# Patient Record
Sex: FEMALE | Race: ASIAN | Employment: FULL TIME | URBAN - METROPOLITAN AREA
[De-identification: names, ages, dates, MRNs, and addresses within clinical notes are randomized per-mention and may not be internally consistent; named-entity substitution may affect disease eponyms.]

---

## 2017-07-24 PROBLEM — Z01.419 WOMEN'S ANNUAL ROUTINE GYNECOLOGICAL EXAMINATION: Status: ACTIVE | Noted: 2017-07-24

## 2017-07-24 PROBLEM — N92.6 IRREGULAR MENSES: Status: ACTIVE | Noted: 2017-07-24

## 2017-10-10 ENCOUNTER — HOSPITAL ENCOUNTER (OUTPATIENT)
Dept: MAMMOGRAPHY | Age: 52
Discharge: HOME | End: 2017-10-10
Attending: FAMILY MEDICINE
Payer: COMMERCIAL

## 2017-10-10 DIAGNOSIS — Z12.31 SCREENING MAMMOGRAM, ENCOUNTER FOR: ICD-10-CM

## 2017-10-10 PROCEDURE — 77067 SCR MAMMO BI INCL CAD: CPT

## 2018-10-15 ENCOUNTER — HOSPITAL ENCOUNTER (OUTPATIENT)
Dept: MAMMOGRAPHY | Age: 53
Discharge: HOME | End: 2018-10-15
Attending: FAMILY MEDICINE
Payer: COMMERCIAL

## 2018-10-15 DIAGNOSIS — Z12.31 VISIT FOR SCREENING MAMMOGRAM: ICD-10-CM

## 2018-10-15 PROCEDURE — 77067 SCR MAMMO BI INCL CAD: CPT

## 2019-03-18 PROBLEM — Z12.31 SCREENING MAMMOGRAM, ENCOUNTER FOR: Status: ACTIVE | Noted: 2019-03-18

## 2019-04-08 PROCEDURE — 88305 TISSUE EXAM BY PATHOLOGIST: CPT

## 2019-04-09 ENCOUNTER — HOSPITAL ENCOUNTER (OUTPATIENT)
Dept: LAB | Age: 54
Discharge: HOME | End: 2019-04-09

## 2019-10-21 ENCOUNTER — HOSPITAL ENCOUNTER (OUTPATIENT)
Dept: MAMMOGRAPHY | Age: 54
Discharge: HOME | End: 2019-10-21
Attending: OBSTETRICS & GYNECOLOGY
Payer: COMMERCIAL

## 2019-10-21 DIAGNOSIS — Z12.31 SCREENING MAMMOGRAM, ENCOUNTER FOR: ICD-10-CM

## 2019-10-21 PROCEDURE — 77067 SCR MAMMO BI INCL CAD: CPT

## 2020-10-19 ENCOUNTER — TRANSCRIBE ORDER (OUTPATIENT)
Dept: SCHEDULING | Age: 55
End: 2020-10-19

## 2020-10-19 DIAGNOSIS — Z12.31 SCREENING MAMMOGRAM FOR HIGH-RISK PATIENT: Primary | ICD-10-CM

## 2020-11-09 ENCOUNTER — HOSPITAL ENCOUNTER (OUTPATIENT)
Dept: MAMMOGRAPHY | Age: 55
Discharge: HOME | End: 2020-11-09
Attending: OBSTETRICS & GYNECOLOGY
Payer: COMMERCIAL

## 2020-11-09 DIAGNOSIS — Z12.31 SCREENING MAMMOGRAM FOR HIGH-RISK PATIENT: ICD-10-CM

## 2020-11-09 PROCEDURE — 77067 SCR MAMMO BI INCL CAD: CPT

## 2021-10-22 ENCOUNTER — TRANSCRIBE ORDER (OUTPATIENT)
Dept: SCHEDULING | Age: 56
End: 2021-10-22

## 2021-10-22 DIAGNOSIS — Z12.31 ENCOUNTER FOR SCREENING MAMMOGRAM FOR MALIGNANT NEOPLASM OF BREAST: Primary | ICD-10-CM

## 2021-11-22 ENCOUNTER — HOSPITAL ENCOUNTER (OUTPATIENT)
Dept: MAMMOGRAPHY | Age: 56
Discharge: HOME | End: 2021-11-22
Attending: OBSTETRICS & GYNECOLOGY
Payer: COMMERCIAL

## 2021-11-22 DIAGNOSIS — Z12.31 ENCOUNTER FOR SCREENING MAMMOGRAM FOR MALIGNANT NEOPLASM OF BREAST: ICD-10-CM

## 2021-11-22 PROCEDURE — 77067 SCR MAMMO BI INCL CAD: CPT

## 2021-12-06 ENCOUNTER — HOSPITAL ENCOUNTER (OUTPATIENT)
Dept: MAMMOGRAPHY | Age: 56
Discharge: HOME | End: 2021-12-06
Attending: OBSTETRICS & GYNECOLOGY
Payer: COMMERCIAL

## 2021-12-06 DIAGNOSIS — R92.8 ABNORMAL SCREENING MAMMOGRAM: ICD-10-CM

## 2021-12-06 PROCEDURE — 77065 DX MAMMO INCL CAD UNI: CPT

## 2021-12-06 PROCEDURE — 76642 ULTRASOUND BREAST LIMITED: CPT

## 2021-12-20 ENCOUNTER — HOSPITAL ENCOUNTER (OUTPATIENT)
Dept: MAMMOGRAPHY | Age: 56
Discharge: HOME | End: 2021-12-20
Attending: OBSTETRICS & GYNECOLOGY

## 2021-12-20 DIAGNOSIS — R92.8 ABNORMAL ULTRASOUND OF BREAST: ICD-10-CM

## 2021-12-20 DIAGNOSIS — N63.20 BREAST MASS, LEFT: ICD-10-CM

## 2021-12-23 ENCOUNTER — HOSPITAL ENCOUNTER (OUTPATIENT)
Dept: MAMMOGRAPHY | Age: 56
Discharge: HOME | End: 2021-12-23
Attending: OBSTETRICS & GYNECOLOGY
Payer: COMMERCIAL

## 2021-12-23 VITALS — HEART RATE: 88 BPM | SYSTOLIC BLOOD PRESSURE: 222 MMHG | DIASTOLIC BLOOD PRESSURE: 104 MMHG

## 2021-12-23 DIAGNOSIS — N63.20 BREAST MASS, LEFT: ICD-10-CM

## 2021-12-23 DIAGNOSIS — R92.8 ABNORMAL ULTRASOUND OF BREAST: ICD-10-CM

## 2021-12-23 PROCEDURE — 88342 IMHCHEM/IMCYTCHM 1ST ANTB: CPT

## 2021-12-23 PROCEDURE — 88341 IMHCHEM/IMCYTCHM EA ADD ANTB: CPT

## 2021-12-23 PROCEDURE — 74011000250 HC RX REV CODE- 250: Performed by: OBSTETRICS & GYNECOLOGY

## 2021-12-23 PROCEDURE — A4648 IMPLANTABLE TISSUE MARKER: HCPCS

## 2021-12-23 PROCEDURE — 88361 TUMOR IMMUNOHISTOCHEM/COMPUT: CPT

## 2021-12-23 PROCEDURE — 77065 DX MAMMO INCL CAD UNI: CPT

## 2021-12-23 PROCEDURE — 88305 TISSUE EXAM BY PATHOLOGIST: CPT

## 2021-12-23 RX ORDER — LIDOCAINE HYDROCHLORIDE 10 MG/ML
10 INJECTION INFILTRATION; PERINEURAL
Status: COMPLETED | OUTPATIENT
Start: 2021-12-23 | End: 2021-12-23

## 2021-12-23 RX ADMIN — LIDOCAINE HYDROCHLORIDE 10 ML: 10 INJECTION, SOLUTION INFILTRATION; PERINEURAL at 09:30

## 2021-12-28 ENCOUNTER — HOSPITAL ENCOUNTER (OUTPATIENT)
Dept: MRI IMAGING | Age: 56
Discharge: HOME | End: 2021-12-28
Attending: OBSTETRICS & GYNECOLOGY
Payer: COMMERCIAL

## 2021-12-28 DIAGNOSIS — D05.10 DUCTAL CARCINOMA IN SITU (DCIS) OF BREAST: ICD-10-CM

## 2021-12-28 DIAGNOSIS — C50.919 INFILTRATING DUCT CARCINOMA (HCC): ICD-10-CM

## 2021-12-28 PROCEDURE — 74011250636 HC RX REV CODE- 250/636: Performed by: OBSTETRICS & GYNECOLOGY

## 2021-12-28 PROCEDURE — 77049 MRI BREAST C-+ W/CAD BI: CPT

## 2021-12-28 PROCEDURE — A9576 INJ PROHANCE MULTIPACK: HCPCS | Performed by: OBSTETRICS & GYNECOLOGY

## 2021-12-28 RX ORDER — SODIUM CHLORIDE 0.9 % (FLUSH) 0.9 %
10 SYRINGE (ML) INJECTION
Status: COMPLETED | OUTPATIENT
Start: 2021-12-28 | End: 2021-12-28

## 2021-12-28 RX ADMIN — GADOTERIDOL 15 ML: 279.3 INJECTION, SOLUTION INTRAVENOUS at 11:10

## 2021-12-28 RX ADMIN — Medication 10 ML: at 11:10

## 2021-12-28 NOTE — PROGRESS NOTES
Dr Ailyn Fontenot, [facility]  and I spoke with Ms Jovany Gomez, her daughter and  regarding the results of her Lt breast U/S bx. Pathology: Microinvasive IDC & DCIS. The patient had no post bx issues or concerns. She has the following apptiontments;  MRI   12-28-21 @ 10:45  Dr Dariana Garcia  1-10-22 @ 8:30    Results went well with  and I believe all information was given and all questions answered. They did ask questions regarding treatment and surgical options and I gave them very general terms but told them these questions will be answered by Dr Dariana Garcia.

## 2022-01-10 PROBLEM — C50.212 CARCINOMA OF UPPER-INNER QUADRANT OF LEFT BREAST IN FEMALE, ESTROGEN RECEPTOR NEGATIVE (HCC): Status: ACTIVE | Noted: 2022-01-10

## 2022-01-10 PROBLEM — C50.919 TRIPLE NEGATIVE MALIGNANT NEOPLASM OF BREAST (HCC): Status: ACTIVE | Noted: 2022-01-10

## 2022-01-10 PROBLEM — Z17.1 CARCINOMA OF UPPER-INNER QUADRANT OF LEFT BREAST IN FEMALE, ESTROGEN RECEPTOR NEGATIVE (HCC): Status: ACTIVE | Noted: 2022-01-10

## 2022-01-19 ENCOUNTER — PATIENT OUTREACH (OUTPATIENT)
Dept: CASE MANAGEMENT | Age: 57
End: 2022-01-19

## 2022-01-19 ENCOUNTER — HOSPITAL ENCOUNTER (OUTPATIENT)
Dept: LAB | Age: 57
Discharge: HOME | End: 2022-01-19
Payer: COMMERCIAL

## 2022-01-19 DIAGNOSIS — C50.919 TRIPLE NEGATIVE MALIGNANT NEOPLASM OF BREAST (HCC): ICD-10-CM

## 2022-01-19 LAB
ALBUMIN SERPL-MCNC: 3.6 G/DL (ref 3.5–5)
ALBUMIN/GLOB SERPL: 0.9 {RATIO} (ref 1.2–3.5)
ALP SERPL-CCNC: 117 U/L (ref 50–136)
ALT SERPL-CCNC: 35 U/L (ref 12–65)
ANION GAP SERPL CALC-SCNC: 5 MMOL/L (ref 7–16)
AST SERPL-CCNC: 19 U/L (ref 15–37)
BASOPHILS # BLD: 0 K/UL (ref 0–0.2)
BASOPHILS NFR BLD: 1 % (ref 0–2)
BILIRUB SERPL-MCNC: 0.4 MG/DL (ref 0.2–1.1)
BUN SERPL-MCNC: 13 MG/DL (ref 6–23)
CALCIUM SERPL-MCNC: 8.9 MG/DL (ref 8.3–10.4)
CANCER AG15-3 SERPL-ACNC: 3 U/ML (ref 1–35)
CHLORIDE SERPL-SCNC: 105 MMOL/L (ref 98–107)
CO2 SERPL-SCNC: 29 MMOL/L (ref 21–32)
CREAT SERPL-MCNC: 0.7 MG/DL (ref 0.6–1)
DIFFERENTIAL METHOD BLD: ABNORMAL
EOSINOPHIL # BLD: 0 K/UL (ref 0–0.8)
EOSINOPHIL NFR BLD: 0 % (ref 0.5–7.8)
ERYTHROCYTE [DISTWIDTH] IN BLOOD BY AUTOMATED COUNT: 12.5 % (ref 11.9–14.6)
GLOBULIN SER CALC-MCNC: 4.2 G/DL (ref 2.3–3.5)
GLUCOSE SERPL-MCNC: 137 MG/DL (ref 65–100)
HCT VFR BLD AUTO: 41.8 % (ref 35.8–46.3)
HGB BLD-MCNC: 13.6 G/DL (ref 11.7–15.4)
IMM GRANULOCYTES # BLD AUTO: 0 K/UL (ref 0–0.5)
IMM GRANULOCYTES NFR BLD AUTO: 0 % (ref 0–5)
LYMPHOCYTES # BLD: 1.5 K/UL (ref 0.5–4.6)
LYMPHOCYTES NFR BLD: 21 % (ref 13–44)
Lab: NORMAL
Lab: NORMAL
MCH RBC QN AUTO: 29.3 PG (ref 26.1–32.9)
MCHC RBC AUTO-ENTMCNC: 32.5 G/DL (ref 31.4–35)
MCV RBC AUTO: 90.1 FL (ref 79.6–97.8)
MONOCYTES # BLD: 0.3 K/UL (ref 0.1–1.3)
MONOCYTES NFR BLD: 5 % (ref 4–12)
NEUTS SEG # BLD: 5.4 K/UL (ref 1.7–8.2)
NEUTS SEG NFR BLD: 74 % (ref 43–78)
NRBC # BLD: 0 K/UL (ref 0–0.2)
PLATELET # BLD AUTO: 236 K/UL (ref 150–450)
PMV BLD AUTO: 9.3 FL (ref 9.4–12.3)
POTASSIUM SERPL-SCNC: 3.6 MMOL/L (ref 3.5–5.1)
PROT SERPL-MCNC: 7.8 G/DL (ref 6.3–8.2)
RBC # BLD AUTO: 4.64 M/UL (ref 4.05–5.2)
REFERENCE LAB,REFLB: NORMAL
REFERENCE LAB,REFLB: NORMAL
SODIUM SERPL-SCNC: 139 MMOL/L (ref 136–145)
TEST DESCRIPTION:,ATST: NORMAL
TEST DESCRIPTION:,ATST: NORMAL
WBC # BLD AUTO: 7.3 K/UL (ref 4.3–11.1)

## 2022-01-19 PROCEDURE — 85025 COMPLETE CBC W/AUTO DIFF WBC: CPT

## 2022-01-19 PROCEDURE — 86300 IMMUNOASSAY TUMOR CA 15-3: CPT

## 2022-01-19 PROCEDURE — 80053 COMPREHEN METABOLIC PANEL: CPT

## 2022-01-19 PROCEDURE — 36415 COLL VENOUS BLD VENIPUNCTURE: CPT

## 2022-01-19 NOTE — PROGRESS NOTES
1/19/22 saw pt today with Dr. Vicky Blackwell for initial medical oncology consult for triple negative breast cancer. Family is here with her today.  services Adonis Mac #[id number]. Recommendation is carbo/taxol/keytruda/adriamycin/cytoxan. Will arrange port placement, chemo education, and echo. 4 prescriptions sent to pharmacy, pt aware. Will arrange follow up with Dr. Pino Basilio for elevated blood pressure. Provided opportunity to ask questions and all were discussed. Port education book reviewed with pt. Chemo approval team emailed. Plan a start date of 2/7, not decoupled per Dr. Vicky Blackwell. Manuela, caitlinator, contact information provided. Navigation will continue to follow.

## 2022-01-20 LAB — CANCER AG27-29 SERPL-ACNC: 13.4 U/ML (ref 0–38.6)

## 2022-01-25 ENCOUNTER — DOCUMENTATION ONLY (OUTPATIENT)
Dept: HEMATOLOGY | Age: 57
End: 2022-01-25

## 2022-01-25 NOTE — PROGRESS NOTES
I spoke with Mrs. Stanley Astudillo, via [employer]  [place], regarding her insurance coverage, potential oral medication authorizations, enrollment in the [address] [place], [place] [address] ([address]), and assistance organization resource sheet. Mrs. Stanley Astudillo has [facility]. She has no deductible. 90/10% co-insurance. Of her $1,600 max out of pocket, she has $1,585 remaining. I went over the [employer] co-pay program for [place] and the hospital financial assistance program with Mrs. Stanley Astudillo. I went over the [place] application page by page with Mrs. Stanley Astudillo. Mrs. Stanley Astudillo agreed to submit the [place] co-pay application and signed where appropriate. Hospital financial assistance application in [place] and [facility] was given to Mrs. Stanley Astudillo. Next, I spoke with patient regarding potential oral medication authorizations. I told her that if she ever had any problems getting her oral medications filled to give the dedicated [facility] , Rafa Pena, a call. Most of the time, it is simply an authorization that needs to be done with the insurance company. Next, I spoke with patient regarding enrolling with [facility]. I went over some of the services that [facility] offers and the enrollment process. Lastly, I gave patient a form with various resource organizations that could assist with specific needs (example:  transportation, lodging, preparing meals, home cleaning)     Faxed Physician's Statement to the [facility] 2[facility] at [phone]. Phone [phone]. Form scanned into chart. Patient expressed understanding of the information above and all questions were answered to her satisfaction.

## 2022-02-01 ENCOUNTER — ANESTHESIA EVENT (OUTPATIENT)
Dept: INTERVENTIONAL RADIOLOGY/VASCULAR | Age: 57
End: 2022-02-01
Payer: COMMERCIAL

## 2022-02-01 RX ORDER — SODIUM CHLORIDE, SODIUM LACTATE, POTASSIUM CHLORIDE, CALCIUM CHLORIDE 600; 310; 30; 20 MG/100ML; MG/100ML; MG/100ML; MG/100ML
75 INJECTION, SOLUTION INTRAVENOUS CONTINUOUS
Status: CANCELLED | OUTPATIENT
Start: 2022-02-01

## 2022-02-01 RX ORDER — LIDOCAINE HYDROCHLORIDE 10 MG/ML
0.1 INJECTION INFILTRATION; PERINEURAL AS NEEDED
Status: CANCELLED | OUTPATIENT
Start: 2022-02-01

## 2022-02-01 RX ORDER — HALOPERIDOL 5 MG/ML
1 INJECTION INTRAMUSCULAR
Status: CANCELLED | OUTPATIENT
Start: 2022-02-01 | End: 2022-02-02

## 2022-02-01 RX ORDER — ACETAMINOPHEN 500 MG
1000 TABLET ORAL ONCE
Status: CANCELLED | OUTPATIENT
Start: 2022-02-01 | End: 2022-02-01

## 2022-02-01 RX ORDER — FLUMAZENIL 0.1 MG/ML
0.2 INJECTION INTRAVENOUS
Status: CANCELLED | OUTPATIENT
Start: 2022-02-01

## 2022-02-01 RX ORDER — HYDROMORPHONE HYDROCHLORIDE 2 MG/ML
0.5 INJECTION, SOLUTION INTRAMUSCULAR; INTRAVENOUS; SUBCUTANEOUS
Status: CANCELLED | OUTPATIENT
Start: 2022-02-01

## 2022-02-01 RX ORDER — DIPHENHYDRAMINE HYDROCHLORIDE 50 MG/ML
12.5 INJECTION, SOLUTION INTRAMUSCULAR; INTRAVENOUS
Status: CANCELLED | OUTPATIENT
Start: 2022-02-01

## 2022-02-01 RX ORDER — MIDAZOLAM HYDROCHLORIDE 1 MG/ML
2 INJECTION, SOLUTION INTRAMUSCULAR; INTRAVENOUS
Status: CANCELLED | OUTPATIENT
Start: 2022-02-01 | End: 2022-02-02

## 2022-02-01 RX ORDER — SODIUM CHLORIDE, SODIUM LACTATE, POTASSIUM CHLORIDE, CALCIUM CHLORIDE 600; 310; 30; 20 MG/100ML; MG/100ML; MG/100ML; MG/100ML
100 INJECTION, SOLUTION INTRAVENOUS CONTINUOUS
Status: CANCELLED | OUTPATIENT
Start: 2022-02-01 | End: 2022-02-02

## 2022-02-01 RX ORDER — OXYCODONE HYDROCHLORIDE 5 MG/1
5 TABLET ORAL
Status: CANCELLED | OUTPATIENT
Start: 2022-02-01 | End: 2022-02-02

## 2022-02-01 RX ORDER — NALOXONE HYDROCHLORIDE 0.4 MG/ML
0.1 INJECTION, SOLUTION INTRAMUSCULAR; INTRAVENOUS; SUBCUTANEOUS
Status: CANCELLED | OUTPATIENT
Start: 2022-02-01

## 2022-02-02 ENCOUNTER — ANESTHESIA (OUTPATIENT)
Dept: INTERVENTIONAL RADIOLOGY/VASCULAR | Age: 57
End: 2022-02-02
Payer: COMMERCIAL

## 2022-02-02 ENCOUNTER — HOSPITAL ENCOUNTER (OUTPATIENT)
Dept: INTERVENTIONAL RADIOLOGY/VASCULAR | Age: 57
Discharge: HOME | End: 2022-02-02
Attending: INTERNAL MEDICINE
Payer: COMMERCIAL

## 2022-02-02 VITALS
DIASTOLIC BLOOD PRESSURE: 68 MMHG | TEMPERATURE: 97.8 F | HEIGHT: 60 IN | RESPIRATION RATE: 14 BRPM | HEART RATE: 65 BPM | OXYGEN SATURATION: 99 % | WEIGHT: 161 LBS | SYSTOLIC BLOOD PRESSURE: 125 MMHG | BODY MASS INDEX: 31.61 KG/M2

## 2022-02-02 DIAGNOSIS — C50.919 TRIPLE NEGATIVE MALIGNANT NEOPLASM OF BREAST (HCC): ICD-10-CM

## 2022-02-02 PROCEDURE — 77030010507 HC ADH SKN DERMBND J&J -B

## 2022-02-02 PROCEDURE — 74011000250 HC RX REV CODE- 250: Performed by: PHYSICIAN ASSISTANT

## 2022-02-02 PROCEDURE — 36561 INSERT TUNNELED CV CATH: CPT

## 2022-02-02 PROCEDURE — 76060000032 HC ANESTHESIA 0.5 TO 1 HR

## 2022-02-02 PROCEDURE — 74011250636 HC RX REV CODE- 250/636: Performed by: PHYSICIAN ASSISTANT

## 2022-02-02 PROCEDURE — C1894 INTRO/SHEATH, NON-LASER: HCPCS

## 2022-02-02 PROCEDURE — 74011250636 HC RX REV CODE- 250/636: Performed by: NURSE ANESTHETIST, CERTIFIED REGISTERED

## 2022-02-02 PROCEDURE — C1788 PORT, INDWELLING, IMP: HCPCS

## 2022-02-02 PROCEDURE — 77030031139 HC SUT VCRL2 J&J -A

## 2022-02-02 PROCEDURE — 76937 US GUIDE VASCULAR ACCESS: CPT

## 2022-02-02 PROCEDURE — 77030031131 HC SUT MXN P COVD -B

## 2022-02-02 RX ORDER — SODIUM CHLORIDE, SODIUM LACTATE, POTASSIUM CHLORIDE, CALCIUM CHLORIDE 600; 310; 30; 20 MG/100ML; MG/100ML; MG/100ML; MG/100ML
INJECTION, SOLUTION INTRAVENOUS
Status: DISCONTINUED | OUTPATIENT
Start: 2022-02-02 | End: 2022-02-02 | Stop reason: HOSPADM

## 2022-02-02 RX ORDER — HEPARIN SODIUM (PORCINE) LOCK FLUSH IV SOLN 100 UNIT/ML 100 UNIT/ML
500 SOLUTION INTRAVENOUS ONCE
Status: COMPLETED | OUTPATIENT
Start: 2022-02-02 | End: 2022-02-02

## 2022-02-02 RX ORDER — CEFAZOLIN SODIUM/WATER 2 G/20 ML
2 SYRINGE (ML) INTRAVENOUS ONCE
Status: COMPLETED | OUTPATIENT
Start: 2022-02-02 | End: 2022-02-02

## 2022-02-02 RX ORDER — LIDOCAINE HYDROCHLORIDE AND EPINEPHRINE 10; 10 MG/ML; UG/ML
1-20 INJECTION, SOLUTION INFILTRATION; PERINEURAL
Status: DISCONTINUED | OUTPATIENT
Start: 2022-02-02 | End: 2022-02-06 | Stop reason: HOSPADM

## 2022-02-02 RX ORDER — PROPOFOL 10 MG/ML
INJECTION, EMULSION INTRAVENOUS
Status: DISCONTINUED | OUTPATIENT
Start: 2022-02-02 | End: 2022-02-02 | Stop reason: HOSPADM

## 2022-02-02 RX ORDER — PROPOFOL 10 MG/ML
INJECTION, EMULSION INTRAVENOUS AS NEEDED
Status: DISCONTINUED | OUTPATIENT
Start: 2022-02-02 | End: 2022-02-02 | Stop reason: HOSPADM

## 2022-02-02 RX ADMIN — Medication 2 G: at 09:48

## 2022-02-02 RX ADMIN — PROPOFOL 160 MCG/KG/MIN: 10 INJECTION, EMULSION INTRAVENOUS at 09:48

## 2022-02-02 RX ADMIN — PROPOFOL 50 MG: 10 INJECTION, EMULSION INTRAVENOUS at 09:48

## 2022-02-02 RX ADMIN — SODIUM CHLORIDE, SODIUM LACTATE, POTASSIUM CHLORIDE, AND CALCIUM CHLORIDE: 600; 310; 30; 20 INJECTION, SOLUTION INTRAVENOUS at 09:42

## 2022-02-02 RX ADMIN — LIDOCAINE HYDROCHLORIDE,EPINEPHRINE BITARTRATE 200 MG: 10; .01 INJECTION, SOLUTION INFILTRATION; PERINEURAL at 10:02

## 2022-02-02 RX ADMIN — HEPARIN SODIUM (PORCINE) LOCK FLUSH IV SOLN 100 UNIT/ML 500 UNITS: 100 SOLUTION at 10:09

## 2022-02-02 NOTE — DISCHARGE INSTRUCTIONS
[address]  Department of Interventional Radiology  [facility]  [phone] Office  [phone] Fax  Implanted Port Discharge Instructions      General Instructions:   A port is like an implanted IV. They are usually ordered for patients who will be getting chemotherapy, but can also be used as an IV for long term antibiotics, large amounts of fluids, and/or blood products. Your blood can be drawn from your port for labs also. Those patients who do not have good veins find the ports convenient as they can get the IV they need with one stick. The port can be used long term, and the care is easy. The device is under the skin, and once the skin heals, care is minimal. All that is required is the nurse who accesses the port will need to flush it with heparinized saline after each use. Ports are usually placed in the chest wall, usually on the right side. But they can be place in the arms and in the abdomen. Home Care Instructions: If your port is in your arm, do not allow blood pressure or other IVs to be place in that arm. Do not allow bra straps or any clothing to rub the skin over the port. Do not bathe or swim until the skin has healed and if the port is accessed. Once it is healed, and when the port is not accessed, it is okay to bathe and swim. Restrict yourself to light activity for the first 5 days after getting the port put in, after that, resume normal activity slowly. You may resume your normal diet and medications. Follow-Up Instructions: Please see your oncologist, or whatever physician ordered the port as he/she has requested of you. Call If: You should call your Physician and/or the Radiology Nurse if you notice redness, pus, swelling, or pain from the area of your incision. Call if you should develop a fever. The nurses who access your port will know to call your doctor if the port does not seem to be working properly. You need to tell the nurses who use the port if you should have any pain or swelling at the site during an infusion. To Reach Us: If you have any questions about your procedure, please call the Interventional Radiology department at [phone]. After business hours (5pm) and weekends, call the answering service at [phone] and ask for the Radiologist on call to be paged. Si tiene Preguntas acerca del procedimiento, por favor llame al departamento de Radiología Intervencional al [phone]. Después de horas de oficina (5 pm) y los fines de [place], llamar al [place] Ask Alexa al [phone] y pregunte por el [facility]. Interventional Radiology General Nurse Discharge    After general anesthesia or intravenous sedation, for 24 hours or while taking prescription Narcotics:  · Limit your activities  · Do not drive and operate hazardous machinery  · Do not make important personal or business decisions  · Do  not drink alcoholic beverages  · If you have not urinated within 8 hours after discharge, please contact your surgeon on call. * Please give a list of your current medications to your Primary Care Provider. * Please update this list whenever your medications are discontinued, doses are     changed, or new medications (including over-the-counter products) are added. * Please carry medication information at all times in case of emergency situations. These are general instructions for a healthy lifestyle:    No smoking/ No tobacco products/ Avoid exposure to second hand smoke  Surgeon General's Warning:  Quitting smoking now greatly reduces serious risk to your health.     Obesity, smoking, and sedentary lifestyle greatly increases your risk for illness  A healthy diet, regular physical exercise & weight monitoring are important for maintaining a healthy lifestyle    You may be retaining fluid if you have a history of heart failure or if you experience any of the following symptoms:  Weight gain of 3 pounds or more overnight or 5 pounds in a week, increased swelling in our hands or feet or shortness of breath while lying flat in bed. Please call your doctor as soon as you notice any of these symptoms; do not wait until your next office visit. Recognize signs and symptoms of STROKE:  F-face looks uneven    A-arms unable to move or move unevenly    S-speech slurred or non-existent    T-time-call 911 as soon as signs and symptoms begin-DO NOT go       Back to bed or wait to see if you get better-TIME IS BRAIN.       Patient Signature:  Date: 2/2/2022  Discharging Nurse: Kayla Schwartz RN

## 2022-02-02 NOTE — PROGRESS NOTES
Prep complete. Patient ready  For procedure.  utilized for triage and port teaching. Opportunity for questions provided. Both patient and family verbalized understanding.

## 2022-02-02 NOTE — PROCEDURES
Department of Interventional Radiology  [phone]        Interventional Radiology Brief Procedure Note    Patient: Angela Gonzales MRN: [id number]  SSN: [id number]    YOB: 1965  Age: 49 y.o.   Sex: female      Date of Procedure: 2/2/2022    Pre-Procedure Diagnosis: breast cancer    Post-Procedure Diagnosis: SAME    Procedure(s): Venous Chest Port Placement    Brief Description of Procedure: as above    Performed By: Lucio Fowler PA-C     Assistants: None    Anesthesia:TIVS/MAC    Estimated Blood Loss: Less than 10ml    Specimens:  None    Implants:  Chest Port Placement    Findings: catheter tip in right atrium     Complications: None    Recommendations: ok to use port     Follow Up: prn    Signed By: Lucio Fowler PA-C     February 2, 2022

## 2022-02-02 NOTE — ANESTHESIA POSTPROCEDURE EVALUATION
Tunneled CV catheter. total IV anesthesia    Anesthesia Post Evaluation      Multimodal analgesia: multimodal analgesia used between 6 hours prior to anesthesia start to PACU discharge  Patient location during evaluation: bedside  Patient participation: complete - patient participated  Level of consciousness: awake  Pain management: adequate  Airway patency: patent  Anesthetic complications: no  Cardiovascular status: acceptable  Respiratory status: spontaneous ventilation and acceptable  Hydration status: acceptable  Post anesthesia nausea and vomiting:  none      INITIAL Post-op Vital signs:   Vitals Value Taken Time   /68 02/02/22 1050   Temp     Pulse 78 02/02/22 1054   Resp 14 02/02/22 1050   SpO2 100 % 02/02/22 1052   Vitals shown include unvalidated device data.

## 2022-02-02 NOTE — H&P
Department of Interventional Radiology  [phone]    History and Physical    Patient:  Nayely Madison MRN:  [id number]  SSN:  [id number]    YOB: 1965  Age:  49 y.o. Sex:  female      Primary Care Provider:  Jeovanny Edmonds MD  Referring Physician:  Tiffany Salmon MD    Subjective:     Chief Complaint: port    History of the Present Illness: The patient is a 49 y.o. Mandarin speaking female with breast cancer who presents for venous chest port placement. Npo. No acute complaints. Past Medical History:   Diagnosis Date    Breast cancer ([facility] [address]     Hypertension     Murmur, heart     echo 2016 EF 60-65% ; 1/31/22 states never has had symptoms    Poor historian     difficulty with verifying medication list     Past Surgical History:   Procedure Laterality Date    [place] BREAST SURGERY PROCEDURE UNLISTED      bx of left breast         Review of Systems:    Pertinent items are noted in the History of Present Illness. Prior to Admission medications    Medication Sig Start Date End Date Taking? Authorizing Provider   LORazepam (Ativan) 1 mg tablet Take 0.5-1 Tablets by mouth every eight (8) hours as needed for Anxiety. Max Daily Amount: 3 mg. Indications: anxious, nausea and vomiting caused by cancer drugs 1/29/22  Yes Jacqueline Herbert NP   senna-docusate (PERICOLACE) 8.6-50 mg per tablet Take 1 Tablet by mouth daily as needed for Constipation. 1/29/22  Yes Jacqueline Herbert NP   spironolactone (ALDACTONE) 25 mg tablet Take 25 mg by mouth daily. 1/20/22 1/20/23 Yes Provider, Historical   montelukast (Singulair) 10 mg tablet Take 10 mg by mouth daily. Yes Provider, Historical   dilTIAZem CD (CARDIZEM CD) 240 mg ER capsule 1 po qd 11/5/18  Yes Jeovanny Edmonds MD   losartan-hydroCHLOROthiazide [facility]) 100-12.5 mg per tablet 1 PO QD 11/5/18  Yes Jeovanny Edmonds MD   prochlorperazine (Compazine) 10 mg tablet Take 1 Tablet by mouth every six (6) hours as needed for Nausea.  Indications: prevent nausea and vomiting from cancer chemotherapy  Patient not taking: Reported on 1/31/2022 1/29/22   Piedad, Idelia Pill, NP   ondansetron hcl (Zofran) 8 mg tablet Take 1 Tablet by mouth every eight (8) hours as needed for Nausea.  Indications: prevent nausea and vomiting from cancer chemotherapy  Patient not taking: Reported on 1/31/2022 1/29/22   Piedad, Idelia Pill, NP   lidocaine-prilocaine (EMLA) topical cream Apply to port about 45 minutes prior to access  Patient not taking: Reported on 2/2/2022 1/29/22   Piedad, Idelia Pill, NP        Allergies   Allergen Reactions    Lisinopril Cough     Other reaction(s): Cough       Family History   Problem Relation Age of Onset    No Known Problems Mother     Hypertension Father     No Known Problems Sister     No Known Problems Brother     Other Other         states no family history    No Known Problems Sister     Breast Cancer Neg Hx     Colon Cancer Neg Hx     Ovarian Cancer Neg Hx     Uterine Cancer Neg Hx      Social History     Tobacco Use    Smoking status: Never Smoker    Smokeless tobacco: Never Used   Substance Use Topics    Alcohol use: No     Alcohol/week: 0.0 standard drinks        Objective:       Physical Examination:    Vitals:    02/02/22 0837   BP: (!) 191/88   Pulse: 84   Resp: 16   Temp: 97.5 °F (36.4 °C)   SpO2: 99%   Weight: 73 kg (161 lb)   Height: 5' (1.524 m)       Pain Assessment  Pain Intensity 1: 0 (02/02/22 0858)               HEART: regular rate and rhythm  LUNG: clear to auscultation bilaterally  ABDOMEN: normal findings: soft, non-tender  EXTREMITIES: warm, no edema    Laboratory:     Lab Results   Component Value Date/Time    Sodium 139 01/19/2022 11:24 AM    Sodium 142 10/25/2018 12:56 PM    Potassium 3.6 01/19/2022 11:24 AM    Potassium 4.1 10/25/2018 12:56 PM    Chloride 105 01/19/2022 11:24 AM    Chloride 102 10/25/2018 12:56 PM    CO2 29 01/19/2022 11:24 AM    CO2 23 10/25/2018 12:56 PM    Anion gap 5 (L) 01/19/2022 11:24 AM    Glucose 137 (H) 01/19/2022 11:24 AM    Glucose 107 (H) 10/25/2018 12:56 PM    BUN 13 01/19/2022 11:24 AM    BUN 10 10/25/2018 12:56 PM    Creatinine 0.70 01/19/2022 11:24 AM    Creatinine 0.59 10/25/2018 12:56 PM    GFR est AA >60 01/19/2022 11:24 AM    GFR est  10/25/2018 12:56 PM    GFR est non-AA >60 01/19/2022 11:24 AM    GFR est non- 10/25/2018 12:56 PM    Calcium 8.9 01/19/2022 11:24 AM    Calcium 9.4 10/25/2018 12:56 PM    Albumin 3.6 01/19/2022 11:24 AM    Albumin 4.4 10/25/2018 12:56 PM    Protein, total 7.8 01/19/2022 11:24 AM    Protein, total 7.7 10/25/2018 12:56 PM    Globulin 4.2 (H) 01/19/2022 11:24 AM    A-G Ratio 0.9 (L) 01/19/2022 11:24 AM    A-G Ratio 1.3 10/25/2018 12:56 PM    ALT (SGPT) 35 01/19/2022 11:24 AM    ALT (SGPT) 16 10/25/2018 12:56 PM     Lab Results   Component Value Date/Time    WBC 7.3 01/19/2022 11:24 AM    WBC 7.3 10/25/2018 09:35 AM    HGB 13.6 01/19/2022 11:24 AM    HGB 13.4 10/25/2018 09:35 AM    HCT 41.8 01/19/2022 11:24 AM    HCT 40.6 10/25/2018 09:35 AM    PLATELET 479 32/78/7525 11:24 AM    PLATELET 655 42/47/7058 09:35 AM     No results found for: APTT, PTP, INR, INREXT    Assessment:     Breast cancer    Hospital Problems  Date Reviewed: 1/25/2022    None          Plan:     Planned Procedure:  Port placement    Risks, benefits, and alternatives reviewed with patient and she agrees to proceed with the procedure.       Signed By: Nhung Grace PA-C     February 2, 2022

## 2022-02-02 NOTE — PROGRESS NOTES
Patient back to IR Recovery 5 for MAC recovery period. Patient resting comfortably with daughter at bedside.

## 2022-02-02 NOTE — ANESTHESIA PREPROCEDURE EVALUATION
Anesthetic History   No history of anesthetic complications            Review of Systems / Medical History  Patient summary reviewed and pertinent labs reviewed    Pulmonary  Within defined limits                 Neuro/Psych         Headaches     Cardiovascular    Hypertension: well controlled              Exercise tolerance: >4 METS     GI/Hepatic/Renal  Within defined limits              Endo/Other        Obesity and cancer (Breast)     Other Findings              Physical Exam    Airway  Mallampati: II  TM Distance: 4 - 6 cm  Neck ROM: normal range of motion   Mouth opening: Normal     Cardiovascular  Regular rate and rhythm,  S1 and S2 normal,  no murmur, click, rub, or gallop          Pertinent negatives: No murmur   Dental  No notable dental hx       Pulmonary  Breath sounds clear to auscultation               Abdominal  GI exam deferred       Other Findings            Anesthetic Plan    ASA: 2  Anesthesia type: total IV anesthesia          Induction: Intravenous  Anesthetic plan and risks discussed with: Patient and Son / Daughter       used via video feed for H&P and IC.

## 2022-02-07 ENCOUNTER — HOSPITAL ENCOUNTER (OUTPATIENT)
Dept: INFUSION THERAPY | Age: 57
Discharge: HOME | End: 2022-02-07
Payer: COMMERCIAL

## 2022-02-07 ENCOUNTER — PATIENT OUTREACH (OUTPATIENT)
Dept: CASE MANAGEMENT | Age: 57
End: 2022-02-07

## 2022-02-07 VITALS
RESPIRATION RATE: 18 BRPM | DIASTOLIC BLOOD PRESSURE: 75 MMHG | SYSTOLIC BLOOD PRESSURE: 123 MMHG | TEMPERATURE: 98 F | OXYGEN SATURATION: 100 % | HEART RATE: 70 BPM

## 2022-02-07 DIAGNOSIS — Z79.899 HIGH RISK MEDICATION USE: ICD-10-CM

## 2022-02-07 DIAGNOSIS — C50.919 TRIPLE NEGATIVE MALIGNANT NEOPLASM OF BREAST (HCC): ICD-10-CM

## 2022-02-07 DIAGNOSIS — C50.919 TRIPLE NEGATIVE MALIGNANT NEOPLASM OF BREAST (HCC): Primary | ICD-10-CM

## 2022-02-07 LAB
ALBUMIN SERPL-MCNC: 3.9 G/DL (ref 3.5–5)
ALBUMIN/GLOB SERPL: 0.8 {RATIO} (ref 1.2–3.5)
ALP SERPL-CCNC: 111 U/L (ref 50–136)
ALT SERPL-CCNC: 31 U/L (ref 12–65)
ANION GAP SERPL CALC-SCNC: 4 MMOL/L (ref 7–16)
AST SERPL-CCNC: 18 U/L (ref 15–37)
BASOPHILS # BLD: 0.1 K/UL (ref 0–0.2)
BASOPHILS NFR BLD: 1 % (ref 0–2)
BILIRUB SERPL-MCNC: 0.6 MG/DL (ref 0.2–1.1)
BUN SERPL-MCNC: 19 MG/DL (ref 6–23)
CALCIUM SERPL-MCNC: 9.1 MG/DL (ref 8.3–10.4)
CHLORIDE SERPL-SCNC: 104 MMOL/L (ref 98–107)
CO2 SERPL-SCNC: 28 MMOL/L (ref 21–32)
CREAT SERPL-MCNC: 0.6 MG/DL (ref 0.6–1)
DIFFERENTIAL METHOD BLD: ABNORMAL
EOSINOPHIL # BLD: 0.1 K/UL (ref 0–0.8)
EOSINOPHIL NFR BLD: 1 % (ref 0.5–7.8)
ERYTHROCYTE [DISTWIDTH] IN BLOOD BY AUTOMATED COUNT: 12.6 % (ref 11.9–14.6)
GLOBULIN SER CALC-MCNC: 4.6 G/DL (ref 2.3–3.5)
GLUCOSE SERPL-MCNC: 112 MG/DL (ref 65–100)
HBV SURFACE AB SERPL IA-ACNC: 206.71 MIU/ML
HCT VFR BLD AUTO: 42.8 % (ref 35.8–46.3)
HGB BLD-MCNC: 13.9 G/DL (ref 11.7–15.4)
IMM GRANULOCYTES # BLD AUTO: 0 K/UL (ref 0–0.5)
IMM GRANULOCYTES NFR BLD AUTO: 0 % (ref 0–5)
LYMPHOCYTES # BLD: 1.9 K/UL (ref 0.5–4.6)
LYMPHOCYTES NFR BLD: 18 % (ref 13–44)
MCH RBC QN AUTO: 28.9 PG (ref 26.1–32.9)
MCHC RBC AUTO-ENTMCNC: 32.5 G/DL (ref 31.4–35)
MCV RBC AUTO: 89 FL (ref 79.6–97.8)
MONOCYTES # BLD: 0.5 K/UL (ref 0.1–1.3)
MONOCYTES NFR BLD: 5 % (ref 4–12)
NEUTS SEG # BLD: 7.9 K/UL (ref 1.7–8.2)
NEUTS SEG NFR BLD: 75 % (ref 43–78)
NRBC # BLD: 0 K/UL (ref 0–0.2)
PLATELET # BLD AUTO: 286 K/UL (ref 150–450)
PMV BLD AUTO: 9.3 FL (ref 9.4–12.3)
POTASSIUM SERPL-SCNC: 3.8 MMOL/L (ref 3.5–5.1)
PROT SERPL-MCNC: 8.5 G/DL (ref 6.3–8.2)
RBC # BLD AUTO: 4.81 M/UL (ref 4.05–5.2)
SODIUM SERPL-SCNC: 136 MMOL/L (ref 136–145)
T3 SERPL-MCNC: 1.35 NG/ML (ref 0.6–1.81)
T4 FREE SERPL-MCNC: 1.1 NG/DL (ref 0.78–1.46)
TSH SERPL DL<=0.005 MIU/L-ACNC: 0.76 UIU/ML (ref 0.36–3.74)
WBC # BLD AUTO: 10.5 K/UL (ref 4.3–11.1)

## 2022-02-07 PROCEDURE — 84480 ASSAY TRIIODOTHYRONINE (T3): CPT

## 2022-02-07 PROCEDURE — 83735 ASSAY OF MAGNESIUM: CPT

## 2022-02-07 PROCEDURE — 87340 HEPATITIS B SURFACE AG IA: CPT

## 2022-02-07 PROCEDURE — 96417 CHEMO IV INFUS EACH ADDL SEQ: CPT

## 2022-02-07 PROCEDURE — 86704 HEP B CORE ANTIBODY TOTAL: CPT

## 2022-02-07 PROCEDURE — 80053 COMPREHEN METABOLIC PANEL: CPT

## 2022-02-07 PROCEDURE — 36591 DRAW BLOOD OFF VENOUS DEVICE: CPT

## 2022-02-07 PROCEDURE — 74011250636 HC RX REV CODE- 250/636: Performed by: INTERNAL MEDICINE

## 2022-02-07 PROCEDURE — 96375 TX/PRO/DX INJ NEW DRUG ADDON: CPT

## 2022-02-07 PROCEDURE — 96367 TX/PROPH/DG ADDL SEQ IV INF: CPT

## 2022-02-07 PROCEDURE — 74011000250 HC RX REV CODE- 250: Performed by: INTERNAL MEDICINE

## 2022-02-07 PROCEDURE — 85025 COMPLETE CBC W/AUTO DIFF WBC: CPT

## 2022-02-07 PROCEDURE — 84443 ASSAY THYROID STIM HORMONE: CPT

## 2022-02-07 PROCEDURE — 96413 CHEMO IV INFUSION 1 HR: CPT

## 2022-02-07 PROCEDURE — 86706 HEP B SURFACE ANTIBODY: CPT

## 2022-02-07 PROCEDURE — 84439 ASSAY OF FREE THYROXINE: CPT

## 2022-02-07 PROCEDURE — 74011000258 HC RX REV CODE- 258: Performed by: INTERNAL MEDICINE

## 2022-02-07 RX ORDER — DIPHENHYDRAMINE HYDROCHLORIDE 50 MG/ML
50 INJECTION, SOLUTION INTRAMUSCULAR; INTRAVENOUS ONCE
Status: COMPLETED | OUTPATIENT
Start: 2022-02-07 | End: 2022-02-07

## 2022-02-07 RX ORDER — SODIUM CHLORIDE 0.9 % (FLUSH) 0.9 %
10 SYRINGE (ML) INJECTION AS NEEDED
Status: ACTIVE | OUTPATIENT
Start: 2022-02-07 | End: 2022-02-07

## 2022-02-07 RX ORDER — SODIUM CHLORIDE 0.9 % (FLUSH) 0.9 %
10 SYRINGE (ML) INJECTION AS NEEDED
Status: DISCONTINUED | OUTPATIENT
Start: 2022-02-07 | End: 2022-02-09 | Stop reason: HOSPADM

## 2022-02-07 RX ORDER — ONDANSETRON 2 MG/ML
8 INJECTION INTRAMUSCULAR; INTRAVENOUS ONCE
Status: COMPLETED | OUTPATIENT
Start: 2022-02-07 | End: 2022-02-07

## 2022-02-07 RX ORDER — SODIUM CHLORIDE 9 MG/ML
25 INJECTION, SOLUTION INTRAVENOUS CONTINUOUS
Status: ACTIVE | OUTPATIENT
Start: 2022-02-07 | End: 2022-02-07

## 2022-02-07 RX ADMIN — SODIUM CHLORIDE 25 ML/HR: 9 INJECTION, SOLUTION INTRAVENOUS at 11:20

## 2022-02-07 RX ADMIN — PACLITAXEL 142 MG: 6 INJECTION, SOLUTION, CONCENTRATE INTRAVENOUS at 13:50

## 2022-02-07 RX ADMIN — ONDANSETRON 8 MG: 2 INJECTION INTRAMUSCULAR; INTRAVENOUS at 12:28

## 2022-02-07 RX ADMIN — SODIUM CHLORIDE 744 MG: 900 INJECTION, SOLUTION INTRAVENOUS at 15:10

## 2022-02-07 RX ADMIN — FAMOTIDINE 20 MG: 10 INJECTION, SOLUTION INTRAVENOUS at 12:25

## 2022-02-07 RX ADMIN — Medication 10 ML: at 09:35

## 2022-02-07 RX ADMIN — DIPHENHYDRAMINE HYDROCHLORIDE 50 MG: 50 INJECTION, SOLUTION INTRAMUSCULAR; INTRAVENOUS at 12:31

## 2022-02-07 RX ADMIN — SODIUM CHLORIDE 200 MG: 9 INJECTION, SOLUTION INTRAVENOUS at 11:45

## 2022-02-07 RX ADMIN — FOSAPREPITANT 150 MG: 150 INJECTION, POWDER, LYOPHILIZED, FOR SOLUTION INTRAVENOUS at 13:00

## 2022-02-07 RX ADMIN — SODIUM CHLORIDE, PRESERVATIVE FREE 10 ML: 5 INJECTION INTRAVENOUS at 11:20

## 2022-02-07 RX ADMIN — SODIUM CHLORIDE, PRESERVATIVE FREE 10 ML: 5 INJECTION INTRAVENOUS at 15:50

## 2022-02-07 RX ADMIN — DEXAMETHASONE SODIUM PHOSPHATE 12 MG: 4 INJECTION, SOLUTION INTRAMUSCULAR; INTRAVENOUS at 12:40

## 2022-02-07 NOTE — PROGRESS NOTES
2/7/2022 saw pt today with Dr. Vicky Blackwell and interpeter # [id number] for pre chemo visit #1 Carbo/Taxol/Ketruda. Patient discussed plan with Dr. Vicky Blackwell.  and daughter present. Given number for  service line for calls to office [phone]. .  Follow up in 1 week. Encouraged to call with any concerns, uncontrolled side effects from treatment, or fevers. Navigation will continue to follow.

## 2022-02-07 NOTE — PROGRESS NOTES
Arrived to the [facility] from [facility] office visit. Pembrolizumab, Taxol and Carboplatin completed. Patient tolerated well. Pre and post infusion instructions  given via  services for Mandarin chinese. Jessica Shell [phone] and Irena Tsang [id number] interpreters. Any issues or concerns during appointment: NO.  Patient aware of next infusion appointment on 02/14/22 (date) at 80 (time). Patient aware of next lab and [facility] office visit on 02/14/22 (date) at 0930 (time). Patient instructed to call provider with temperature of 100.4 or greater or nausea/vomiting/ diarrhea or pain not controlled by medications. Pt and family have  services number to call with any questions post treatment. Discharged ambulatory with family.

## 2022-02-07 NOTE — PROGRESS NOTES
Patient arrived to port lab for port access and lab draw   Florin Erp accessed and labs drawn per protocol   *Port remains accessed   Patient discharged from port lab ambulatory*

## 2022-02-08 LAB
HBV CORE AB SERPL QL IA: NEGATIVE
HBV SURFACE AG SERPL QL IA: NEGATIVE
MAGNESIUM SERPL-MCNC: 2 MG/DL (ref 1.6–2.3)

## 2022-02-09 ENCOUNTER — DOCUMENTATION ONLY (OUTPATIENT)
Dept: HEMATOLOGY | Age: 57
End: 2022-02-09

## 2022-02-09 NOTE — PROGRESS NOTES
Ms. Saralyn Riedel has been enrolled into the [employer] program for [place]. E/D 1/1/22 - 12/31/22  ID# [id number]. Enrollment fax will be scanned into chart upon receipt.

## 2022-02-14 ENCOUNTER — PATIENT OUTREACH (OUTPATIENT)
Dept: CASE MANAGEMENT | Age: 57
End: 2022-02-14

## 2022-02-14 ENCOUNTER — HOSPITAL ENCOUNTER (OUTPATIENT)
Dept: INFUSION THERAPY | Age: 57
Discharge: HOME | End: 2022-02-14
Payer: COMMERCIAL

## 2022-02-14 DIAGNOSIS — C50.919 TRIPLE NEGATIVE MALIGNANT NEOPLASM OF BREAST (HCC): Primary | ICD-10-CM

## 2022-02-14 DIAGNOSIS — C50.919 TRIPLE NEGATIVE MALIGNANT NEOPLASM OF BREAST (HCC): ICD-10-CM

## 2022-02-14 LAB
ALBUMIN SERPL-MCNC: 3.5 G/DL (ref 3.5–5)
ALBUMIN/GLOB SERPL: 0.9 {RATIO} (ref 1.2–3.5)
ALP SERPL-CCNC: 106 U/L (ref 50–136)
ALT SERPL-CCNC: 54 U/L (ref 12–65)
ANION GAP SERPL CALC-SCNC: 6 MMOL/L (ref 7–16)
AST SERPL-CCNC: 26 U/L (ref 15–37)
BASOPHILS # BLD: 0 K/UL (ref 0–0.2)
BASOPHILS NFR BLD: 1 % (ref 0–2)
BILIRUB SERPL-MCNC: 0.6 MG/DL (ref 0.2–1.1)
BUN SERPL-MCNC: 21 MG/DL (ref 6–23)
CALCIUM SERPL-MCNC: 9 MG/DL (ref 8.3–10.4)
CHLORIDE SERPL-SCNC: 106 MMOL/L (ref 98–107)
CO2 SERPL-SCNC: 25 MMOL/L (ref 21–32)
CREAT SERPL-MCNC: 0.8 MG/DL (ref 0.6–1)
DIFFERENTIAL METHOD BLD: ABNORMAL
EOSINOPHIL # BLD: 0.1 K/UL (ref 0–0.8)
EOSINOPHIL NFR BLD: 2 % (ref 0.5–7.8)
ERYTHROCYTE [DISTWIDTH] IN BLOOD BY AUTOMATED COUNT: 12.3 % (ref 11.9–14.6)
GLOBULIN SER CALC-MCNC: 4.1 G/DL (ref 2.3–3.5)
GLUCOSE SERPL-MCNC: 168 MG/DL (ref 65–100)
HCT VFR BLD AUTO: 40 % (ref 35.8–46.3)
HGB BLD-MCNC: 12.9 G/DL (ref 11.7–15.4)
IMM GRANULOCYTES # BLD AUTO: 0 K/UL (ref 0–0.5)
IMM GRANULOCYTES NFR BLD AUTO: 1 % (ref 0–5)
LYMPHOCYTES # BLD: 0.5 K/UL (ref 0.5–4.6)
LYMPHOCYTES NFR BLD: 9 % (ref 13–44)
MCH RBC QN AUTO: 28.5 PG (ref 26.1–32.9)
MCHC RBC AUTO-ENTMCNC: 32.3 G/DL (ref 31.4–35)
MCV RBC AUTO: 88.3 FL (ref 79.6–97.8)
MONOCYTES # BLD: 0.3 K/UL (ref 0.1–1.3)
MONOCYTES NFR BLD: 6 % (ref 4–12)
NEUTS SEG # BLD: 4.8 K/UL (ref 1.7–8.2)
NEUTS SEG NFR BLD: 82 % (ref 43–78)
NRBC # BLD: 0 K/UL (ref 0–0.2)
PLATELET # BLD AUTO: 181 K/UL (ref 150–450)
PMV BLD AUTO: 9.5 FL (ref 9.4–12.3)
POTASSIUM SERPL-SCNC: 4.2 MMOL/L (ref 3.5–5.1)
PROT SERPL-MCNC: 7.6 G/DL (ref 6.3–8.2)
RBC # BLD AUTO: 4.53 M/UL (ref 4.05–5.2)
SODIUM SERPL-SCNC: 137 MMOL/L (ref 136–145)
WBC # BLD AUTO: 5.8 K/UL (ref 4.3–11.1)

## 2022-02-14 PROCEDURE — 96375 TX/PRO/DX INJ NEW DRUG ADDON: CPT

## 2022-02-14 PROCEDURE — 80053 COMPREHEN METABOLIC PANEL: CPT

## 2022-02-14 PROCEDURE — 74011000250 HC RX REV CODE- 250: Performed by: INTERNAL MEDICINE

## 2022-02-14 PROCEDURE — 74011250636 HC RX REV CODE- 250/636: Performed by: INTERNAL MEDICINE

## 2022-02-14 PROCEDURE — 85025 COMPLETE CBC W/AUTO DIFF WBC: CPT

## 2022-02-14 PROCEDURE — 96413 CHEMO IV INFUSION 1 HR: CPT

## 2022-02-14 PROCEDURE — 83735 ASSAY OF MAGNESIUM: CPT

## 2022-02-14 PROCEDURE — 74011000258 HC RX REV CODE- 258: Performed by: INTERNAL MEDICINE

## 2022-02-14 PROCEDURE — 36591 DRAW BLOOD OFF VENOUS DEVICE: CPT

## 2022-02-14 RX ORDER — SODIUM CHLORIDE 0.9 % (FLUSH) 0.9 %
10 SYRINGE (ML) INJECTION AS NEEDED
Status: DISCONTINUED | OUTPATIENT
Start: 2022-02-14 | End: 2022-02-16 | Stop reason: HOSPADM

## 2022-02-14 RX ORDER — DIPHENHYDRAMINE HYDROCHLORIDE 50 MG/ML
50 INJECTION, SOLUTION INTRAMUSCULAR; INTRAVENOUS ONCE
Status: COMPLETED | OUTPATIENT
Start: 2022-02-14 | End: 2022-02-14

## 2022-02-14 RX ORDER — SODIUM CHLORIDE 9 MG/ML
25 INJECTION, SOLUTION INTRAVENOUS CONTINUOUS
Status: ACTIVE | OUTPATIENT
Start: 2022-02-14 | End: 2022-02-14

## 2022-02-14 RX ORDER — SODIUM CHLORIDE 0.9 % (FLUSH) 0.9 %
10 SYRINGE (ML) INJECTION AS NEEDED
Status: ACTIVE | OUTPATIENT
Start: 2022-02-14 | End: 2022-02-14

## 2022-02-14 RX ORDER — DEXAMETHASONE SODIUM PHOSPHATE 100 MG/10ML
10 INJECTION INTRAMUSCULAR; INTRAVENOUS ONCE
Status: COMPLETED | OUTPATIENT
Start: 2022-02-14 | End: 2022-02-14

## 2022-02-14 RX ADMIN — SODIUM CHLORIDE 25 ML/HR: 9 INJECTION, SOLUTION INTRAVENOUS at 11:05

## 2022-02-14 RX ADMIN — DIPHENHYDRAMINE HYDROCHLORIDE 50 MG: 50 INJECTION, SOLUTION INTRAMUSCULAR; INTRAVENOUS at 11:15

## 2022-02-14 RX ADMIN — Medication 10 ML: at 09:50

## 2022-02-14 RX ADMIN — FAMOTIDINE 20 MG: 10 INJECTION, SOLUTION INTRAVENOUS at 11:10

## 2022-02-14 RX ADMIN — SODIUM CHLORIDE, PRESERVATIVE FREE 10 ML: 5 INJECTION INTRAVENOUS at 11:05

## 2022-02-14 RX ADMIN — PACLITAXEL 142 MG: 300 INJECTION, SOLUTION INTRAVENOUS at 11:48

## 2022-02-14 RX ADMIN — DEXAMETHASONE SODIUM PHOSPHATE 10 MG: 10 INJECTION INTRAMUSCULAR; INTRAVENOUS at 11:12

## 2022-02-14 RX ADMIN — SODIUM CHLORIDE, PRESERVATIVE FREE 10 ML: 5 INJECTION INTRAVENOUS at 12:53

## 2022-02-14 NOTE — PROGRESS NOTES
Arrived to the [facility]. Taxol completed. Patient tolerated well. Any issues or concerns during appointment: none. Patient aware of next infusion appointment on 2/21 (date) at 2:30PM (time). Patient instructed to call provider with temperature of 100.4 or greater or nausea/vomiting/ diarrhea or pain not controlled by medications  Discharged ambulatory.

## 2022-02-14 NOTE — PROGRESS NOTES
2/14/2022 saw pt today with Dr Hollie Olsen for [address] visit . Mandarin [facility]  # [phone] present via remote.  and daughter present for visit. Dr Hollie Olsen stated some leg pain last visit with infusion, will call if problems. Patient had 3 lb weight loss, will hold off on appetite stimulant at this time. Follow up in 1 week. Encouraged to call with any concerns, uncontrolled side effects from treatment, or fevers. Navigation will continue to follow.

## 2022-02-14 NOTE — PROGRESS NOTES
Patient arrived to port lab for port access and lab draw   Catia Liang 45 accessed and labs drawn per protocol   *Port remains accessed   Patient discharged from port lab ambulatory*

## 2022-02-15 LAB — MAGNESIUM SERPL-MCNC: 1.8 MG/DL (ref 1.6–2.3)

## 2022-02-17 ENCOUNTER — PATIENT OUTREACH (OUTPATIENT)
Dept: CASE MANAGEMENT | Age: 57
End: 2022-02-17

## 2022-02-17 ENCOUNTER — HOSPITAL ENCOUNTER (INPATIENT)
Age: 57
LOS: 7 days | Discharge: HOME | DRG: 871 | End: 2022-02-25
Attending: EMERGENCY MEDICINE | Admitting: FAMILY MEDICINE
Payer: COMMERCIAL

## 2022-02-17 DIAGNOSIS — R50.81 NEUTROPENIC FEVER (HCC): Primary | ICD-10-CM

## 2022-02-17 DIAGNOSIS — C50.919 MALIGNANT NEOPLASM OF FEMALE BREAST, UNSPECIFIED ESTROGEN RECEPTOR STATUS, UNSPECIFIED LATERALITY, UNSPECIFIED SITE OF BREAST (HCC): ICD-10-CM

## 2022-02-17 DIAGNOSIS — I95.9 HYPOTENSION, UNSPECIFIED HYPOTENSION TYPE: ICD-10-CM

## 2022-02-17 DIAGNOSIS — R19.7 DIARRHEA, UNSPECIFIED TYPE: ICD-10-CM

## 2022-02-17 DIAGNOSIS — A41.9 SEVERE SEPSIS (HCC): ICD-10-CM

## 2022-02-17 DIAGNOSIS — D70.9 NEUTROPENIC FEVER (HCC): Primary | ICD-10-CM

## 2022-02-17 DIAGNOSIS — R00.0 TACHYCARDIA: ICD-10-CM

## 2022-02-17 DIAGNOSIS — C50.919 TRIPLE NEGATIVE MALIGNANT NEOPLASM OF BREAST (HCC): ICD-10-CM

## 2022-02-17 DIAGNOSIS — R60.9 FLUID RETENTION: ICD-10-CM

## 2022-02-17 DIAGNOSIS — R65.20 SEVERE SEPSIS (HCC): ICD-10-CM

## 2022-02-17 DIAGNOSIS — R21 RASH: ICD-10-CM

## 2022-02-17 PROCEDURE — 94762 N-INVAS EAR/PLS OXIMTRY CONT: CPT

## 2022-02-17 PROCEDURE — 99284 EMERGENCY DEPT VISIT MOD MDM: CPT

## 2022-02-17 RX ORDER — SODIUM CHLORIDE 0.9 % (FLUSH) 0.9 %
5-10 SYRINGE (ML) INJECTION EVERY 8 HOURS
Status: DISCONTINUED | OUTPATIENT
Start: 2022-02-17 | End: 2022-02-25 | Stop reason: HOSPADM

## 2022-02-17 RX ORDER — SODIUM CHLORIDE 0.9 % (FLUSH) 0.9 %
5-10 SYRINGE (ML) INJECTION AS NEEDED
Status: DISCONTINUED | OUTPATIENT
Start: 2022-02-17 | End: 2022-02-25 | Stop reason: HOSPADM

## 2022-02-17 RX ORDER — ACETAMINOPHEN 325 MG/1
650 TABLET ORAL
Status: DISCONTINUED | OUTPATIENT
Start: 2022-02-17 | End: 2022-02-18 | Stop reason: SDUPTHER

## 2022-02-17 NOTE — PROGRESS NOTES
2/17/2022 The patient called today with the . She has a rash over her face, hands, and neck that is itching. I discussed this with Dr Vickey Sanchez and he would like to prescribe Kenalog and have her take Benadryl oral as well as topically.

## 2022-02-18 ENCOUNTER — APPOINTMENT (OUTPATIENT)
Dept: GENERAL RADIOLOGY | Age: 57
DRG: 871 | End: 2022-02-18
Attending: EMERGENCY MEDICINE
Payer: COMMERCIAL

## 2022-02-18 PROBLEM — R65.20 SEVERE SEPSIS (HCC): Status: ACTIVE | Noted: 2022-02-18

## 2022-02-18 PROBLEM — A41.9 SEVERE SEPSIS (HCC): Status: ACTIVE | Noted: 2022-02-18

## 2022-02-18 LAB
ALBUMIN SERPL-MCNC: 2.7 G/DL (ref 3.5–5)
ALBUMIN/GLOB SERPL: 0.6 {RATIO} (ref 1.2–3.5)
ALP SERPL-CCNC: 88 U/L (ref 50–136)
ALT SERPL-CCNC: 81 U/L (ref 12–65)
ANION GAP SERPL CALC-SCNC: 7 MMOL/L (ref 7–16)
APPEARANCE UR: ABNORMAL
AST SERPL-CCNC: 40 U/L (ref 15–37)
ATRIAL RATE: 114 BPM
BACTERIA URNS QL MICRO: 0 /HPF
BASOPHILS # BLD: 0 K/UL (ref 0–0.2)
BASOPHILS NFR BLD: 1 % (ref 0–2)
BILIRUB SERPL-MCNC: 0.3 MG/DL (ref 0.2–1.1)
BILIRUB UR QL: NEGATIVE
BUN SERPL-MCNC: 11 MG/DL (ref 6–23)
CALCIUM SERPL-MCNC: 8.2 MG/DL (ref 8.3–10.4)
CALCULATED P AXIS, ECG09: 61 DEGREES
CALCULATED R AXIS, ECG10: 77 DEGREES
CALCULATED T AXIS, ECG11: 52 DEGREES
CHLORIDE SERPL-SCNC: 96 MMOL/L (ref 98–107)
CO2 SERPL-SCNC: 24 MMOL/L (ref 21–32)
COLOR UR: YELLOW
COVID-19 RAPID TEST, COVR: NOT DETECTED
CREAT SERPL-MCNC: 0.81 MG/DL (ref 0.6–1)
DIAGNOSIS, 93000: NORMAL
DIFFERENTIAL METHOD BLD: ABNORMAL
EOSINOPHIL # BLD: 0 K/UL (ref 0–0.8)
EOSINOPHIL NFR BLD: 3 % (ref 0.5–7.8)
EPI CELLS #/AREA URNS HPF: ABNORMAL /HPF
ERYTHROCYTE [DISTWIDTH] IN BLOOD BY AUTOMATED COUNT: 12.3 % (ref 11.9–14.6)
GLOBULIN SER CALC-MCNC: 4.4 G/DL (ref 2.3–3.5)
GLUCOSE SERPL-MCNC: 204 MG/DL (ref 65–100)
GLUCOSE UR STRIP.AUTO-MCNC: 250 MG/DL
HCT VFR BLD AUTO: 36.3 % (ref 35.8–46.3)
HGB BLD-MCNC: 12.2 G/DL (ref 11.7–15.4)
HGB UR QL STRIP: NEGATIVE
IMM GRANULOCYTES # BLD AUTO: 0 K/UL (ref 0–0.5)
IMM GRANULOCYTES NFR BLD AUTO: 2 % (ref 0–5)
KETONES UR QL STRIP.AUTO: NEGATIVE MG/DL
LACTATE SERPL-SCNC: 1.4 MMOL/L (ref 0.4–2)
LACTATE SERPL-SCNC: 2.1 MMOL/L (ref 0.4–2)
LEUKOCYTE ESTERASE UR QL STRIP.AUTO: NEGATIVE
LYMPHOCYTES # BLD: 0.2 K/UL (ref 0.5–4.6)
LYMPHOCYTES NFR BLD: 15 % (ref 13–44)
MAGNESIUM SERPL-MCNC: 1.5 MG/DL (ref 1.8–2.4)
MCH RBC QN AUTO: 28.9 PG (ref 26.1–32.9)
MCHC RBC AUTO-ENTMCNC: 33.6 G/DL (ref 31.4–35)
MCV RBC AUTO: 86 FL (ref 79.6–97.8)
MONOCYTES # BLD: 0 K/UL (ref 0.1–1.3)
MONOCYTES NFR BLD: 2 % (ref 4–12)
MUCOUS THREADS URNS QL MICRO: ABNORMAL /LPF
NEUTS SEG # BLD: 1.4 K/UL (ref 1.7–8.2)
NEUTS SEG NFR BLD: 77 % (ref 43–78)
NITRITE UR QL STRIP.AUTO: NEGATIVE
NRBC # BLD: 0 K/UL (ref 0–0.2)
OTHER OBSERVATIONS,UCOM: ABNORMAL
P-R INTERVAL, ECG05: 128 MS
PH UR STRIP: 5.5 [PH] (ref 5–9)
PLATELET # BLD AUTO: 103 K/UL (ref 150–450)
PLATELET COMMENTS,PCOM: ADEQUATE
PMV BLD AUTO: 10.6 FL (ref 9.4–12.3)
POTASSIUM SERPL-SCNC: 3.7 MMOL/L (ref 3.5–5.1)
PROCALCITONIN SERPL-MCNC: 0.47 NG/ML (ref 0–0.49)
PROT SERPL-MCNC: 7.1 G/DL (ref 6.3–8.2)
PROT UR STRIP-MCNC: ABNORMAL MG/DL
Q-T INTERVAL, ECG07: 298 MS
QRS DURATION, ECG06: 88 MS
QTC CALCULATION (BEZET), ECG08: 410 MS
RBC # BLD AUTO: 4.22 M/UL (ref 4.05–5.2)
RBC #/AREA URNS HPF: ABNORMAL /HPF
RBC MORPH BLD: ABNORMAL
SODIUM SERPL-SCNC: 127 MMOL/L (ref 136–145)
SOURCE, COVRS: NORMAL
SP GR UR REFRACTOMETRY: 1.01 (ref 1–1.02)
UROBILINOGEN UR QL STRIP.AUTO: 0.2 EU/DL (ref 0.2–1)
VENTRICULAR RATE, ECG03: 114 BPM
WBC # BLD AUTO: 1.6 K/UL (ref 4.3–11.1)
WBC MORPH BLD: ABNORMAL
WBC URNS QL MICRO: ABNORMAL /HPF

## 2022-02-18 PROCEDURE — 74011000250 HC RX REV CODE- 250: Performed by: EMERGENCY MEDICINE

## 2022-02-18 PROCEDURE — 74011000258 HC RX REV CODE- 258: Performed by: FAMILY MEDICINE

## 2022-02-18 PROCEDURE — 71045 X-RAY EXAM CHEST 1 VIEW: CPT

## 2022-02-18 PROCEDURE — 87040 BLOOD CULTURE FOR BACTERIA: CPT

## 2022-02-18 PROCEDURE — 74011250636 HC RX REV CODE- 250/636: Performed by: STUDENT IN AN ORGANIZED HEALTH CARE EDUCATION/TRAINING PROGRAM

## 2022-02-18 PROCEDURE — 96367 TX/PROPH/DG ADDL SEQ IV INF: CPT

## 2022-02-18 PROCEDURE — APPNB30 APP NON BILLABLE TIME 0-30 MINS: Performed by: NURSE PRACTITIONER

## 2022-02-18 PROCEDURE — 74011250636 HC RX REV CODE- 250/636: Performed by: INTERNAL MEDICINE

## 2022-02-18 PROCEDURE — APPSS180 APP SPLIT SHARED TIME > 60 MINUTES: Performed by: NURSE PRACTITIONER

## 2022-02-18 PROCEDURE — 74011250637 HC RX REV CODE- 250/637: Performed by: FAMILY MEDICINE

## 2022-02-18 PROCEDURE — 74011250636 HC RX REV CODE- 250/636: Performed by: EMERGENCY MEDICINE

## 2022-02-18 PROCEDURE — 83735 ASSAY OF MAGNESIUM: CPT

## 2022-02-18 PROCEDURE — 99223 1ST HOSP IP/OBS HIGH 75: CPT | Performed by: INTERNAL MEDICINE

## 2022-02-18 PROCEDURE — 84145 PROCALCITONIN (PCT): CPT

## 2022-02-18 PROCEDURE — 81003 URINALYSIS AUTO W/O SCOPE: CPT

## 2022-02-18 PROCEDURE — 83605 ASSAY OF LACTIC ACID: CPT

## 2022-02-18 PROCEDURE — 74011000258 HC RX REV CODE- 258: Performed by: EMERGENCY MEDICINE

## 2022-02-18 PROCEDURE — 85025 COMPLETE CBC W/AUTO DIFF WBC: CPT

## 2022-02-18 PROCEDURE — 74011250637 HC RX REV CODE- 250/637: Performed by: EMERGENCY MEDICINE

## 2022-02-18 PROCEDURE — 96365 THER/PROPH/DIAG IV INF INIT: CPT

## 2022-02-18 PROCEDURE — 74011250636 HC RX REV CODE- 250/636: Performed by: FAMILY MEDICINE

## 2022-02-18 PROCEDURE — 93005 ELECTROCARDIOGRAM TRACING: CPT | Performed by: EMERGENCY MEDICINE

## 2022-02-18 PROCEDURE — 65270000029 HC RM PRIVATE

## 2022-02-18 PROCEDURE — 74011000258 HC RX REV CODE- 258: Performed by: INTERNAL MEDICINE

## 2022-02-18 PROCEDURE — 74011000250 HC RX REV CODE- 250: Performed by: FAMILY MEDICINE

## 2022-02-18 PROCEDURE — 87635 SARS-COV-2 COVID-19 AMP PRB: CPT

## 2022-02-18 PROCEDURE — 80053 COMPREHEN METABOLIC PANEL: CPT

## 2022-02-18 PROCEDURE — 74011250637 HC RX REV CODE- 250/637: Performed by: STUDENT IN AN ORGANIZED HEALTH CARE EDUCATION/TRAINING PROGRAM

## 2022-02-18 RX ORDER — SODIUM CHLORIDE 9 MG/ML
150 INJECTION, SOLUTION INTRAVENOUS CONTINUOUS
Status: DISPENSED | OUTPATIENT
Start: 2022-02-18 | End: 2022-02-18

## 2022-02-18 RX ORDER — LIDOCAINE AND PRILOCAINE 25; 25 MG/G; MG/G
CREAM TOPICAL AS NEEDED
Status: DISCONTINUED | OUTPATIENT
Start: 2022-02-18 | End: 2022-02-25 | Stop reason: HOSPADM

## 2022-02-18 RX ORDER — FAMOTIDINE 20 MG/1
20 TABLET, FILM COATED ORAL EVERY 12 HOURS
Status: DISCONTINUED | OUTPATIENT
Start: 2022-02-18 | End: 2022-02-18

## 2022-02-18 RX ORDER — MONTELUKAST SODIUM 10 MG/1
10 TABLET ORAL DAILY
Status: DISCONTINUED | OUTPATIENT
Start: 2022-02-18 | End: 2022-02-18

## 2022-02-18 RX ORDER — SODIUM CHLORIDE 0.9 % (FLUSH) 0.9 %
5-40 SYRINGE (ML) INJECTION AS NEEDED
Status: DISCONTINUED | OUTPATIENT
Start: 2022-02-18 | End: 2022-02-21

## 2022-02-18 RX ORDER — SODIUM CHLORIDE 0.9 % (FLUSH) 0.9 %
10 SYRINGE (ML) INJECTION ONCE
Status: CANCELLED | OUTPATIENT
Start: 2022-02-18 | End: 2022-02-18

## 2022-02-18 RX ORDER — ENOXAPARIN SODIUM 100 MG/ML
40 INJECTION SUBCUTANEOUS EVERY 24 HOURS
Status: DISCONTINUED | OUTPATIENT
Start: 2022-02-18 | End: 2022-02-25 | Stop reason: HOSPADM

## 2022-02-18 RX ORDER — LORAZEPAM 0.5 MG/1
.5-1 TABLET ORAL
Status: DISCONTINUED | OUTPATIENT
Start: 2022-02-18 | End: 2022-02-25 | Stop reason: HOSPADM

## 2022-02-18 RX ORDER — DILTIAZEM HYDROCHLORIDE 240 MG/1
240 CAPSULE, COATED, EXTENDED RELEASE ORAL DAILY
Status: DISCONTINUED | OUTPATIENT
Start: 2022-02-18 | End: 2022-02-18

## 2022-02-18 RX ORDER — ACETAMINOPHEN 500 MG
1000 TABLET ORAL
Status: COMPLETED | OUTPATIENT
Start: 2022-02-18 | End: 2022-02-18

## 2022-02-18 RX ORDER — DIPHENHYDRAMINE HCL 25 MG
25 CAPSULE ORAL
Status: DISCONTINUED | OUTPATIENT
Start: 2022-02-18 | End: 2022-02-25 | Stop reason: HOSPADM

## 2022-02-18 RX ORDER — VANCOMYCIN 1.75 GRAM/500 ML IN 0.9 % SODIUM CHLORIDE INTRAVENOUS
1750 ONCE
Status: COMPLETED | OUTPATIENT
Start: 2022-02-18 | End: 2022-02-18

## 2022-02-18 RX ORDER — SPIRONOLACTONE 25 MG/1
25 TABLET ORAL DAILY
Status: DISCONTINUED | OUTPATIENT
Start: 2022-02-18 | End: 2022-02-18

## 2022-02-18 RX ORDER — ONDANSETRON 2 MG/ML
4 INJECTION INTRAMUSCULAR; INTRAVENOUS
Status: DISCONTINUED | OUTPATIENT
Start: 2022-02-18 | End: 2022-02-25 | Stop reason: HOSPADM

## 2022-02-18 RX ORDER — SODIUM CHLORIDE 0.9 % (FLUSH) 0.9 %
5-40 SYRINGE (ML) INJECTION EVERY 8 HOURS
Status: DISCONTINUED | OUTPATIENT
Start: 2022-02-18 | End: 2022-02-21

## 2022-02-18 RX ORDER — MAGNESIUM SULFATE HEPTAHYDRATE 40 MG/ML
2 INJECTION, SOLUTION INTRAVENOUS ONCE
Status: COMPLETED | OUTPATIENT
Start: 2022-02-18 | End: 2022-02-18

## 2022-02-18 RX ORDER — ACETAMINOPHEN 325 MG/1
650 TABLET ORAL
Status: DISCONTINUED | OUTPATIENT
Start: 2022-02-18 | End: 2022-02-22

## 2022-02-18 RX ORDER — DIPHENHYDRAMINE HCL 25 MG
25 CAPSULE ORAL
Status: COMPLETED | OUTPATIENT
Start: 2022-02-18 | End: 2022-02-18

## 2022-02-18 RX ADMIN — ACETAMINOPHEN 650 MG: 325 TABLET, FILM COATED ORAL at 22:56

## 2022-02-18 RX ADMIN — PIPERACILLIN AND TAZOBACTAM 3.38 G: 3; .375 INJECTION, POWDER, LYOPHILIZED, FOR SOLUTION INTRAVENOUS at 08:29

## 2022-02-18 RX ADMIN — CEFEPIME HYDROCHLORIDE 2 G: 2 INJECTION, POWDER, FOR SOLUTION INTRAVENOUS at 10:12

## 2022-02-18 RX ADMIN — FILGRASTIM-AAFI 300 MCG: 300 INJECTION, SOLUTION SUBCUTANEOUS at 13:20

## 2022-02-18 RX ADMIN — ENOXAPARIN SODIUM 40 MG: 100 INJECTION SUBCUTANEOUS at 10:12

## 2022-02-18 RX ADMIN — DIPHENHYDRAMINE HYDROCHLORIDE 25 MG: 25 CAPSULE ORAL at 04:48

## 2022-02-18 RX ADMIN — PIPERACILLIN SODIUM AND TAZOBACTAM SODIUM 4.5 G: 4; .5 INJECTION, POWDER, LYOPHILIZED, FOR SOLUTION INTRAVENOUS at 00:42

## 2022-02-18 RX ADMIN — SODIUM CHLORIDE 1000 ML: 900 INJECTION, SOLUTION INTRAVENOUS at 01:49

## 2022-02-18 RX ADMIN — SODIUM CHLORIDE, PRESERVATIVE FREE 10 ML: 5 INJECTION INTRAVENOUS at 21:09

## 2022-02-18 RX ADMIN — ACETAMINOPHEN 1000 MG: 500 TABLET, FILM COATED ORAL at 00:30

## 2022-02-18 RX ADMIN — VANCOMYCIN HYDROCHLORIDE 1750 MG: 10 INJECTION, POWDER, LYOPHILIZED, FOR SOLUTION INTRAVENOUS at 01:24

## 2022-02-18 RX ADMIN — ONDANSETRON 4 MG: 2 INJECTION INTRAMUSCULAR; INTRAVENOUS at 15:22

## 2022-02-18 RX ADMIN — MONTELUKAST 10 MG: 10 TABLET, FILM COATED ORAL at 13:20

## 2022-02-18 RX ADMIN — MAGNESIUM SULFATE HEPTAHYDRATE 2 G: 40 INJECTION, SOLUTION INTRAVENOUS at 10:50

## 2022-02-18 RX ADMIN — VANCOMYCIN HYDROCHLORIDE 1000 MG: 1 INJECTION, POWDER, LYOPHILIZED, FOR SOLUTION INTRAVENOUS at 13:20

## 2022-02-18 RX ADMIN — SODIUM CHLORIDE 75 ML/HR: 900 INJECTION, SOLUTION INTRAVENOUS at 04:48

## 2022-02-18 RX ADMIN — CEFEPIME HYDROCHLORIDE 2 G: 2 INJECTION, POWDER, FOR SOLUTION INTRAVENOUS at 21:07

## 2022-02-18 RX ADMIN — SODIUM CHLORIDE, SODIUM LACTATE, POTASSIUM CHLORIDE, AND CALCIUM CHLORIDE 1000 ML: 600; 310; 30; 20 INJECTION, SOLUTION INTRAVENOUS at 00:30

## 2022-02-18 RX ADMIN — DIPHENHYDRAMINE HYDROCHLORIDE 25 MG: 25 CAPSULE ORAL at 15:22

## 2022-02-18 NOTE — CONSULTS
Discussed with oncology. Recommend initial vanc/cefepime pending further cultures. Keytruda rash is also possible. ID consult not needed at this time. We are happy to see later in the course of illness if additional input needed.

## 2022-02-18 NOTE — ED TRIAGE NOTES
Pt speaks Mandarin. Daughter states that the pt received her chemo on Monday and has had a fever and generalized rash that developed today.   Masked on arrival

## 2022-02-18 NOTE — PROGRESS NOTES
[facility] Pharmacokinetic Monitoring Service - Vancomycin     Nina Plaza is a 49 y.o. female starting on vancomycin therapy for sepsis. Pharmacy consulted by Dr Raheem Blanco for monitoring and adjustment. Target Concentration: Goal AUC/TONI 400-600 mg*hr/L    Additional Antimicrobials: Zosyn    Pertinent Laboratory Values: Wt Readings from Last 1 Encounters:   02/17/22 71.7 kg (158 lb 1.1 oz)     Temp Readings from Last 1 Encounters:   02/18/22 99.2 °F (37.3 °C)     No components found for: PROCAL  Recent Labs     02/18/22  0157 02/18/22  0004   BUN  --  11   CREA  --  0.81   WBC  --  1.6*   PCT  --  0.47   LAC 1.4 2.1*     Estimated Creatinine Clearance: 70.2 mL/min (based on SCr of 0.81 mg/dL). No results found for: Juan A Garza    MRSA Nasal Swab: N/A. Non-respiratory infection. .    Plan:  1. Dosing recommendations based on Bayesian software  2. Start vancomycin 1000mg q12h.  a. Anticipated AUC of 558 and trough concentration of 17.6 at steady state  3. Renal labs as indicated   4. Vancomycin concentration not yet ordered.    5. Pharmacy will continue to monitor patient and adjust therapy as indicated    Thank you for the consult,  DAYNA Edouard

## 2022-02-18 NOTE — ED NOTES
TRANSFER - OUT REPORT:    Verbal report given to Nick(name) on Simba  being transferred to [facility](unit) for routine progression of care       Report consisted of patients Situation, Background, Assessment and   Recommendations(SBAR). Information from the following report(s) SBAR, ED Summary and MAR was reviewed with the receiving nurse. Lines:   Venous Access Device Bard PowerPort 02/02/22 Upper chest (subclavicular area, right (Active)   Central Line Being Utilized Yes 02/18/22 0019   Site Assessment Clean, dry, & intact 02/18/22 0019   Date of Last Dressing Change 02/18/22 02/18/22 0019   Dressing Status Clean, dry, & intact 02/18/22 0019   Dressing Type Transparent 02/18/22 0019   Date Accessed (Medial Site) 02/18/22 02/18/22 0019   Access Time (Medial Site) 0005 02/18/22 0019   Access Needle Size (Site #1) 20 G 02/18/22 0019   Access Needle Length (Medial Site) 1 inch 02/18/22 0019   Positive Blood Return (Medial Site) Yes 02/18/22 0019   Action Taken (Medial Site) Alcohol;Benzoin;Blood drawn 02/18/22 0019       Peripheral IV 02/18/22 Left Antecubital (Active)   Site Assessment Clean, dry, & intact 02/18/22 0020   Phlebitis Assessment 0 02/18/22 0020   Infiltration Assessment 0 02/18/22 0020   Dressing Status Clean, dry, & intact 02/18/22 0020   Action Taken Blood drawn 02/18/22 0020        Opportunity for questions and clarification was provided.       Patient transported with:

## 2022-02-18 NOTE — PROGRESS NOTES
VANCO DAILY FOLLOW UP NOTE  [facility] Pharmacokinetic Monitoring Service - Vancomycin    Consulting Provider: Gurinder Feliciano   Indication: sepsis  Target Concentration: Goal AUC/TONI 400-600 mg*hr/L  Day of Therapy: 1  Additional Antimicrobials: cefepime    Pertinent Laboratory Values: Wt Readings from Last 1 Encounters:   02/17/22 71.7 kg (158 lb 1.1 oz)     Temp Readings from Last 1 Encounters:   02/18/22 99.2 °F (37.3 °C)     No components found for: PROCAL  Recent Labs     02/18/22  0157 02/18/22  0004   BUN  --  11   CREA  --  0.81   WBC  --  1.6*   PCT  --  0.47   LAC 1.4 2.1*     Estimated Creatinine Clearance: 70.2 mL/min (based on SCr of 0.81 mg/dL). No results found for: Juan A Garza      Assessment:  Date/Time Dose Concentration AUC   2/19 1000mg q 12 hours     Note: Serum concentrations collected for AUC dosing may appear elevated if collected in close proximity to the dose administered, this is not necessarily an indication of toxicity    Plan:  Continue current dose  Vancomycin concentration ordered for 2/19 @ 0500   Pharmacy will continue to monitor patient and adjust therapy as indicated    Thank you for the consult,  Chino Guillen.  Jalen Wilson, PharmD, BCPS  Clinical Pharmacist

## 2022-02-18 NOTE — ED NOTES
Pt denies any pain at the present time. Report received from [facility].   No other changes at the present time

## 2022-02-18 NOTE — ED PROVIDER NOTES
Patient is a 53-year-old female fully vaccinated for Covid including booster who presents with her family. The patient does not speak English, the patient and family interpreted. They state that she has a history of breast cancer and is currently on chemotherapy, last treatment was Monday. She was doing fine until Wednesday when she started running a fever and broke out in a rash. They tried to get her to come to the emergency department but the patient refused. She vomited 1 time, has had no diarrhea or sore throat. She denies any cough or shortness of breath or urinary symptoms. They went to the oncology office today where she was afebrile. She was given some type of medication for her rash. This evening her fever returned and thus they brought her here for evaluation. Past Medical History:   Diagnosis Date    Breast cancer ([facility] [address]     Hypertension     Murmur, heart     echo 2016 EF 60-65% ; 1/31/22 states never has had symptoms    Poor historian     difficulty with verifying medication list       Past Surgical History:   Procedure Laterality Date    IR INSERT TUNL CVC W PORT OVER 5 YEARS  2/2/2022    MO BREAST SURGERY PROCEDURE UNLISTED      bx of left breast          Family History:   Problem Relation Age of Onset    No Known Problems Mother     Hypertension Father     No Known Problems Sister     No Known Problems Brother     Other Other         states no family history    No Known Problems Sister     Breast Cancer Neg Hx     Colon Cancer Neg Hx     Ovarian Cancer Neg Hx     Uterine Cancer Neg Hx        Social History     Socioeconomic History    Marital status:      Spouse name: Not on file    Number of children: 3    Years of education: Not on file    Highest education level: Not on file   Occupational History    Not on file   Tobacco Use    Smoking status: Never Smoker    Smokeless tobacco: Never Used   Substance and Sexual Activity    Alcohol use:  No Alcohol/week: 0.0 standard drinks    Drug use: No    Sexual activity: Yes     Birth control/protection: Condom   Other Topics Concern     Service Not Asked    Blood Transfusions Not Asked    Caffeine Concern No    Occupational Exposure Not Asked    Hobby Hazards Not Asked    Sleep Concern Not Asked    Stress Concern Not Asked    Weight Concern Not Asked    Special Diet No    Back Care Not Asked    Exercise No    Bike Helmet Not Asked    Seat Belt Yes    Self-Exams Yes   Social History Narrative    Abuse: Feels safe at home, no history of physical abuse, no history of sexual abuse     Social Determinants of Health     Financial Resource Strain:     Difficulty of Paying Living Expenses: Not on file   Food Insecurity:     Worried About Running Out of Food in the Last Year: Not on file    Kathy of Food in the Last Year: Not on file   Transportation Needs:     Lack of Transportation (Medical): Not on file    Lack of Transportation (Non-Medical):  Not on file   Physical Activity:     Days of Exercise per Week: Not on file    Minutes of Exercise per Session: Not on file   Stress:     Feeling of Stress : Not on file   Social Connections:     Frequency of Communication with Friends and Family: Not on file    Frequency of Social Gatherings with Friends and Family: Not on file    Attends [employer] Services: Not on file    Active Member of [address] [place] [address] or Organizations: Not on file    Attends Club or Organization Meetings: Not on file    Marital Status: Not on file   Intimate Partner Violence:     Fear of Current or Ex-Partner: Not on file    Emotionally Abused: Not on file    Physically Abused: Not on file    Sexually Abused: Not on file   Housing Stability:     Unable to Pay for Housing in the Last Year: Not on file    Number of [place] in the Last Year: Not on file    Unstable Housing in the Last Year: Not on file         ALLERGIES: Lisinopril    Review of Systems   Constitutional: Positive for fever. Negative for chills. Gastrointestinal: Negative for nausea and vomiting. Skin: Positive for rash. All other systems reviewed and are negative. Vitals:    02/17/22 2346   BP: 114/70   Pulse: (!) 123   Resp: 20   Temp: (!) 103.1 °F (39.5 °C)   SpO2: 97%   Weight: 71.7 kg (158 lb 1.1 oz)   Height: 5' (1.524 m)            Physical Exam  Vitals and nursing note reviewed. Constitutional:       Appearance: Normal appearance. She is well-developed. HENT:      Head: Normocephalic and atraumatic. Eyes:      Conjunctiva/sclera: Conjunctivae normal.      Pupils: Pupils are equal, round, and reactive to light. Cardiovascular:      Rate and Rhythm: Regular rhythm. Tachycardia present. Pulmonary:      Effort: Pulmonary effort is normal. No respiratory distress. Breath sounds: No wheezing or rales. Abdominal:      General: There is no distension. Tenderness: There is no abdominal tenderness. Musculoskeletal:         General: No tenderness. Cervical back: Normal range of motion and neck supple. Skin:     General: Skin is warm and dry. Findings: Rash present. Comments: Diffuse maculopapular lesions approximately 1 cm diameter on her trunk and extremities. Scattered lesions on her soft palate orally. Neurological:      Mental Status: She is alert. Psychiatric:         Behavior: Behavior normal.          MDM  Number of Diagnoses or Management Options  Malignant neoplasm of female breast, unspecified estrogen receptor status, unspecified laterality, unspecified site of breast ([address]  Neutropenic fever ([address]): new and requires workup  Rash: new and requires workup  Diagnosis management comments: 2:19 AM discussed work-up thus far with patient and family, need for admission. They are agreeable. Hospitalist has been contacted for admission. Vanco and Zosyn are in. Her lactic acid is minimally elevated, IV fluids are infusing.     Sepsis Reassessment note: Nata Amanda meets criteria for Sepsis -  Patient is receiving IV Fluids and Antibiotics per sepsis protocol. I did the Sepsis Reassessment at 2:19 AM 02/18/22.   Nilson Recinos MD        Amount and/or Complexity of Data Reviewed  Clinical lab tests: ordered and reviewed  Tests in the radiology section of CPT®: ordered and reviewed  Obtain history from someone other than the patient: yes  Discuss the patient with other providers: yes    Risk of Complications, Morbidity, and/or Mortality  Presenting problems: moderate  Diagnostic procedures: moderate  Management options: moderate    Patient Progress  Patient progress: improved         EKG    Date/Time: 2/18/2022 12:29 AM  Performed by: Emily Ruffin MD  Authorized by: Emily Ruffin MD     ECG reviewed by ED Physician in the absence of a cardiologist: yes    Previous ECG:     Previous ECG:  Unavailable  Interpretation:     Interpretation: non-specific    Rate:     ECG rate:  114    ECG rate assessment: tachycardic    Rhythm:     Rhythm: sinus tachycardia    Ectopy:     Ectopy: none    QRS:     QRS axis:  Normal    QRS intervals:  Normal  Conduction:     Conduction: normal    ST segments:     ST segments:  Normal  T waves:     T waves: normal

## 2022-02-18 NOTE — PROGRESS NOTES
Hospitalist Progress Note   Admit Date:  2022 11:51 PM   Name:  Clinton Mcknight   Age:  49 y.o. Sex:  female  :  1965   MRN:  [id number]   Room:      Presenting Complaint: Fever and Rash    Reason(s) for Admission: Severe sepsis ([facility] [A41.9, R65.20]     Hospital Course & Interval History:   62-year-old Mandarin speaking female with newly diagnosed triple negative left breast cancer who just started neoadjuvant chemo per oncology on 2022 presents with fevers hypotension and new maculopapular rash all over her abdomen and septic shock. Her last chemo treatment was on  and since then she was doing okay but then on Wednesday she started running fever she broke out in a rash. Patient refused to go to the emergency room. He returned on  and so she came to the ER. Prior to this the oncology office did prescribe her steroids with her the rash, though not clear if they knew about the fever. Arrived in our ER on  she had a temperature of 103 white blood cell count 1.6 with an ANC of 1400, lactate 2.1 and chest x-ray and urinalysis did not show any obvious source of infection. She was start empirically on Vanco and Zosyn and bolused lactate came down the patient still mildly hypotensive. The admitting physician consulted both oncology and infectious diseases. Subjective (22): She is complaining of her nearly full body drug rash that spares only her feet and hands, very itchy, raised. Assessment & Plan:     1. Sepsis with septic shock, mild neutropenic fever: Though her [address] is greater than 500 she is still immunocompromise, so if patient acutely decompensates or has evidence of pulmonary distress, recommend a stat dry CT to rule out opportunistic infection. Continue with Vanco and Zosyn. IV fluids can be timed out. Holding all antihypertensives. Blood cultures and urine cultures.   2. Hypertension: Hold her reportedly home diltiazem, olmesartan, hydrochlorothiazide, and spironolactone, though when I reviewed the patient's medication bottles with her that she does not appear to have any of these and all think she is taking these at home. 3. Chemo induced nausea and vomiting: As needed lorazepam and Zofran. 4. Hypomagnesemia: Probably from her nausea and vomiting, treat and treat above. 5. Leukopenia, thrombocytopenia: Chemo induced. Keep platelets above 10 or platelets above 20 if patient is showing signs or symptoms of fever and greater than 50 if she is bleeding actively. Okay for DVT prophylaxis along his platelets are greater than 50.  6. Maculopapular rash: Suspect this is a drug rash probably from [address]. As needed Benadryl, and expect patient to feel better within the next 1 to 2 days. Explained this to the family. DVT ppx: Enoxaparin  Dispo: Home  PT/OT: Not yet consulted    Full Code    Objective:     Patient Vitals for the past 24 hrs:   Temp Pulse Resp BP SpO2   02/18/22 0651 -- 95 20 (!) 95/47 99 %   02/18/22 0430 -- 98 16 (!) 92/57 98 %   02/18/22 0300 -- 98 16 (!) 89/59 98 %   02/18/22 0131 99.2 °F (37.3 °C) (!) 107 18 (!) 119/59 98 %   02/17/22 2346 (!) 103.1 °F (39.5 °C) (!) 123 20 114/70 97 %     Oxygen Therapy  O2 Sat (%): 99 % (02/18/22 0651)  O2 Device: None (Room air) (02/18/22 0300)    Estimated body mass index is 30.87 kg/m² as calculated from the following:    Height as of this encounter: 5' (1.524 m). Weight as of this encounter: 71.7 kg (158 lb 1.1 oz). No intake or output data in the 24 hours ending 02/18/22 0539      Physical Exam:   Physical Exam  Vitals reviewed. Constitutional:       Appearance: Normal appearance. She is ill-appearing. HENT:      Head: Normocephalic. Mouth/Throat:      Mouth: Mucous membranes are moist.   Eyes:      Extraocular Movements: Extraocular movements intact. Cardiovascular:      Rate and Rhythm: Normal rate. Pulses: Normal pulses.    Pulmonary:      Effort: Pulmonary effort is normal.      Breath sounds: Normal breath sounds. Abdominal:      Palpations: Abdomen is soft. Tenderness: There is no abdominal tenderness. Musculoskeletal:         General: Normal range of motion. Cervical back: Normal range of motion. Skin:     General: Skin is warm. Findings: Rash (Diffuse maculopapular rash on the lower and upper extremities sparing the hands and the feet including the entire back abdomen chest, itchy) present. Neurological:      General: No focal deficit present. Mental Status: She is alert and oriented to person, place, and time. Psychiatric:         Mood and Affect: Mood normal.           I have reviewed ordered lab tests and independently visualized imaging below:    Recent Labs:  Recent Results (from the past 48 hour(s))   LACTIC ACID    Collection Time: 02/18/22 12:04 AM   Result Value Ref Range    Lactic acid 2.1 (H) 0.4 - 2.0 MMOL/L   CBC WITH AUTOMATED DIFF    Collection Time: 02/18/22 12:04 AM   Result Value Ref Range    WBC 1.6 (LL) 4.3 - 11.1 K/uL    RBC 4.22 4.05 - 5.2 M/uL    HGB 12.2 11.7 - 15.4 g/dL    HCT 36.3 35.8 - 46.3 %    MCV 86.0 79.6 - 97.8 FL    MCH 28.9 26.1 - 32.9 PG    MCHC 33.6 31.4 - 35.0 g/dL    RDW 12.3 11.9 - 14.6 %    PLATELET 213 (L) 894 - 450 K/uL    MPV 10.6 9.4 - 12.3 FL    ABSOLUTE NRBC 0.00 0.0 - 0.2 K/uL    NEUTROPHILS 77 43 - 78 %    LYMPHOCYTES 15 13 - 44 %    MONOCYTES 2 (L) 4.0 - 12.0 %    EOSINOPHILS 3 0.5 - 7.8 %    BASOPHILS 1 0.0 - 2.0 %    IMMATURE GRANULOCYTES 2 0.0 - 5.0 %    ABS. NEUTROPHILS 1.4 (L) 1.7 - 8.2 K/UL    ABS. LYMPHOCYTES 0.2 (L) 0.5 - 4.6 K/UL    ABS. MONOCYTES 0.0 (L) 0.1 - 1.3 K/UL    ABS. EOSINOPHILS 0.0 0.0 - 0.8 K/UL    ABS. BASOPHILS 0.0 0.0 - 0.2 K/UL    ABS. IMM.  GRANS. 0.0 0.0 - 0.5 K/UL    RBC COMMENTS NORMOCYTIC/NORMOCHROMIC      WBC COMMENTS VACUOLATED POLYS      PLATELET COMMENTS ADEQUATE      DF AUTOMATED     METABOLIC PANEL, COMPREHENSIVE    Collection Time: 02/18/22 12:04 AM Result Value Ref Range    Sodium 127 (L) 136 - 145 mmol/L    Potassium 3.7 3.5 - 5.1 mmol/L    Chloride 96 (L) 98 - 107 mmol/L    CO2 24 21 - 32 mmol/L    Anion gap 7 7 - 16 mmol/L    Glucose 204 (H) 65 - 100 mg/dL    BUN 11 6 - 23 MG/DL    Creatinine 0.81 0.6 - 1.0 MG/DL    GFR est AA >60 >60 ml/min/1.73m2    GFR est non-AA >60 >60 ml/min/1.73m2    Calcium 8.2 (L) 8.3 - 10.4 MG/DL    Bilirubin, total 0.3 0.2 - 1.1 MG/DL    ALT (SGPT) 81 (H) 12 - 65 U/L    AST (SGOT) 40 (H) 15 - 37 U/L    Alk. phosphatase 88 50 - 136 U/L    Protein, total 7.1 6.3 - 8.2 g/dL    Albumin 2.7 (L) 3.5 - 5.0 g/dL    Globulin 4.4 (H) 2.3 - 3.5 g/dL    A-G Ratio 0.6 (L) 1.2 - 3.5     PROCALCITONIN    Collection Time: 02/18/22 12:04 AM   Result Value Ref Range    Procalcitonin 0.47 0.00 - 0.49 ng/mL   MAGNESIUM    Collection Time: 02/18/22 12:04 AM   Result Value Ref Range    Magnesium 1.5 (L) 1.8 - 2.4 mg/dL   EKG, 12 LEAD, INITIAL    Collection Time: 02/18/22 12:10 AM   Result Value Ref Range    Ventricular Rate 114 BPM    Atrial Rate 114 BPM    P-R Interval 128 ms    QRS Duration 88 ms    Q-T Interval 298 ms    QTC Calculation (Bezet) 410 ms    Calculated P Axis 61 degrees    Calculated R Axis 77 degrees    Calculated T Axis 52 degrees    Diagnosis       !! AGE AND GENDER SPECIFIC ECG ANALYSIS !!   Sinus tachycardia  Otherwise normal ECG  No previous ECGs available  Confirmed by Angella Laws ([id number]) on 2/18/2022 7:15:21 AM     URINALYSIS W/ RFLX MICROSCOPIC    Collection Time: 02/18/22 12:16 AM   Result Value Ref Range    Color YELLOW      Appearance CLOUDY      Specific gravity 1.011 1.001 - 1.023      pH (UA) 5.5 5.0 - 9.0      Protein TRACE (A) NEG mg/dL    Glucose 250 mg/dL    Ketone Negative NEG mg/dL    Bilirubin Negative NEG      Blood Negative NEG      Urobilinogen 0.2 0.2 - 1.0 EU/dL    Nitrites Negative NEG      Leukocyte Esterase Negative NEG      WBC 3-5 0 /hpf    RBC 0-3 0 /hpf    Epithelial cells 0-3 0 /hpf    Bacteria 0 0 /hpf    Mucus TRACE 0 /lpf    Other observations RESULTS VERIFIED MANUALLY     COVID-19 RAPID TEST    Collection Time: 02/18/22 12:44 AM   Result Value Ref Range    Specimen source NASAL SWAB      COVID-19 rapid test Not detected NOTD     LACTIC ACID    Collection Time: 02/18/22  1:57 AM   Result Value Ref Range    Lactic acid 1.4 0.4 - 2.0 MMOL/L       All Micro Results     Procedure Component Value Units Date/Time    COVID-19 RAPID TEST [[id number]] Collected: 02/18/22 0044    Order Status: Completed Specimen: Nasopharyngeal Updated: 02/18/22 0121     Specimen source NASAL SWAB        COVID-19 rapid test Not detected        Comment:      The specimen is NEGATIVE for SARS-CoV-2, the novel coronavirus associated with COVID-19. A negative result does not rule out COVID-19. This test has been authorized by the FDA under an Emergency Use Authorization (EUA) for use by authorized laboratories. Fact sheet for Healthcare Providers: [employer]  Fact sheet for Patients: [employer]       Methodology: Isothermal Nucleic Acid Amplification         BLOOD CULTURE [[id number]] Collected: 02/18/22 0008    Order Status: Completed Specimen: Blood Updated: 02/18/22 0030    BLOOD CULTURE [[id number]] Collected: 02/18/22 0004    Order Status: Completed Specimen: Blood Updated: 02/18/22 0030          Other Studies:  XR CHEST PORT    Result Date: 2/18/2022  EXAM: XR CHEST PORT HISTORY: meets SIRS criteria. TECHNIQUE: Frontal chest. COMPARISON: None available. FINDINGS: The cardiac silhouette, mediastinum, and pulmonary vasculature are within normal limits. There is no consolidation, pleural effusion, or pneumothorax. No significant osseous abnormalities are observed. There is a right-sided MediPort. No evidence of an acute intrathoracic process.        Current Meds:  Current Facility-Administered Medications   Medication Dose Route Frequency    lidocaine-prilocaine (EMLA) 2.5-2.5 % cream   Topical PRN    LORazepam (ATIVAN) tablet 0.5-1 mg  0.5-1 mg Oral Q8H PRN    montelukast (SINGULAIR) tablet 10 mg  10 mg Oral DAILY    sodium chloride (NS) flush 5-40 mL  5-40 mL IntraVENous Q8H    sodium chloride (NS) flush 5-40 mL  5-40 mL IntraVENous PRN    0.9% sodium chloride infusion  75 mL/hr IntraVENous CONTINUOUS    piperacillin-tazobactam (ZOSYN) 3.375 g in 0.9% sodium chloride (MBP/ADV) 100 mL MBP  3.375 g IntraVENous Q8H    acetaminophen (TYLENOL) tablet 650 mg  650 mg Oral Q4H PRN    famotidine (PEPCID) tablet 20 mg  20 mg Oral Q12H    enoxaparin (LOVENOX) injection 40 mg  40 mg SubCUTAneous Q24H    vancomycin (VANCOCIN) 1,000 mg in 0.9% sodium chloride 250 mL (VIAL-MATE)  1 g IntraVENous Q12H    magnesium sulfate 2 g/50 ml IVPB (premix or compounded)  2 g IntraVENous ONCE    sodium chloride (NS) flush 5-10 mL  5-10 mL IntraVENous Q8H    sodium chloride (NS) flush 5-10 mL  5-10 mL IntraVENous PRN     Current Outpatient Medications   Medication Sig    triamcinolone acetonide (KENALOG) 0.1 % topical cream Apply  to affected area three (3) times daily. use thin layer over rash    olmesartan-hydroCHLOROthiazide (BENICAR HCT) 40-25 mg per tablet Take 1 Tablet by mouth daily.  prochlorperazine (Compazine) 10 mg tablet Take 1 Tablet by mouth every six (6) hours as needed for Nausea. Indications: prevent nausea and vomiting from cancer chemotherapy    ondansetron hcl (Zofran) 8 mg tablet Take 1 Tablet by mouth every eight (8) hours as needed for Nausea. Indications: prevent nausea and vomiting from cancer chemotherapy    lidocaine-prilocaine (EMLA) topical cream Apply to port about 45 minutes prior to access    LORazepam (Ativan) 1 mg tablet Take 0.5-1 Tablets by mouth every eight (8) hours as needed for Anxiety. Max Daily Amount: 3 mg.  Indications: anxious, nausea and vomiting caused by cancer drugs    senna-docusate (PERICOLACE) 8.6-50 mg per tablet Take 1 Tablet by mouth daily as needed for Constipation.  spironolactone (ALDACTONE) 25 mg tablet Take 25 mg by mouth daily.  montelukast (Singulair) 10 mg tablet Take 10 mg by mouth daily.  dilTIAZem CD (CARDIZEM CD) 240 mg ER capsule 1 po qd    losartan-hydroCHLOROthiazide (HYZAAR) 100-12.5 mg per tablet 1 PO QD       Signed:  Edilma Ruiz MD    Part of this note may have been written by using a voice dictation software. The note has been proofread but may still contain some grammatical/other typographical errors.

## 2022-02-18 NOTE — PROGRESS NOTES
Care Management Interventions  PCP Verified by CM: Yes (Dr. Manjula Javier )  Mode of Transport at Discharge:  (family)  Transition of Care Consult (CM Consult): Discharge Planning  Confirm Follow Up Transport: Family  The Patient and/or Patient Representative was Provided with a Choice of Provider and Agrees with the Discharge Plan?: Yes  Freedom of Choice List was Provided with Basic Dialogue that Supports the Patient's Individualized Plan of Care/Goals, Treatment Preferences and Shares the Quality Data Associated with the Providers?: Yes  Discharge Location  Patient Expects to be Discharged to[de-identified] Home with family assistance  Visited with pt to establish prior to admission baseline and discuss their anticipated goals at time of d/c. Stratus interpreting used for communication. Pt came in c/o fever and rash since yesterday, had last chemo on Monday. Pt with Hx of Breast CA. Pt lives at home with spouse/ Dorys Roberson, she is inde with [employer], works in [employer]. Pt is current with PCP and Rx are filled at [facility] off [address]. CM to follow.

## 2022-02-18 NOTE — PROGRESS NOTES
02/18/22 1433   Dual Skin Pressure Injury Assessment   Dual Skin Pressure Injury Assessment WDL   Second Care Provider (Based on Facility Policy) ANKIT Grossman   Skin Integumentary   Skin Integumentary (WDL) X   Skin Color Other (comment); Appropriate for ethnicity  (rashes around body)   Skin Integrity Excoriation  (L middle finger)    Pressure  Injury Documentation No Pressure Injury Noted-Pressure Ulcer Prevention Initiated   Wound Prevention and Protection Methods   Orientation of Wound Prevention Posterior   Location of Wound Prevention Sacrum/Coccyx   Dressing Present  No   Wound Offloading (Prevention Methods) Bed, pressure reduction mattress

## 2022-02-18 NOTE — H&P
[facility] Hematology & Oncology        Inpatient Hematology / Oncology History and Physical    Reason for Admission:  Severe sepsis ([facility] [A41.9, R65.20]    History of Present Illness:  Ms. Panchito Rob is a 49 y.o. female admitted on 2/17/2022. The primary encounter diagnosis was Neutropenic fever ([facility] Diagnoses of Rash and Malignant neoplasm of female breast, unspecified estrogen receptor status, unspecified laterality, unspecified site of breast ([facility] were also pertinent to this visit. Bouchra Fartun Her PMH includes HTN and heart murmur. She is a patient of Dr. Sidney Martinez with triple negative breast cancer s/p C1D8 Carbo/Taxol/Keytruda on 2/14. D15 due 2/21. She presented to ED with c/o fever and rash. Temp 103 in ED with one episode of vomiting. Rash is occasionally pruritic. She called the clinic regarding this rash and was prescribed steroids and advised to take Benadryl. She met sepsis criteria with fever, tachycardia, leukopenia, and lactic acid 2.1. CXR neg.  UA neg. BCx pending. COVID neg. On Vanc/Zosyn. We were asked to assume care of our patient. Review of Systems:  Constitutional +fever. Denies weight loss, appetite changes, fatigue, night sweats. HEENT Denies trauma, blurry vision, hearing loss, ear pain, nosebleeds, sore throat, neck pain and ear discharge. Skin +rash   Lungs Denies dyspnea, cough, sputum production or hemoptysis. Cardiovascular Denies chest pain, palpitations, or lower extremity edema. Gastrointestinal +vomiting. Denies changes in bowel habits, bloody or black stools, abdominal pain.  Denies dysuria, frequency or hesitancy of urination. Neuro Denies headaches, visual changes or ataxia. Denies dizziness, tingling, tremors, sensory change, speech change, focal weakness or headaches. MSK Denies back pain, arthralgias, myalgias or frequent falls. Psychiatric/Behavioral  The patient is not nervous/anxious.          Allergies   Allergen Reactions    Lisinopril Cough Other reaction(s): Cough     Past Medical History:   Diagnosis Date    Breast cancer ([facility]     Hypertension     Murmur, heart     echo 2016 EF 60-65% ; 1/31/22 states never has had symptoms    Poor historian     difficulty with verifying medication list     Past Surgical History:   Procedure Laterality Date    IR INSERT TUNL CVC W PORT OVER 5 YEARS  2/2/2022    ID BREAST SURGERY PROCEDURE UNLISTED      bx of left breast      Family History   Problem Relation Age of Onset    No Known Problems Mother     Hypertension Father     No Known Problems Sister     No Known Problems Brother     Other Other         states no family history    No Known Problems Sister     Breast Cancer Neg Hx     Colon Cancer Neg Hx     Ovarian Cancer Neg Hx     Uterine Cancer Neg Hx      Social History     Socioeconomic History    Marital status:      Spouse name: Not on file    Number of children: 3    Years of education: Not on file    Highest education level: Not on file   Occupational History    Not on file   Tobacco Use    Smoking status: Never Smoker    Smokeless tobacco: Never Used   Substance and Sexual Activity    Alcohol use: No     Alcohol/week: 0.0 standard drinks    Drug use: No    Sexual activity: Yes     Birth control/protection: Condom   Other Topics Concern     Service Not Asked    Blood Transfusions Not Asked    Caffeine Concern No    Occupational Exposure Not Asked    Hobby Hazards Not Asked    Sleep Concern Not Asked    Stress Concern Not Asked    Weight Concern Not Asked    Special Diet No    Back Care Not Asked    Exercise No    Bike Helmet Not Asked    Seat Belt Yes    Self-Exams Yes   Social History Narrative    Abuse: Feels safe at home, no history of physical abuse, no history of sexual abuse     Social Determinants of Health     Financial Resource Strain:     Difficulty of Paying Living Expenses: Not on file   Food Insecurity:     Worried About Running Out of Food in the Last Year: Not on file    Ran Out of Food in the Last Year: Not on file   Transportation Needs:     Lack of Transportation (Medical): Not on file    Lack of Transportation (Non-Medical):  Not on file   Physical Activity:     Days of Exercise per Week: Not on file    Minutes of Exercise per Session: Not on file   Stress:     Feeling of Stress : Not on file   Social Connections:     Frequency of Communication with Friends and Family: Not on file    Frequency of Social Gatherings with Friends and Family: Not on file    Attends Anabaptist Services: Not on file    Active Member of Clubs or Organizations: Not on file    Attends Club or Organization Meetings: Not on file    Marital Status: Not on file   Intimate Partner Violence:     Fear of Current or Ex-Partner: Not on file    Emotionally Abused: Not on file    Physically Abused: Not on file    Sexually Abused: Not on file   Housing Stability:     Unable to Pay for Housing in the Last Year: Not on file    Number of [place] in the Last Year: Not on file    Unstable Housing in the Last Year: Not on file     Current Facility-Administered Medications   Medication Dose Route Frequency Provider Last Rate Last Admin    lidocaine-prilocaine (EMLA) 2.5-2.5 % cream   Topical PRN Tete Swartz MD        LORazepam (ATIVAN) tablet 0.5-1 mg  0.5-1 mg Oral Q8H PRN Tete Swartz MD        montelukast (SINGULAIR) tablet 10 mg  10 mg Oral DAILY Tete Swartz MD        sodium chloride (NS) flush 5-40 mL  5-40 mL IntraVENous Q8H Catherine Negrete MD        sodium chloride (NS) flush 5-40 mL  5-40 mL IntraVENous PRN Tete Swartz MD        0.9% sodium chloride infusion  150 mL/hr IntraVENous CONTINUOUS Jun Perez  mL/hr at 02/18/22 0957 150 mL/hr at 02/18/22 0957    acetaminophen (TYLENOL) tablet 650 mg  650 mg Oral Q4H PRN Tete Swartz MD        enoxaparin (LOVENOX) injection 40 mg  40 mg SubCUTAneous Q24H Bill Harman MD   40 mg at 02/18/22 1012    vancomycin (VANCOCIN) 1,000 mg in 0.9% sodium chloride 250 mL (VIAL-MATE)  1 g IntraVENous Q12H Olman Case MD        magnesium sulfate 2 g/50 ml IVPB (premix or compounded)  2 g IntraVENous Jose Ellis MD 25 mL/hr at 02/18/22 1050 2 g at 02/18/22 1050    cefepime (MAXIPIME) 2 g in 0.9% sodium chloride (MBP/ADV) 100 mL MBP  2 g IntraVENous Q8H Raquel Hager  mL/hr at 02/18/22 1012 2 g at 02/18/22 1012    filgrastim-aafi (NIVESTYM) injection syringe 300 mcg  300 mcg SubCUTAneous DAILY Gage Harrison MD        sodium chloride (NS) flush 5-10 mL  5-10 mL IntraVENous Q8H Arlyn Gray MD        sodium chloride (NS) flush 5-10 mL  5-10 mL IntraVENous PRN Arlyn Gray MD         Current Outpatient Medications   Medication Sig Dispense Refill    triamcinolone acetonide (KENALOG) 0.1 % topical cream Apply  to affected area three (3) times daily. use thin layer over rash 45 g 1    olmesartan-hydroCHLOROthiazide (BENICAR HCT) 40-25 mg per tablet Take 1 Tablet by mouth daily.  prochlorperazine (Compazine) 10 mg tablet Take 1 Tablet by mouth every six (6) hours as needed for Nausea. Indications: prevent nausea and vomiting from cancer chemotherapy 60 Tablet 2    ondansetron hcl (Zofran) 8 mg tablet Take 1 Tablet by mouth every eight (8) hours as needed for Nausea. Indications: prevent nausea and vomiting from cancer chemotherapy 90 Tablet 2    lidocaine-prilocaine (EMLA) topical cream Apply to port about 45 minutes prior to access 30 g 5    LORazepam (Ativan) 1 mg tablet Take 0.5-1 Tablets by mouth every eight (8) hours as needed for Anxiety. Max Daily Amount: 3 mg. Indications: anxious, nausea and vomiting caused by cancer drugs 60 Tablet 0    senna-docusate (PERICOLACE) 8.6-50 mg per tablet Take 1 Tablet by mouth daily as needed for Constipation.  30 Tablet 1    spironolactone (ALDACTONE) 25 mg tablet Take 25 mg by mouth daily.  montelukast (Singulair) 10 mg tablet Take 10 mg by mouth daily.  dilTIAZem CD (CARDIZEM CD) 240 mg ER capsule 1 po qd 90 Cap 3    losartan-hydroCHLOROthiazide (HYZAAR) 100-12.5 mg per tablet 1 PO QD 90 Tab 3       OBJECTIVE:  Patient Vitals for the past 8 hrs:   BP Pulse Resp SpO2   22 0651 (!) 95/47 95 20 99 %   22 0430 (!) 92/57 98 16 98 %     Temp (24hrs), Av.2 °F (38.4 °C), Min:99.2 °F (37.3 °C), Max:103.1 °F (39.5 °C)    No intake/output data recorded. Physical Exam:  Constitutional: Well developed, well nourished female in no acute distress, sitting comfortably in the hospital bed. HEENT: Normocephalic and atraumatic. Oropharynx is clear, mucous membranes are moist. Extraocular muscles are intact. Sclerae anicteric. Neck supple without JVD. No thyromegaly present. Skin Warm and dry. No bruising and no rash noted. No erythema. No pallor. Respiratory Lungs are clear to auscultation bilaterally without wheezes, rales or rhonchi, normal air exchange without accessory muscle use. CVS Normal rate, regular rhythm and normal S1 and S2. No murmurs, gallops, or rubs. Abdomen Soft, nontender and nondistended, normoactive bowel sounds. No palpable mass. No hepatosplenomegaly. Neuro Grossly nonfocal with no obvious sensory or motor deficits. MSK Normal range of motion in general.  No edema and no tenderness. Psych Appropriate mood and affect.         Labs:    Recent Results (from the past 24 hour(s))   LACTIC ACID    Collection Time: 22 12:04 AM   Result Value Ref Range    Lactic acid 2.1 (H) 0.4 - 2.0 MMOL/L   CBC WITH AUTOMATED DIFF    Collection Time: 22 12:04 AM   Result Value Ref Range    WBC 1.6 (LL) 4.3 - 11.1 K/uL    RBC 4.22 4.05 - 5.2 M/uL    HGB 12.2 11.7 - 15.4 g/dL    HCT 36.3 35.8 - 46.3 %    MCV 86.0 79.6 - 97.8 FL    MCH 28.9 26.1 - 32.9 PG    MCHC 33.6 31.4 - 35.0 g/dL    RDW 12.3 11.9 - 14.6 %    PLATELET 982 (L) 150 - 450 K/uL    MPV 10.6 9.4 - 12.3 FL    ABSOLUTE NRBC 0.00 0.0 - 0.2 K/uL    NEUTROPHILS 77 43 - 78 %    LYMPHOCYTES 15 13 - 44 %    MONOCYTES 2 (L) 4.0 - 12.0 %    EOSINOPHILS 3 0.5 - 7.8 %    BASOPHILS 1 0.0 - 2.0 %    IMMATURE GRANULOCYTES 2 0.0 - 5.0 %    ABS. NEUTROPHILS 1.4 (L) 1.7 - 8.2 K/UL    ABS. LYMPHOCYTES 0.2 (L) 0.5 - 4.6 K/UL    ABS. MONOCYTES 0.0 (L) 0.1 - 1.3 K/UL    ABS. EOSINOPHILS 0.0 0.0 - 0.8 K/UL    ABS. BASOPHILS 0.0 0.0 - 0.2 K/UL    ABS. IMM. GRANS. 0.0 0.0 - 0.5 K/UL    RBC COMMENTS NORMOCYTIC/NORMOCHROMIC      WBC COMMENTS VACUOLATED POLYS      PLATELET COMMENTS ADEQUATE      DF AUTOMATED     METABOLIC PANEL, COMPREHENSIVE    Collection Time: 02/18/22 12:04 AM   Result Value Ref Range    Sodium 127 (L) 136 - 145 mmol/L    Potassium 3.7 3.5 - 5.1 mmol/L    Chloride 96 (L) 98 - 107 mmol/L    CO2 24 21 - 32 mmol/L    Anion gap 7 7 - 16 mmol/L    Glucose 204 (H) 65 - 100 mg/dL    BUN 11 6 - 23 MG/DL    Creatinine 0.81 0.6 - 1.0 MG/DL    GFR est AA >60 >60 ml/min/1.73m2    GFR est non-AA >60 >60 ml/min/1.73m2    Calcium 8.2 (L) 8.3 - 10.4 MG/DL    Bilirubin, total 0.3 0.2 - 1.1 MG/DL    ALT (SGPT) 81 (H) 12 - 65 U/L    AST (SGOT) 40 (H) 15 - 37 U/L    Alk.  phosphatase 88 50 - 136 U/L    Protein, total 7.1 6.3 - 8.2 g/dL    Albumin 2.7 (L) 3.5 - 5.0 g/dL    Globulin 4.4 (H) 2.3 - 3.5 g/dL    A-G Ratio 0.6 (L) 1.2 - 3.5     PROCALCITONIN    Collection Time: 02/18/22 12:04 AM   Result Value Ref Range    Procalcitonin 0.47 0.00 - 0.49 ng/mL   MAGNESIUM    Collection Time: 02/18/22 12:04 AM   Result Value Ref Range    Magnesium 1.5 (L) 1.8 - 2.4 mg/dL   EKG, 12 LEAD, INITIAL    Collection Time: 02/18/22 12:10 AM   Result Value Ref Range    Ventricular Rate 114 BPM    Atrial Rate 114 BPM    P-R Interval 128 ms    QRS Duration 88 ms    Q-T Interval 298 ms    QTC Calculation (Bezet) 410 ms    Calculated P Axis 61 degrees    Calculated R Axis 77 degrees    Calculated T Axis 52 degrees Diagnosis       !! AGE AND GENDER SPECIFIC ECG ANALYSIS !! Sinus tachycardia  Otherwise normal ECG  No previous ECGs available  Confirmed by JOHN GARCIA ([id number]) on 2/18/2022 7:15:21 AM     URINALYSIS W/ RFLX MICROSCOPIC    Collection Time: 02/18/22 12:16 AM   Result Value Ref Range    Color YELLOW      Appearance CLOUDY      Specific gravity 1.011 1.001 - 1.023      pH (UA) 5.5 5.0 - 9.0      Protein TRACE (A) NEG mg/dL    Glucose 250 mg/dL    Ketone Negative NEG mg/dL    Bilirubin Negative NEG      Blood Negative NEG      Urobilinogen 0.2 0.2 - 1.0 EU/dL    Nitrites Negative NEG      Leukocyte Esterase Negative NEG      WBC 3-5 0 /hpf    RBC 0-3 0 /hpf    Epithelial cells 0-3 0 /hpf    Bacteria 0 0 /hpf    Mucus TRACE 0 /lpf    Other observations RESULTS VERIFIED MANUALLY     COVID-19 RAPID TEST    Collection Time: 02/18/22 12:44 AM   Result Value Ref Range    Specimen source NASAL SWAB      COVID-19 rapid test Not detected NOTD     LACTIC ACID    Collection Time: 02/18/22  1:57 AM   Result Value Ref Range    Lactic acid 1.4 0.4 - 2.0 MMOL/L       Imaging:  XR CHEST PORT [[id number]] Collected: 02/18/22 0053   Order Status: Completed Updated: 02/18/22 0056   Narrative:     EXAM: XR CHEST PORT     HISTORY: meets SIRS criteria.       TECHNIQUE: Frontal chest.     COMPARISON: None available. FINDINGS:   The cardiac silhouette, mediastinum, and pulmonary vasculature are within normal   limits. There is no consolidation, pleural effusion, or pneumothorax. No significant osseous abnormalities are observed. There is a right-sided MediPort. Impression:     No evidence of an acute intrathoracic process.           ASSESSMENT:  Problem List  Date Reviewed: 2/14/2022          Codes Class Noted    * (Principal) Severe sepsis [facility]) ICD-10-CM: A41.9, R65.20  ICD-9-CM: 038.9, 995.92  2/18/2022        Carcinoma at 9:00 in left breast in female, estrogen receptor negative ([facility] [address] ICD-10-CM: K28.194, Z17.1  ICD-9-CM: 174.2, V86.1  1/10/2022    Overview Addendum 1/27/2022  6:20 AM by Yudelka Barnes MD     cT2N0 left breast IDC with DCIS, triple negative     No previous personal malignancy, prior breast surgery, or other symptoms reported. No known family breast cancer. 11/22/21 Bilat screening mammo  Scattered fibroglandular densities. A few typically benign-appearing calcifications in the left breast are similar in appearance to the previous study.     There is a focal asymmetry in the medial inferior left breast located 6 cm from the nipple. Recommend spot compression magnification views on the direct ML view and ultrasound, if indicated for further evaluation.     There is otherwise no suspicious mass, calcification, or architectural distortion in either breast.       IMPRESSION  Focal asymmetry in the medial inferior left breast at 6 cm from the nipple.        12/6/21 Left breast dx mammo and US  The area of concern persists with additional imaging. This resides within the medial left breast at approximately 9:00 position. There  are few associated calcifications which appear somewhat coarse. This measures approximately 1.3 cm in size. An additional smaller asymmetry also at the 9:00  position resides 7 cm from the nipple. A left breast ultrasound is recommended for further evaluation.     12/6/21 left breast US  At the 9:00 position, 6 cm from the nipple is a lobulated hypoechoic but mildly heterogeneous appearing mass measuring  approximately 1.5 x 0.8 x 0.8 cm. The margins are occasionally ill-defined with some components demonstrating posterior acoustic shadowing. The long axis is  parallel to the plane of the chest wall. There is a 7 mm hypoechoic mass just adjacent to this This is hypoechoic with a lobulated contour. The margins are  occasionally ill-defined.      IMPRESSION  Adjacent left breast masses.  Recommend tissue sampling of the larger lesion.       12/23/21 US-guided left breast biopsy  A:  \" LEFT BREAST, 9:00 POSITION, 6 CM FROM NIPPLE, CORE BIOPSY\": FOCI OF AT LEAST MICROINVASIVE INFILTRATING DUCTAL CARCINOMA,   INTERMEDIATE GRADE (MODERATELY DIFFERENTIATED) IN ASSOCIATION WITH FOCALLY HIGH GRADE DUCTAL CARCINOMA IN SITU. DEFINITE LYMPHOVASCULAR INVASION IS NOT IDENTIFIED   Sign Out Date: 12/24/2021 Suzanne Parson M.D.   ER:  Negative (0.9%);  PA: Negative (0.4%); HER-2/COURTNEY: Negative (0)   Post biopsy mammogram shows the biopsy clip in good position. 12/28/21 Breast MRI  The breasts demonstrate moderate glandularity and mild background enhancement.     Left 9:00 biopsy clip within a lobulated heterogeneously enhancing mass with angulated and irregular margins overall measuring about 1.8 x 0.7 x 1.2 cm. Posterior to this (about 1 cm away) the smaller mass measuring 0.9 cm is unchanged from recent mammogram and ultrasound. Additionally there is clumped  and reticular nonmass enhancement extending superiorly by about 2.7 cm and anteriorly by about 1.8 cm, in keeping with the DCIS and calcifications. Overall the expected area of disease measures about 4.5 cm AP by 1.2 cm transverse and is located at 9:00-9:30, mid to posterior depth. There is no evidence of involvement of skin or chest wall structures.     No other evidence of suspicious enhancing mass, and no other dominant or unique nonmass enhancement,  to suggest additional malignancy in either breast.  No evidence of axillary or internal mammary lymphadenopathy.     Elsewhere, limited visualization of the partially included thorax and upper abdomen shows no acute abnormality, with note of a small benign cyst in the left liver.         IMPRESSION  1. Left medial breast cancer measures about 4.5 cm in greatest dimension. 2. No additional suspicious finding elsewhere in either breast. No obvious lymphadenopathy.       1/13/22 presented at [facility]; genetic testing and refer to med oncology pre-op    1/13/22 BRCAPlus panel of 8 genes associated with hereditary cancer (including BRCA1 and BRCA2)   No pathogenic variants identified that increase risk of developing breast cancer                Triple negative malignant neoplasm of breast [facility]) ICD-10-CM: C50.919  ICD-9-CM: 174.9  1/10/2022        Screening mammogram, encounter for ICD-10-CM: Z12.31  ICD-9-CM: V76.12  3/18/2019    Overview Signed 3/18/2019  4:30 PM by Gregg Dyer MD     Ordered for this Oct on 3/18/19             Women's annual routine gynecological examination ICD-10-CM: Z01.419  ICD-9-CM: V72.31  7/24/2017    Overview Signed 7/24/2017  9:42 AM by Gregg Dyer MD     Pap with HPV done 7/24/17             Irregular menses ICD-10-CM: N92.6  ICD-9-CM: 626.4  7/24/2017    Overview Signed 7/24/2017  9:42 AM by Gregg Dyer MD     noted             Benign essential HTN (Chronic) ICD-10-CM: I10  ICD-9-CM: 401.1  4/13/2016        Mixed hyperlipidemia ICD-10-CM: P18.7  ICD-9-CM: 272.2  4/13/2016        Encephalomalacia on imaging study ICD-10-CM: G93.89  ICD-9-CM: 348.89  4/13/2016        Seasonal allergic rhinitis ICD-10-CM: J30.2  ICD-9-CM: 477.9  4/13/2016        Frequent headaches ICD-10-CM: R51.9  ICD-9-CM: 784.0  2/25/2016                PLAN:  Triple negative breast cancer  - s/p C1D8 Carbo/Taxol/Keytruda on 2/14. C15 due 2/21, no plans to give while admitted  -mildly neutropenic-give filgrastim x 3 days    Sepsis / Neutropenic fever  - CXR neg  - UA neg  - BCx pending  - COVID neg  - On Vanc/Zosyn    Rash  - cause unknown    Continue home meds  Roya SOPs  Lovenox for DVT ppx    Lab studies and imaging studies were personally reviewed. Thank you for allowing us to participate in the care of Ms. Loel Goodpasture. We will assume care of our patient.               Will Yousif, NP    [facility] Hematology & Oncology  [address] [address]  Office : [phone]  Fax : [phone]         Attending Addendum:  I have personally performed a face to face diagnostic evaluation on this patient. I have reviewed and agree with the care plan as documented by Eddie Varela N.P. My findings are as follows: She has breast cancer and fever, appears weak, heart rate regular without murmurs, abdomen is non-tender, bowel sounds are positive, we will admit her to our service and administer IV ABX.               Vinnie Houser MD      [facility] Hematology/Oncology  [address] [place] [address]  Office : [phone]  Fax : [phone]

## 2022-02-18 NOTE — H&P
Hospitalist History and Physical   Admit Date:  2022 11:51 PM   Name:  Miguelito Clark   Age:  49 y.o. Sex:  female  :  1965   MRN:  [id number]   Room:      Presenting Complaint: Fever and Rash    Reason(s) for Admission: Severe sepsis ([facility] [A41.9, R65.20]     History of Present Illness: This patient was discussed with the ER provider prior to seeing the patient. Pertinent history, physical, diagnostics,   initial treatments, and further plans reviewed. Patient speaks Clydie [place], family is able to interpret. Miguelito Clark is a 49 y.o. female with medical history of breast cancer, who presented with fever and rash. Patient is getting chemotherapy for her breast cancer and had her last dose on Monday of this week. Yesterday, she developed fever and a rash. Her family wondered come to the emergency room then, but she refused. Her fever did resolve somewhat, but the rashes persisted. Until this evening she had another significant fever and the family brought her into the ER. She was noted to have a temperature of 103 on arrival.  She did have one episode of vomiting. She denies abdominal pain, chest pain, shortness of breath, cough, urinary symptoms, sore throat, headaches. She did call her oncology office and was prescribed steroids and advised to take Benadryl. She has not had any new medications or exposures, other than her chemotherapy. The rash is occasionally itchy, but not continuous. Review of Systems:  10 systems reviewed and negative except as noted in HPI. Assessment & Plan:     Principal Problem:    Severe sepsis ([facility] (2022)  She meets criteria with a temperature of 103.1, tachycardia, WBC of 1.6, initial lactic acid 2.1. Repeat lactic acid was 1.4. Procalcitonin 0.47. Chest x-ray and urine are clear. Unknown source of potential infection. Unknown cause of rash. We will continue with Erika.   Consult ID    Active Problems:    Benign essential HTN (4/13/2016)  Continue home medications      Carcinoma at 9:00 in left breast in female, estrogen receptor negative ([facility] (1/10/2022)  Consult oncology to assume care of this patient. Dispo/Discharge Planning:   Inpatient, anticipate 2 to 3 days    Diet: Regular  VTE ppx:  Lovenox  Code status: Full    Hospital Problems as of 2/18/2022 Date Reviewed: 2/14/2022          Codes Class Noted - Resolved POA    * (Principal) Severe sepsis ([facility] ICD-10-CM: A41.9, R65.20  ICD-9-CM: 038.9, 995.92  2/18/2022 - Present Yes        Carcinoma at 9:00 in left breast in female, estrogen receptor negative ([facility] ICD-10-CM: F19.360, Z17.1  ICD-9-CM: 174.2, V86.1  1/10/2022 - Present Yes    Overview Addendum 1/27/2022  6:20 AM by Vita Tsang MD     cT2N0 left breast IDC with DCIS, triple negative     No previous personal malignancy, prior breast surgery, or other symptoms reported. No known family breast cancer. 11/22/21 Bilat screening mammo  Scattered fibroglandular densities. A few typically benign-appearing calcifications in the left breast are similar in appearance to the previous study.     There is a focal asymmetry in the medial inferior left breast located 6 cm from the nipple. Recommend spot compression magnification views on the direct ML view and ultrasound, if indicated for further evaluation.     There is otherwise no suspicious mass, calcification, or architectural distortion in either breast.       IMPRESSION  Focal asymmetry in the medial inferior left breast at 6 cm from the nipple.        12/6/21 Left breast dx mammo and US  The area of concern persists with additional imaging. This resides within the medial left breast at approximately 9:00 position. There  are few associated calcifications which appear somewhat coarse. This measures approximately 1.3 cm in size. An additional smaller asymmetry also at the 9:00  position resides 7 cm from the nipple.  A left breast ultrasound is recommended for further evaluation.     12/6/21 left breast US  At the 9:00 position, 6 cm from the nipple is a lobulated hypoechoic but mildly heterogeneous appearing mass measuring  approximately 1.5 x 0.8 x 0.8 cm. The margins are occasionally ill-defined with some components demonstrating posterior acoustic shadowing. The long axis is  parallel to the plane of the chest wall. There is a 7 mm hypoechoic mass just adjacent to this This is hypoechoic with a lobulated contour. The margins are  occasionally ill-defined.      IMPRESSION  Adjacent left breast masses. Recommend tissue sampling of the larger lesion.       12/23/21 US-guided left breast biopsy  A:  \" LEFT BREAST, 9:00 POSITION, 6 CM FROM NIPPLE, CORE BIOPSY\": FOCI OF AT LEAST MICROINVASIVE INFILTRATING DUCTAL CARCINOMA,   INTERMEDIATE GRADE (MODERATELY DIFFERENTIATED) IN ASSOCIATION WITH FOCALLY HIGH GRADE DUCTAL CARCINOMA IN SITU. DEFINITE LYMPHOVASCULAR INVASION IS NOT IDENTIFIED   Sign Out Date: 12/24/2021 Eliana Gutierres M.D.   ER:  Negative (0.9%);  WA: Negative (0.4%); HER-2/COURTNEY: Negative (0)   Post biopsy mammogram shows the biopsy clip in good position. 12/28/21 Breast MRI  The breasts demonstrate moderate glandularity and mild background enhancement.     Left 9:00 biopsy clip within a lobulated heterogeneously enhancing mass with angulated and irregular margins overall measuring about 1.8 x 0.7 x 1.2 cm. Posterior to this (about 1 cm away) the smaller mass measuring 0.9 cm is unchanged from recent mammogram and ultrasound. Additionally there is clumped  and reticular nonmass enhancement extending superiorly by about 2.7 cm and anteriorly by about 1.8 cm, in keeping with the DCIS and calcifications. Overall the expected area of disease measures about 4.5 cm AP by 1.2 cm transverse and is located at 9:00-9:30, mid to posterior depth.    There is no evidence of involvement of skin or chest wall structures.     No other evidence of suspicious enhancing mass, and no other dominant or unique nonmass enhancement,  to suggest additional malignancy in either breast.  No evidence of axillary or internal mammary lymphadenopathy.     Elsewhere, limited visualization of the partially included thorax and upper abdomen shows no acute abnormality, with note of a small benign cyst in the left liver.         IMPRESSION  1. Left medial breast cancer measures about 4.5 cm in greatest dimension. 2. No additional suspicious finding elsewhere in either breast. No obvious lymphadenopathy.       1/13/22 presented at [facility]; genetic testing and refer to med oncology pre-op    1/13/22 BRCAPlus panel of 8 genes associated with hereditary cancer (including BRCA1 and BRCA2)   No pathogenic variants identified that increase risk of developing breast cancer                Benign essential HTN (Chronic) ICD-10-CM: I10  ICD-9-CM: 401.1  4/13/2016 - Present Yes              Past History:  Past Medical History:   Diagnosis Date    [facility]     Hypertension     Murmur, heart     echo 2016 EF 60-65% ; 1/31/22 states never has had symptoms    Poor historian     difficulty with verifying medication list     Past Surgical History:   Procedure Laterality Date    IR INSERT TUNL CVC W PORT OVER 5 YEARS  2/2/2022    MN BREAST SURGERY PROCEDURE UNLISTED      bx of left breast       Allergies   Allergen Reactions    Lisinopril Cough     Other reaction(s): Cough      Social History     Tobacco Use    Smoking status: Never Smoker    Smokeless tobacco: Never Used   Substance Use Topics    Alcohol use: No     Alcohol/week: 0.0 standard drinks        Family Hx:  Family History   Problem Relation Age of Onset    No Known Problems Mother     Hypertension Father     No Known Problems Sister     No Known Problems Brother     Other Other         states no family history    No Known Problems Sister     Breast Cancer Neg Hx     Colon Cancer Neg Hx     Ovarian Cancer Neg Hx     Uterine Cancer Neg Hx          Family history reviewed and negative except as otherwise noted. Immunization History   Administered Date(s) Administered    Influenza Vaccine (Quad) Mdck Pf (>2 Yrs Flucelvax QUAD [id number]) 11/01/2017    Influenza Vaccine (Quad) PF (>6 Mo Flulaval, Fluarix, and >3 Yrs Afluria, Fluzone [id number]) 11/05/2018    Influenza Vaccine PF 02/25/2016    Tdap 11/01/2017     Prior to Admit Medications:  Current Outpatient Medications   Medication Instructions    dilTIAZem CD (CARDIZEM CD) 240 mg ER capsule 1 po qd    lidocaine-prilocaine (EMLA) topical cream Apply to port about 45 minutes prior to access    LORazepam (ATIVAN) 0.5-1 mg, Oral, EVERY 8 HOURS AS NEEDED    losartan-hydroCHLOROthiazide (HYZAAR) 100-12.5 mg per tablet 1 PO QD    montelukast (SINGULAIR) 10 mg, Oral, DAILY    olmesartan-hydroCHLOROthiazide (BENICAR HCT) 40-25 mg per tablet 1 Tablet, Oral, DAILY    ondansetron hcl (ZOFRAN) 8 mg, Oral, EVERY 8 HOURS AS NEEDED    prochlorperazine (COMPAZINE) 10 mg, Oral, EVERY 6 HOURS AS NEEDED    senna-docusate (PERICOLACE) 8.6-50 mg per tablet 1 Tablet, Oral, DAILY AS NEEDED    spironolactone (ALDACTONE) 25 mg, Oral, DAILY    triamcinolone acetonide (KENALOG) 0.1 % topical cream Topical, 3 TIMES DAILY, use thin layer over rash       Objective:     Patient Vitals for the past 24 hrs:   Temp Pulse Resp BP SpO2   02/18/22 0131 99.2 °F (37.3 °C) (!) 107 18 (!) 119/59 98 %   02/17/22 2346 (!) 103.1 °F (39.5 °C) (!) 123 20 114/70 97 %     Oxygen Therapy  O2 Sat (%): 98 % (02/18/22 0131)  O2 Device: None (Room air) (02/17/22 2346)    Estimated body mass index is 30.87 kg/m² as calculated from the following:    Height as of this encounter: 5' (1.524 m). Weight as of this encounter: 71.7 kg (158 lb 1.1 oz). No intake or output data in the 24 hours ending 02/18/22 0256      Physical Exam:    Blood pressure (!) 119/59, pulse (!) 107, temperature 99.2 °F (37.3 °C), resp.  rate 18, height 5' (1.524 m), weight 71.7 kg (158 lb 1.1 oz), SpO2 98 %. General:    Well nourished. No apparent distress  HENT:  Normocephalic, atraumatic. Nares appear normal, no drainage. Oropharynx is clear with tacky mucous membranes, a few erythematous lesions noted on soft palate  Eyes:  Sclerae appear normal.  PERRL; EOMI  Neck:  No restricted ROM. Trachea midline   CV:   RRR. No jugular venous distension. No LE edema  Lungs:   CTAB. No wheezing, rhonchi, or rales. Respirations even, unlabored  Abdomen: Bowel sounds present. Soft, nontender, nondistended. Musc/Sk: No cyanosis or clubbing. Skin:     Diffuse maculopapular rash. Warm and dry. Neuro:  CN II-XII grossly intact. Eye exam as noted above; Moves extremities equally and appropriately  Psych:   Alert and oriented x 4; Judgement and insight are normal;         I have reviewed ordered lab tests and reports of imaging below:    Last 24hr Labs:  Recent Results (from the past 24 hour(s))   LACTIC ACID    Collection Time: 02/18/22 12:04 AM   Result Value Ref Range    Lactic acid 2.1 (H) 0.4 - 2.0 MMOL/L   CBC WITH AUTOMATED DIFF    Collection Time: 02/18/22 12:04 AM   Result Value Ref Range    WBC 1.6 (LL) 4.3 - 11.1 K/uL    RBC 4.22 4.05 - 5.2 M/uL    HGB 12.2 11.7 - 15.4 g/dL    HCT 36.3 35.8 - 46.3 %    MCV 86.0 79.6 - 97.8 FL    MCH 28.9 26.1 - 32.9 PG    MCHC 33.6 31.4 - 35.0 g/dL    RDW 12.3 11.9 - 14.6 %    PLATELET 309 (L) 748 - 450 K/uL    MPV 10.6 9.4 - 12.3 FL    ABSOLUTE NRBC 0.00 0.0 - 0.2 K/uL    NEUTROPHILS 77 43 - 78 %    LYMPHOCYTES 15 13 - 44 %    MONOCYTES 2 (L) 4.0 - 12.0 %    EOSINOPHILS 3 0.5 - 7.8 %    BASOPHILS 1 0.0 - 2.0 %    IMMATURE GRANULOCYTES 2 0.0 - 5.0 %    ABS. NEUTROPHILS 1.4 (L) 1.7 - 8.2 K/UL    ABS. LYMPHOCYTES 0.2 (L) 0.5 - 4.6 K/UL    ABS. MONOCYTES 0.0 (L) 0.1 - 1.3 K/UL    ABS. EOSINOPHILS 0.0 0.0 - 0.8 K/UL    ABS. BASOPHILS 0.0 0.0 - 0.2 K/UL    ABS. IMM.  GRANS. 0.0 0.0 - 0.5 K/UL    RBC COMMENTS NORMOCYTIC/NORMOCHROMIC      WBC COMMENTS VACUOLATED POLYS      PLATELET COMMENTS ADEQUATE      DF AUTOMATED     METABOLIC PANEL, COMPREHENSIVE    Collection Time: 02/18/22 12:04 AM   Result Value Ref Range    Sodium 127 (L) 136 - 145 mmol/L    Potassium 3.7 3.5 - 5.1 mmol/L    Chloride 96 (L) 98 - 107 mmol/L    CO2 24 21 - 32 mmol/L    Anion gap 7 7 - 16 mmol/L    Glucose 204 (H) 65 - 100 mg/dL    BUN 11 6 - 23 MG/DL    Creatinine 0.81 0.6 - 1.0 MG/DL    GFR est AA >60 >60 ml/min/1.73m2    GFR est non-AA >60 >60 ml/min/1.73m2    Calcium 8.2 (L) 8.3 - 10.4 MG/DL    Bilirubin, total 0.3 0.2 - 1.1 MG/DL    ALT (SGPT) 81 (H) 12 - 65 U/L    AST (SGOT) 40 (H) 15 - 37 U/L    Alk.  phosphatase 88 50 - 136 U/L    Protein, total 7.1 6.3 - 8.2 g/dL    Albumin 2.7 (L) 3.5 - 5.0 g/dL    Globulin 4.4 (H) 2.3 - 3.5 g/dL    A-G Ratio 0.6 (L) 1.2 - 3.5     URINALYSIS W/ RFLX MICROSCOPIC    Collection Time: 02/18/22 12:16 AM   Result Value Ref Range    Color YELLOW      Appearance CLOUDY      Specific gravity 1.011 1.001 - 1.023      pH (UA) 5.5 5.0 - 9.0      Protein TRACE (A) NEG mg/dL    Glucose 250 mg/dL    Ketone Negative NEG mg/dL    Bilirubin Negative NEG      Blood Negative NEG      Urobilinogen 0.2 0.2 - 1.0 EU/dL    Nitrites Negative NEG      Leukocyte Esterase Negative NEG      WBC 3-5 0 /hpf    RBC 0-3 0 /hpf    Epithelial cells 0-3 0 /hpf    Bacteria 0 0 /hpf    Mucus TRACE 0 /lpf    Other observations RESULTS VERIFIED MANUALLY     COVID-19 RAPID TEST    Collection Time: 02/18/22 12:44 AM   Result Value Ref Range    Specimen source NASAL SWAB      COVID-19 rapid test Not detected NOTD     LACTIC ACID    Collection Time: 02/18/22  1:57 AM   Result Value Ref Range    Lactic acid 1.4 0.4 - 2.0 MMOL/L       All Micro Results     Procedure Component Value Units Date/Time    COVID-19 RAPID TEST [[id number]] Collected: 02/18/22 0044    Order Status: Completed Specimen: Nasopharyngeal Updated: 02/18/22 0121     Specimen source NASAL SWAB        COVID-19 rapid test Not detected        Comment:      The specimen is NEGATIVE for SARS-CoV-2, the novel coronavirus associated with COVID-19. A negative result does not rule out COVID-19. This test has been authorized by the FDA under an Emergency Use Authorization (EUA) for use by authorized laboratories. Fact sheet for Healthcare Providers: [employer]  Fact sheet for Patients: [employer]       Methodology: Isothermal Nucleic Acid Amplification         BLOOD CULTURE [[id number]] Collected: 02/18/22 0008    Order Status: Completed Specimen: Blood Updated: 02/18/22 0030    BLOOD CULTURE [[id number]] Collected: 02/18/22 0004    Order Status: Completed Specimen: Blood Updated: 02/18/22 0030          Other Studies:  XR CHEST PORT    Result Date: 2/18/2022  EXAM: XR CHEST PORT HISTORY: meets SIRS criteria. TECHNIQUE: Frontal chest. COMPARISON: None available. FINDINGS: The cardiac silhouette, mediastinum, and pulmonary vasculature are within normal limits. There is no consolidation, pleural effusion, or pneumothorax. No significant osseous abnormalities are observed. There is a right-sided MediPort. No evidence of an acute intrathoracic process.        Medications Administered     acetaminophen (TYLENOL) tablet 1,000 mg     Admin Date  02/18/2022 Action  Given Dose  1,000 mg Route  Oral Administered By  Sisi Banegas RN          lactated ringers bolus infusion 1,000 mL     Admin Date  02/18/2022 Action  New Bag Dose  1,000 mL Rate  1,000 mL/hr Route  IntraVENous Administered By  Sisi Banegas RN          piperacillin-tazobactam (ZOSYN) 4.5 g in 0.9% sodium chloride (MBP/ADV) 100 mL MBP     Admin Date  02/18/2022 Action  New Bag Dose  4.5 g Rate  200 mL/hr Route  IntraVENous Administered By  Sisi Banegas RN          sodium chloride 0.9 % bolus infusion 1,000 mL     Admin Date  02/18/2022 Action  New Bag Dose  1,000 mL Rate  1,000 mL/hr Route  IntraVENous Administered By  Mumtaz Keyes RN          vancomycin (VANCOCIN) 1750 mg in  ml infusion     Admin Date  02/18/2022 Action  New Bag Dose  1,750 mg Rate  250 mL/hr Route  IntraVENous Administered By  Mumtaz Keyes RN                Signed:  Chase Rosas MD    Part of this note may have been written by using a voice dictation software. The note has been proof read but may still contain some grammatical/other typographical errors.

## 2022-02-19 LAB
ALBUMIN SERPL-MCNC: 1.7 G/DL (ref 3.5–5)
ALBUMIN/GLOB SERPL: 0.5 {RATIO} (ref 1.2–3.5)
ALP SERPL-CCNC: 73 U/L (ref 50–130)
ALT SERPL-CCNC: 107 U/L (ref 12–65)
ANION GAP SERPL CALC-SCNC: 6 MMOL/L (ref 7–16)
AST SERPL-CCNC: 58 U/L (ref 15–37)
BILIRUB SERPL-MCNC: 0.4 MG/DL (ref 0.2–1.1)
BUN SERPL-MCNC: 14 MG/DL (ref 6–23)
CALCIUM SERPL-MCNC: 6.8 MG/DL (ref 8.3–10.4)
CHLORIDE SERPL-SCNC: 106 MMOL/L (ref 98–107)
CO2 SERPL-SCNC: 21 MMOL/L (ref 21–32)
CREAT SERPL-MCNC: 1.1 MG/DL (ref 0.6–1)
DIFFERENTIAL METHOD BLD: ABNORMAL
ERYTHROCYTE [DISTWIDTH] IN BLOOD BY AUTOMATED COUNT: 12.5 % (ref 11.9–14.6)
GLOBULIN SER CALC-MCNC: 3.2 G/DL (ref 2.3–3.5)
GLUCOSE SERPL-MCNC: 105 MG/DL (ref 65–100)
HCT VFR BLD AUTO: 31.1 % (ref 35.8–46.3)
HGB BLD-MCNC: 10.5 G/DL (ref 11.7–15.4)
LYMPHOCYTES # BLD: 0.7 K/UL (ref 0.5–4.6)
LYMPHOCYTES NFR BLD MANUAL: 12 % (ref 16–44)
MAGNESIUM SERPL-MCNC: 2 MG/DL (ref 1.8–2.4)
MCH RBC QN AUTO: 29.2 PG (ref 26.1–32.9)
MCHC RBC AUTO-ENTMCNC: 33.8 G/DL (ref 31.4–35)
MCV RBC AUTO: 86.4 FL (ref 79.6–97.8)
METAMYELOCYTES NFR BLD MANUAL: 1 %
MONOCYTES # BLD: 0.3 K/UL (ref 0.1–1.3)
MONOCYTES NFR BLD MANUAL: 5 % (ref 3–9)
MYELOCYTES NFR BLD MANUAL: 2 %
NEUTS BAND NFR BLD MANUAL: 23 % (ref 0–6)
NEUTS SEG # BLD: 4.8 K/UL (ref 1.7–8.2)
NEUTS SEG NFR BLD MANUAL: 57 % (ref 47–75)
NRBC # BLD: 0 K/UL (ref 0–0.2)
PLATELET # BLD AUTO: 75 K/UL (ref 150–450)
PLATELET COMMENTS,PCOM: ABNORMAL
PMV BLD AUTO: 10.2 FL (ref 9.4–12.3)
POTASSIUM SERPL-SCNC: 3.8 MMOL/L (ref 3.5–5.1)
PROT SERPL-MCNC: 4.9 G/DL (ref 6.3–8.2)
RBC # BLD AUTO: 3.6 M/UL (ref 4.05–5.2)
RBC MORPH BLD: ABNORMAL
SODIUM SERPL-SCNC: 133 MMOL/L (ref 136–145)
VANCOMYCIN SERPL-MCNC: 26.2 UG/ML
WBC # BLD AUTO: 5.9 K/UL (ref 4.3–11.1)

## 2022-02-19 PROCEDURE — 74011250636 HC RX REV CODE- 250/636: Performed by: STUDENT IN AN ORGANIZED HEALTH CARE EDUCATION/TRAINING PROGRAM

## 2022-02-19 PROCEDURE — 74011250636 HC RX REV CODE- 250/636: Performed by: INTERNAL MEDICINE

## 2022-02-19 PROCEDURE — 80202 ASSAY OF VANCOMYCIN: CPT

## 2022-02-19 PROCEDURE — 65270000029 HC RM PRIVATE

## 2022-02-19 PROCEDURE — 85025 COMPLETE CBC W/AUTO DIFF WBC: CPT

## 2022-02-19 PROCEDURE — 74011000250 HC RX REV CODE- 250: Performed by: FAMILY MEDICINE

## 2022-02-19 PROCEDURE — 99233 SBSQ HOSP IP/OBS HIGH 50: CPT | Performed by: INTERNAL MEDICINE

## 2022-02-19 PROCEDURE — 83735 ASSAY OF MAGNESIUM: CPT

## 2022-02-19 PROCEDURE — 74011000258 HC RX REV CODE- 258: Performed by: INTERNAL MEDICINE

## 2022-02-19 PROCEDURE — 74011000250 HC RX REV CODE- 250: Performed by: EMERGENCY MEDICINE

## 2022-02-19 PROCEDURE — 87324 CLOSTRIDIUM AG IA: CPT

## 2022-02-19 PROCEDURE — 80053 COMPREHEN METABOLIC PANEL: CPT

## 2022-02-19 PROCEDURE — 74011250636 HC RX REV CODE- 250/636: Performed by: FAMILY MEDICINE

## 2022-02-19 RX ADMIN — ONDANSETRON 4 MG: 2 INJECTION INTRAMUSCULAR; INTRAVENOUS at 11:11

## 2022-02-19 RX ADMIN — FILGRASTIM-AAFI 300 MCG: 300 INJECTION, SOLUTION SUBCUTANEOUS at 10:22

## 2022-02-19 RX ADMIN — SODIUM CHLORIDE, PRESERVATIVE FREE 10 ML: 5 INJECTION INTRAVENOUS at 13:20

## 2022-02-19 RX ADMIN — VANCOMYCIN HYDROCHLORIDE 1000 MG: 1 INJECTION, POWDER, LYOPHILIZED, FOR SOLUTION INTRAVENOUS at 21:19

## 2022-02-19 RX ADMIN — SODIUM CHLORIDE, PRESERVATIVE FREE 10 ML: 5 INJECTION INTRAVENOUS at 21:22

## 2022-02-19 RX ADMIN — VANCOMYCIN HYDROCHLORIDE 1000 MG: 1 INJECTION, POWDER, LYOPHILIZED, FOR SOLUTION INTRAVENOUS at 02:10

## 2022-02-19 RX ADMIN — SODIUM CHLORIDE, PRESERVATIVE FREE 10 ML: 5 INJECTION INTRAVENOUS at 05:58

## 2022-02-19 RX ADMIN — CEFEPIME HYDROCHLORIDE 2 G: 2 INJECTION, POWDER, FOR SOLUTION INTRAVENOUS at 03:16

## 2022-02-19 RX ADMIN — CEFEPIME HYDROCHLORIDE 2 G: 2 INJECTION, POWDER, FOR SOLUTION INTRAVENOUS at 10:22

## 2022-02-19 RX ADMIN — SODIUM CHLORIDE, PRESERVATIVE FREE 10 ML: 5 INJECTION INTRAVENOUS at 05:59

## 2022-02-19 RX ADMIN — ONDANSETRON 4 MG: 2 INJECTION INTRAMUSCULAR; INTRAVENOUS at 23:49

## 2022-02-19 RX ADMIN — CEFEPIME HYDROCHLORIDE 2 G: 2 INJECTION, POWDER, FOR SOLUTION INTRAVENOUS at 20:34

## 2022-02-19 RX ADMIN — ENOXAPARIN SODIUM 40 MG: 100 INJECTION SUBCUTANEOUS at 09:04

## 2022-02-19 NOTE — PROGRESS NOTES
[facility] Hematology & Oncology        Inpatient Hematology / Oncology Progress Note      Admission Date: 2022 11:51 PM  Reason for Admission/Hospital Course: Severe sepsis ([address] [A41.9, R65.20]      24 Hour Events:  Fever ON, VSS   BCx NGTD  On Cef/Vanc  C/o watery diarrhea - checking C diff    ROS:  Constitutional: Positive for fever, fatigue; negative for weakness. CV: Positive for hand edema; negative for chest pain, palpitations. Respiratory: Negative for dyspnea, cough, wheezing. GI: Positive for diarrhea; negative for nausea, abdominal pain     10 point review of systems is otherwise negative with the exception of the elements mentioned above in the HPI. Allergies   Allergen Reactions    Lisinopril Cough     Other reaction(s): Cough       OBJECTIVE:  Patient Vitals for the past 8 hrs:   BP Temp Pulse Resp SpO2   22 0716 101/63 98.4 °F (36.9 °C) 99 16 98 %   22 0316 (!) 99/56 98.7 °F (37.1 °C) 82 18 92 %     Temp (24hrs), Av.6 °F (37.6 °C), Min:98.2 °F (36.8 °C), Max:102.9 °F (39.4 °C)    No intake/output data recorded. Physical Exam:  Constitutional: Well developed, well nourished female in no acute distress, sitting comfortably in the hospital bed. HEENT: Normocephalic and atraumatic. Oropharynx is clear, mucous membranes are moist.  Neck supple. Lymph node   Deferred. Skin Warm and dry. No bruising and no rash noted. No erythema. No pallor. Respiratory Lungs are clear to auscultation bilaterally without wheezes, rales or rhonchi, normal air exchange without accessory muscle use. CVS Normal rate, regular rhythm and normal S1 and S2. No murmurs, gallops, or rubs. Abdomen Soft, nontender and nondistended, normoactive bowel sounds. No palpable mass. No hepatosplenomegaly. Neuro Grossly nonfocal with no obvious sensory or motor deficits. MSK Normal range of motion in general.  +swelling BL hands   Psych Appropriate mood and affect. Labs:      Recent Labs     02/19/22  0547 02/18/22  0004   WBC 5.9 1.6*   RBC 3.60* 4.22   HGB 10.5* 12.2   HCT 31.1* 36.3   MCV 86.4 86.0   MCH 29.2 28.9   MCHC 33.8 33.6   RDW 12.5 12.3   PLT 75* 103*   GRANS 57 77   LYMPH 12* 15   MONOS 5 2*   EOS  --  3   BASOS  --  1   IG  --  2   DF MANUAL AUTOMATED   ANEU 4.8 1.4*   ABL 0.7 0.2*   ABM 0.3 0.0*   KARIN  --  0.0   ABB  --  0.0   AIG  --  0.0        Recent Labs     02/19/22  0547 02/18/22  0004   * 127*   K 3.8 3.7    96*   CO2 21 24   AGAP 6* 7   * 204*   BUN 14 11   CREA 1.10* 0.81   GFRAA >60 >60   GFRNA 55* >60   CA 6.8* 8.2*   AP 73 88   TP 4.9* 7.1   ALB 1.7* 2.7*   GLOB 3.2 4.4*   AGRAT 0.5* 0.6*   MG 2.0 1.5*         Imaging:  XR CHEST PORT [[id number]] Collected: 02/18/22 0053   Order Status: Completed Updated: 02/18/22 0056   Narrative:     EXAM: XR CHEST PORT     HISTORY: meets SIRS criteria.       TECHNIQUE: Frontal chest.     COMPARISON: None available. FINDINGS:   The cardiac silhouette, mediastinum, and pulmonary vasculature are within normal   limits. There is no consolidation, pleural effusion, or pneumothorax. No significant osseous abnormalities are observed. There is a right-sided MediPort. Impression:     No evidence of an acute intrathoracic process.         Medications:  Current Facility-Administered Medications   Medication Dose Route Frequency    vancomycin (VANCOCIN) 1,000 mg in 0.9% sodium chloride 250 mL (VIAL-MATE)  1 g IntraVENous Q18H    lidocaine-prilocaine (EMLA) 2.5-2.5 % cream   Topical PRN    LORazepam (ATIVAN) tablet 0.5-1 mg  0.5-1 mg Oral Q8H PRN    sodium chloride (NS) flush 5-40 mL  5-40 mL IntraVENous Q8H    sodium chloride (NS) flush 5-40 mL  5-40 mL IntraVENous PRN    acetaminophen (TYLENOL) tablet 650 mg  650 mg Oral Q4H PRN    enoxaparin (LOVENOX) injection 40 mg  40 mg SubCUTAneous Q24H    cefepime (MAXIPIME) 2 g in 0.9% sodium chloride (MBP/ADV) 100 mL MBP  2 g IntraVENous Q8H    filgrastim-aafi (NIVESTYM) injection syringe 300 mcg  300 mcg SubCUTAneous DAILY    ondansetron (ZOFRAN) injection 4 mg  4 mg IntraVENous Q4H PRN    diphenhydrAMINE (BENADRYL) capsule 25 mg  25 mg Oral Q6H PRN    sodium chloride (NS) flush 5-10 mL  5-10 mL IntraVENous Q8H    sodium chloride (NS) flush 5-10 mL  5-10 mL IntraVENous PRN         ASSESSMENT:    Problem List  Date Reviewed: 2/14/2022          Codes Class Noted    * (Principal) Severe sepsis ([facility] ICD-10-CM: A41.9, R65.20  ICD-9-CM: 038.9, 995.92  2/18/2022        Carcinoma at 9:00 in left breast in female, estrogen receptor negative ([facility] ICD-10-CM: D57.544, Z17.1  ICD-9-CM: 174.2, V86.1  1/10/2022    Overview Addendum 1/27/2022  6:20 AM by Dora Sal MD     cT2N0 left breast IDC with DCIS, triple negative     No previous personal malignancy, prior breast surgery, or other symptoms reported. No known family breast cancer. 11/22/21 Bilat screening mammo  Scattered fibroglandular densities. A few typically benign-appearing calcifications in the left breast are similar in appearance to the previous study.     There is a focal asymmetry in the medial inferior left breast located 6 cm from the nipple. Recommend spot compression magnification views on the direct ML view and ultrasound, if indicated for further evaluation.     There is otherwise no suspicious mass, calcification, or architectural distortion in either breast.       IMPRESSION  Focal asymmetry in the medial inferior left breast at 6 cm from the nipple.        12/6/21 Left breast dx mammo and US  The area of concern persists with additional imaging. This resides within the medial left breast at approximately 9:00 position. There  are few associated calcifications which appear somewhat coarse. This measures approximately 1.3 cm in size. An additional smaller asymmetry also at the 9:00  position resides 7 cm from the nipple.  A left breast ultrasound is recommended for further evaluation.     12/6/21 left breast US  At the 9:00 position, 6 cm from the nipple is a lobulated hypoechoic but mildly heterogeneous appearing mass measuring  approximately 1.5 x 0.8 x 0.8 cm. The margins are occasionally ill-defined with some components demonstrating posterior acoustic shadowing. The long axis is  parallel to the plane of the chest wall. There is a 7 mm hypoechoic mass just adjacent to this This is hypoechoic with a lobulated contour. The margins are  occasionally ill-defined.      IMPRESSION  Adjacent left breast masses. Recommend tissue sampling of the larger lesion.       12/23/21 US-guided left breast biopsy  A:  \" LEFT BREAST, 9:00 POSITION, 6 CM FROM NIPPLE, CORE BIOPSY\": FOCI OF AT LEAST MICROINVASIVE INFILTRATING DUCTAL CARCINOMA,   INTERMEDIATE GRADE (MODERATELY DIFFERENTIATED) IN ASSOCIATION WITH FOCALLY HIGH GRADE DUCTAL CARCINOMA IN SITU. DEFINITE LYMPHOVASCULAR INVASION IS NOT IDENTIFIED   Sign Out Date: 12/24/2021 Kika Urrutia M.D.   ER:  Negative (0.9%);  VA: Negative (0.4%); HER-2/COURTNEY: Negative (0)   Post biopsy mammogram shows the biopsy clip in good position. 12/28/21 Breast MRI  The breasts demonstrate moderate glandularity and mild background enhancement.     Left 9:00 biopsy clip within a lobulated heterogeneously enhancing mass with angulated and irregular margins overall measuring about 1.8 x 0.7 x 1.2 cm. Posterior to this (about 1 cm away) the smaller mass measuring 0.9 cm is unchanged from recent mammogram and ultrasound. Additionally there is clumped  and reticular nonmass enhancement extending superiorly by about 2.7 cm and anteriorly by about 1.8 cm, in keeping with the DCIS and calcifications. Overall the expected area of disease measures about 4.5 cm AP by 1.2 cm transverse and is located at 9:00-9:30, mid to posterior depth.    There is no evidence of involvement of skin or chest wall structures.     No other evidence of suspicious enhancing mass, and no other dominant or unique nonmass enhancement,  to suggest additional malignancy in either breast.  No evidence of axillary or internal mammary lymphadenopathy.     Elsewhere, limited visualization of the partially included thorax and upper abdomen shows no acute abnormality, with note of a small benign cyst in the left liver.         IMPRESSION  1. Left medial breast cancer measures about 4.5 cm in greatest dimension. 2. No additional suspicious finding elsewhere in either breast. No obvious lymphadenopathy. 1/13/22 presented at [facility]; genetic testing and refer to med oncology pre-op    1/13/22 BRCAPlus panel of 8 genes associated with hereditary cancer (including BRCA1 and BRCA2)   No pathogenic variants identified that increase risk of developing breast cancer                Triple negative malignant neoplasm of breast [facility]) ICD-10-CM: C50.919  ICD-9-CM: 174.9  1/10/2022        Screening mammogram, encounter for ICD-10-CM: Z12.31  ICD-9-CM: V76.12  3/18/2019    Overview Signed 3/18/2019  4:30 PM by Vinod Lee MD     Ordered for this Oct on 3/18/19             Women's annual routine gynecological examination ICD-10-CM: Z01.419  ICD-9-CM: V72.31  7/24/2017    Overview Signed 7/24/2017  9:42 AM by Vinod Lee MD     Pap with HPV done 7/24/17             Irregular menses ICD-10-CM: N92.6  ICD-9-CM: 626.4  7/24/2017    Overview Signed 7/24/2017  9:42 AM by Vinod Lee MD     noted             Benign essential HTN (Chronic) ICD-10-CM: I10  ICD-9-CM: 401.1  4/13/2016        Mixed hyperlipidemia ICD-10-CM: R70.1  ICD-9-CM: 272.2  4/13/2016        Encephalomalacia on imaging study ICD-10-CM: G93.89  ICD-9-CM: 348.89  4/13/2016        Seasonal allergic rhinitis ICD-10-CM: J30.2  ICD-9-CM: 477.9  4/13/2016        Frequent headaches ICD-10-CM: R51.9  ICD-9-CM: 784.0  2/25/2016            Ms. Oscar Espinosa is a 49 y.o. female admitted on 2/17/2022.  The primary encounter diagnosis was Neutropenic fever ([facility] Diagnoses of Rash and Malignant neoplasm of female breast, unspecified estrogen receptor status, unspecified laterality, unspecified site of breast ([facility] were also pertinent to this visit. .       Her PMH includes HTN and heart murmur. She is a patient of Dr. Shaina Holley with triple negative breast cancer s/p C1D8 Carbo/Taxol/Keytruda on 2/14. D15 due 2/21. She presented to ED with c/o fever and rash. Temp 103 in ED with one episode of vomiting. Rash is occasionally pruritic. She called the clinic regarding this rash and was prescribed steroids and advised to take Benadryl. She met sepsis criteria with fever, tachycardia, leukopenia, and lactic acid 2.1. CXR neg.  UA neg. BCx pending. COVID neg. On Vanc/Zosyn. We were asked to assume care of our patient. PLAN:  Triple negative breast cancer  - s/p C1D8 Carbo/Taxol/Keytruda on 2/14. C15 due 2/21, no plans to give while admitted  -mildly neutropenic-give filgrastim x 3 days  2/19 WBC/ANC improved today.       Sepsis / Neutropenic fever  - CXR neg  - UA neg  - BCx pending  - COVID neg  2/19 On Cef/Vanc. BCx NGTD.          Rash  - cause unknown    Diarrhea  2/19 Check C diff d/t c/o watery diarrhea ON     Continue home meds  Orya SOPs  Lovenox for DVT ppx    Goals and plan of care reviewed with the patient. All questions answered to the best of our ability. Janette Purvis NP   [facility] Hematology & Oncology  [address] [address]  Office : [phone]  Fax : [phone]     Attending Addendum:  I have personally performed a face to face diagnostic evaluation on this patient.  I have reviewed and agree with the care plan as documented by Janette Purvis, N.P. 36 minutes were spent on patient care, including but not limited to, reviewing the chart and time with the patient and family, more than 50% of the time documented was spent in face-to-face contact with the patient and in the care of the patient on the floor/unit where the patient is located. My findings are as follows: She has breast cancer and fever, appears weak, heart rate regular without murmurs, abdomen is non-tender, bowel sounds are positive, we will stop G-CSF and continue IV ABX.               Iraida Escobar MD      [facility] Hematology/Oncology  [address] [place] [address]  Office : [phone]  Fax : [phone]

## 2022-02-19 NOTE — PROGRESS NOTES
VANCO DAILY FOLLOW UP NOTE  [address] Pharmacokinetic Monitoring Service - Vancomycin    Consulting Provider: Julia Pearson   Indication: sepsis of unknown etiology  Target Concentration: Goal AUC/TONI 400-600 mg*hr/L  Day of Therapy: 2  Additional Antimicrobials: cefepime    Pertinent Laboratory Values: Wt Readings from Last 1 Encounters:   02/18/22 69.1 kg (152 lb 4.8 oz)     Temp Readings from Last 1 Encounters:   02/19/22 98.4 °F (36.9 °C)     No components found for: PROCAL  Recent Labs     02/19/22  0547 02/18/22  0157 02/18/22  0004   BUN 14  --  11   CREA 1.10*  --  0.81   WBC 5.9  --  1.6*   PCT  --   --  0.47   LAC  --  1.4 2.1*     Estimated Creatinine Clearance: 50.7 mL/min (A) (based on SCr of 1.1 mg/dL (H)).     Lab Results   Component Value Date/Time    Vancomycin, random 26.2 02/19/2022 05:47 AM     Assessment:  Date/Time Dose Concentration AUC   2/19 0547 1000 mg q12h 26.2 754   Note: Serum concentrations collected for AUC dosing may appear elevated if collected in close proximity to the dose administered, this is not necessarily an indication of toxicity    Plan:  Current dosing regimen is supra-therapeutic  Decrease dose to 1000 mg q18h for predicted AUC/Tr of 518/15.8  Repeat vancomycin concentration ordered as clinically indicated  Pharmacy will continue to monitor patient and adjust therapy as indicated    Thank you for the consult,  Aurora Al, POOJAD.

## 2022-02-19 NOTE — PROGRESS NOTES
Hospitalist Progress Note   Admit Date:  2022 11:51 PM   Name:  James Zepeda   Age:  49 y.o. Sex:  female  :  1965   MRN:  [id number]   Room:  [facility]/    Presenting Complaint: Fever and Rash    Reason(s) for Admission: Severe sepsis ([facility] [A41.9, R65.20]     Hospital Course & Interval History:   56-year-old Mandarin speaking female with newly diagnosed triple negative left breast cancer who just started neoadjuvant chemo per oncology on 2022 presents with fevers hypotension and new maculopapular rash all over her abdomen and septic shock. Her last chemo treatment was on  and since then she was doing okay but then on Wednesday she started running fever she broke out in a rash. Patient refused to go to the emergency room. He returned on  and so she came to the ER. Prior to this the oncology office did prescribe her steroids with her the rash, though not clear if they knew about the fever. Arrived in our ER on  she had a temperature of 103 white blood cell count 1.6 with an ANC of 1400, lactate 2.1 and chest x-ray and urinalysis did not show any obvious source of infection. She was start empirically on Vanco and Zosyn and bolused lactate came down the patient still mildly hypotensive. The admitting physician consulted both oncology and infectious diseases. Subjective (22): Still complaining of itching mostly around her feet today. The rash she says appears to be getting better in some places but worse than others. Assessment & Plan:     1. Sepsis with septic shock, mild neutropenic fever: Though her [address] is greater than 500 she is still immunocompromise, so if patient acutely decompensates or has evidence of pulmonary distress, recommend a stat dry CT to rule out opportunistic infection. Continue with Vanco and Zosyn. IV fluids can be timed out. Holding all antihypertensives. Blood cultures and urine cultures.   2. Hypertension: Hold her reportedly home diltiazem, olmesartan, hydrochlorothiazide, and spironolactone, though when I reviewed the patient's medication bottles with her that she does not appear to have any of these and all think she is taking these at home. 3. Chemo induced nausea and vomiting: As needed lorazepam and Zofran. 4. Hypomagnesemia: Probably from her nausea and vomiting, treat and treat above. 5. Leukopenia, thrombocytopenia: Chemo induced. Keep platelets above 10 or platelets above 20 if patient is showing signs or symptoms of fever and greater than 50 if she is bleeding actively. Okay for DVT prophylaxis along his platelets are greater than 50.  6. Maculopapular rash: Suspect this is a drug rash probably from Leopold Friedman. As needed Benadryl, and expect patient to feel better within the next 1 to 2 days. Explained this to the family. Discussed the case with oncology who will be taking over the primary team.  On 2/19 we will officially sign off however please feel free to reach out to consult anytime. Thank you! DVT ppx: Enoxaparin  Dispo: Home  PT/OT: Not yet consulted    Full Code    Objective:     Patient Vitals for the past 24 hrs:   Temp Pulse Resp BP SpO2   02/19/22 1113 97.9 °F (36.6 °C) (!) 115 24 109/65 97 %   02/19/22 0716 98.4 °F (36.9 °C) 99 16 101/63 98 %   02/19/22 0316 98.7 °F (37.1 °C) 82 18 (!) 99/56 92 %   02/19/22 0004 100.4 °F (38 °C) -- -- -- --   02/18/22 2248 (!) 102.9 °F (39.4 °C) (!) 111 16 101/60 94 %   02/18/22 1916 98.2 °F (36.8 °C) (!) 112 17 92/60 96 %   02/18/22 1433 99.1 °F (37.3 °C) (!) 111 20 136/72 99 %   02/18/22 1222 -- 84 20 124/64 97 %     Oxygen Therapy  O2 Sat (%): 97 % (02/19/22 1113)  O2 Device: None (Room air) (02/18/22 1433)    Estimated body mass index is 29.74 kg/m² as calculated from the following:    Height as of this encounter: 5' (1.524 m). Weight as of this encounter: 69.1 kg (152 lb 4.8 oz).     Intake/Output Summary (Last 24 hours) at 2/19/2022 1140  Last data filed at 2/19/2022 1113  Gross per 24 hour   Intake 60 ml   Output 900 ml   Net -840 ml         Physical Exam:   Physical Exam  Vitals reviewed. Constitutional:       Appearance: Normal appearance. She is ill-appearing. HENT:      Head: Normocephalic. Mouth/Throat:      Mouth: Mucous membranes are moist.   Eyes:      Extraocular Movements: Extraocular movements intact. Cardiovascular:      Rate and Rhythm: Normal rate. Pulses: Normal pulses. Pulmonary:      Effort: Pulmonary effort is normal.      Breath sounds: Normal breath sounds. Abdominal:      Palpations: Abdomen is soft. Tenderness: There is no abdominal tenderness. Musculoskeletal:         General: Normal range of motion. Cervical back: Normal range of motion. Skin:     General: Skin is warm. Findings: Rash (Diffuse maculopapular rash on the lower and upper extremities sparing the hands, includes the entire back abdomen chest, itchy) present. Neurological:      General: No focal deficit present. Mental Status: She is alert and oriented to person, place, and time.    Psychiatric:         Mood and Affect: Mood normal.           I have reviewed ordered lab tests and independently visualized imaging below:    Recent Labs:  Recent Results (from the past 48 hour(s))   CULTURE, BLOOD    Collection Time: 02/18/22 12:04 AM    Specimen: Blood   Result Value Ref Range    Special Requests: LEFT  Antecubital        Culture result: NO GROWTH 1 DAY     LACTIC ACID    Collection Time: 02/18/22 12:04 AM   Result Value Ref Range    Lactic acid 2.1 (H) 0.4 - 2.0 MMOL/L   CBC WITH AUTOMATED DIFF    Collection Time: 02/18/22 12:04 AM   Result Value Ref Range    WBC 1.6 (LL) 4.3 - 11.1 K/uL    RBC 4.22 4.05 - 5.2 M/uL    HGB 12.2 11.7 - 15.4 g/dL    HCT 36.3 35.8 - 46.3 %    MCV 86.0 79.6 - 97.8 FL    MCH 28.9 26.1 - 32.9 PG    MCHC 33.6 31.4 - 35.0 g/dL    RDW 12.3 11.9 - 14.6 %    PLATELET 862 (L) 740 - 450 K/uL    MPV 10.6 9.4 - 12.3 FL ABSOLUTE NRBC 0.00 0.0 - 0.2 K/uL    NEUTROPHILS 77 43 - 78 %    LYMPHOCYTES 15 13 - 44 %    MONOCYTES 2 (L) 4.0 - 12.0 %    EOSINOPHILS 3 0.5 - 7.8 %    BASOPHILS 1 0.0 - 2.0 %    IMMATURE GRANULOCYTES 2 0.0 - 5.0 %    ABS. NEUTROPHILS 1.4 (L) 1.7 - 8.2 K/UL    ABS. LYMPHOCYTES 0.2 (L) 0.5 - 4.6 K/UL    ABS. MONOCYTES 0.0 (L) 0.1 - 1.3 K/UL    ABS. EOSINOPHILS 0.0 0.0 - 0.8 K/UL    ABS. BASOPHILS 0.0 0.0 - 0.2 K/UL    ABS. IMM. GRANS. 0.0 0.0 - 0.5 K/UL    RBC COMMENTS NORMOCYTIC/NORMOCHROMIC      WBC COMMENTS VACUOLATED POLYS      PLATELET COMMENTS ADEQUATE      DF AUTOMATED     METABOLIC PANEL, COMPREHENSIVE    Collection Time: 02/18/22 12:04 AM   Result Value Ref Range    Sodium 127 (L) 136 - 145 mmol/L    Potassium 3.7 3.5 - 5.1 mmol/L    Chloride 96 (L) 98 - 107 mmol/L    CO2 24 21 - 32 mmol/L    Anion gap 7 7 - 16 mmol/L    Glucose 204 (H) 65 - 100 mg/dL    BUN 11 6 - 23 MG/DL    Creatinine 0.81 0.6 - 1.0 MG/DL    GFR est AA >60 >60 ml/min/1.73m2    GFR est non-AA >60 >60 ml/min/1.73m2    Calcium 8.2 (L) 8.3 - 10.4 MG/DL    Bilirubin, total 0.3 0.2 - 1.1 MG/DL    ALT (SGPT) 81 (H) 12 - 65 U/L    AST (SGOT) 40 (H) 15 - 37 U/L    Alk.  phosphatase 88 50 - 136 U/L    Protein, total 7.1 6.3 - 8.2 g/dL    Albumin 2.7 (L) 3.5 - 5.0 g/dL    Globulin 4.4 (H) 2.3 - 3.5 g/dL    A-G Ratio 0.6 (L) 1.2 - 3.5     PROCALCITONIN    Collection Time: 02/18/22 12:04 AM   Result Value Ref Range    Procalcitonin 0.47 0.00 - 0.49 ng/mL   MAGNESIUM    Collection Time: 02/18/22 12:04 AM   Result Value Ref Range    Magnesium 1.5 (L) 1.8 - 2.4 mg/dL   CULTURE, BLOOD    Collection Time: 02/18/22 12:08 AM    Specimen: Blood   Result Value Ref Range    Special Requests: NO SPECIAL REQUESTS  LATERAL PORT        Culture result: NO GROWTH 1 DAY     EKG, 12 LEAD, INITIAL    Collection Time: 02/18/22 12:10 AM   Result Value Ref Range    Ventricular Rate 114 BPM    Atrial Rate 114 BPM    P-R Interval 128 ms    QRS Duration 88 ms    Q-T Interval 298 ms    QTC Calculation (Bezet) 410 ms    Calculated P Axis 61 degrees    Calculated R Axis 77 degrees    Calculated T Axis 52 degrees    Diagnosis       !! AGE AND GENDER SPECIFIC ECG ANALYSIS !! Sinus tachycardia  Otherwise normal ECG  No previous ECGs available  Confirmed by Suni Miller [id number]) on 2/18/2022 7:15:21 AM     URINALYSIS W/ RFLX MICROSCOPIC    Collection Time: 02/18/22 12:16 AM   Result Value Ref Range    Color YELLOW      Appearance CLOUDY      Specific gravity 1.011 1.001 - 1.023      pH (UA) 5.5 5.0 - 9.0      Protein TRACE (A) NEG mg/dL    Glucose 250 mg/dL    Ketone Negative NEG mg/dL    Bilirubin Negative NEG      Blood Negative NEG      Urobilinogen 0.2 0.2 - 1.0 EU/dL    Nitrites Negative NEG      Leukocyte Esterase Negative NEG      WBC 3-5 0 /hpf    RBC 0-3 0 /hpf    Epithelial cells 0-3 0 /hpf    Bacteria 0 0 /hpf    Mucus TRACE 0 /lpf    Other observations RESULTS VERIFIED MANUALLY     COVID-19 RAPID TEST    Collection Time: 02/18/22 12:44 AM   Result Value Ref Range    Specimen source NASAL SWAB      COVID-19 rapid test Not detected NOTD     LACTIC ACID    Collection Time: 02/18/22  1:57 AM   Result Value Ref Range    Lactic acid 1.4 0.4 - 2.0 MMOL/L   CBC WITH AUTOMATED DIFF    Collection Time: 02/19/22  5:47 AM   Result Value Ref Range    WBC 5.9 4.3 - 11.1 K/uL    RBC 3.60 (L) 4.05 - 5.2 M/uL    HGB 10.5 (L) 11.7 - 15.4 g/dL    HCT 31.1 (L) 35.8 - 46.3 %    MCV 86.4 79.6 - 97.8 FL    MCH 29.2 26.1 - 32.9 PG    MCHC 33.8 31.4 - 35.0 g/dL    RDW 12.5 11.9 - 14.6 %    PLATELET 75 (L) 057 - 450 K/uL    MPV 10.2 9.4 - 12.3 FL    ABSOLUTE NRBC 0.00 0.0 - 0.2 K/uL    NEUTROPHILS 57 47 - 75 %    BAND NEUTROPHILS 23 (H) 0 - 6 %    LYMPHOCYTES 12 (L) 16 - 44 %    MONOCYTES 5 3 - 9 %    METAMYELOCYTES 1 %    MYELOCYTES 2 %    ABS. NEUTROPHILS 4.8 1.7 - 8.2 K/UL    ABS. LYMPHOCYTES 0.7 0.5 - 4.6 K/UL    ABS.  MONOCYTES 0.3 0.1 - 1.3 K/UL    RBC COMMENTS SLIGHT  ANISOCYTOSIS        RBC COMMENTS SLIGHT  POLYCHROMASIA        RBC COMMENTS SLIGHT  MICROCYTOSIS        PLATELET COMMENTS DECREASED      DF MANUAL     MAGNESIUM    Collection Time: 02/19/22  5:47 AM   Result Value Ref Range    Magnesium 2.0 1.8 - 2.4 mg/dL   METABOLIC PANEL, COMPREHENSIVE    Collection Time: 02/19/22  5:47 AM   Result Value Ref Range    Sodium 133 (L) 136 - 145 mmol/L    Potassium 3.8 3.5 - 5.1 mmol/L    Chloride 106 98 - 107 mmol/L    CO2 21 21 - 32 mmol/L    Anion gap 6 (L) 7 - 16 mmol/L    Glucose 105 (H) 65 - 100 mg/dL    BUN 14 6 - 23 MG/DL    Creatinine 1.10 (H) 0.6 - 1.0 MG/DL    GFR est AA >60 >60 ml/min/1.73m2    GFR est non-AA 55 (L) >60 ml/min/1.73m2    Calcium 6.8 (L) 8.3 - 10.4 MG/DL    Bilirubin, total 0.4 0.2 - 1.1 MG/DL    ALT (SGPT) 107 (H) 12 - 65 U/L    AST (SGOT) 58 (H) 15 - 37 U/L    Alk.  phosphatase 73 50 - 130 U/L    Protein, total 4.9 (L) 6.3 - 8.2 g/dL    Albumin 1.7 (L) 3.5 - 5.0 g/dL    Globulin 3.2 2.3 - 3.5 g/dL    A-G Ratio 0.5 (L) 1.2 - 3.5     VANCOMYCIN, RANDOM    Collection Time: 02/19/22  5:47 AM   Result Value Ref Range    Vancomycin, random 26.2 UG/ML       All Micro Results     Procedure Component Value Units Date/Time    CLOSTRIDIUM DIFF TOXIN A & B [[id number]]     Order Status: Sent Specimen: Stool     CLOSTRIDIUM DIFF TOXIN A & B [[id number]] Collected: 02/19/22 1116    Order Status: Canceled Specimen: Stool     BLOOD CULTURE [[id number]] Collected: 02/18/22 0008    Order Status: Completed Specimen: Blood Updated: 02/19/22 1056     Special Requests: --        NO SPECIAL REQUESTS  LATERAL PORT       Culture result: NO GROWTH 1 DAY       BLOOD CULTURE [[id number]] Collected: 02/18/22 0004    Order Status: Completed Specimen: Blood Updated: 02/19/22 1056     Special Requests: --        LEFT  Antecubital       Culture result: NO GROWTH 1 DAY       COVID-19 RAPID TEST [[id number]] Collected: 02/18/22 0044    Order Status: Completed Specimen: Nasopharyngeal Updated: 02/18/22 0121     Specimen source NASAL SWAB        COVID-19 rapid test Not detected        Comment:      The specimen is NEGATIVE for SARS-CoV-2, the novel coronavirus associated with COVID-19. A negative result does not rule out COVID-19. This test has been authorized by the FDA under an Emergency Use Authorization (EUA) for use by authorized laboratories. Fact sheet for Healthcare Providers: [employer]  Fact sheet for Patients: [employer]stat[employer]com       Methodology: Isothermal Nucleic Acid Amplification               Other Studies:  No results found. Current Meds:  Current Facility-Administered Medications   Medication Dose Route Frequency    vancomycin (VANCOCIN) 1,000 mg in 0.9% sodium chloride 250 mL (VIAL-MATE)  1 g IntraVENous Q18H    lidocaine-prilocaine (EMLA) 2.5-2.5 % cream   Topical PRN    LORazepam (ATIVAN) tablet 0.5-1 mg  0.5-1 mg Oral Q8H PRN    sodium chloride (NS) flush 5-40 mL  5-40 mL IntraVENous Q8H    sodium chloride (NS) flush 5-40 mL  5-40 mL IntraVENous PRN    acetaminophen (TYLENOL) tablet 650 mg  650 mg Oral Q4H PRN    enoxaparin (LOVENOX) injection 40 mg  40 mg SubCUTAneous Q24H    cefepime (MAXIPIME) 2 g in 0.9% sodium chloride (MBP/ADV) 100 mL MBP  2 g IntraVENous Q8H    ondansetron (ZOFRAN) injection 4 mg  4 mg IntraVENous Q4H PRN    diphenhydrAMINE (BENADRYL) capsule 25 mg  25 mg Oral Q6H PRN    sodium chloride (NS) flush 5-10 mL  5-10 mL IntraVENous Q8H    sodium chloride (NS) flush 5-10 mL  5-10 mL IntraVENous PRN       Signed:  Delmer Rodriguez MD    Part of this note may have been written by using a voice dictation software. The note has been proofread but may still contain some grammatical/other typographical errors.

## 2022-02-19 NOTE — PROGRESS NOTES
END OF SHIFT NOTE:    Intake/Output  02/19 0701 - 02/19 1900  In: -   Out: 200 [Urine:100]   Voiding: YES  Catheter: NO  Drain:              Stool:  4 occurrences. Emesis:  1 occurrences. VITAL SIGNS  Patient Vitals for the past 12 hrs:   Temp Pulse Resp BP SpO2   02/19/22 1520 98.4 °F (36.9 °C) (!) 107 18 (!) 90/55 93 %   02/19/22 1319  99 24 99/65 98 %   02/19/22 1113 97.9 °F (36.6 °C) (!) 115 24 109/65 97 %   02/19/22 0716 98.4 °F (36.9 °C) 99 16 101/63 98 %       Pain Assessment  Pain 1  Pain Scale 1: Numeric (0 - 10) (02/19/22 1522)  Pain Intensity 1: 0 (02/19/22 1522)  Patient Stated Pain Goal: 0 (02/19/22 1522)  Pain Reassessment 1: Yes (02/19/22 1320)    Ambulating  Yes    Additional Information: vss pt A&O X's 4 no complaints of pain N/V X's 1 gave medication as ordered in mar pt resting in bed lowest position call light in reach     Shift report given to oncoming nurse at the bedside.     Belkys Fisher RN

## 2022-02-20 LAB
ALBUMIN SERPL-MCNC: 1.7 G/DL (ref 3.5–5)
ALBUMIN/GLOB SERPL: 0.5 {RATIO} (ref 1.2–3.5)
ALP SERPL-CCNC: 80 U/L (ref 50–136)
ALT SERPL-CCNC: 105 U/L (ref 12–65)
ANION GAP SERPL CALC-SCNC: 8 MMOL/L (ref 7–16)
AST SERPL-CCNC: 36 U/L (ref 15–37)
BILIRUB SERPL-MCNC: 0.2 MG/DL (ref 0.2–1.1)
BUN SERPL-MCNC: 13 MG/DL (ref 6–23)
CALCIUM SERPL-MCNC: 7.5 MG/DL (ref 8.3–10.4)
CHLORIDE SERPL-SCNC: 111 MMOL/L (ref 98–107)
CO2 SERPL-SCNC: 18 MMOL/L (ref 21–32)
CREAT SERPL-MCNC: 0.9 MG/DL (ref 0.6–1)
DIFFERENTIAL METHOD BLD: ABNORMAL
ERYTHROCYTE [DISTWIDTH] IN BLOOD BY AUTOMATED COUNT: 12.5 % (ref 11.9–14.6)
GLOBULIN SER CALC-MCNC: 3.1 G/DL (ref 2.3–3.5)
GLUCOSE SERPL-MCNC: 108 MG/DL (ref 65–100)
HCT VFR BLD AUTO: 27.6 % (ref 35.8–46.3)
HGB BLD-MCNC: 9.4 G/DL (ref 11.7–15.4)
LYMPHOCYTES # BLD: 0.9 K/UL (ref 0.5–4.6)
LYMPHOCYTES NFR BLD MANUAL: 9 % (ref 16–44)
MAGNESIUM SERPL-MCNC: 2.2 MG/DL (ref 1.8–2.4)
MCH RBC QN AUTO: 29.4 PG (ref 26.1–32.9)
MCHC RBC AUTO-ENTMCNC: 34.1 G/DL (ref 31.4–35)
MCV RBC AUTO: 86.3 FL (ref 79.6–97.8)
MONOCYTES # BLD: 0.4 K/UL (ref 0.1–1.3)
MONOCYTES NFR BLD MANUAL: 4 % (ref 3–9)
NEUTS BAND NFR BLD MANUAL: 19 % (ref 0–6)
NEUTS SEG # BLD: 8.5 K/UL (ref 1.7–8.2)
NEUTS SEG NFR BLD MANUAL: 68 % (ref 47–75)
NRBC # BLD: 0 K/UL (ref 0–0.2)
PLATELET # BLD AUTO: 75 K/UL (ref 150–450)
PLATELET COMMENTS,PCOM: ABNORMAL
PMV BLD AUTO: 10.6 FL (ref 9.4–12.3)
POTASSIUM SERPL-SCNC: 3.6 MMOL/L (ref 3.5–5.1)
PROT SERPL-MCNC: 4.8 G/DL (ref 6.3–8.2)
RBC # BLD AUTO: 3.2 M/UL (ref 4.05–5.2)
RBC MORPH BLD: ABNORMAL
RBC MORPH BLD: ABNORMAL
SODIUM SERPL-SCNC: 137 MMOL/L (ref 136–145)
WBC # BLD AUTO: 9.8 K/UL (ref 4.3–11.1)

## 2022-02-20 PROCEDURE — 85025 COMPLETE CBC W/AUTO DIFF WBC: CPT

## 2022-02-20 PROCEDURE — 83735 ASSAY OF MAGNESIUM: CPT

## 2022-02-20 PROCEDURE — 74011250636 HC RX REV CODE- 250/636: Performed by: STUDENT IN AN ORGANIZED HEALTH CARE EDUCATION/TRAINING PROGRAM

## 2022-02-20 PROCEDURE — 65270000029 HC RM PRIVATE

## 2022-02-20 PROCEDURE — 74011250636 HC RX REV CODE- 250/636: Performed by: INTERNAL MEDICINE

## 2022-02-20 PROCEDURE — 74011000258 HC RX REV CODE- 258: Performed by: INTERNAL MEDICINE

## 2022-02-20 PROCEDURE — 74011000250 HC RX REV CODE- 250: Performed by: EMERGENCY MEDICINE

## 2022-02-20 PROCEDURE — 74011000250 HC RX REV CODE- 250: Performed by: FAMILY MEDICINE

## 2022-02-20 PROCEDURE — 74011250636 HC RX REV CODE- 250/636: Performed by: FAMILY MEDICINE

## 2022-02-20 PROCEDURE — 74011250637 HC RX REV CODE- 250/637: Performed by: STUDENT IN AN ORGANIZED HEALTH CARE EDUCATION/TRAINING PROGRAM

## 2022-02-20 PROCEDURE — 99233 SBSQ HOSP IP/OBS HIGH 50: CPT | Performed by: INTERNAL MEDICINE

## 2022-02-20 PROCEDURE — 80053 COMPREHEN METABOLIC PANEL: CPT

## 2022-02-20 RX ADMIN — ONDANSETRON 4 MG: 2 INJECTION INTRAMUSCULAR; INTRAVENOUS at 14:21

## 2022-02-20 RX ADMIN — ENOXAPARIN SODIUM 40 MG: 100 INJECTION SUBCUTANEOUS at 08:48

## 2022-02-20 RX ADMIN — CEFEPIME HYDROCHLORIDE 2 G: 2 INJECTION, POWDER, FOR SOLUTION INTRAVENOUS at 10:18

## 2022-02-20 RX ADMIN — SODIUM CHLORIDE, PRESERVATIVE FREE 10 ML: 5 INJECTION INTRAVENOUS at 22:00

## 2022-02-20 RX ADMIN — SODIUM CHLORIDE, PRESERVATIVE FREE 10 ML: 5 INJECTION INTRAVENOUS at 06:15

## 2022-02-20 RX ADMIN — DIPHENHYDRAMINE HYDROCHLORIDE 25 MG: 25 CAPSULE ORAL at 07:09

## 2022-02-20 RX ADMIN — SODIUM CHLORIDE, PRESERVATIVE FREE 10 ML: 5 INJECTION INTRAVENOUS at 13:28

## 2022-02-20 RX ADMIN — CEFEPIME HYDROCHLORIDE 2 G: 2 INJECTION, POWDER, FOR SOLUTION INTRAVENOUS at 03:10

## 2022-02-20 RX ADMIN — CEFEPIME HYDROCHLORIDE 2 G: 2 INJECTION, POWDER, FOR SOLUTION INTRAVENOUS at 19:28

## 2022-02-20 NOTE — PROGRESS NOTES
[facility]        Inpatient Hematology / Oncology Progress Note      Admission Date: 2022 11:51 PM  Reason for Admission/Hospital Course: Severe sepsis ([facility] [address] [A41.9, R65.20]      24 Hour Events:  Afebrile ON, mildly hypotensive   BCx remain NGTD  On Cef/Vanc - d/c Vanc today  C/o watery diarrhea yesterday - checking C diff - not completed yet as had a couple of solid stools yesterday    ROS:  Constitutional: Positive for previous fever, fatigue; negative for weakness. CV: Positive for hand edema; negative for chest pain, palpitations. Respiratory: Negative for dyspnea, cough, wheezing. GI: Positive for diarrhea/nausea; negative for abdominal pain   Skin:  +Rash - improving     10 point review of systems is otherwise negative with the exception of the elements mentioned above in the HPI. Allergies   Allergen Reactions    Lisinopril Cough     Other reaction(s): Cough       OBJECTIVE:  Patient Vitals for the past 8 hrs:   BP Temp Pulse Resp SpO2   22 0728 104/61 98 °F (36.7 °C) 91 18 98 %   22 0312 106/62 98.2 °F (36.8 °C) 81 18 94 %     Temp (24hrs), Av.2 °F (36.8 °C), Min:97.9 °F (36.6 °C), Max:98.4 °F (36.9 °C)    No intake/output data recorded. Physical Exam:  Constitutional: Well developed, well nourished female in no acute distress, sitting comfortably in the hospital bed. HEENT: Normocephalic and atraumatic. Oropharynx is clear, mucous membranes are moist.  Neck supple. Lymph node   Deferred. Skin Warm and dry.  +diffuse rash   Respiratory Lungs are clear to auscultation bilaterally without wheezes, rales or rhonchi, normal air exchange without accessory muscle use. CVS Normal rate, regular rhythm and normal S1 and S2. No murmurs, gallops, or rubs. Abdomen Soft, nontender and nondistended, normoactive bowel sounds. No palpable mass. No hepatosplenomegaly. Neuro Grossly nonfocal with no obvious sensory or motor deficits.    MSK Normal range of motion in general.  +swelling BL hands   Psych Appropriate mood and affect. Labs:      Recent Labs     02/20/22 0310 02/19/22  0547 02/18/22  0004   WBC 9.8 5.9 1.6*   RBC 3.20* 3.60* 4.22   HGB 9.4* 10.5* 12.2   HCT 27.6* 31.1* 36.3   MCV 86.3 86.4 86.0   MCH 29.4 29.2 28.9   MCHC 34.1 33.8 33.6   RDW 12.5 12.5 12.3   PLT 75* 75* 103*   GRANS 68 57 77   LYMPH 9* 12* 15   MONOS 4 5 2*   EOS  --   --  3   BASOS  --   --  1   IG  --   --  2   DF MANUAL MANUAL AUTOMATED   ANEU 8.5* 4.8 1.4*   ABL 0.9 0.7 0.2*   ABM 0.4 0.3 0.0*   KARIN  --   --  0.0   ABB  --   --  0.0   AIG  --   --  0.0        Recent Labs     02/20/22 0310 02/19/22  0547 02/18/22  0004    133* 127*   K 3.6 3.8 3.7   * 106 96*   CO2 18* 21 24   AGAP 8 6* 7   * 105* 204*   BUN 13 14 11   CREA 0.90 1.10* 0.81   GFRAA >60 >60 >60   GFRNA >60 55* >60   CA 7.5* 6.8* 8.2*   AP 80 73 88   TP 4.8* 4.9* 7.1   ALB 1.7* 1.7* 2.7*   GLOB 3.1 3.2 4.4*   AGRAT 0.5* 0.5* 0.6*   MG 2.2 2.0 1.5*         Imaging:  XR CHEST PORT [[id number]] Collected: 02/18/22 0053   Order Status: Completed Updated: 02/18/22 0056   Narrative:     EXAM: XR CHEST PORT     HISTORY: meets SIRS criteria.       TECHNIQUE: Frontal chest.     COMPARISON: None available. FINDINGS:   The cardiac silhouette, mediastinum, and pulmonary vasculature are within normal   limits. There is no consolidation, pleural effusion, or pneumothorax. No significant osseous abnormalities are observed. There is a right-sided MediPort. Impression:     No evidence of an acute intrathoracic process.         Medications:  Current Facility-Administered Medications   Medication Dose Route Frequency    vancomycin (VANCOCIN) 1,000 mg in 0.9% sodium chloride 250 mL (VIAL-MATE)  1 g IntraVENous Q18H    lidocaine-prilocaine (EMLA) 2.5-2.5 % cream   Topical PRN    LORazepam (ATIVAN) tablet 0.5-1 mg  0.5-1 mg Oral Q8H PRN    sodium chloride (NS) flush 5-40 mL  5-40 mL IntraVENous Q8H    sodium chloride (NS) flush 5-40 mL  5-40 mL IntraVENous PRN    acetaminophen (TYLENOL) tablet 650 mg  650 mg Oral Q4H PRN    enoxaparin (LOVENOX) injection 40 mg  40 mg SubCUTAneous Q24H    cefepime (MAXIPIME) 2 g in 0.9% sodium chloride (MBP/ADV) 100 mL MBP  2 g IntraVENous Q8H    ondansetron (ZOFRAN) injection 4 mg  4 mg IntraVENous Q4H PRN    diphenhydrAMINE (BENADRYL) capsule 25 mg  25 mg Oral Q6H PRN    sodium chloride (NS) flush 5-10 mL  5-10 mL IntraVENous Q8H    sodium chloride (NS) flush 5-10 mL  5-10 mL IntraVENous PRN         ASSESSMENT:    Problem List  Date Reviewed: 2/14/2022          Codes Class Noted    * (Principal) Severe sepsis ([facility] ICD-10-CM: A41.9, R65.20  ICD-9-CM: 038.9, 995.92  2/18/2022        Carcinoma at 9:00 in left breast in female, estrogen receptor negative ([facility] ICD-10-CM: X26.781, Z17.1  ICD-9-CM: 174.2, V86.1  1/10/2022    Overview Addendum 1/27/2022  6:20 AM by Marina Uribe MD     cT2N0 left breast IDC with DCIS, triple negative     No previous personal malignancy, prior breast surgery, or other symptoms reported. No known family breast cancer. 11/22/21 Bilat screening mammo  Scattered fibroglandular densities. A few typically benign-appearing calcifications in the left breast are similar in appearance to the previous study.     There is a focal asymmetry in the medial inferior left breast located 6 cm from the nipple. Recommend spot compression magnification views on the direct ML view and ultrasound, if indicated for further evaluation.     There is otherwise no suspicious mass, calcification, or architectural distortion in either breast.       IMPRESSION  Focal asymmetry in the medial inferior left breast at 6 cm from the nipple.        12/6/21 Left breast dx mammo and US  The area of concern persists with additional imaging. This resides within the medial left breast at approximately 9:00 position.  There  are few associated calcifications which appear somewhat coarse. This measures approximately 1.3 cm in size. An additional smaller asymmetry also at the 9:00  position resides 7 cm from the nipple. A left breast ultrasound is recommended for further evaluation.     12/6/21 left breast US  At the 9:00 position, 6 cm from the nipple is a lobulated hypoechoic but mildly heterogeneous appearing mass measuring  approximately 1.5 x 0.8 x 0.8 cm. The margins are occasionally ill-defined with some components demonstrating posterior acoustic shadowing. The long axis is  parallel to the plane of the chest wall. There is a 7 mm hypoechoic mass just adjacent to this This is hypoechoic with a lobulated contour. The margins are  occasionally ill-defined.      IMPRESSION  Adjacent left breast masses. Recommend tissue sampling of the larger lesion.       12/23/21 US-guided left breast biopsy  A:  \" LEFT BREAST, 9:00 POSITION, 6 CM FROM NIPPLE, CORE BIOPSY\": FOCI OF AT LEAST MICROINVASIVE INFILTRATING DUCTAL CARCINOMA,   INTERMEDIATE GRADE (MODERATELY DIFFERENTIATED) IN ASSOCIATION WITH FOCALLY HIGH GRADE DUCTAL CARCINOMA IN SITU. DEFINITE LYMPHOVASCULAR INVASION IS NOT IDENTIFIED   Sign Out Date: 12/24/2021 Oren Hart M.D.   ER:  Negative (0.9%);  MT: Negative (0.4%); HER-2/COURTNEY: Negative (0)   Post biopsy mammogram shows the biopsy clip in good position. 12/28/21 Breast MRI  The breasts demonstrate moderate glandularity and mild background enhancement.     Left 9:00 biopsy clip within a lobulated heterogeneously enhancing mass with angulated and irregular margins overall measuring about 1.8 x 0.7 x 1.2 cm. Posterior to this (about 1 cm away) the smaller mass measuring 0.9 cm is unchanged from recent mammogram and ultrasound. Additionally there is clumped  and reticular nonmass enhancement extending superiorly by about 2.7 cm and anteriorly by about 1.8 cm, in keeping with the DCIS and calcifications.    Overall the expected area of disease measures about 4.5 cm AP by 1.2 cm transverse and is located at 9:00-9:30, mid to posterior depth. There is no evidence of involvement of skin or chest wall structures.     No other evidence of suspicious enhancing mass, and no other dominant or unique nonmass enhancement,  to suggest additional malignancy in either breast.  No evidence of axillary or internal mammary lymphadenopathy.     Elsewhere, limited visualization of the partially included thorax and upper abdomen shows no acute abnormality, with note of a small benign cyst in the left liver.         IMPRESSION  1. Left medial breast cancer measures about 4.5 cm in greatest dimension. 2. No additional suspicious finding elsewhere in either breast. No obvious lymphadenopathy.       1/13/22 presented at [facility]; genetic testing and refer to med oncology pre-op    1/13/22 BRCAPlus panel of 8 genes associated with hereditary cancer (including BRCA1 and BRCA2)   No pathogenic variants identified that increase risk of developing breast cancer                Triple negative malignant neoplasm of breast [facility]) ICD-10-CM: C50.919  ICD-9-CM: 174.9  1/10/2022        Screening mammogram, encounter for ICD-10-CM: Z12.31  ICD-9-CM: V76.12  3/18/2019    Overview Signed 3/18/2019  4:30 PM by Maddy Salvador MD     Ordered for this Oct on 3/18/19             Women's annual routine gynecological examination ICD-10-CM: Z01.419  ICD-9-CM: V72.31  7/24/2017    Overview Signed 7/24/2017  9:42 AM by Maddy Salvador MD     Pap with HPV done 7/24/17             Irregular menses ICD-10-CM: N92.6  ICD-9-CM: 626.4  7/24/2017    Overview Signed 7/24/2017  9:42 AM by Maddy Salvador MD     noted             Benign essential HTN (Chronic) ICD-10-CM: I10  ICD-9-CM: 401.1  4/13/2016        Mixed hyperlipidemia ICD-10-CM: B89.6  ICD-9-CM: 272.2  4/13/2016        Encephalomalacia on imaging study ICD-10-CM: G93.89  ICD-9-CM: 348.89  4/13/2016        Seasonal allergic rhinitis ICD-10-CM: J30.2  ICD-9-CM: 477.9  4/13/2016        Frequent headaches ICD-10-CM: R51.9  ICD-9-CM: 784.0  2/25/2016            Ms. Ashley Rodriguez is a 49 y.o. female admitted on 2/17/2022. The primary encounter diagnosis was Neutropenic fever ([facility] [address] Diagnoses of Rash and Malignant neoplasm of female breast, unspecified estrogen receptor status, unspecified laterality, unspecified site of breast ([facility] [address] were also pertinent to this visit. .       Her PMH includes HTN and heart murmur. She is a patient of Dr. Chapincito Huang with triple negative breast cancer s/p C1D8 Carbo/Taxol/Keytruda on 2/14. D15 due 2/21. She presented to ED with c/o fever and rash. Temp 103 in ED with one episode of vomiting. Rash is occasionally pruritic. She called the clinic regarding this rash and was prescribed steroids and advised to take Benadryl. She met sepsis criteria with fever, tachycardia, leukopenia, and lactic acid 2.1. CXR neg.  UA neg. BCx pending. COVID neg. On Vanc/Zosyn. We were asked to assume care of our patient. PLAN:  Triple negative breast cancer  - s/p C1D8 Carbo/Taxol/Keytruda on 2/14. C15 due 2/21, no plans to give while admitted  -mildly neutropenic-give filgrastim x 3 days  2/19 WBC/ANC improved today. Filgrastim discontinued (after dose 2 completed)       Sepsis / Neutropenic fever  - CXR neg  - UA neg  - BCx pending  - COVID neg  2/19 On Cef/Vanc. BCx NGTD. 2/20 Remains on Cef/Vanc. BCx remain NGTD. D/C Vanc today.            Rash  - cause unknown  2/20 Rash overall with improvement. Diarrhea  2/19 Check C diff d/t c/o watery diarrhea ON  2/20 C diff check not yet completed as she had a couple of solid stools yesterday. Will check C diff today if diarrhea.       Continue home meds  Roya SOPs  Lovenox for DVT ppx    Discharge Plan:  Possible d/c in the next 24-48 hours if pt remains afebrile and BCx remain negative. They would like to see Dr. Chapincito Huang tomorrow d/t language barrier.       Goals and plan of care reviewed with the patient. All questions answered to the best of our ability. José Miguel Dawn NP   [facility]  [address] [place] [address]  Office : [phone]  Fax : [phone]         Attending Addendum:  I have personally performed a face to face diagnostic evaluation on this patient. I have reviewed and agree with the care plan as documented by José Miguel Dawn, N.P. 36 minutes were spent on patient care, including but not limited to, reviewing the chart and time with the patient and family, more than 50% of the time documented was spent in face-to-face contact with the patient and in the care of the patient on the floor/unit where the patient is located. My findings are as follows: She has breast cancer and fever, appears weak, heart rate regular without murmurs, abdomen is non-tender, bowel sounds are positive, we will continue IV ABX and follow-up her cultures.               Joann Butcher MD      [facility]  [address] [place] [address]  Office : [phone]  Fax : [phone]

## 2022-02-20 NOTE — PROGRESS NOTES
Problem: Falls - Risk of  Goal: *Absence of Falls  Description: Document Yanelis Don Fall Risk and appropriate interventions in the flowsheet.   Outcome: Progressing Towards Goal  Note: Fall Risk Interventions:            Medication Interventions: Evaluate medications/consider consulting pharmacy         History of Falls Interventions: Door open when patient unattended

## 2022-02-20 NOTE — PROGRESS NOTES
END OF SHIFT NOTE:    Intake/Output  No intake/output data recorded. Voiding: YES  Catheter: NO  Drain:              Stool:  3 occurrences. Stool Assessment  Stool Color: Brown (02/20/22 1509)  Stool Appearance: Mucous; Loose (02/20/22 1509)  Stool Amount: Small (02/20/22 1509)  Stool Source/Status: Rectum (02/20/22 1509)    Emesis:  1 occurrences. VITAL SIGNS  Patient Vitals for the past 12 hrs:   Temp Pulse Resp BP SpO2   02/20/22 1509 97.9 °F (36.6 °C) 93 18 119/70 100 %   02/20/22 1139 97.4 °F (36.3 °C) 95 18 108/79 100 %   02/20/22 0728 98 °F (36.7 °C) 91 18 104/61 98 %       Pain Assessment  Pain 1  Pain Scale 1: Numeric (0 - 10) (02/20/22 1515)  Pain Intensity 1: 0 (02/20/22 1515)  Patient Stated Pain Goal: 0 (02/20/22 1515)  Pain Reassessment 1: Yes (02/19/22 1320)    Ambulating  No    Additional Information: vss pt complain of nausea X's 1 gave medication as ordered in Mar no other complaints pt back in bed lowest position call light in reach     Shift report given to oncoming nurse at the bedside.     Dawna Levine RN

## 2022-02-20 NOTE — PROGRESS NOTES
VANCO DAILY FOLLOW UP NOTE  [facility] Pharmacokinetic Monitoring Service - Vancomycin    Consulting Provider: Dr. Adrianna Vee  Indication: sepsis of unknown etiology  Target Concentration: Goal AUC/TONI 400-600 mg*hr/L  Day of Therapy: 3  Additional Antimicrobials: cefepime    Pertinent Laboratory Values: Wt Readings from Last 1 Encounters:   02/18/22 69.1 kg (152 lb 4.8 oz)     Temp Readings from Last 1 Encounters:   02/20/22 98.2 °F (36.8 °C)     No components found for: PROCAL  Recent Labs     02/20/22  0310 02/19/22  0547 02/18/22  0157 02/18/22  0004   BUN 13 14  --  11   CREA 0.90 1.10*  --  0.81   WBC 9.8 5.9  --  1.6*   PCT  --   --   --  0.47   LAC  --   --  1.4 2.1*     Estimated Creatinine Clearance: 61.9 mL/min (based on SCr of 0.9 mg/dL).     Lab Results   Component Value Date/Time    Vancomycin, random 26.2 02/19/2022 05:47 AM     Assessment:  Date/Time Dose Concentration AUC   2/19 0547 1000 mg q12h 26.2 754   Note: Serum concentrations collected for AUC dosing may appear elevated if collected in close proximity to the dose administered, this is not necessarily an indication of toxicity    Plan:  Current dosing regimen is supra-therapeutic  Decrease dose to 1000 mg q18h for predicted AUC/Tr of 518/15.8  Repeat vancomycin concentration ordered for 2/21 @ 0400  Pharmacy will continue to monitor patient and adjust therapy as indicated    Thank you for the consult,  DAYNA Faulkner

## 2022-02-21 ENCOUNTER — HOSPITAL ENCOUNTER (OUTPATIENT)
Dept: INFUSION THERAPY | Age: 57
End: 2022-02-21

## 2022-02-21 LAB
ALBUMIN SERPL-MCNC: 1.7 G/DL (ref 3.5–5)
ALBUMIN/GLOB SERPL: 0.5 {RATIO} (ref 1.2–3.5)
ALP SERPL-CCNC: 96 U/L (ref 50–136)
ALT SERPL-CCNC: 79 U/L (ref 12–65)
ANION GAP SERPL CALC-SCNC: 4 MMOL/L (ref 7–16)
AST SERPL-CCNC: 34 U/L (ref 15–37)
BASOPHILS # BLD: 0 K/UL (ref 0–0.2)
BASOPHILS NFR BLD: 0 % (ref 0–2)
BILIRUB SERPL-MCNC: 0.2 MG/DL (ref 0.2–1.1)
BUN SERPL-MCNC: 13 MG/DL (ref 6–23)
C DIFF GDH STL QL: NORMAL
C DIFF TOX A+B STL QL IA: NORMAL
CALCIUM SERPL-MCNC: 8 MG/DL (ref 8.3–10.4)
CHLORIDE SERPL-SCNC: 113 MMOL/L (ref 98–107)
CLINICAL CONSIDERATION: NORMAL
CO2 SERPL-SCNC: 23 MMOL/L (ref 21–32)
CREAT SERPL-MCNC: 0.9 MG/DL (ref 0.6–1)
DIFFERENTIAL METHOD BLD: ABNORMAL
EOSINOPHIL # BLD: 0.2 K/UL (ref 0–0.8)
EOSINOPHIL NFR BLD: 2 % (ref 0.5–7.8)
ERYTHROCYTE [DISTWIDTH] IN BLOOD BY AUTOMATED COUNT: 12.9 % (ref 11.9–14.6)
GLOBULIN SER CALC-MCNC: 3.3 G/DL (ref 2.3–3.5)
GLUCOSE SERPL-MCNC: 103 MG/DL (ref 65–100)
HCT VFR BLD AUTO: 26.6 % (ref 35.8–46.3)
HGB BLD-MCNC: 8.9 G/DL (ref 11.7–15.4)
IMM GRANULOCYTES # BLD AUTO: 0.1 K/UL (ref 0–0.5)
IMM GRANULOCYTES NFR BLD AUTO: 1 % (ref 0–5)
INTERPRETATION: NORMAL
LYMPHOCYTES # BLD: 1.5 K/UL (ref 0.5–4.6)
LYMPHOCYTES NFR BLD: 17 % (ref 13–44)
MAGNESIUM SERPL-MCNC: 1.6 MG/DL (ref 1.8–2.4)
MCH RBC QN AUTO: 28.8 PG (ref 26.1–32.9)
MCHC RBC AUTO-ENTMCNC: 33.5 G/DL (ref 31.4–35)
MCV RBC AUTO: 86.1 FL (ref 79.6–97.8)
MONOCYTES # BLD: 0.5 K/UL (ref 0.1–1.3)
MONOCYTES NFR BLD: 6 % (ref 4–12)
NEUTS SEG # BLD: 6.3 K/UL (ref 1.7–8.2)
NEUTS SEG NFR BLD: 74 % (ref 43–78)
NRBC # BLD: 0 K/UL (ref 0–0.2)
PCR REFLEX: NORMAL
PLATELET # BLD AUTO: 81 K/UL (ref 150–450)
PLATELET COMMENTS,PCOM: ABNORMAL
PMV BLD AUTO: 10.8 FL (ref 9.4–12.3)
POTASSIUM SERPL-SCNC: 3.4 MMOL/L (ref 3.5–5.1)
PROT SERPL-MCNC: 5 G/DL (ref 6.3–8.2)
RBC # BLD AUTO: 3.09 M/UL (ref 4.05–5.2)
RBC MORPH BLD: ABNORMAL
SODIUM SERPL-SCNC: 140 MMOL/L (ref 136–145)
WBC # BLD AUTO: 8.6 K/UL (ref 4.3–11.1)
WBC MORPH BLD: ABNORMAL

## 2022-02-21 PROCEDURE — 74011250636 HC RX REV CODE- 250/636: Performed by: NURSE PRACTITIONER

## 2022-02-21 PROCEDURE — 2709999900 HC NON-CHARGEABLE SUPPLY

## 2022-02-21 PROCEDURE — 74011250637 HC RX REV CODE- 250/637: Performed by: FAMILY MEDICINE

## 2022-02-21 PROCEDURE — 74011250636 HC RX REV CODE- 250/636: Performed by: INTERNAL MEDICINE

## 2022-02-21 PROCEDURE — 74011250636 HC RX REV CODE- 250/636: Performed by: FAMILY MEDICINE

## 2022-02-21 PROCEDURE — 74011000250 HC RX REV CODE- 250: Performed by: FAMILY MEDICINE

## 2022-02-21 PROCEDURE — 80053 COMPREHEN METABOLIC PANEL: CPT

## 2022-02-21 PROCEDURE — 65270000029 HC RM PRIVATE

## 2022-02-21 PROCEDURE — 83735 ASSAY OF MAGNESIUM: CPT

## 2022-02-21 PROCEDURE — 74011000250 HC RX REV CODE- 250: Performed by: EMERGENCY MEDICINE

## 2022-02-21 PROCEDURE — APPSS180 APP SPLIT SHARED TIME > 60 MINUTES: Performed by: NURSE PRACTITIONER

## 2022-02-21 PROCEDURE — 74011250636 HC RX REV CODE- 250/636: Performed by: STUDENT IN AN ORGANIZED HEALTH CARE EDUCATION/TRAINING PROGRAM

## 2022-02-21 PROCEDURE — 85025 COMPLETE CBC W/AUTO DIFF WBC: CPT

## 2022-02-21 PROCEDURE — 74011000258 HC RX REV CODE- 258: Performed by: INTERNAL MEDICINE

## 2022-02-21 PROCEDURE — 74011000250 HC RX REV CODE- 250: Performed by: INTERNAL MEDICINE

## 2022-02-21 PROCEDURE — 99233 SBSQ HOSP IP/OBS HIGH 50: CPT | Performed by: INTERNAL MEDICINE

## 2022-02-21 RX ORDER — HYDROCORTISONE SODIUM SUCCINATE 100 MG/2ML
100 INJECTION, POWDER, FOR SOLUTION INTRAMUSCULAR; INTRAVENOUS AS NEEDED
Status: DISCONTINUED | OUTPATIENT
Start: 2022-02-21 | End: 2022-02-25 | Stop reason: HOSPADM

## 2022-02-21 RX ORDER — EPINEPHRINE 1 MG/ML
0.3 INJECTION, SOLUTION, CONCENTRATE INTRAVENOUS AS NEEDED
Status: DISCONTINUED | OUTPATIENT
Start: 2022-02-21 | End: 2022-02-25 | Stop reason: HOSPADM

## 2022-02-21 RX ORDER — DIPHENHYDRAMINE HYDROCHLORIDE 50 MG/ML
50 INJECTION, SOLUTION INTRAMUSCULAR; INTRAVENOUS ONCE
Status: COMPLETED | OUTPATIENT
Start: 2022-02-21 | End: 2022-02-21

## 2022-02-21 RX ORDER — MAGNESIUM SULFATE HEPTAHYDRATE 40 MG/ML
2 INJECTION, SOLUTION INTRAVENOUS ONCE
Status: COMPLETED | OUTPATIENT
Start: 2022-02-21 | End: 2022-02-21

## 2022-02-21 RX ORDER — DEXAMETHASONE SODIUM PHOSPHATE 100 MG/10ML
10 INJECTION INTRAMUSCULAR; INTRAVENOUS ONCE
Status: COMPLETED | OUTPATIENT
Start: 2022-02-21 | End: 2022-02-21

## 2022-02-21 RX ORDER — POTASSIUM CHLORIDE 14.9 MG/ML
20 INJECTION INTRAVENOUS ONCE
Status: COMPLETED | OUTPATIENT
Start: 2022-02-21 | End: 2022-02-21

## 2022-02-21 RX ORDER — DIPHENHYDRAMINE HYDROCHLORIDE 50 MG/ML
50 INJECTION, SOLUTION INTRAMUSCULAR; INTRAVENOUS AS NEEDED
Status: DISCONTINUED | OUTPATIENT
Start: 2022-02-21 | End: 2022-02-25 | Stop reason: HOSPADM

## 2022-02-21 RX ORDER — ALBUTEROL SULFATE 0.83 MG/ML
2.5 SOLUTION RESPIRATORY (INHALATION) AS NEEDED
Status: DISCONTINUED | OUTPATIENT
Start: 2022-02-21 | End: 2022-02-25 | Stop reason: HOSPADM

## 2022-02-21 RX ADMIN — MAGNESIUM SULFATE HEPTAHYDRATE 2 G: 40 INJECTION, SOLUTION INTRAVENOUS at 09:45

## 2022-02-21 RX ADMIN — POTASSIUM CHLORIDE 20 MEQ: 14.9 INJECTION, SOLUTION INTRAVENOUS at 07:30

## 2022-02-21 RX ADMIN — PACLITAXEL 142 MG: 6 INJECTION, SOLUTION, CONCENTRATE INTRAVENOUS at 18:10

## 2022-02-21 RX ADMIN — LORAZEPAM 1 MG: 0.5 TABLET ORAL at 23:21

## 2022-02-21 RX ADMIN — FAMOTIDINE 20 MG: 10 INJECTION, SOLUTION INTRAVENOUS at 17:20

## 2022-02-21 RX ADMIN — SODIUM CHLORIDE, PRESERVATIVE FREE 10 ML: 5 INJECTION INTRAVENOUS at 03:09

## 2022-02-21 RX ADMIN — SODIUM CHLORIDE, PRESERVATIVE FREE 10 ML: 5 INJECTION INTRAVENOUS at 21:16

## 2022-02-21 RX ADMIN — ENOXAPARIN SODIUM 40 MG: 100 INJECTION SUBCUTANEOUS at 08:20

## 2022-02-21 RX ADMIN — DIPHENHYDRAMINE HYDROCHLORIDE 50 MG: 50 INJECTION, SOLUTION INTRAMUSCULAR; INTRAVENOUS at 17:20

## 2022-02-21 RX ADMIN — CEFEPIME HYDROCHLORIDE 2 G: 2 INJECTION, POWDER, FOR SOLUTION INTRAVENOUS at 20:00

## 2022-02-21 RX ADMIN — SODIUM CHLORIDE, PRESERVATIVE FREE 10 ML: 5 INJECTION INTRAVENOUS at 13:43

## 2022-02-21 RX ADMIN — ACETAMINOPHEN 650 MG: 325 TABLET, FILM COATED ORAL at 22:52

## 2022-02-21 RX ADMIN — ONDANSETRON 4 MG: 2 INJECTION INTRAMUSCULAR; INTRAVENOUS at 22:52

## 2022-02-21 RX ADMIN — CEFEPIME HYDROCHLORIDE 2 G: 2 INJECTION, POWDER, FOR SOLUTION INTRAVENOUS at 12:03

## 2022-02-21 RX ADMIN — CEFEPIME HYDROCHLORIDE 2 G: 2 INJECTION, POWDER, FOR SOLUTION INTRAVENOUS at 03:08

## 2022-02-21 RX ADMIN — DEXAMETHASONE SODIUM PHOSPHATE 10 MG: 10 INJECTION INTRAMUSCULAR; INTRAVENOUS at 17:20

## 2022-02-21 NOTE — PROGRESS NOTES
[facility]        Inpatient Hematology / Oncology Progress Note      Admission Date: 2022 11:51 PM  Reason for Admission/Hospital Course: Severe sepsis ([facility] [address] [A41.9, R65.20]      24 Hour Events:  Afebrile, VSS  BCx-NGTD  ANC 6.3 s/p G-CSF  Cdiff pending  Plan to give C1D15 Taxol today  Family at bedside    Transfusions: None  Replacements: K+, Mg+    ROS:  Constitutional: Positive for fatigue; negative for fever, weakness. CV: Positive for hand edema; negative for chest pain, palpitations. Respiratory: Negative for dyspnea, cough, wheezing. GI: Positive for diarrhea/nausea; negative for abdominal pain   Skin:  +Rash - improving     10 point review of systems is otherwise negative with the exception of the elements mentioned above in the HPI. Allergies   Allergen Reactions    Lisinopril Cough     Other reaction(s): Cough       OBJECTIVE:  Patient Vitals for the past 8 hrs:   BP Temp Pulse Resp SpO2   22 0751 119/83 97.6 °F (36.4 °C) 92 20 100 %   22 0300 104/60 97.8 °F (36.6 °C) 89 18 97 %     Temp (24hrs), Av.7 °F (36.5 °C), Min:97.4 °F (36.3 °C), Max:97.9 °F (36.6 °C)    No intake/output data recorded. Physical Exam:  Constitutional: Well developed, well nourished female in no acute distress, sitting comfortably in the hospital bed. HEENT: Normocephalic and atraumatic. Oropharynx is clear, mucous membranes are moist.  Neck supple. Skin Warm and dry.  +diffuse erythematous rash   Respiratory Lungs are clear to auscultation bilaterally without wheezes, rales or rhonchi, normal air exchange without accessory muscle use. CVS Normal rate, regular rhythm and normal S1 and S2. No murmurs, gallops, or rubs. Abdomen Soft, nontender and nondistended, normoactive bowel sounds. No palpable mass. No hepatosplenomegaly. Neuro Grossly nonfocal with no obvious sensory or motor deficits.    MSK Normal range of motion in general.  +swelling BL hands   Psych Appropriate mood and affect. Labs:      Recent Labs     02/21/22 0318 02/20/22 0310 02/19/22  0547   WBC 8.6 9.8 5.9   RBC 3.09* 3.20* 3.60*   HGB 8.9* 9.4* 10.5*   HCT 26.6* 27.6* 31.1*   MCV 86.1 86.3 86.4   MCH 28.8 29.4 29.2   MCHC 33.5 34.1 33.8   RDW 12.9 12.5 12.5   PLT 81* 75* 75*   GRANS 74 68 57   LYMPH 17 9* 12*   MONOS 6 4 5   EOS 2  --   --    BASOS 0  --   --    IG 1  --   --    DF AUTOMATED MANUAL MANUAL   ANEU 6.3 8.5* 4.8   ABL 1.5 0.9 0.7   ABM 0.5 0.4 0.3   KARIN 0.2  --   --    ABB 0.0  --   --    AIG 0.1  --   --         Recent Labs     02/21/22 0318 02/20/22 0310 02/19/22  0547    137 133*   K 3.4* 3.6 3.8   * 111* 106   CO2 23 18* 21   AGAP 4* 8 6*   * 108* 105*   BUN 13 13 14   CREA 0.90 0.90 1.10*   GFRAA >60 >60 >60   GFRNA >60 >60 55*   CA 8.0* 7.5* 6.8*   AP 96 80 73   TP 5.0* 4.8* 4.9*   ALB 1.7* 1.7* 1.7*   GLOB 3.3 3.1 3.2   AGRAT 0.5* 0.5* 0.5*   MG 1.6* 2.2 2.0         Imaging:  XR CHEST PORT [[id number]] Collected: 02/18/22 0053   Order Status: Completed Updated: 02/18/22 0056   Narrative:     EXAM: XR CHEST PORT     HISTORY: meets SIRS criteria.       TECHNIQUE: Frontal chest.     COMPARISON: None available. FINDINGS:   The cardiac silhouette, mediastinum, and pulmonary vasculature are within normal   limits. There is no consolidation, pleural effusion, or pneumothorax. No significant osseous abnormalities are observed. There is a right-sided MediPort. Impression:     No evidence of an acute intrathoracic process.         Medications:  Current Facility-Administered Medications   Medication Dose Route Frequency    magnesium sulfate 2 g/50 ml IVPB (premix or compounded)  2 g IntraVENous ONCE    potassium chloride 20 mEq in 100 ml IVPB  20 mEq IntraVENous ONCE    lip protectant (BLISTEX) ointment 1 Each  1 Each Topical PRN    lidocaine-prilocaine (EMLA) 2.5-2.5 % cream   Topical PRN    LORazepam (ATIVAN) tablet 0.5-1 mg  0.5-1 mg Oral Q8H PRN    sodium chloride (NS) flush 5-40 mL  5-40 mL IntraVENous Q8H    sodium chloride (NS) flush 5-40 mL  5-40 mL IntraVENous PRN    acetaminophen (TYLENOL) tablet 650 mg  650 mg Oral Q4H PRN    enoxaparin (LOVENOX) injection 40 mg  40 mg SubCUTAneous Q24H    cefepime (MAXIPIME) 2 g in 0.9% sodium chloride (MBP/ADV) 100 mL MBP  2 g IntraVENous Q8H    ondansetron (ZOFRAN) injection 4 mg  4 mg IntraVENous Q4H PRN    diphenhydrAMINE (BENADRYL) capsule 25 mg  25 mg Oral Q6H PRN    sodium chloride (NS) flush 5-10 mL  5-10 mL IntraVENous Q8H    sodium chloride (NS) flush 5-10 mL  5-10 mL IntraVENous PRN         ASSESSMENT:    Problem List  Date Reviewed: 2/14/2022          Codes Class Noted    * (Principal) Severe sepsis ([facility] ICD-10-CM: A41.9, R65.20  ICD-9-CM: 038.9, 995.92  2/18/2022        Carcinoma at 9:00 in left breast in female, estrogen receptor negative ([facility] ICD-10-CM: R12.814, Z17.1  ICD-9-CM: 174.2, V86.1  1/10/2022    Overview Addendum 1/27/2022  6:20 AM by Ariana Almonte MD     cT2N0 left breast IDC with DCIS, triple negative     No previous personal malignancy, prior breast surgery, or other symptoms reported. No known family breast cancer. 11/22/21 Bilat screening mammo  Scattered fibroglandular densities. A few typically benign-appearing calcifications in the left breast are similar in appearance to the previous study.     There is a focal asymmetry in the medial inferior left breast located 6 cm from the nipple. Recommend spot compression magnification views on the direct ML view and ultrasound, if indicated for further evaluation.     There is otherwise no suspicious mass, calcification, or architectural distortion in either breast.       IMPRESSION  Focal asymmetry in the medial inferior left breast at 6 cm from the nipple.        12/6/21 Left breast dx mammo and US  The area of concern persists with additional imaging.  This resides within the medial left breast at approximately 9:00 position. There  are few associated calcifications which appear somewhat coarse. This measures approximately 1.3 cm in size. An additional smaller asymmetry also at the 9:00  position resides 7 cm from the nipple. A left breast ultrasound is recommended for further evaluation.     12/6/21 left breast US  At the 9:00 position, 6 cm from the nipple is a lobulated hypoechoic but mildly heterogeneous appearing mass measuring  approximately 1.5 x 0.8 x 0.8 cm. The margins are occasionally ill-defined with some components demonstrating posterior acoustic shadowing. The long axis is  parallel to the plane of the chest wall. There is a 7 mm hypoechoic mass just adjacent to this This is hypoechoic with a lobulated contour. The margins are  occasionally ill-defined.      IMPRESSION  Adjacent left breast masses. Recommend tissue sampling of the larger lesion.       12/23/21 US-guided left breast biopsy  A:  \" LEFT BREAST, 9:00 POSITION, 6 CM FROM NIPPLE, CORE BIOPSY\": FOCI OF AT LEAST MICROINVASIVE INFILTRATING DUCTAL CARCINOMA,   INTERMEDIATE GRADE (MODERATELY DIFFERENTIATED) IN ASSOCIATION WITH FOCALLY HIGH GRADE DUCTAL CARCINOMA IN SITU. DEFINITE LYMPHOVASCULAR INVASION IS NOT IDENTIFIED   Sign Out Date: 12/24/2021 Suzanne Parson M.D.   ER:  Negative (0.9%);  SD: Negative (0.4%); HER-2/COURTNEY: Negative (0)   Post biopsy mammogram shows the biopsy clip in good position. 12/28/21 Breast MRI  The breasts demonstrate moderate glandularity and mild background enhancement.     Left 9:00 biopsy clip within a lobulated heterogeneously enhancing mass with angulated and irregular margins overall measuring about 1.8 x 0.7 x 1.2 cm. Posterior to this (about 1 cm away) the smaller mass measuring 0.9 cm is unchanged from recent mammogram and ultrasound.  Additionally there is clumped  and reticular nonmass enhancement extending superiorly by about 2.7 cm and anteriorly by about 1.8 cm, in keeping with the DCIS and calcifications. Overall the expected area of disease measures about 4.5 cm AP by 1.2 cm transverse and is located at 9:00-9:30, mid to posterior depth. There is no evidence of involvement of skin or chest wall structures.     No other evidence of suspicious enhancing mass, and no other dominant or unique nonmass enhancement,  to suggest additional malignancy in either breast.  No evidence of axillary or internal mammary lymphadenopathy.     Elsewhere, limited visualization of the partially included thorax and upper abdomen shows no acute abnormality, with note of a small benign cyst in the left liver.         IMPRESSION  1. Left medial breast cancer measures about 4.5 cm in greatest dimension. 2. No additional suspicious finding elsewhere in either breast. No obvious lymphadenopathy.       1/13/22 presented at [facility]; genetic testing and refer to med oncology pre-op    1/13/22 BRCAPlus panel of 8 genes associated with hereditary cancer (including BRCA1 and BRCA2)   No pathogenic variants identified that increase risk of developing breast cancer                Triple negative malignant neoplasm of breast [facility]) ICD-10-CM: C50.919  ICD-9-CM: 174.9  1/10/2022        Screening mammogram, encounter for ICD-10-CM: Z12.31  ICD-9-CM: V76.12  3/18/2019    Overview Signed 3/18/2019  4:30 PM by Mimi Maya MD     Ordered for this Oct on 3/18/19             Women's annual routine gynecological examination ICD-10-CM: Z01.419  ICD-9-CM: V72.31  7/24/2017    Overview Signed 7/24/2017  9:42 AM by Mimi Maya MD     Pap with HPV done 7/24/17             Irregular menses ICD-10-CM: N92.6  ICD-9-CM: 626.4  7/24/2017    Overview Signed 7/24/2017  9:42 AM by Miim Maya MD     noted             Benign essential HTN (Chronic) ICD-10-CM: I10  ICD-9-CM: 401.1  4/13/2016        Mixed hyperlipidemia ICD-10-CM: M35.1  ICD-9-CM: 272.2  4/13/2016        Encephalomalacia on imaging study ICD-10-CM: G93.89  ICD-9-CM: 348.89  4/13/2016        Seasonal allergic rhinitis ICD-10-CM: J30.2  ICD-9-CM: 477.9  4/13/2016        Frequent headaches ICD-10-CM: R51.9  ICD-9-CM: 784.0  2/25/2016            Ms. Panchito oRb is a 49 y.o. female admitted on 2/17/2022. The primary encounter diagnosis was Neutropenic fever ([facility] [address] Diagnoses of Rash and Malignant neoplasm of female breast, unspecified estrogen receptor status, unspecified laterality, unspecified site of breast ([facility] [address] were also pertinent to this visit. .       Her PMH includes HTN and heart murmur. She is a patient of Dr. Sidney Martinez with triple negative breast cancer s/p C1D8 Carbo/Taxol/Keytruda on 2/14. D15 due 2/21. She presented to ED with c/o fever and rash. Temp 103 in ED with one episode of vomiting. Rash is occasionally pruritic. She called the clinic regarding this rash and was prescribed steroids and advised to take Benadryl. She met sepsis criteria with fever, tachycardia, leukopenia, and lactic acid 2.1. CXR neg.  UA neg. BCx pending. COVID neg. On Vanc/Zosyn. We were asked to assume care of our patient. PLAN:  Triple negative breast cancer  - s/p C1D8 Carbo/Taxol/Keytruda on 2/14. C15 due 2/21, no plans to give while admitted  -mildly neutropenic-give filgrastim x 3 days  2/19 WBC/ANC improved today. Filgrastim discontinued (after dose 2 completed)    2/21 ANC up to 6.3. D15 Taxol today.     Sepsis / Neutropenic fever  - CXR neg  - UA neg  - BCx pending  - COVID neg  2/19 On Cef/Vanc. BCx NGTD. 2/20 Remains on Cef/Vanc. BCx remain NGTD. D/C Vanc today. 2/21 Remains afebrile. BCx-NGTD. Con't Cef  (D4).     Rash / Angioedema  - cause unknown  2/20 Rash overall with improvement. 2/21 ? allergic reaction. Will rechallenge with Taxol today to monitor. Diarrhea  2/19 Check C diff d/t c/o watery diarrhea ON  2/20 C diff check not yet completed as she had a couple of solid stools yesterday.   Will check C diff today if diarrhea.    2/21 Cdiff pending. Anemia / Thrombocytopenia secondary to chemotherapy  - Transfuse prn per Roya SOPs     Continue home meds  Roya SOPs  Lovenox for DVT ppx (hold for plt <50k)    Goals and plan of care reviewed with the patient. All questions answered to the best of our ability. Disposition:  TBD pending clinical course. Giving Taxol today. Daphnie Weiner NP   [facility]  [id number] [place] [address]  Office : [phone]  Fax : [phone]   I personally saw, exammed and counselled the patient, and discussed with NP, agree with above history/assessment/plan. 49 y. o.female triple negative breast cancer status post cycle 1 day 8 Taxol on 2/14 and developed acute onset of itching, rash, anasarca, short of breath, fever, hypotension, diarrhea, admitted with antibiotics, antihistamine, IV fluid, most symptom much improved the second day although still lingering, discussed that the picture of allergic reaction but thorough discussion not able to identify new food or medicine, not typical for Taxol or Keytruda reaction given the timing and the acuity, and she reported a similar episode a few years ago that she went to ER for treatment, but never followed up for allergen test and never had EpiPen, discussed the concern of allergy and will arrange allergist work-up and EpiPen that discharge, however would proceed with day 15 Taxol with close observation to rule out atypical Taxol allergy, patient and family understand the risk and agreed to proceed, continue antibiotics and supportive treatment as above, check C. difficile. Total time spent 40 minutes. Camila Theodore M.D.   [address]  Office : [phone]  Fax : [phone]

## 2022-02-21 NOTE — PROGRESS NOTES
Problem: Falls - Risk of  Goal: *Absence of Falls  Description: Document Yanelis Don Fall Risk and appropriate interventions in the flowsheet.   Outcome: Progressing Towards Goal  Note: Fall Risk Interventions:            Medication Interventions: Evaluate medications/consider consulting pharmacy         History of Falls Interventions: Evaluate medications/consider consulting pharmacy

## 2022-02-22 ENCOUNTER — APPOINTMENT (OUTPATIENT)
Dept: GENERAL RADIOLOGY | Age: 57
DRG: 871 | End: 2022-02-22
Attending: NURSE PRACTITIONER
Payer: COMMERCIAL

## 2022-02-22 LAB
ALBUMIN SERPL-MCNC: 1.8 G/DL (ref 3.5–5)
ALBUMIN/GLOB SERPL: 0.5 {RATIO} (ref 1.2–3.5)
ALP SERPL-CCNC: 138 U/L (ref 50–136)
ALT SERPL-CCNC: 235 U/L (ref 12–65)
ANION GAP SERPL CALC-SCNC: 6 MMOL/L (ref 7–16)
AST SERPL-CCNC: 280 U/L (ref 15–37)
ATRIAL RATE: 135 BPM
BILIRUB SERPL-MCNC: 0.3 MG/DL (ref 0.2–1.1)
BNP SERPL-MCNC: 3511 PG/ML (ref 5–125)
BUN SERPL-MCNC: 13 MG/DL (ref 6–23)
CALCIUM SERPL-MCNC: 8.3 MG/DL (ref 8.3–10.4)
CALCULATED P AXIS, ECG09: 46 DEGREES
CALCULATED R AXIS, ECG10: 69 DEGREES
CALCULATED T AXIS, ECG11: 36 DEGREES
CHLORIDE SERPL-SCNC: 114 MMOL/L (ref 98–107)
CO2 SERPL-SCNC: 21 MMOL/L (ref 21–32)
CREAT SERPL-MCNC: 0.95 MG/DL (ref 0.6–1)
DIAGNOSIS, 93000: NORMAL
DIFFERENTIAL METHOD BLD: ABNORMAL
ERYTHROCYTE [DISTWIDTH] IN BLOOD BY AUTOMATED COUNT: 13.1 % (ref 11.9–14.6)
GLOBULIN SER CALC-MCNC: 3.7 G/DL (ref 2.3–3.5)
GLUCOSE SERPL-MCNC: 138 MG/DL (ref 65–100)
HCT VFR BLD AUTO: 27.2 % (ref 35.8–46.3)
HGB BLD-MCNC: 9.1 G/DL (ref 11.7–15.4)
LYMPHOCYTES # BLD: 0.1 K/UL (ref 0.5–4.6)
LYMPHOCYTES NFR BLD MANUAL: 1 % (ref 16–44)
MAGNESIUM SERPL-MCNC: 2 MG/DL (ref 1.8–2.4)
MCH RBC QN AUTO: 29.1 PG (ref 26.1–32.9)
MCHC RBC AUTO-ENTMCNC: 33.5 G/DL (ref 31.4–35)
MCV RBC AUTO: 86.9 FL (ref 79.6–97.8)
METAMYELOCYTES NFR BLD MANUAL: 1 %
MONOCYTES # BLD: 0.6 K/UL (ref 0.1–1.3)
MONOCYTES NFR BLD MANUAL: 6 % (ref 3–9)
MYELOCYTES NFR BLD MANUAL: 3 %
NEUTS BAND NFR BLD MANUAL: 12 % (ref 0–6)
NEUTS SEG # BLD: 8.9 K/UL (ref 1.7–8.2)
NEUTS SEG NFR BLD MANUAL: 76 % (ref 47–75)
NRBC # BLD: 0 K/UL (ref 0–0.2)
P-R INTERVAL, ECG05: 138 MS
PLATELET # BLD AUTO: 85 K/UL (ref 150–450)
PLATELET COMMENTS,PCOM: ABNORMAL
PMV BLD AUTO: 11 FL (ref 9.4–12.3)
POTASSIUM SERPL-SCNC: 3.4 MMOL/L (ref 3.5–5.1)
PROMYELOCYTES NFR BLD MANUAL: 1 %
PROT SERPL-MCNC: 5.5 G/DL (ref 6.3–8.2)
Q-T INTERVAL, ECG07: 270 MS
QRS DURATION, ECG06: 82 MS
QTC CALCULATION (BEZET), ECG08: 405 MS
RBC # BLD AUTO: 3.13 M/UL (ref 4.05–5.2)
RBC MORPH BLD: ABNORMAL
SODIUM SERPL-SCNC: 141 MMOL/L (ref 136–145)
VENTRICULAR RATE, ECG03: 135 BPM
WBC # BLD AUTO: 10 K/UL (ref 4.3–11.1)
WBC MORPH BLD: SLIGHT

## 2022-02-22 PROCEDURE — 2709999900 HC NON-CHARGEABLE SUPPLY

## 2022-02-22 PROCEDURE — 74011000250 HC RX REV CODE- 250: Performed by: EMERGENCY MEDICINE

## 2022-02-22 PROCEDURE — 74011250637 HC RX REV CODE- 250/637: Performed by: INTERNAL MEDICINE

## 2022-02-22 PROCEDURE — 74011250636 HC RX REV CODE- 250/636: Performed by: INTERNAL MEDICINE

## 2022-02-22 PROCEDURE — 65660000000 HC RM CCU STEPDOWN

## 2022-02-22 PROCEDURE — 83880 ASSAY OF NATRIURETIC PEPTIDE: CPT

## 2022-02-22 PROCEDURE — 74011250636 HC RX REV CODE- 250/636: Performed by: NURSE PRACTITIONER

## 2022-02-22 PROCEDURE — 94760 N-INVAS EAR/PLS OXIMETRY 1: CPT

## 2022-02-22 PROCEDURE — 77010033678 HC OXYGEN DAILY

## 2022-02-22 PROCEDURE — 83735 ASSAY OF MAGNESIUM: CPT

## 2022-02-22 PROCEDURE — 74011250636 HC RX REV CODE- 250/636: Performed by: STUDENT IN AN ORGANIZED HEALTH CARE EDUCATION/TRAINING PROGRAM

## 2022-02-22 PROCEDURE — 85025 COMPLETE CBC W/AUTO DIFF WBC: CPT

## 2022-02-22 PROCEDURE — 74011250636 HC RX REV CODE- 250/636: Performed by: FAMILY MEDICINE

## 2022-02-22 PROCEDURE — 80053 COMPREHEN METABOLIC PANEL: CPT

## 2022-02-22 PROCEDURE — 99233 SBSQ HOSP IP/OBS HIGH 50: CPT | Performed by: INTERNAL MEDICINE

## 2022-02-22 PROCEDURE — 71045 X-RAY EXAM CHEST 1 VIEW: CPT

## 2022-02-22 PROCEDURE — 36591 DRAW BLOOD OFF VENOUS DEVICE: CPT

## 2022-02-22 PROCEDURE — APPSS180 APP SPLIT SHARED TIME > 60 MINUTES: Performed by: NURSE PRACTITIONER

## 2022-02-22 PROCEDURE — 94762 N-INVAS EAR/PLS OXIMTRY CONT: CPT

## 2022-02-22 PROCEDURE — 74011000258 HC RX REV CODE- 258: Performed by: INTERNAL MEDICINE

## 2022-02-22 RX ORDER — FUROSEMIDE 10 MG/ML
20 INJECTION INTRAMUSCULAR; INTRAVENOUS ONCE
Status: COMPLETED | OUTPATIENT
Start: 2022-02-22 | End: 2022-02-22

## 2022-02-22 RX ORDER — ACETAMINOPHEN 325 MG/1
650 TABLET ORAL
Status: DISCONTINUED | OUTPATIENT
Start: 2022-02-22 | End: 2022-02-25 | Stop reason: HOSPADM

## 2022-02-22 RX ORDER — POTASSIUM CHLORIDE 20 MEQ/1
20 TABLET, EXTENDED RELEASE ORAL 2 TIMES DAILY
Status: DISCONTINUED | OUTPATIENT
Start: 2022-02-22 | End: 2022-02-25 | Stop reason: HOSPADM

## 2022-02-22 RX ORDER — LOPERAMIDE HYDROCHLORIDE 2 MG/1
2 CAPSULE ORAL AS NEEDED
Status: DISCONTINUED | OUTPATIENT
Start: 2022-02-22 | End: 2022-02-25 | Stop reason: HOSPADM

## 2022-02-22 RX ORDER — GUAIFENESIN/DEXTROMETHORPHAN 100-10MG/5
5 SYRUP ORAL
Status: DISCONTINUED | OUTPATIENT
Start: 2022-02-22 | End: 2022-02-25 | Stop reason: HOSPADM

## 2022-02-22 RX ADMIN — SODIUM CHLORIDE, PRESERVATIVE FREE 10 ML: 5 INJECTION INTRAVENOUS at 21:34

## 2022-02-22 RX ADMIN — POTASSIUM CHLORIDE 20 MEQ: 20 TABLET, EXTENDED RELEASE ORAL at 17:32

## 2022-02-22 RX ADMIN — SODIUM CHLORIDE, PRESERVATIVE FREE 10 ML: 5 INJECTION INTRAVENOUS at 14:00

## 2022-02-22 RX ADMIN — GUAIFENESIN AND DEXTROMETHORPHAN 5 ML: 100; 10 SYRUP ORAL at 23:37

## 2022-02-22 RX ADMIN — Medication 1 EACH: at 21:34

## 2022-02-22 RX ADMIN — GUAIFENESIN AND DEXTROMETHORPHAN 5 ML: 100; 10 SYRUP ORAL at 17:32

## 2022-02-22 RX ADMIN — CEFEPIME HYDROCHLORIDE 2 G: 2 INJECTION, POWDER, FOR SOLUTION INTRAVENOUS at 02:11

## 2022-02-22 RX ADMIN — HYDROCORTISONE SODIUM SUCCINATE 100 MG: 100 INJECTION, POWDER, FOR SOLUTION INTRAMUSCULAR; INTRAVENOUS at 04:01

## 2022-02-22 RX ADMIN — POTASSIUM CHLORIDE 20 MEQ: 20 TABLET, EXTENDED RELEASE ORAL at 08:42

## 2022-02-22 RX ADMIN — SODIUM CHLORIDE, PRESERVATIVE FREE 10 ML: 5 INJECTION INTRAVENOUS at 05:17

## 2022-02-22 RX ADMIN — ONDANSETRON 4 MG: 2 INJECTION INTRAMUSCULAR; INTRAVENOUS at 21:34

## 2022-02-22 RX ADMIN — ENOXAPARIN SODIUM 40 MG: 100 INJECTION SUBCUTANEOUS at 08:42

## 2022-02-22 RX ADMIN — ONDANSETRON 4 MG: 2 INJECTION INTRAMUSCULAR; INTRAVENOUS at 04:01

## 2022-02-22 RX ADMIN — ACETAMINOPHEN 650 MG: 325 TABLET, FILM COATED ORAL at 05:39

## 2022-02-22 RX ADMIN — DIPHENHYDRAMINE HYDROCHLORIDE 50 MG: 50 INJECTION, SOLUTION INTRAMUSCULAR; INTRAVENOUS at 04:01

## 2022-02-22 RX ADMIN — FUROSEMIDE 20 MG: 10 INJECTION, SOLUTION INTRAMUSCULAR; INTRAVENOUS at 05:39

## 2022-02-22 NOTE — PROGRESS NOTES
END OF SHIFT NOTE:    Intake/Output  02/22 0701 - 02/22 1900  In: 240 [P.O.:240]  Out: 450 [Urine:450]   Voiding: YES  Catheter: NO  Drain:              Stool:  0 occurrences. Stool Assessment  Stool Color: Brown (02/21/22 1009)  Stool Appearance: Loose (02/21/22 1009)  Stool Amount: Small (02/21/22 1009)  Stool Source/Status: Rectum (02/21/22 1009)    Emesis:  0 occurrences. VITAL SIGNS  Patient Vitals for the past 12 hrs:   Temp Pulse Resp BP SpO2   02/22/22 1621 98 °F (36.7 °C) 97 20 126/82    02/22/22 1304 97.8 °F (36.6 °C) 95 18 (!) 94/48 98 %   02/22/22 0849 97.8 °F (36.6 °C) (!) 102 24 128/86 97 %   02/22/22 0737     96 %       Pain Assessment  Pain 1  Pain Scale 1: Numeric (0 - 10) (02/22/22 0849)  Pain Intensity 1: 0 (02/22/22 0849)  Patient Stated Pain Goal: 0 (02/22/22 0849)  Pain Reassessment 1: Patient resting w/respiratory rate greater than 10 (02/22/22 0849)  Pain Onset 1: last night (02/22/22 0539)  Pain Location 1: Abdomen (02/22/22 0539)  Pain Orientation 1: Mid (02/22/22 0539)  Pain Description 1: Other (comment) (unable to describe) (02/22/22 0539)  Pain Intervention(s) 1: Medication (see MAR); Repositioned (02/22/22 0539)    Ambulating  Yes    Additional Information: patient stated that throat was a little sore Robitussin given. Shift report given to oncoming nurse at the bedside.     April Pierre, RN

## 2022-02-22 NOTE — PROGRESS NOTES
END OF SHIFT NOTE:    Intake/Output  02/21 1901 - 02/22 0700  In: 955 [P.O.:360; I.V.:595]  Out: 950 [Urine:950]   Voiding: YES  Catheter: NO  Drain:              Stool:  0 occurrences. Stool Assessment  Stool Color: Brown (02/21/22 1009)  Stool Appearance: Loose (02/21/22 1009)  Stool Amount: Small (02/21/22 1009)  Stool Source/Status: Rectum (02/21/22 1009)    Emesis:  0 occurrences. VITAL SIGNS  Patient Vitals for the past 12 hrs:   Temp Pulse Resp BP SpO2   02/22/22 0224 99 °F (37.2 °C) (!) 131 26 114/66 95 %   02/21/22 2312 99.5 °F (37.5 °C) (!) 118 16 (!) 142/90 96 %   02/21/22 1956 98.3 °F (36.8 °C) (!) 103 24 (!) 136/95 97 %   02/21/22 1520 97.6 °F (36.4 °C) 83 16 112/83 100 %       Pain Assessment  Pain 1  Pain Scale 1: Numeric (0 - 10) (02/22/22 0150)  Pain Intensity 1: 0 (02/22/22 0150)  Patient Stated Pain Goal: 0 (02/22/22 0150)  Pain Reassessment 1: Patient resting w/respiratory rate greater than 10 (02/21/22 2350)  Pain Onset 1: tonight (02/21/22 2252)  Pain Location 1: Abdomen (02/21/22 2252)  Pain Orientation 1: Mid (02/21/22 2252)  Pain Description 1: Other (comment) (unable to describe) (02/21/22 2252)  Pain Intervention(s) 1: Medication (see MAR); Environmental changes (02/21/22 2252)    Ambulating  Yes    Additional Information:   Completed Taxol past 8PM;no reactions noted/reported. Had c/o abdominal pain;nausea;medicated x1 po Tylenol,IV Zofran & po Ativan for sleep. Noted HR trended up fr low 100's to 130's this AM. Tachypneic;appears SOB;on RA >92%. Afebrile. NP notified;stat EKG,CXR, tele ordered. Increased SOB;prn IV Benadryl,Solucortef,Zofran given. CXR ? Fluid overload;pt w/ wheezing. STAT BNP;elevated;x1 IV Lasix 20 mg. Monitored temp,TMax 100.1. Still appears SOB but RR improved. Shift report given to oncoming nurse at the bedside.     Yelena Rivers RN

## 2022-02-22 NOTE — PROGRESS NOTES
02/22/22 1556   Resting (Room Air)   SpO2 97   HR 74   Resting (On O2)   SpO2   (N/A)   HR   (N/A)   During Walk (Room Air)   SpO2 87   HR 74   Walk/Assistance Device Ambulation   Rate of Dyspnea 0   Symptoms Other (comment)  (weak)   During Walk (On O2)   SpO2 92   HR 75   O2 Device Nasal cannula   O2 Flow Rate (l/min) 2 l/min   Need Additional O2 Flow Rate Rows No   Rate of Dyspnea 0   After Walk   SpO2 91   HR 79   O2 Device Nasal cannula   O2 Flow Rate (l/min) 2 l/min   Does the Patient Qualify for Home O2 Yes   Does the Patient Need Portable Oxygen Tanks Yes

## 2022-02-22 NOTE — ADT AUTH CERT NOTES
Sepsis and Other Febrile Illness, without Focal Infection - Care Day 4 (2/21/2022) by Luca Rose       Review Entered Review Status   2/22/2022 08:59 Completed      Criteria Review      Care Day: 4 Care Date: 2/21/2022 Level of Care: Inpatient Floor    Guideline Day 3    Level Of Care    (X) Floor    2/22/2022 08:59:52 EST by Luca Rose      Medical    Clinical Status    (X) * Mental status at baseline    2/22/2022 08:59:52 EST by Luca Rose      PsychAppropriate mood and affect. Constitutional:Well developed, well nourished female in no acute distress, sitting comfortably in the hospital bed.     (X) * Afebrile or fever improved    2/22/2022 08:59:52 EST by Luca Rose      Temp 97.8    Activity    (X) Activity as tolerated    2/22/2022 08:59:52 EST by Luca Rose      up ad miguel w/assist    Routes    (X) * Oral hydration    2/22/2022 08:59:52 EST by Luca Rose      regular diet    (X) Diet as tolerated    2/22/2022 08:59:52 EST by Luca Rose      regular diet    (X) Parenteral or oral medications    2/22/2022 08:59:52 EST by Luca Rose      Maxipime 2g IV TID  Lovenox 40mg SC QD  Ativan 1mg PO TID PRN x1  Zofran 4mg IV Q4 PRN x1  Decadron 10mg IV x1  Benadryl 50mg IV x1  Pepcid 20mg IV x1  Mag sulfate 2g IV x1IV x1  Tylenol 650mg PO Q4 PRN X1  Taxol 142mg IV x1  Potassium Chloride 20meq    Interventions    (X) WBC    2/22/2022 08:59:52 EST by Luca Rose      WBC: 8.6    * Milestone   Additional Notes   DATE: 2/21/2022 Continued Stay Review/Medical      Hem/Onc progress note:   ASSESSMENT:   Problem List Date Reviewed: 2/14/2022     * (Principal) Severe sepsis (HCC) ICD-10-CM: A41.9, R65.20      Carcinoma at 9:00 in left breast in female, estrogen receptor negative ([facility] [address] ICD-10-CM:     cT2N0 left breast IDC with DCIS, triple negative       Triple negative malignant neoplasm of breast ([facility] [address] ICD-10-CM: C50.919      Screening mammogram, encounter for ICD-10-CM: Z12.31    Ms. Kin Butler is a 49 y.o. female admitted on 2/17/2022. The primary encounter diagnosis was Neutropenic fever ([facility] [address] Diagnoses of Rash and Malignant neoplasm of female breast, unspecified estrogen receptor status, unspecified laterality, unspecified site of breast ([address] were also pertinent to this visit. Her PMH includes HTN and heart murmur. Dariana Downs is a patient of Dr. Zelda Strong with triple negative breast cancer s/p C1D8 Carbo/Taxol/Keytruda on 2/14.  D15 due 2/21.  She presented to ED with c/o fever and rash.  Temp 103 in ED with one episode of vomiting. Adán Mages is occasionally pruritic.  She called the clinic regarding this rash and was prescribed steroids and advised to take Benadryl.  She met sepsis criteria with fever, tachycardia, leukopenia, and lactic acid 2.1.   t.       PLAN:   Triple negative breast cancer   - s/p C1D8 Carbo/Taxol/Keytruda on 2/14. Ta Deal due 2/21, no plans to give while admitted   -mildly neutropenic-give filgrastim x 3 days   2/19 WBC/ANC improved today.  Filgrastim discontinued (after dose 2 completed)     2/21 ANC up to 6.3.  D15 Taxol today. Sepsis / Neutropenic fever   - CXR neg   - UA neg   - BCx pending   - COVID neg. Anemia / Thrombocytopenia secondary to chemotherapy   - Transfuse prn per Roya SOPs   Continue home meds   Roya SOPs   Lovenox for DVT ppx (hold for plt <50k)   Disposition:  TBD pending clinical course.  Giving Taxol today.       Relevant baselines: (lab values, vitals, o2 amount/delivery, etc.)   2/21/2022 03:18   WBC: 8.6   RBC: 3.09 (L)   HGB: 8.9 (L)   HCT: 26.6 (L)   PLATELET: 81 (L)   RBC COMMENTS: SLIGHT. ..    PLATELET COMMENTS: DECREASED   Potassium: 3.4 (L)   Chloride: 113 (H)   Anion gap: 4 (L)   Glucose: 103 (H)   Calcium: 8.0 (L)   Magnesium: 1.6 (L)   Protein, total: 5.0 (L)   Albumin: 1.7 (L)   A-G Ratio: 0.5 (L)   ALT: 79 (H)      Vitals: Temp 97.8, HR 89, R 18, B/P 104/60 and 99% RA      Medications:   Maxipime 2g IV TID   Lovenox 40mg SC QD   Ativan 1mg PO TID PRN x1   Zofran 4mg IV Q4 PRN x1   Decadron 10mg IV x1   Benadryl 50mg IV x1   Pepcid 20mg IV x1   Mag sulfate 2g IV x1IV x1   Tylenol 650mg PO Q4 PRN X1   Taxol 142mg IV x1   Potassium Chloride 20meq IV x1           Sepsis and Other Febrile Illness, without Focal Infection - Care Day 3 (2/20/2022) by Pio Sinha       Review Entered Review Status   2/22/2022 08:49 Completed      Criteria Review      Care Day: 3 Care Date: 2/20/2022 Level of Care: Inpatient Floor    Guideline Day 2    Level Of Care    (X) ICU or floor    2/22/2022 08:49:43 EST by Pio Sinha      Medical    Clinical Status    (X) * Hemodynamic stability    2/22/2022 08:49:43 EST by Pio Sinha      Vitals: Temp 97.9, HR 89, R 18, B/P 104/61 and 98% RA    (X) * Hypoxemia absent    2/22/2022 08:49:43 EST by Pio Sinha      Vitals: Temp 97.9, HR 89, R 18, B/P 104/61 and 98% RA    (X) * Tachypnea absent    2/22/2022 08:49:43 EST by Pio Sinha      Vitals: Temp 97.9, HR 89, R 18, B/P 104/61 and 98% RA    Activity    (X) Activity as tolerated    2/22/2022 08:49:43 EST by Pio iSnha      up ad miguel w/assist    Routes    (X) Parenteral or oral medications    2/22/2022 08:49:43 EST by Pio Sinha      Benadryl 25mg PO QID PRN x1  Lovenox 40mg SC QD  Zofran 4mg IV Q4 PRN x1    (X) Diet as tolerated    2/22/2022 08:49:43 EST by Pio Sinha      regular diet    Interventions    (X) WBC    2/22/2022 08:49:43 EST by Pio Sinha      2/20/2022 03:10  WBC: 9.8    Medications    (X) Possible antimicrobial treatment    2/22/2022 08:49:43 EST by Pio Sinha      Maxipime 2g IV TID    (X) Possible DVT prophylaxis    2/22/2022 08:49:43 EST by Pio Sinha      Lovenox 40mg SC QD    * Milestone   Additional Notes   DATE: 2/20/2022 Continued Stay Review/Medical      NP progress note:   ASSESSMENT:   Problem List Date Reviewed: 2/14/2022      * (Principal) Severe sepsis (HCC) ICD-10-CM: A41.9, R65.20     Carcinoma at 9:00 in left breast in female, estrogen receptor negative ([facility] [address] ICD-10-CM:        cT2N0 left breast IDC with DCIS, triple negative       Triple negative malignant neoplasm of breast [facility]) ICD-10-CM: C50.18      49 y.o. female admitted on 2/17/2022. The primary encounter diagnosis was Neutropenic fever ([facility] [address] Diagnoses of Rash and Malignant neoplasm of female breast, unspecified estrogen receptor status, unspecified laterality, unspecified site of breast ([facility] [address] was also pertinent to this visit.     Her PMH includes HTN and heart murmur. Kaye Whitehead is a patient of Dr. Josi Abdul with triple negative breast cancer s/p C1D8 Carbo/Taxol/Keytruda on 2/14.  D15 due 2/21.  She presented to ED with c/o fever and rash.  Temp 103 in ED with one episode of vomiting. Alexanisla Hira is occasionally pruritic.  She called the clinic regarding this rash and was prescribed steroids and advised to take Benadryl.  She met sepsis criteria with fever, tachycardia, leukopenia, and lactic acid 2.1.        PLAN:   Triple negative breast cancer   - s/p C1D8 Carbo/Taxol/Keytruda on 2/14. Marjorie Dilling due 2/21, no plans to give while admitted   -mildly neutropenic-give filgrastim x 3 days    Sepsis / Neutropenic fever   - CXR neg   - UA neg   - BCx pending   - COVID neg   2/19 On Cef/Vanc.  BCx NGTD.     2/20 Remains on Cef/Vanc.  BCx remain NGTD.  D/C Vanc today.          Roya SOPs   Lovenox for DVT ppx   Discharge Plan:  Possible d/c in the next 24-48 hours if pt remains afebrile and BCx remain negative.  They would like to see Dr. Josi Abdul tomorrow d/t language barrier.         Relevant baselines: (lab values, vitals, o2 amount/delivery, etc.)   2/20/2022 03:10   RBC: 3.20 (L)   HGB: 9.4 (L)   HCT: 27.6 (L)   PLATELET: 75 (L)   BAND NEUTROPHILS: 19 (H)   LYMPHOCYTES: 9 (L)   ABS.  NEUTROPHILS: 8.5 (H)   Chloride: 111 (H)   CO2: 18 (L)   Glucose: 108 (H)   Calcium: 7.5 (L)   Protein, total: 4.8 (L)   Albumin: 1.7 (L) A-G Ratio: 0.5 (L)   ALT: 105 (H)   2/20/2022 03:10   RBC COMMENTS: SLIGHT. Garrison Carson Vitals: Temp 97.9, HR 89, R 18, B/P 104/61 and 98% RA      Medications:   Maxipime 2g IV TID   Lovenox 40mg SC QD           Sepsis and Other Febrile Illness, without Focal Infection - Care Day 2 (2/19/2022) by Mariya Armas RN       Review Entered Review Status   2/21/2022 14:06 Completed      Criteria Review      Care Day: 2 Care Date: 2/19/2022 Level of Care: Inpatient Floor    Guideline Day 2    Level Of Care    (X) ICU or floor    2/21/2022 14:06:37 EST by Naomia       on medical floor    Clinical Status    ( ) * Hemodynamic stability    2/21/2022 14:06:37 EST by Naomia       -115    (X) * Hypoxemia absent    2/21/2022 14:06:37 EST by Naomia       on room air    ( ) * Tachypnea absent    2/21/2022 14:06:37 EST by Naomia       rr 24    Routes    (X) Parenteral or oral medications    2/21/2022 14:06:37 EST by Naomia       IV antibiotic therapy + IV Lasix 4mg every 4 hours prn nausea, vomiting x 2 doses. (X) Diet as tolerated    2/21/2022 14:06:37 EST by Naomia       regular diet    Interventions    (X) WBC    2/21/2022 14:06:37 EST by Naomia       WBC: 5.9.    +Diarrhea: Check C diff due to complaint of watery diarrhea.     Medications    (X) Possible antimicrobial treatment    2/21/2022 14:06:37 EST by Naomia       IV Cefepime 2gm every 8 hours/ IV Vancomycin 1000mg every 12 hours    (X) Possible DVT prophylaxis    2/21/2022 14:06:37 EST by Naomia       enoxaparin (LOVENOX) injection 40 mg  Dose: 40 mg  Freq: EVERY 24 HOURS Route: SC    * Milestone   Additional Notes   2/19   Medical          VS: 97.9 °F (36.6 °C) ;(!) 115; 24 ;109/65; 97 %  ROOM AIR               LABS:      2/19/2022 05:47   WBC: 5.9   RBC: 3.60 (L)   HGB: 10.5 (L)   HCT: 31.1 (L)   MCV: 86.4   MCH: 29.2   MCHC: 33.8   RDW: 12.5   PLATELET: 75 (L)   MPV: 10.2   NEUTROPHILS: 57   BAND NEUTROPHILS: 23 (H)   LYMPHOCYTES: 12 (L)   MONOCYTES: 5   METAMYELOCYTES: 1   MYELOCYTES: 2   DF: MANUAL   ABSOLUTE NRBC: 0.00   ABS. NEUTROPHILS: 4.8   ABS. LYMPHOCYTES: 0.7   ABS. MONOCYTES: 0.3   RBC COMMENTS: SLIGHT. .. PLATELET COMMENTS: DECREASED   Sodium: 133 (L)   Potassium: 3.8   Chloride: 106   CO2: 21   Anion gap: 6 (L)   Glucose: 105 (H)   BUN: 14   Creatinine: 1.10 (H)   Calcium: 6.8 (L)   Magnesium: 2.0   GFR est non-AA: 55 (L)   GFR est AA: >60   Bilirubin, total: 0.4   Protein, total: 4.9 (L)   Albumin: 1.7 (L)   Globulin: 3.2   A-G Ratio: 0.5 (L)   ALT: 107 (H)   AST: 58 (H)   Alk. phosphatase: 73   Vancomycin, random: 26.2      2/19/2022 05:47   RBC COMMENTS: SLIGHT. .. 2/19/2022 05:47   RBC COMMENTS: SLIGHT. .. 2/19/2022 19:03   C. DIFFICILE AG & TOXIN A/B: Rpt               -ONCOLOGY:      24 Hour Events:   Fever ON, VSS    BCx NGTD   On Cef/Vanc   C/o watery diarrhea - checking C diff       ROS:   Constitutional: Positive for fever, fatigue; negative for weakness. CV: Positive for hand edema; negative for chest pain, palpitations.     Respiratory: Negative for dyspnea, cough, wheezing. GI: Positive for diarrhea; negative for nausea, abdominal pain          Ms. Chavez is a 49 y. o. female admitted on 2/17/2022. The primary encounter diagnosis was Neutropenic fever ([facility] [address] Diagnoses of Rash and Malignant neoplasm of female breast, unspecified estrogen receptor status, unspecified laterality, unspecified site of breast ([address] were also pertinent to this visit. Dossie Jurist       Her PMH includes HTN and heart murmur. Julia De Leon is a patient of Dr. Jourdan Dunlap with triple negative breast cancer s/p C1D8 Carbo/Taxol/Keytruda on 2/14.  D15 due 2/21.  She presented to ED with c/o fever and rash.  Temp 103 in ED with one episode of vomiting. Vero Thomas is occasionally pruritic.  She called the clinic regarding this rash and was prescribed steroids and advised to take Benadryl. Julia De Leon met sepsis criteria with fever, tachycardia, leukopenia, and lactic acid 2.1.  CXR neg.  UA neg. BCx pending.  COVID neg. Jackqueline Pastures.  We were asked to assume care of our patient.       PLAN:   Triple negative breast cancer   - s/p C1D8 Carbo/Taxol/Keytruda on 2/14. Maycol Gonzalez due 2/21, no plans to give while admitted   -mildly neutropenic-give filgrastim x 3 days   2/19 WBC/ANC improved today.         Sepsis / Neutropenic fever   - CXR neg   - UA neg   - BCx pending   - COVID neg   2/19 On Cef/Vanc.  BCx NGTD.            Rash   - cause unknown       Diarrhea   2/19 Check C diff d/t c/o watery diarrhea ON       Continue home meds   Roya SOPs   Lovenox for DVT ppx            -INTERNAL MEDICINE:      Still complaining of itching mostly around her feet today.  The rash she says appears to be getting better in some places but worse than others.       Assessment & Plan:       1. Sepsis with septic shock, mild neutropenic fever: Though her [address] is greater than 500 she is still immunocompromise, so if patient acutely decompensates or has evidence of pulmonary distress, recommend a stat dry CT to rule out opportunistic infection.  Continue with Vanco and Zosyn.  IV fluids can be timed out.  Holding all antihypertensives.  Blood cultures and urine cultures. 2. Hypertension: Hold her reportedly home diltiazem, olmesartan, hydrochlorothiazide, and spironolactone, though when I reviewed the patient's medication bottles with her that she does not appear to have any of these and all think she is taking these at home. 3. Chemo induced nausea and vomiting: As needed lorazepam and Zofran. 4. Hypomagnesemia: Probably from her nausea and vomiting, treat and treat above. 5. Leukopenia, thrombocytopenia: Chemo induced.  Keep platelets above 10 or platelets above 20 if patient is showing signs or symptoms of fever and greater than 50 if she is bleeding actively. Vic Ojeda for DVT prophylaxis along his platelets are greater than 50. 6. Maculopapular rash:  Suspect this is a drug rash probably from [facility].  As needed Benadryl, and expect patient to feel better within the next 1 to 2 days.  Explained this to the family.           DVT ppx: Enoxaparin   Dispo: Home   PT/OT: Not yet consulted            -Current Facility-Administered Medications   Medication Dose Route Frequency   · vancomycin (VANCOCIN) 1,000 mg in 0.9% sodium chloride 250 mL (VIAL-MATE) 1 g IntraVENous Q18H   · lidocaine-prilocaine (EMLA) 2.5-2.5 % cream Topical PRN   · LORazepam (ATIVAN) tablet 0.5-1 mg 0.5-1 mg Oral Q8H PRN   · sodium chloride (NS) flush 5-40 mL 5-40 mL IntraVENous Q8H   · sodium chloride (NS) flush 5-40 mL 5-40 mL IntraVENous PRN   · acetaminophen (TYLENOL) tablet 650 mg 650 mg Oral Q4H PRN   · enoxaparin (LOVENOX) injection 40 mg 40 mg SubCUTAneous Q24H   · cefepime (MAXIPIME) 2 g in 0.9% sodium chloride (MBP/ADV) 100 mL MBP 2 g IntraVENous Q8H   · ondansetron (ZOFRAN) injection 4 mg 4 mg IntraVENous Q4H PRN   · diphenhydrAMINE (BENADRYL) capsule 25 mg 25 mg Oral Q6H PRN   · sodium chloride (NS) flush 5-10 mL 5-10 mL IntraVENous Q8H   · sodium chloride (NS) flush 5-10 mL 5-10 mL IntraVENous PRN

## 2022-02-22 NOTE — PROGRESS NOTES
Problem: Falls - Risk of  Goal: *Absence of Falls  Description: Document Tessa Damaso Fall Risk and appropriate interventions in the flowsheet.   Outcome: Progressing Towards Goal  Note: Fall Risk Interventions:            Medication Interventions: Teach patient to arise slowly         History of Falls Interventions: Door open when patient unattended

## 2022-02-22 NOTE — PROGRESS NOTES
[facility] Hematology & Oncology        Inpatient Hematology / Oncology Progress Note      Admission Date: 2022 11:51 PM  Reason for Admission/Hospital Course: Severe sepsis ([address] [A41.9, R65.20]      24 Hour Events:  Afebrile, tachycardic  Cdiff neg  S/p C1D15 Taxol yesterday  Pt with abd pain, nausea, tachycardia and tachypnea/dyspnea earlier this AM  LFTs elevated   at bedside    Transfusions: None  Replacements: K+    ROS:  Constitutional: Positive for fatigue; negative for fever, weakness. CV: Positive for hand edema; negative for chest pain, palpitations. Respiratory: Negative for dyspnea, cough, wheezing. GI: Positive for diarrhea/nausea; negative for abdominal pain   Skin:  +Rash - improving     10 point review of systems is otherwise negative with the exception of the elements mentioned above in the HPI. Allergies   Allergen Reactions    Lisinopril Cough     Other reaction(s): Cough       OBJECTIVE:  Patient Vitals for the past 8 hrs:   BP Temp Pulse Resp SpO2   22 0849 128/86 97.8 °F (36.6 °C) (!) 102 -- 97 %   22 0737 -- -- -- -- 96 %   22 0530 126/80 99.8 °F (37.7 °C) (!) 126 (!) 38 97 %   22 0425 -- 99.2 °F (37.3 °C) -- -- --   22 0405 (!) 140/86 100.1 °F (37.8 °C) (!) 133 (!) 46 96 %   22 0224 114/66 99 °F (37.2 °C) (!) 131 26 95 %     Temp (24hrs), Av.7 °F (37.1 °C), Min:97.3 °F (36.3 °C), Max:100.1 °F (37.8 °C)    No intake/output data recorded. Physical Exam:  Constitutional: Well developed, well nourished female in no acute distress, sitting comfortably in the hospital bed. HEENT: Normocephalic and atraumatic. Oropharynx is clear, mucous membranes are moist.  Neck supple. Skin Warm and dry.  +diffuse erythematous rash (improved). Dry skin noted on hands   Respiratory Lungs are clear to auscultation bilaterally without wheezes, rales or rhonchi, normal air exchange without accessory muscle use.     CVS Mildly tachycardic rate, regular rhythm and normal S1 and S2. No murmurs, gallops, or rubs. Abdomen Soft, nontender and nondistended, normoactive bowel sounds. No palpable mass. No hepatosplenomegaly. Neuro Grossly nonfocal with no obvious sensory or motor deficits. MSK Normal range of motion in general.  +swelling BL hands   Psych Appropriate mood and affect. Labs:      Recent Labs     02/22/22 0213 02/21/22 0318 02/20/22 0310   WBC 10.0 8.6 9.8   RBC 3.13* 3.09* 3.20*   HGB 9.1* 8.9* 9.4*   HCT 27.2* 26.6* 27.6*   MCV 86.9 86.1 86.3   MCH 29.1 28.8 29.4   MCHC 33.5 33.5 34.1   RDW 13.1 12.9 12.5   PLT 85* 81* 75*   GRANS 76* 74 68   LYMPH 1* 17 9*   MONOS 6 6 4   EOS  --  2  --    BASOS  --  0  --    IG  --  1  --    DF MANUAL AUTOMATED MANUAL   ANEU 8.9* 6.3 8.5*   ABL 0.1* 1.5 0.9   ABM 0.6 0.5 0.4   KARIN  --  0.2  --    ABB  --  0.0  --    AIG  --  0.1  --         Recent Labs     02/22/22 0213 02/21/22 0318 02/20/22 0310    140 137   K 3.4* 3.4* 3.6   * 113* 111*   CO2 21 23 18*   AGAP 6* 4* 8   * 103* 108*   BUN 13 13 13   CREA 0.95 0.90 0.90   GFRAA >60 >60 >60   GFRNA >60 >60 >60   CA 8.3 8.0* 7.5*   * 96 80   TP 5.5* 5.0* 4.8*   ALB 1.8* 1.7* 1.7*   GLOB 3.7* 3.3 3.1   AGRAT 0.5* 0.5* 0.5*   MG 2.0 1.6* 2.2         Imaging:  XR CHEST PORT [[id number]] Collected: 02/18/22 0053   Order Status: Completed Updated: 02/18/22 0056   Narrative:     EXAM: XR CHEST PORT     HISTORY: meets SIRS criteria.       TECHNIQUE: Frontal chest.     COMPARISON: None available. FINDINGS:   The cardiac silhouette, mediastinum, and pulmonary vasculature are within normal   limits. There is no consolidation, pleural effusion, or pneumothorax. No significant osseous abnormalities are observed. There is a right-sided MediPort. Impression:     No evidence of an acute intrathoracic process.         Medications:  Current Facility-Administered Medications   Medication Dose Route Frequency    acetaminophen (TYLENOL) tablet 650 mg  650 mg Oral Q4H PRN    potassium chloride (K-DUR, KLOR-CON M20) SR tablet 20 mEq  20 mEq Oral BID    loperamide (IMODIUM) capsule 2 mg  2 mg Oral PRN    hydrocortisone Sod Succ (PF) (SOLU-CORTEF) injection 100 mg  100 mg IntraVENous PRN    diphenhydrAMINE (BENADRYL) injection 50 mg  50 mg IntraVENous PRN    EPINEPHrine HCl (PF) (ADRENALIN) 1 mg/mL (1 mL) injection 0.3 mg  0.3 mg IntraMUSCular PRN    albuterol (PROVENTIL VENTOLIN) nebulizer solution 2.5 mg  2.5 mg Inhalation PRN    lip protectant (BLISTEX) ointment 1 Each  1 Each Topical PRN    lidocaine-prilocaine (EMLA) 2.5-2.5 % cream   Topical PRN    LORazepam (ATIVAN) tablet 0.5-1 mg  0.5-1 mg Oral Q8H PRN    enoxaparin (LOVENOX) injection 40 mg  40 mg SubCUTAneous Q24H    cefepime (MAXIPIME) 2 g in 0.9% sodium chloride (MBP/ADV) 100 mL MBP  2 g IntraVENous Q8H    ondansetron (ZOFRAN) injection 4 mg  4 mg IntraVENous Q4H PRN    diphenhydrAMINE (BENADRYL) capsule 25 mg  25 mg Oral Q6H PRN    sodium chloride (NS) flush 5-10 mL  5-10 mL IntraVENous Q8H    sodium chloride (NS) flush 5-10 mL  5-10 mL IntraVENous PRN         ASSESSMENT:    Problem List  Date Reviewed: 2/14/2022          Codes Class Noted    * (Principal) Severe sepsis (HCC) ICD-10-CM: A41.9, R65.20  ICD-9-CM: 038.9, 995.92  2/18/2022        Carcinoma at 9:00 in left breast in female, estrogen receptor negative ([facility] ICD-10-CM: N12.619, Z17.1  ICD-9-CM: 174.2, V86.1  1/10/2022    Overview Addendum 1/27/2022  6:20 AM by Zara Lomeli MD     cT2N0 left breast IDC with DCIS, triple negative     No previous personal malignancy, prior breast surgery, or other symptoms reported. No known family breast cancer. 11/22/21 Bilat screening mammo  Scattered fibroglandular densities.    A few typically benign-appearing calcifications in the left breast are similar in appearance to the previous study.     There is a focal asymmetry in the medial inferior left breast located 6 cm from the nipple. Recommend spot compression magnification views on the direct ML view and ultrasound, if indicated for further evaluation.     There is otherwise no suspicious mass, calcification, or architectural distortion in either breast.       IMPRESSION  Focal asymmetry in the medial inferior left breast at 6 cm from the nipple.        12/6/21 Left breast dx mammo and US  The area of concern persists with additional imaging. This resides within the medial left breast at approximately 9:00 position. There  are few associated calcifications which appear somewhat coarse. This measures approximately 1.3 cm in size. An additional smaller asymmetry also at the 9:00  position resides 7 cm from the nipple. A left breast ultrasound is recommended for further evaluation.     12/6/21 left breast US  At the 9:00 position, 6 cm from the nipple is a lobulated hypoechoic but mildly heterogeneous appearing mass measuring  approximately 1.5 x 0.8 x 0.8 cm. The margins are occasionally ill-defined with some components demonstrating posterior acoustic shadowing. The long axis is  parallel to the plane of the chest wall. There is a 7 mm hypoechoic mass just adjacent to this This is hypoechoic with a lobulated contour. The margins are  occasionally ill-defined.      IMPRESSION  Adjacent left breast masses. Recommend tissue sampling of the larger lesion.       12/23/21 US-guided left breast biopsy  A:  \" LEFT BREAST, 9:00 POSITION, 6 CM FROM NIPPLE, CORE BIOPSY\": FOCI OF AT LEAST MICROINVASIVE INFILTRATING DUCTAL CARCINOMA,   INTERMEDIATE GRADE (MODERATELY DIFFERENTIATED) IN ASSOCIATION WITH FOCALLY HIGH GRADE DUCTAL CARCINOMA IN SITU. DEFINITE LYMPHOVASCULAR INVASION IS NOT IDENTIFIED   Sign Out Date: 12/24/2021 Divine Bloom M.D.   ER:  Negative (0.9%);  IN: Negative (0.4%); HER-2/COURTNEY: Negative (0)   Post biopsy mammogram shows the biopsy clip in good position.       12/28/21 Breast MRI  The breasts demonstrate moderate glandularity and mild background enhancement.     Left 9:00 biopsy clip within a lobulated heterogeneously enhancing mass with angulated and irregular margins overall measuring about 1.8 x 0.7 x 1.2 cm. Posterior to this (about 1 cm away) the smaller mass measuring 0.9 cm is unchanged from recent mammogram and ultrasound. Additionally there is clumped  and reticular nonmass enhancement extending superiorly by about 2.7 cm and anteriorly by about 1.8 cm, in keeping with the DCIS and calcifications. Overall the expected area of disease measures about 4.5 cm AP by 1.2 cm transverse and is located at 9:00-9:30, mid to posterior depth. There is no evidence of involvement of skin or chest wall structures.     No other evidence of suspicious enhancing mass, and no other dominant or unique nonmass enhancement,  to suggest additional malignancy in either breast.  No evidence of axillary or internal mammary lymphadenopathy.     Elsewhere, limited visualization of the partially included thorax and upper abdomen shows no acute abnormality, with note of a small benign cyst in the left liver.         IMPRESSION  1. Left medial breast cancer measures about 4.5 cm in greatest dimension. 2. No additional suspicious finding elsewhere in either breast. No obvious lymphadenopathy.       1/13/22 presented at [facility]; genetic testing and refer to med oncology pre-op    1/13/22 BRCAPlus panel of 8 genes associated with hereditary cancer (including BRCA1 and BRCA2)   No pathogenic variants identified that increase risk of developing breast cancer                Triple negative malignant neoplasm of breast [facility]) ICD-10-CM: C50.919  ICD-9-CM: 174.9  1/10/2022        Screening mammogram, encounter for ICD-10-CM: Z12.31  ICD-9-CM: V76.12  3/18/2019    Overview Signed 3/18/2019  4:30 PM by Vinod Lee MD     Ordered for this Oct on 3/18/19             Women's annual routine gynecological examination ICD-10-CM: Z01.419  ICD-9-CM: V72.31  7/24/2017    Overview Signed 7/24/2017  9:42 AM by Amira Marr MD     Pap with HPV done 7/24/17             Irregular menses ICD-10-CM: N92.6  ICD-9-CM: 626.4  7/24/2017    Overview Signed 7/24/2017  9:42 AM by Amira Marr MD     noted             Benign essential HTN (Chronic) ICD-10-CM: I10  ICD-9-CM: 401.1  4/13/2016        Mixed hyperlipidemia ICD-10-CM: E72.7  ICD-9-CM: 272.2  4/13/2016        Encephalomalacia on imaging study ICD-10-CM: G93.89  ICD-9-CM: 348.89  4/13/2016        Seasonal allergic rhinitis ICD-10-CM: J30.2  ICD-9-CM: 477.9  4/13/2016        Frequent headaches ICD-10-CM: R51.9  ICD-9-CM: 784.0  2/25/2016            Ms. Marixa Snider is a 49 y.o. female admitted on 2/17/2022. The primary encounter diagnosis was Neutropenic fever ([facility] Diagnoses of Rash and Malignant neoplasm of female breast, unspecified estrogen receptor status, unspecified laterality, unspecified site of breast ([facility] were also pertinent to this visit. .       Her PMH includes HTN and heart murmur. She is a patient of Dr. Matti Coleman with triple negative breast cancer s/p C1D8 Carbo/Taxol/Keytruda on 2/14. D15 due 2/21. She presented to ED with c/o fever and rash. Temp 103 in ED with one episode of vomiting. Rash is occasionally pruritic. She called the clinic regarding this rash and was prescribed steroids and advised to take Benadryl. She met sepsis criteria with fever, tachycardia, leukopenia, and lactic acid 2.1. CXR neg.  UA neg. BCx pending. COVID neg. On Vanc/Zosyn. We were asked to assume care of our patient. PLAN:  Triple negative breast cancer  - s/p C1D8 Carbo/Taxol/Keytruda on 2/14. C15 due 2/21, no plans to give while admitted  -mildly neutropenic-give filgrastim x 3 days  2/19 WBC/ANC improved today. Filgrastim discontinued (after dose 2 completed)    2/21 ANC up to 6.3.   D15 Taxol today.     Sepsis / Neutropenic fever  - CXR neg  - UA neg  - BCx pending  - COVID neg  2/19 On Cef/Vanc. BCx NGTD. 2/20 Remains on Cef/Vanc. BCx remain NGTD. D/C Vanc today. 2/22 Remains afebrile. BCx-NGTD. DC Cef.     Rash / Angioedema  - cause unknown  2/20 Rash overall with improvement. 2/21 ? allergic reaction. Will rechallenge with Taxol today to monitor. 2/22 Pt completed Taxol last night @ 2000. Became tachycardic and tachypneic with dyspnea this AM.  Also reported abd pain with nausea. EKG with sinus tach. CXR with possible fluid overload. pBNP 3511. Was given Lasix, Benadryl, Solucortef, and Zofran with resolution of symptoms. Diarrhea  2/19 Check C diff d/t c/o watery diarrhea ON  2/20 C diff check not yet completed as she had a couple of solid stools yesterday. Will check C diff today if diarrhea.    2/22 Cdiff neg. Anemia / Thrombocytopenia secondary to chemotherapy  - Transfuse prn per Roya SOPs    Transaminitis  2/22 LFTs elevated. Monitor.     Continue home meds  Roya SOPs  Lovenox for DVT ppx (hold for plt <50k)    Goals and plan of care reviewed with the patient. All questions answered to the best of our ability. Disposition:  TBD pending clinical course. Dr. Joyce Hope would like to continue to monitor patient for at least 48-72 hours for any delayed allergic reaction and to monitor liver function. Ceci Jacob NP   [facility] Hematology & Oncology  [address] [address]  Office : [phone]  Fax : [phone]   I personally saw, exammed and counselled the patient, and discussed with NP, agree with above history/assessment/plan. 49 y. o.female triple negative breast cancer status post cycle 1 day 8 Taxol on 2/14 and developed acute onset of itching, rash, anasarca, short of breath, fever, hypotension, diarrhea, admitted with antibiotics, antihistamine, IV fluid, most symptom much improved the second day although still lingering, discussed that the picture of allergic reaction but thorough discussion not able to identify new food or medicine, not typical for Taxol or Keytruda reaction given the timing and the acuity, and she reported a similar episode a few years ago that she went to ER for treatment, but never followed up for allergen test and never had EpiPen, discussed the concern of allergy and will arrange allergist work-up and EpiPen that discharge, however would proceed with day 15 Taxol with close observation to rule out atypical Taxol allergy, patient and family understand the risk and agreed to proceed, and reported reasonable tolerance except for mild itching through the infusion, however overnight RN was concerned of tachycardia and tachypneic which patient reported similar to each infusion but received IV Benadryl and Solu-Medrol, appear well in the morning, LFT elevated and monitor, C. difficile negative and diarrhea improved, remains afebrile and stop cefepime, continue supportive care.       Ashley Oreilly M.D.   [address]  Office : [phone]  Fax : [phone]

## 2022-02-23 LAB
ALBUMIN SERPL-MCNC: 1.8 G/DL (ref 3.5–5)
ALBUMIN/GLOB SERPL: 0.5 {RATIO} (ref 1.2–3.5)
ALP SERPL-CCNC: 115 U/L (ref 50–136)
ALT SERPL-CCNC: 246 U/L (ref 12–65)
ANION GAP SERPL CALC-SCNC: 2 MMOL/L (ref 7–16)
AST SERPL-CCNC: 212 U/L (ref 15–37)
BACTERIA SPEC CULT: NORMAL
BACTERIA SPEC CULT: NORMAL
BASOPHILS # BLD: 0 K/UL (ref 0–0.2)
BASOPHILS NFR BLD: 0 % (ref 0–2)
BILIRUB SERPL-MCNC: 0.3 MG/DL (ref 0.2–1.1)
BUN SERPL-MCNC: 19 MG/DL (ref 6–23)
CALCIUM SERPL-MCNC: 7.6 MG/DL (ref 8.3–10.4)
CHLORIDE SERPL-SCNC: 113 MMOL/L (ref 98–107)
CO2 SERPL-SCNC: 24 MMOL/L (ref 21–32)
CREAT SERPL-MCNC: 0.9 MG/DL (ref 0.6–1)
DIFFERENTIAL METHOD BLD: ABNORMAL
EOSINOPHIL # BLD: 0.1 K/UL (ref 0–0.8)
EOSINOPHIL NFR BLD: 2 % (ref 0.5–7.8)
ERYTHROCYTE [DISTWIDTH] IN BLOOD BY AUTOMATED COUNT: 13.4 % (ref 11.9–14.6)
GLOBULIN SER CALC-MCNC: 3.9 G/DL (ref 2.3–3.5)
GLUCOSE SERPL-MCNC: 99 MG/DL (ref 65–100)
HCT VFR BLD AUTO: 25.2 % (ref 35.8–46.3)
HGB BLD-MCNC: 8.4 G/DL (ref 11.7–15.4)
IMM GRANULOCYTES # BLD AUTO: 0 K/UL (ref 0–0.5)
IMM GRANULOCYTES NFR BLD AUTO: 0 % (ref 0–5)
LYMPHOCYTES # BLD: 1.3 K/UL (ref 0.5–4.6)
LYMPHOCYTES NFR BLD: 19 % (ref 13–44)
MAGNESIUM SERPL-MCNC: 2.1 MG/DL (ref 1.8–2.4)
MCH RBC QN AUTO: 28.9 PG (ref 26.1–32.9)
MCHC RBC AUTO-ENTMCNC: 33.3 G/DL (ref 31.4–35)
MCV RBC AUTO: 86.6 FL (ref 79.6–97.8)
MONOCYTES # BLD: 0.3 K/UL (ref 0.1–1.3)
MONOCYTES NFR BLD: 5 % (ref 4–12)
NEUTS SEG # BLD: 5.1 K/UL (ref 1.7–8.2)
NEUTS SEG NFR BLD: 74 % (ref 43–78)
NRBC # BLD: 0 K/UL (ref 0–0.2)
PLATELET # BLD AUTO: 90 K/UL (ref 150–450)
PMV BLD AUTO: 10.9 FL (ref 9.4–12.3)
POTASSIUM SERPL-SCNC: 3.6 MMOL/L (ref 3.5–5.1)
PROT SERPL-MCNC: 5.7 G/DL (ref 6.3–8.2)
RBC # BLD AUTO: 2.91 M/UL (ref 4.05–5.2)
SERVICE CMNT-IMP: NORMAL
SERVICE CMNT-IMP: NORMAL
SODIUM SERPL-SCNC: 139 MMOL/L (ref 136–145)
WBC # BLD AUTO: 6.8 K/UL (ref 4.3–11.1)

## 2022-02-23 PROCEDURE — 74011250636 HC RX REV CODE- 250/636: Performed by: NURSE PRACTITIONER

## 2022-02-23 PROCEDURE — 65660000000 HC RM CCU STEPDOWN

## 2022-02-23 PROCEDURE — 74011250636 HC RX REV CODE- 250/636: Performed by: FAMILY MEDICINE

## 2022-02-23 PROCEDURE — 85025 COMPLETE CBC W/AUTO DIFF WBC: CPT

## 2022-02-23 PROCEDURE — APPSS60 APP SPLIT SHARED TIME 46-60 MINUTES: Performed by: NURSE PRACTITIONER

## 2022-02-23 PROCEDURE — 74011000250 HC RX REV CODE- 250: Performed by: EMERGENCY MEDICINE

## 2022-02-23 PROCEDURE — 74011250637 HC RX REV CODE- 250/637: Performed by: INTERNAL MEDICINE

## 2022-02-23 PROCEDURE — 36591 DRAW BLOOD OFF VENOUS DEVICE: CPT

## 2022-02-23 PROCEDURE — 83735 ASSAY OF MAGNESIUM: CPT

## 2022-02-23 PROCEDURE — P9047 ALBUMIN (HUMAN), 25%, 50ML: HCPCS | Performed by: NURSE PRACTITIONER

## 2022-02-23 PROCEDURE — 80053 COMPREHEN METABOLIC PANEL: CPT

## 2022-02-23 PROCEDURE — 99232 SBSQ HOSP IP/OBS MODERATE 35: CPT | Performed by: INTERNAL MEDICINE

## 2022-02-23 PROCEDURE — 94760 N-INVAS EAR/PLS OXIMETRY 1: CPT

## 2022-02-23 RX ORDER — FUROSEMIDE 10 MG/ML
40 INJECTION INTRAMUSCULAR; INTRAVENOUS ONCE
Status: COMPLETED | OUTPATIENT
Start: 2022-02-23 | End: 2022-02-23

## 2022-02-23 RX ORDER — HYDROCORTISONE 1 %
CREAM (GRAM) TOPICAL 2 TIMES DAILY
Status: DISCONTINUED | OUTPATIENT
Start: 2022-02-23 | End: 2022-02-23 | Stop reason: RX

## 2022-02-23 RX ORDER — ALBUMIN HUMAN 250 G/1000ML
12.5 SOLUTION INTRAVENOUS ONCE
Status: COMPLETED | OUTPATIENT
Start: 2022-02-23 | End: 2022-02-23

## 2022-02-23 RX ORDER — ELECTROLYTES/DEXTROSE
SOLUTION, ORAL ORAL 2 TIMES DAILY
Status: DISCONTINUED | OUTPATIENT
Start: 2022-02-23 | End: 2022-02-25 | Stop reason: HOSPADM

## 2022-02-23 RX ADMIN — POTASSIUM CHLORIDE 20 MEQ: 20 TABLET, EXTENDED RELEASE ORAL at 08:19

## 2022-02-23 RX ADMIN — POTASSIUM CHLORIDE 20 MEQ: 20 TABLET, EXTENDED RELEASE ORAL at 17:01

## 2022-02-23 RX ADMIN — HYDROCORTISONE: 0.01 CREAM TOPICAL at 11:00

## 2022-02-23 RX ADMIN — SODIUM CHLORIDE, PRESERVATIVE FREE 10 ML: 5 INJECTION INTRAVENOUS at 22:24

## 2022-02-23 RX ADMIN — SODIUM CHLORIDE, PRESERVATIVE FREE 10 ML: 5 INJECTION INTRAVENOUS at 13:33

## 2022-02-23 RX ADMIN — FUROSEMIDE 40 MG: 10 INJECTION, SOLUTION INTRAMUSCULAR; INTRAVENOUS at 10:49

## 2022-02-23 RX ADMIN — ALBUMIN (HUMAN) 12.5 G: 0.25 INJECTION, SOLUTION INTRAVENOUS at 10:50

## 2022-02-23 RX ADMIN — HYDROCORTISONE: 0.01 CREAM TOPICAL at 18:00

## 2022-02-23 RX ADMIN — ENOXAPARIN SODIUM 40 MG: 100 INJECTION SUBCUTANEOUS at 08:19

## 2022-02-23 RX ADMIN — SODIUM CHLORIDE, PRESERVATIVE FREE 10 ML: 5 INJECTION INTRAVENOUS at 05:03

## 2022-02-23 NOTE — PROGRESS NOTES
END OF SHIFT NOTE:    Intake/Output  02/22 1901 - 02/23 0700  In: 480 [P.O.:480]  Out: 400 [Urine:400]   Voiding: YES  Catheter: NO  Drain:              Stool:  1 occurrence. Stool Assessment  Stool Color: Ely Stall (02/22/22 2344)  Stool Appearance: Soft (02/22/22 2344)  Stool Amount: Small (02/22/22 2344)  Stool Source/Status: Rectum (02/22/22 2344)    Emesis:  0 occurrences. VITAL SIGNS  Patient Vitals for the past 12 hrs:   Temp Pulse Resp BP SpO2   02/22/22 2317 97.9 °F (36.6 °C) (!) 112 28 (!) 157/106 94 %   02/22/22 2014 99.5 °F (37.5 °C) 95 24 115/80 98 %   02/22/22 1621 98 °F (36.7 °C) 97 20 126/82    02/22/22 1304 97.8 °F (36.6 °C) 95 18 (!) 94/48 98 %       Pain Assessment  Pain 1  Pain Scale 1: Numeric (0 - 10) (02/23/22 0025)  Pain Intensity 1: 0 (02/23/22 0025)  Patient Stated Pain Goal: 0 (02/23/22 0025)  Pain Reassessment 1: Patient resting w/respiratory rate greater than 10 (02/22/22 0849)  Pain Onset 1: last night (02/22/22 0539)  Pain Location 1: Abdomen (02/22/22 0539)  Pain Orientation 1: Mid (02/22/22 0539)  Pain Description 1: Other (comment) (unable to describe) (02/22/22 0539)  Pain Intervention(s) 1: Medication (see MAR); Repositioned (02/22/22 0539)    Ambulating  Yes    Additional Information:   Afebrile. No acute respiratory distress;remains on RA w/ adequate 02 sats. Mouth care instructed. Mostly NSR on the monitor w/ few episodes of ST.  X1 IV Zofran for nausea. Shift report given to oncoming nurse Rafa Hernandez at the bedside.     Yelena Rivers RN

## 2022-02-23 NOTE — CONSULTS
Comprehensive Nutrition Assessment    Type and Reason for Visit: Initial,Consult  Best Practice Alert for Malnutrition Screening Tool: Recently Lost Weight Without Trying: Yes, If Yes, How Much Weight Loss: 2-13 lbs,    Oral nutrition supplements (Oncology)    Nutrition Recommendations/Plan:   Meals and Snacks:  Continue current diet. Regular Adult  Nutrition Supplement Therapy:   Medical food supplement therapy:  Initiate Ensure Enlive once per day (this provides 350 kcal and 20 grams protein per bottle) (chocolate)     Malnutrition Assessment:  Malnutrition Status: At risk for malnutrition (specify) (Decreased appetite PTA, altered taste perception)  Context: Chronic illness    Nutrition Assessment:   Nutrition History: 2/23: Per phone call with pt daughter- Pt consumes 3 meals per day consisting of a protein, starch, and vegetable at baseline prior to initiation of chemo treatment (2/7). Pt's appetite and intake has decreased to the extent of \"taking a few bites\" during meals. Pt also c/o of altered taste perception that impacts her intake. Pt's family has attempted to modify her diet to easily chewed and swallowed food options d/t pt c/o difficulty chewing and swallowing. Pt daughter reports ~3# wt loss noticed between 1st and 2nd chemo treatments. Cultural/Advent/Ethnic Food Preference(s): Yes (comment) (No beef)   Nutrition Background: PMH significant for: HTN, HLD, carcinoma of left breast. Chemo treatment received: 2/7, 2/14. Pt experienced fever and rash on 2/16 s/p 2nd chemo treatment on 2/14. Presented with fever, rash, hypotension, and 1 isolated episode of vomiting. Pt admitted with severe sepsis, neutropenic fever  Nutrition Interval:  Per discussion with pt RN - Pt reports that she has been eating \"ok\" since admission, but does not enjoy the food provided by the hospital. Pt's family provides outside meals in addition to hospital provided meals, therefore specific intake is not measurable.  Per discussion with pt's daughter - Pt would benefit from ONS being provided at meal time d/t limited appetite. Daughter claims that pt would prefer chocolate flavored supplement. Nutrition Related Findings:   No rene signs of wasting      Current Nutrition Therapies:  ADULT DIET Regular    Current Intake:   Average Meal Intake: 51-75% (per 6 recorded meals (pt family also supplies outside meals))        Anthropometric Measures:  Height: 5' (152.4 cm)  Current Body Wt: 77.1 kg (169 lb 15.6 oz) (2/22), Weight source: Standing scale  BMI: 33.2, Obese class 1 (BMI 30.0-34. 9)  Admission Body Weight: 152 lb 1.9 oz (bed scale 2/18)  Ideal Body Weight (lbs) (Calculated): 100 lbs (45 kg),    Usual Body Wt: 72.6 kg (160 lb) (159-162 per EMR dating back to 2019), Percent weight change: 6.2          Edema: Facial: Angioedema (2/23/2022  7:20 AM)  LLE: 1+ (2/23/2022  7:20 AM)  LUE: Trace (2/22/2022  7:40 PM)  RLE: 1+ (2/23/2022  7:20 AM)  RUE: Trace (2/22/2022  7:40 PM)     Estimated Daily Nutrient Needs:  Energy (kcal/day): 4618-3258 (Kcal/kg (20-25), Weight Used: Current (77.1 kg standing scale 2/22))  Protein (g/day): 77-96 Weight Used: ( (20% of kcal))  Fluid (ml/day):   (1 ml/kcal)    Nutrition Diagnosis:   · Predicted inadequate energy intake related to altered taste perception (decreased appetite) as evidenced by poor intake prior to admission (reported barrriers as above, breast cancer and variable intake since admission, predicted intake meeting <75%)    Nutrition Interventions:   Food and/or Nutrient Delivery: Continue current diet,Start oral nutrition supplement     Coordination of Nutrition Care: Continue to monitor while inpatient     Plan of Care discussed with Chemo Reece RN    Goals:       Active Goal: Continue to meet >75% of estimated needs by next f/u    Nutrition Monitoring and Evaluation:      Food/Nutrient Intake Outcomes: Food and nutrient intake,Supplement intake  Physical Signs/Symptoms Outcomes: Weight    Discharge Planning:     Too soon to determine    Eliu Mackenzie, Dietetic Intern

## 2022-02-23 NOTE — PROGRESS NOTES
02/23/22 0920   Oxygen Therapy   O2 Sat (%) 97 %   Pulse via Oximetry 108 beats per minute   O2 Device None (Room air)

## 2022-02-23 NOTE — PROGRESS NOTES
[facility]        Inpatient Hematology / Oncology Progress Note      Admission Date: 2022 11:51 PM  Reason for Admission/Hospital Course: Severe sepsis ([facility] [address] [A41.9, R65.20]      24 Hour Events:  Afebrile, VSS  S/p C1D15 Taxol on   LFTs slightly improved (with the exception of ALT)   at bedside    Transfusions: None  Replacements: None    ROS:  Constitutional: Positive for fatigue; negative for fever, weakness. CV: Positive for edema; negative for chest pain, palpitations. Respiratory: Negative for dyspnea, cough, wheezing. GI: Positive for diarrhea/nausea; negative for abdominal pain   Skin:  +Rash - improving     10 point review of systems is otherwise negative with the exception of the elements mentioned above in the HPI. Allergies   Allergen Reactions    Lisinopril Cough     Other reaction(s): Cough       OBJECTIVE:  Patient Vitals for the past 8 hrs:   BP Temp Pulse Resp SpO2   22 0321 123/79 98.1 °F (36.7 °C) 93 24 95 %     Temp (24hrs), Av.2 °F (36.8 °C), Min:97.8 °F (36.6 °C), Max:99.5 °F (37.5 °C)    No intake/output data recorded. Physical Exam:  Constitutional: Well developed, well nourished female in no acute distress, sitting comfortably in the hospital bed. HEENT: Normocephalic and atraumatic. Oropharynx is clear, mucous membranes are moist.  Neck supple. Skin Warm and dry.  +diffuse erythematous rash (improved). Dry skin noted on hands   Respiratory Lungs are clear to auscultation bilaterally without wheezes, rales or rhonchi, normal air exchange without accessory muscle use. CVS Mildly tachycardic rate, regular rhythm and normal S1 and S2. No murmurs, gallops, or rubs. Abdomen Soft, nontender and nondistended, normoactive bowel sounds. No palpable mass. No hepatosplenomegaly. Neuro Grossly nonfocal with no obvious sensory or motor deficits. MSK Normal range of motion in general.  +Trace BLE edema and no tenderness. Psych Appropriate mood and affect. Labs:      Recent Labs     02/23/22  0250 02/22/22  0213 02/21/22 0318   WBC 6.8 10.0 8.6   RBC 2.91* 3.13* 3.09*   HGB 8.4* 9.1* 8.9*   HCT 25.2* 27.2* 26.6*   MCV 86.6 86.9 86.1   MCH 28.9 29.1 28.8   MCHC 33.3 33.5 33.5   RDW 13.4 13.1 12.9   PLT 90* 85* 81*   GRANS 74 76* 74   LYMPH 19 1* 17   MONOS 5 6 6   EOS 2  --  2   BASOS 0  --  0   IG 0  --  1   DF AUTOMATED MANUAL AUTOMATED   ANEU 5.1 8.9* 6.3   ABL 1.3 0.1* 1.5   ABM 0.3 0.6 0.5   KARIN 0.1  --  0.2   ABB 0.0  --  0.0   AIG 0.0  --  0.1        Recent Labs     02/23/22  0250 02/22/22 0213 02/21/22 0318    141 140   K 3.6 3.4* 3.4*   * 114* 113*   CO2 24 21 23   AGAP 2* 6* 4*   GLU 99 138* 103*   BUN 19 13 13   CREA 0.90 0.95 0.90   GFRAA >60 >60 >60   GFRNA >60 >60 >60   CA 7.6* 8.3 8.0*    138* 96   TP 5.7* 5.5* 5.0*   ALB 1.8* 1.8* 1.7*   GLOB 3.9* 3.7* 3.3   AGRAT 0.5* 0.5* 0.5*   MG 2.1 2.0 1.6*         Imaging:  XR CHEST PORT [[id number]] Collected: 02/18/22 0053   Order Status: Completed Updated: 02/18/22 0056   Narrative:     EXAM: XR CHEST PORT     HISTORY: meets SIRS criteria.       TECHNIQUE: Frontal chest.     COMPARISON: None available. FINDINGS:   The cardiac silhouette, mediastinum, and pulmonary vasculature are within normal   limits. There is no consolidation, pleural effusion, or pneumothorax. No significant osseous abnormalities are observed. There is a right-sided MediPort. Impression:     No evidence of an acute intrathoracic process.         Medications:  Current Facility-Administered Medications   Medication Dose Route Frequency    acetaminophen (TYLENOL) tablet 650 mg  650 mg Oral Q4H PRN    potassium chloride (K-DUR, KLOR-CON M20) SR tablet 20 mEq  20 mEq Oral BID    loperamide (IMODIUM) capsule 2 mg  2 mg Oral PRN    lanolin alcohol-mineral oil-petrolatum (EUCERIN) topical cream   Topical PRN    guaiFENesin-dextromethorphan (ROBITUSSIN DM) 100-10 mg/5 mL syrup 5 mL  5 mL Oral Q6H PRN    hydrocortisone Sod Succ (PF) (SOLU-CORTEF) injection 100 mg  100 mg IntraVENous PRN    diphenhydrAMINE (BENADRYL) injection 50 mg  50 mg IntraVENous PRN    EPINEPHrine HCl (PF) (ADRENALIN) 1 mg/mL (1 mL) injection 0.3 mg  0.3 mg IntraMUSCular PRN    albuterol (PROVENTIL VENTOLIN) nebulizer solution 2.5 mg  2.5 mg Inhalation PRN    lip protectant (BLISTEX) ointment 1 Each  1 Each Topical PRN    lidocaine-prilocaine (EMLA) 2.5-2.5 % cream   Topical PRN    LORazepam (ATIVAN) tablet 0.5-1 mg  0.5-1 mg Oral Q8H PRN    enoxaparin (LOVENOX) injection 40 mg  40 mg SubCUTAneous Q24H    ondansetron (ZOFRAN) injection 4 mg  4 mg IntraVENous Q4H PRN    diphenhydrAMINE (BENADRYL) capsule 25 mg  25 mg Oral Q6H PRN    sodium chloride (NS) flush 5-10 mL  5-10 mL IntraVENous Q8H    sodium chloride (NS) flush 5-10 mL  5-10 mL IntraVENous PRN         ASSESSMENT:    Problem List  Date Reviewed: 2/14/2022          Codes Class Noted    * (Principal) Severe sepsis ([facility] ICD-10-CM: A41.9, R65.20  ICD-9-CM: 038.9, 995.92  2/18/2022        Carcinoma at 9:00 in left breast in female, estrogen receptor negative ([facility] ICD-10-CM: D93.997, Z17.1  ICD-9-CM: 174.2, V86.1  1/10/2022    Overview Addendum 1/27/2022  6:20 AM by Sebastian Gao MD     cT2N0 left breast IDC with DCIS, triple negative     No previous personal malignancy, prior breast surgery, or other symptoms reported. No known family breast cancer. 11/22/21 Bilat screening mammo  Scattered fibroglandular densities. A few typically benign-appearing calcifications in the left breast are similar in appearance to the previous study.     There is a focal asymmetry in the medial inferior left breast located 6 cm from the nipple.    Recommend spot compression magnification views on the direct ML view and ultrasound, if indicated for further evaluation.     There is otherwise no suspicious mass, calcification, or architectural distortion in either breast.       IMPRESSION  Focal asymmetry in the medial inferior left breast at 6 cm from the nipple.        12/6/21 Left breast dx mammo and US  The area of concern persists with additional imaging. This resides within the medial left breast at approximately 9:00 position. There  are few associated calcifications which appear somewhat coarse. This measures approximately 1.3 cm in size. An additional smaller asymmetry also at the 9:00  position resides 7 cm from the nipple. A left breast ultrasound is recommended for further evaluation.     12/6/21 left breast US  At the 9:00 position, 6 cm from the nipple is a lobulated hypoechoic but mildly heterogeneous appearing mass measuring  approximately 1.5 x 0.8 x 0.8 cm. The margins are occasionally ill-defined with some components demonstrating posterior acoustic shadowing. The long axis is  parallel to the plane of the chest wall. There is a 7 mm hypoechoic mass just adjacent to this This is hypoechoic with a lobulated contour. The margins are  occasionally ill-defined.      IMPRESSION  Adjacent left breast masses. Recommend tissue sampling of the larger lesion.       12/23/21 US-guided left breast biopsy  A:  \" LEFT BREAST, 9:00 POSITION, 6 CM FROM NIPPLE, CORE BIOPSY\": FOCI OF AT LEAST MICROINVASIVE INFILTRATING DUCTAL CARCINOMA,   INTERMEDIATE GRADE (MODERATELY DIFFERENTIATED) IN ASSOCIATION WITH FOCALLY HIGH GRADE DUCTAL CARCINOMA IN SITU. DEFINITE LYMPHOVASCULAR INVASION IS NOT IDENTIFIED   Sign Out Date: 12/24/2021 Bowen Adam M.D.   ER:  Negative (0.9%);  NE: Negative (0.4%); HER-2/COURTNEY: Negative (0)   Post biopsy mammogram shows the biopsy clip in good position.       12/28/21 Breast MRI  The breasts demonstrate moderate glandularity and mild background enhancement.     Left 9:00 biopsy clip within a lobulated heterogeneously enhancing mass with angulated and irregular margins overall measuring about 1.8 x 0.7 x 1.2 cm.  Posterior to this (about 1 cm away) the smaller mass measuring 0.9 cm is unchanged from recent mammogram and ultrasound. Additionally there is clumped  and reticular nonmass enhancement extending superiorly by about 2.7 cm and anteriorly by about 1.8 cm, in keeping with the DCIS and calcifications. Overall the expected area of disease measures about 4.5 cm AP by 1.2 cm transverse and is located at 9:00-9:30, mid to posterior depth. There is no evidence of involvement of skin or chest wall structures.     No other evidence of suspicious enhancing mass, and no other dominant or unique nonmass enhancement,  to suggest additional malignancy in either breast.  No evidence of axillary or internal mammary lymphadenopathy.     Elsewhere, limited visualization of the partially included thorax and upper abdomen shows no acute abnormality, with note of a small benign cyst in the left liver.         IMPRESSION  1. Left medial breast cancer measures about 4.5 cm in greatest dimension. 2. No additional suspicious finding elsewhere in either breast. No obvious lymphadenopathy.       1/13/22 presented at [facility]; genetic testing and refer to med oncology pre-op    1/13/22 BRCAPlus panel of 8 genes associated with hereditary cancer (including BRCA1 and BRCA2)   No pathogenic variants identified that increase risk of developing breast cancer                Triple negative malignant neoplasm of breast [facility]) ICD-10-CM: C50.919  ICD-9-CM: 174.9  1/10/2022        Screening mammogram, encounter for ICD-10-CM: Z12.31  ICD-9-CM: V76.12  3/18/2019    Overview Signed 3/18/2019  4:30 PM by Karli Alford MD     Ordered for this Oct on 3/18/19             Women's annual routine gynecological examination ICD-10-CM: Z01.419  ICD-9-CM: V72.31  7/24/2017    Overview Signed 7/24/2017  9:42 AM by Karli Alford MD     Pap with HPV done 7/24/17             Irregular menses ICD-10-CM: N92.6  ICD-9-CM: 626.4  7/24/2017    Overview Signed 7/24/2017  9:42 AM by Nanette Jeffries MD     noted             Benign essential HTN (Chronic) ICD-10-CM: I10  ICD-9-CM: 401.1  4/13/2016        Mixed hyperlipidemia ICD-10-CM: R13.3  ICD-9-CM: 272.2  4/13/2016        Encephalomalacia on imaging study ICD-10-CM: G93.89  ICD-9-CM: 348.89  4/13/2016        Seasonal allergic rhinitis ICD-10-CM: J30.2  ICD-9-CM: 477.9  4/13/2016        Frequent headaches ICD-10-CM: R51.9  ICD-9-CM: 784.0  2/25/2016            Ms. Renate Staley is a 49 y.o. female admitted on 2/17/2022. The primary encounter diagnosis was Neutropenic fever ([facility] [address] Diagnoses of Rash and Malignant neoplasm of female breast, unspecified estrogen receptor status, unspecified laterality, unspecified site of breast ([facility] were also pertinent to this visit. .       Her PMH includes HTN and heart murmur. She is a patient of Dr. Irina Portillo with triple negative breast cancer s/p C1D8 Carbo/Taxol/Keytruda on 2/14. D15 due 2/21. She presented to ED with c/o fever and rash. Temp 103 in ED with one episode of vomiting. Rash is occasionally pruritic. She called the clinic regarding this rash and was prescribed steroids and advised to take Benadryl. She met sepsis criteria with fever, tachycardia, leukopenia, and lactic acid 2.1. CXR neg.  UA neg. BCx pending. COVID neg. On Vanc/Zosyn. We were asked to assume care of our patient. PLAN:  Triple negative breast cancer  - s/p C1D8 Carbo/Taxol/Keytruda on 2/14. C15 due 2/21, no plans to give while admitted  -mildly neutropenic-give filgrastim x 3 days  2/19 WBC/ANC improved today. Filgrastim discontinued (after dose 2 completed)    2/21 ANC up to 6.3. D15 Taxol today. C2 due 2/28.     Sepsis / Neutropenic fever  - CXR neg  - UA neg  - BCx pending  - COVID neg  2/19 On Cef/Vanc. BCx NGTD. 2/20 Remains on Cef/Vanc. BCx remain NGTD. D/C Vanc today. 2/22 Remains afebrile. BCx-NGTD. DC Cef.  2/23 Remains afebrile.   BCx-NG.     Rash / Angioedema  - cause unknown  2/20 Rash overall with improvement. 2/21 ? allergic reaction. Will rechallenge with Taxol today to monitor. 2/22 Pt completed Taxol last night @ 2000. Became tachycardic and tachypneic with dyspnea this AM.  Also reported abd pain with nausea. EKG with sinus tach. CXR with possible fluid overload. pBNP 3511. Was given Lasix, Benadryl, Solucortef, and Zofran with resolution of symptoms. 2/23 Rash much improved. Now with dry skin on hands bilaterally. Continue Eucerin as ordered. Hydrocortisone cream ordered. Edema ongoing, but improved overalll. Albumin low at 1.8. Lasix/albumin ordered. Diarrhea  2/19 Check C diff d/t c/o watery diarrhea ON  2/20 C diff check not yet completed as she had a couple of solid stools yesterday. Will check C diff today if diarrhea.    2/22 Cdiff neg. Anemia / Thrombocytopenia secondary to chemotherapy  - Transfuse prn per Roya SOPs    Transaminitis  2/22 LFTs elevated. Monitor. 2/23 LFTs slightly improved (with the exception of ALT)     Continue home meds  Roya SOPs  Lovenox for DVT ppx (hold for plt <50k)    Goals and plan of care reviewed with the patient. All questions answered to the best of our ability. Disposition:  Anticipate discharge home on Fri, 2/25. Kulwant Storm NP   [facility]  [address] [place] [address]  Office : [phone]  Fax : [phone]   I personally saw, exammed and counselled the patient, and discussed with NP, agree with above history/assessment/plan. 56 y. o.female triple negative breast cancer status post cycle 1 day 8 Taxol on 2/14 and developed acute onset of itching, rash, anasarca, short of breath, fever, hypotension, diarrhea, admitted with antibiotics, antihistamine, IV fluid, most symptom much improved the second day although still lingering, discussed that the picture of allergic reaction but thorough discussion not able to identify new food or medicine, not typical for Taxol or Keytruda reaction given the timing and the acuity, and she reported a similar episode a few years ago that she went to ER for treatment, but never followed up for allergen test and never had EpiPen, discussed the concern of allergy and will arrange allergist work-up and EpiPen that discharge, however would proceed with day 15 Taxol with close observation to rule out atypical Taxol allergy, patient and family understand the risk and agreed to proceed, and reported reasonable tolerance except for mild itching through the infusion, however overnight RN was concerned of tachycardia and tachypneic which patient reported similar to each infusion but received IV Benadryl and Solu-Medrol, appear well in the morning, LFT elevated and monitor, C. difficile negative and diarrhea improved, remains afebrile and stopped cefepime, give albumin/Lasix, continue supportive care and monitor to Friday.       Radha Mullins M.D.   [address]  Office : [phone]  Fax : [phone]

## 2022-02-24 LAB
ALBUMIN SERPL-MCNC: 2 G/DL (ref 3.5–5)
ALBUMIN/GLOB SERPL: 0.5 {RATIO} (ref 1.2–3.5)
ALP SERPL-CCNC: 105 U/L (ref 50–130)
ALT SERPL-CCNC: 166 U/L (ref 12–65)
ANION GAP SERPL CALC-SCNC: 2 MMOL/L (ref 7–16)
AST SERPL-CCNC: 59 U/L (ref 15–37)
BASOPHILS # BLD: 0 K/UL (ref 0–0.2)
BASOPHILS NFR BLD: 0 % (ref 0–2)
BILIRUB SERPL-MCNC: 0.3 MG/DL (ref 0.2–1.1)
BUN SERPL-MCNC: 14 MG/DL (ref 6–23)
CALCIUM SERPL-MCNC: 7.7 MG/DL (ref 8.3–10.4)
CHLORIDE SERPL-SCNC: 114 MMOL/L (ref 98–107)
CO2 SERPL-SCNC: 27 MMOL/L (ref 21–32)
CREAT SERPL-MCNC: 0.68 MG/DL (ref 0.6–1)
DIFFERENTIAL METHOD BLD: ABNORMAL
EOSINOPHIL # BLD: 0.1 K/UL (ref 0–0.8)
EOSINOPHIL NFR BLD: 4 % (ref 0.5–7.8)
ERYTHROCYTE [DISTWIDTH] IN BLOOD BY AUTOMATED COUNT: 13.3 % (ref 11.9–14.6)
GLOBULIN SER CALC-MCNC: 3.8 G/DL (ref 2.3–3.5)
GLUCOSE SERPL-MCNC: 101 MG/DL (ref 65–100)
HCT VFR BLD AUTO: 26 % (ref 35.8–46.3)
HGB BLD-MCNC: 8.7 G/DL (ref 11.7–15.4)
IMM GRANULOCYTES # BLD AUTO: 0 K/UL (ref 0–0.5)
IMM GRANULOCYTES NFR BLD AUTO: 0 % (ref 0–5)
LYMPHOCYTES # BLD: 1.1 K/UL (ref 0.5–4.6)
LYMPHOCYTES NFR BLD: 38 % (ref 13–44)
MAGNESIUM SERPL-MCNC: 2.1 MG/DL (ref 1.8–2.4)
MCH RBC QN AUTO: 28.9 PG (ref 26.1–32.9)
MCHC RBC AUTO-ENTMCNC: 33.5 G/DL (ref 31.4–35)
MCV RBC AUTO: 86.4 FL (ref 79.6–97.8)
MONOCYTES # BLD: 0.1 K/UL (ref 0.1–1.3)
MONOCYTES NFR BLD: 4 % (ref 4–12)
NEUTS SEG # BLD: 1.6 K/UL (ref 1.7–8.2)
NEUTS SEG NFR BLD: 54 % (ref 43–78)
NRBC # BLD: 0 K/UL (ref 0–0.2)
PLATELET # BLD AUTO: 98 K/UL (ref 150–450)
PMV BLD AUTO: 10.9 FL (ref 9.4–12.3)
POTASSIUM SERPL-SCNC: 4.2 MMOL/L (ref 3.5–5.1)
PROT SERPL-MCNC: 5.8 G/DL (ref 6.3–8.2)
RBC # BLD AUTO: 3.01 M/UL (ref 4.05–5.2)
SODIUM SERPL-SCNC: 143 MMOL/L (ref 136–145)
WBC # BLD AUTO: 3 K/UL (ref 4.3–11.1)

## 2022-02-24 PROCEDURE — P9047 ALBUMIN (HUMAN), 25%, 50ML: HCPCS | Performed by: NURSE PRACTITIONER

## 2022-02-24 PROCEDURE — 74011000250 HC RX REV CODE- 250: Performed by: EMERGENCY MEDICINE

## 2022-02-24 PROCEDURE — 99232 SBSQ HOSP IP/OBS MODERATE 35: CPT | Performed by: INTERNAL MEDICINE

## 2022-02-24 PROCEDURE — 74011000250 HC RX REV CODE- 250: Performed by: INTERNAL MEDICINE

## 2022-02-24 PROCEDURE — 74011250637 HC RX REV CODE- 250/637: Performed by: INTERNAL MEDICINE

## 2022-02-24 PROCEDURE — APPSS60 APP SPLIT SHARED TIME 46-60 MINUTES: Performed by: NURSE PRACTITIONER

## 2022-02-24 PROCEDURE — 74011250636 HC RX REV CODE- 250/636: Performed by: NURSE PRACTITIONER

## 2022-02-24 PROCEDURE — 65660000000 HC RM CCU STEPDOWN

## 2022-02-24 PROCEDURE — 74011250636 HC RX REV CODE- 250/636: Performed by: FAMILY MEDICINE

## 2022-02-24 PROCEDURE — 36591 DRAW BLOOD OFF VENOUS DEVICE: CPT

## 2022-02-24 PROCEDURE — APPNB15 APP NON BILLABLE TIME 0-15 MINS: Performed by: NURSE PRACTITIONER

## 2022-02-24 PROCEDURE — 83735 ASSAY OF MAGNESIUM: CPT

## 2022-02-24 PROCEDURE — 85025 COMPLETE CBC W/AUTO DIFF WBC: CPT

## 2022-02-24 PROCEDURE — 2709999900 HC NON-CHARGEABLE SUPPLY

## 2022-02-24 PROCEDURE — 80053 COMPREHEN METABOLIC PANEL: CPT

## 2022-02-24 PROCEDURE — 74011250637 HC RX REV CODE- 250/637: Performed by: NURSE PRACTITIONER

## 2022-02-24 RX ORDER — DIPHENHYDRAMINE HYDROCHLORIDE 50 MG/ML
25 INJECTION, SOLUTION INTRAMUSCULAR; INTRAVENOUS
Status: COMPLETED | OUTPATIENT
Start: 2022-02-24 | End: 2022-02-24

## 2022-02-24 RX ORDER — FUROSEMIDE 10 MG/ML
40 INJECTION INTRAMUSCULAR; INTRAVENOUS ONCE
Status: COMPLETED | OUTPATIENT
Start: 2022-02-24 | End: 2022-02-24

## 2022-02-24 RX ORDER — ALBUMIN HUMAN 250 G/1000ML
12.5 SOLUTION INTRAVENOUS ONCE
Status: COMPLETED | OUTPATIENT
Start: 2022-02-24 | End: 2022-02-24

## 2022-02-24 RX ADMIN — HYDROCORTISONE: 0.01 CREAM TOPICAL at 18:00

## 2022-02-24 RX ADMIN — ALBUMIN (HUMAN) 12.5 G: 0.25 INJECTION, SOLUTION INTRAVENOUS at 10:18

## 2022-02-24 RX ADMIN — Medication: at 14:00

## 2022-02-24 RX ADMIN — SODIUM CHLORIDE, PRESERVATIVE FREE 10 ML: 5 INJECTION INTRAVENOUS at 13:34

## 2022-02-24 RX ADMIN — SODIUM CHLORIDE, PRESERVATIVE FREE 10 ML: 5 INJECTION INTRAVENOUS at 07:02

## 2022-02-24 RX ADMIN — ENOXAPARIN SODIUM 40 MG: 100 INJECTION SUBCUTANEOUS at 08:30

## 2022-02-24 RX ADMIN — POTASSIUM CHLORIDE 20 MEQ: 20 TABLET, EXTENDED RELEASE ORAL at 08:30

## 2022-02-24 RX ADMIN — FUROSEMIDE 40 MG: 10 INJECTION, SOLUTION INTRAMUSCULAR; INTRAVENOUS at 10:18

## 2022-02-24 RX ADMIN — DIPHENHYDRAMINE HYDROCHLORIDE 25 MG: 50 INJECTION, SOLUTION INTRAMUSCULAR; INTRAVENOUS at 13:30

## 2022-02-24 RX ADMIN — HYDROCORTISONE: 0.01 CREAM TOPICAL at 08:31

## 2022-02-24 RX ADMIN — NYSTATIN 5 ML: 100000 SUSPENSION ORAL at 21:09

## 2022-02-24 RX ADMIN — SODIUM CHLORIDE, PRESERVATIVE FREE 10 ML: 5 INJECTION INTRAVENOUS at 21:10

## 2022-02-24 RX ADMIN — POTASSIUM CHLORIDE 20 MEQ: 20 TABLET, EXTENDED RELEASE ORAL at 17:37

## 2022-02-24 NOTE — PROGRESS NOTES
EOS: VSS. AF. BC's NGTD. Not on any abx. No complaints of pain or nausea. Am labs drawn late. Report was given to oncoming RN. Continue with POC.

## 2022-02-24 NOTE — PROGRESS NOTES
Unsuccessful attempt to contact the patient as no one answered the phone this time. I will try again another time.           Thank you,         Ej Eng [facility] [employer] [facility] Department  [facility]  [phone] (phone)

## 2022-02-24 NOTE — PROGRESS NOTES
END OF SHIFT NOTE:    Intake/Output  No intake/output data recorded. Voiding: YES  Catheter: NO  Drain:              Stool:  1 occurrences. Stool Assessment  Stool Color: Ely Stall (02/22/22 2344)  Stool Appearance: Soft (02/23/22 0720)  Stool Amount: Small (02/22/22 2344)  Stool Source/Status: Rectum (02/22/22 2344)    Emesis:  0 occurrences. VITAL SIGNS  No data found. Pain Assessment  Pain 1  Pain Scale 1: Numeric (0 - 10) (02/24/22 1408)  Pain Intensity 1: 0 (02/24/22 1408)  Patient Stated Pain Goal: 0 (02/24/22 1408)  Pain Reassessment 1: Patient resting w/respiratory rate greater than 10 (02/24/22 1408)  Pain Onset 1: last night (02/22/22 0539)  Pain Location 1: Abdomen (02/22/22 0539)  Pain Orientation 1: Mid (02/22/22 0539)  Pain Description 1: Other (comment) (unable to describe) (02/22/22 0539)  Pain Intervention(s) 1: Medication (see MAR); Repositioned (02/22/22 0539)    Ambulating  Yes    Additional Information: vss A&O X's 4 no compliant's of pain or discomfort pt l-eye swollen in middle of shift Benadryl giving as per order, pt resting in bed lowest position call light in reach     Shift report given to oncoming nurse at the bedside.     Stas Villarreal, RN

## 2022-02-24 NOTE — PROGRESS NOTES
Pt with new onset L periorbital edema. Denies any difficulty swallowing. No shortness of breath. Benadryl 25mg x 1 ordered.       Babar Blanc NP  [facility]

## 2022-02-24 NOTE — PROGRESS NOTES
In to see patient with Naa Hurt, who speaks Mandarin. Per , she has no discharge meeds at the present time. She is up in room ambulating. States she is ready to go home. Spouse in room. Plan is to go home with family. Has Kilo Carlson. Denies any need for help with medications. Advised patient to tell her nurse, Eliana Morrow, if she needs CM to help her with any discharge needs. Verb understanding.

## 2022-02-24 NOTE — PROGRESS NOTES
[facility]        Inpatient Hematology / Oncology Progress Note      Admission Date: 2022 11:51 PM  Reason for Admission/Hospital Course: Severe sepsis ([address] [A41.9, R65.20]      24 Hour Events:  Afebrile, VSS  S/p C1D15 Taxol on   LFTs improving  Wt down with neg I/Os s/p Albumin/Lasix   at bedside    Transfusions: None  Replacements: None    ROS:  Constitutional: Positive for fatigue; negative for fever, weakness. CV: Positive for edema; negative for chest pain, palpitations. Respiratory: Negative for dyspnea, cough, wheezing. GI: Positive for diarrhea/nausea; negative for abdominal pain   Skin:  +Rash - improving, Hands with peeling skin    10 point review of systems is otherwise negative with the exception of the elements mentioned above in the HPI. Allergies   Allergen Reactions    Lisinopril Cough     Other reaction(s): Cough       OBJECTIVE:  Patient Vitals for the past 8 hrs:   BP Temp Pulse Resp SpO2   22 0324 122/86 98.3 °F (36.8 °C) 91 18 95 %     Temp (24hrs), Av.3 °F (36.8 °C), Min:97.9 °F (36.6 °C), Max:98.5 °F (36.9 °C)    No intake/output data recorded. Physical Exam:  Constitutional: Well developed, well nourished female in no acute distress, sitting comfortably in the hospital bed. HEENT: Normocephalic and atraumatic. Oropharynx is clear, mucous membranes are moist.  Neck supple. Skin Warm and dry.  +diffuse erythematous rash (improved). Dry/peeling skin noted on hands   Respiratory Lungs are clear to auscultation bilaterally without wheezes, rales or rhonchi, normal air exchange without accessory muscle use. CVS Mildly tachycardic rate, regular rhythm and normal S1 and S2. No murmurs, gallops, or rubs. Abdomen Soft, nontender and nondistended, normoactive bowel sounds. No palpable mass. No hepatosplenomegaly. Neuro Grossly nonfocal with no obvious sensory or motor deficits.    MSK Normal range of motion in general. +Trace BLE edema and no tenderness. Psych Appropriate mood and affect. Labs:      Recent Labs     02/24/22  0653 02/23/22  0250 02/22/22 0213   WBC 3.0* 6.8 10.0   RBC 3.01* 2.91* 3.13*   HGB 8.7* 8.4* 9.1*   HCT 26.0* 25.2* 27.2*   MCV 86.4 86.6 86.9   MCH 28.9 28.9 29.1   MCHC 33.5 33.3 33.5   RDW 13.3 13.4 13.1   PLT 98* 90* 85*   GRANS 54 74 76*   LYMPH 38 19 1*   MONOS 4 5 6   EOS 4 2  --    BASOS 0 0  --    IG 0 0  --    DF AUTOMATED AUTOMATED MANUAL   ANEU 1.6* 5.1 8.9*   ABL 1.1 1.3 0.1*   ABM 0.1 0.3 0.6   KARIN 0.1 0.1  --    ABB 0.0 0.0  --    AIG 0.0 0.0  --         Recent Labs     02/24/22  0653 02/23/22  0250 02/22/22 0213    139 141   K 4.2 3.6 3.4*   * 113* 114*   CO2 27 24 21   AGAP 2* 2* 6*   * 99 138*   BUN 14 19 13   CREA 0.68 0.90 0.95   GFRAA >60 >60 >60   GFRNA >60 >60 >60   CA 7.7* 7.6* 8.3    115 138*   TP 5.8* 5.7* 5.5*   ALB 2.0* 1.8* 1.8*   GLOB 3.8* 3.9* 3.7*   AGRAT 0.5* 0.5* 0.5*   MG 2.1 2.1 2.0         Imaging:  XR CHEST PORT [[id number]] Collected: 02/18/22 0053   Order Status: Completed Updated: 02/18/22 0056   Narrative:     EXAM: XR CHEST PORT     HISTORY: meets SIRS criteria.       TECHNIQUE: Frontal chest.     COMPARISON: None available. FINDINGS:   The cardiac silhouette, mediastinum, and pulmonary vasculature are within normal   limits. There is no consolidation, pleural effusion, or pneumothorax. No significant osseous abnormalities are observed. There is a right-sided MediPort. Impression:     No evidence of an acute intrathoracic process.         Medications:  Current Facility-Administered Medications   Medication Dose Route Frequency    hydrocortisone-aloe vera 1 % topical cream   Topical BID    acetaminophen (TYLENOL) tablet 650 mg  650 mg Oral Q4H PRN    potassium chloride (K-DUR, KLOR-CON M20) SR tablet 20 mEq  20 mEq Oral BID    loperamide (IMODIUM) capsule 2 mg  2 mg Oral PRN    lanolin alcohol-mineral oil-petrolatum (EUCERIN) topical cream   Topical PRN    guaiFENesin-dextromethorphan (ROBITUSSIN DM) 100-10 mg/5 mL syrup 5 mL  5 mL Oral Q6H PRN    hydrocortisone Sod Succ (PF) (SOLU-CORTEF) injection 100 mg  100 mg IntraVENous PRN    diphenhydrAMINE (BENADRYL) injection 50 mg  50 mg IntraVENous PRN    EPINEPHrine HCl (PF) (ADRENALIN) 1 mg/mL (1 mL) injection 0.3 mg  0.3 mg IntraMUSCular PRN    albuterol (PROVENTIL VENTOLIN) nebulizer solution 2.5 mg  2.5 mg Inhalation PRN    lip protectant (BLISTEX) ointment 1 Each  1 Each Topical PRN    lidocaine-prilocaine (EMLA) 2.5-2.5 % cream   Topical PRN    LORazepam (ATIVAN) tablet 0.5-1 mg  0.5-1 mg Oral Q8H PRN    enoxaparin (LOVENOX) injection 40 mg  40 mg SubCUTAneous Q24H    ondansetron (ZOFRAN) injection 4 mg  4 mg IntraVENous Q4H PRN    diphenhydrAMINE (BENADRYL) capsule 25 mg  25 mg Oral Q6H PRN    sodium chloride (NS) flush 5-10 mL  5-10 mL IntraVENous Q8H    sodium chloride (NS) flush 5-10 mL  5-10 mL IntraVENous PRN         ASSESSMENT:    Problem List  Date Reviewed: 2/14/2022          Codes Class Noted    * (Principal) Severe sepsis (HCC) ICD-10-CM: A41.9, R65.20  ICD-9-CM: 038.9, 995.92  2/18/2022        Carcinoma at 9:00 in left breast in female, estrogen receptor negative ([facility] ICD-10-CM: S50.888, Z17.1  ICD-9-CM: 174.2, V86.1  1/10/2022    Overview Addendum 1/27/2022  6:20 AM by Rema Goodman MD     cT2N0 left breast IDC with DCIS, triple negative     No previous personal malignancy, prior breast surgery, or other symptoms reported. No known family breast cancer. 11/22/21 Bilat screening mammo  Scattered fibroglandular densities. A few typically benign-appearing calcifications in the left breast are similar in appearance to the previous study.     There is a focal asymmetry in the medial inferior left breast located 6 cm from the nipple.    Recommend spot compression magnification views on the direct ML view and ultrasound, if indicated for further evaluation.     There is otherwise no suspicious mass, calcification, or architectural distortion in either breast.       IMPRESSION  Focal asymmetry in the medial inferior left breast at 6 cm from the nipple.        12/6/21 Left breast dx mammo and US  The area of concern persists with additional imaging. This resides within the medial left breast at approximately 9:00 position. There  are few associated calcifications which appear somewhat coarse. This measures approximately 1.3 cm in size. An additional smaller asymmetry also at the 9:00  position resides 7 cm from the nipple. A left breast ultrasound is recommended for further evaluation.     12/6/21 left breast US  At the 9:00 position, 6 cm from the nipple is a lobulated hypoechoic but mildly heterogeneous appearing mass measuring  approximately 1.5 x 0.8 x 0.8 cm. The margins are occasionally ill-defined with some components demonstrating posterior acoustic shadowing. The long axis is  parallel to the plane of the chest wall. There is a 7 mm hypoechoic mass just adjacent to this This is hypoechoic with a lobulated contour. The margins are  occasionally ill-defined.      IMPRESSION  Adjacent left breast masses. Recommend tissue sampling of the larger lesion.       12/23/21 US-guided left breast biopsy  A:  \" LEFT BREAST, 9:00 POSITION, 6 CM FROM NIPPLE, CORE BIOPSY\": FOCI OF AT LEAST MICROINVASIVE INFILTRATING DUCTAL CARCINOMA,   INTERMEDIATE GRADE (MODERATELY DIFFERENTIATED) IN ASSOCIATION WITH FOCALLY HIGH GRADE DUCTAL CARCINOMA IN SITU. DEFINITE LYMPHOVASCULAR INVASION IS NOT IDENTIFIED   Sign Out Date: 12/24/2021 Ike Tolentino M.D.   ER:  Negative (0.9%);  VA: Negative (0.4%); HER-2/COURTNEY: Negative (0)   Post biopsy mammogram shows the biopsy clip in good position.       12/28/21 Breast MRI  The breasts demonstrate moderate glandularity and mild background enhancement.     Left 9:00 biopsy clip within a lobulated heterogeneously enhancing mass with angulated and irregular margins overall measuring about 1.8 x 0.7 x 1.2 cm. Posterior to this (about 1 cm away) the smaller mass measuring 0.9 cm is unchanged from recent mammogram and ultrasound. Additionally there is clumped  and reticular nonmass enhancement extending superiorly by about 2.7 cm and anteriorly by about 1.8 cm, in keeping with the DCIS and calcifications. Overall the expected area of disease measures about 4.5 cm AP by 1.2 cm transverse and is located at 9:00-9:30, mid to posterior depth. There is no evidence of involvement of skin or chest wall structures.     No other evidence of suspicious enhancing mass, and no other dominant or unique nonmass enhancement,  to suggest additional malignancy in either breast.  No evidence of axillary or internal mammary lymphadenopathy.     Elsewhere, limited visualization of the partially included thorax and upper abdomen shows no acute abnormality, with note of a small benign cyst in the left liver.         IMPRESSION  1. Left medial breast cancer measures about 4.5 cm in greatest dimension. 2. No additional suspicious finding elsewhere in either breast. No obvious lymphadenopathy.       1/13/22 presented at [facility]; genetic testing and refer to med oncology pre-op    1/13/22 BRCAPlus panel of 8 genes associated with hereditary cancer (including BRCA1 and BRCA2)   No pathogenic variants identified that increase risk of developing breast cancer                Triple negative malignant neoplasm of breast [facility]) ICD-10-CM: C50.919  ICD-9-CM: 174.9  1/10/2022        Screening mammogram, encounter for ICD-10-CM: Z12.31  ICD-9-CM: V76.12  3/18/2019    Overview Signed 3/18/2019  4:30 PM by Shellie Roblero MD     Ordered for this Oct on 3/18/19             Women's annual routine gynecological examination ICD-10-CM: Z01.419  ICD-9-CM: V72.31  7/24/2017    Overview Signed 7/24/2017  9:42 AM by Shellie Roblero MD     Pap with HPV done 7/24/17             Irregular menses ICD-10-CM: N92.6  ICD-9-CM: 626.4  7/24/2017    Overview Signed 7/24/2017  9:42 AM by Johanna Michaud MD     noted             Benign essential HTN (Chronic) ICD-10-CM: I10  ICD-9-CM: 401.1  4/13/2016        Mixed hyperlipidemia ICD-10-CM: I79.3  ICD-9-CM: 272.2  4/13/2016        Encephalomalacia on imaging study ICD-10-CM: G93.89  ICD-9-CM: 348.89  4/13/2016        Seasonal allergic rhinitis ICD-10-CM: J30.2  ICD-9-CM: 477.9  4/13/2016        Frequent headaches ICD-10-CM: R51.9  ICD-9-CM: 784.0  2/25/2016            Ms. Glenis Schafer is a 49 y.o. female admitted on 2/17/2022. The primary encounter diagnosis was Neutropenic fever ([facility] Diagnoses of Rash and Malignant neoplasm of female breast, unspecified estrogen receptor status, unspecified laterality, unspecified site of breast ([address] were also pertinent to this visit. .       Her PMH includes HTN and heart murmur. She is a patient of Dr. Vicky Blackwell with triple negative breast cancer s/p C1D8 Carbo/Taxol/Keytruda on 2/14. D15 due 2/21. She presented to ED with c/o fever and rash. Temp 103 in ED with one episode of vomiting. Rash is occasionally pruritic. She called the clinic regarding this rash and was prescribed steroids and advised to take Benadryl. She met sepsis criteria with fever, tachycardia, leukopenia, and lactic acid 2.1. CXR neg.  UA neg. BCx pending. COVID neg. On Vanc/Zosyn. We were asked to assume care of our patient. PLAN:  Triple negative breast cancer  - s/p C1D8 Carbo/Taxol/Keytruda on 2/14. C15 due 2/21, no plans to give while admitted  -mildly neutropenic-give filgrastim x 3 days  2/19 WBC/ANC improved today. Filgrastim discontinued (after dose 2 completed)    2/21 ANC up to 6.3. D15 Taxol today. C2 due 2/28.     Sepsis / Neutropenic fever  - CXR neg  - UA neg  - BCx pending  - COVID neg  2/19 On Cef/Vanc. BCx NGTD. 2/20 Remains on Cef/Vanc. BCx remain NGTD. D/C Vanc today. 2/22 Remains afebrile. BCx-NGTD. DC Cef.  2/23 Remains afebrile. BCx-NG.     Rash / Angioedema  - cause unknown  2/20 Rash overall with improvement. 2/21 ? allergic reaction. Will rechallenge with Taxol today to monitor. 2/22 Pt completed Taxol last night @ 2000. Became tachycardic and tachypneic with dyspnea this AM.  Also reported abd pain with nausea. EKG with sinus tach. CXR with possible fluid overload. pBNP 3511. Was given Lasix, Benadryl, Solucortef, and Zofran with resolution of symptoms. 2/23 Rash much improved. Now with dry skin on hands bilaterally. Continue Eucerin as ordered. Hydrocortisone cream ordered. Edema ongoing, but improved overalll. Albumin low at 1.8. Lasix/albumin ordered. 2/24 Diuresed well, repeat Albumin/Lasix    Diarrhea  2/19 Check C diff d/t c/o watery diarrhea ON  2/20 C diff check not yet completed as she had a couple of solid stools yesterday. Will check C diff today if diarrhea.    2/22 Cdiff neg. Pancytopenia secondary to chemotherapy  - Transfuse prn per [facility] SOPs    Transaminitis  2/22 LFTs elevated. Monitor. 2/23 LFTs slightly improved (with the exception of ALT)  2/24 LFTs improving     Continue home meds  Roya SOPs  Lovenox for DVT ppx (hold for plt <50k)    Goals and plan of care reviewed with the patient. All questions answered to the best of our ability. Disposition:  Anticipate discharge home tomorrow. Yady Kilgore NP   [address] [place] [address] Hematology & Oncology  [address] [place] [address]  Office : [phone]  Fax : [phone]   I personally saw, exammed and counselled the patient, and discussed with NP, agree with above history/assessment/plan. 56 y. o.female triple negative breast cancer status post cycle 1 day 8 Taxol on 2/14 and developed acute onset of itching, rash, anasarca, short of breath, fever, hypotension, diarrhea, admitted with antibiotics, antihistamine, IV fluid, most symptom much improved the second day although still lingering, discussed that the picture of allergic reaction but thorough discussion not able to identify new food or medicine, not typical for Taxol or Keytruda reaction given the timing and the acuity, and she reported a similar episode a few years ago that she went to ER for treatment, but never followed up for allergen test and never had EpiPen, discussed the concern of allergy and will arrange allergist work-up and EpiPen that discharge, however would proceed with day 15 Taxol with close observation to rule out atypical Taxol allergy, patient and family understand the risk and agreed to proceed, and reported reasonable tolerance except for mild itching through the infusion, however overnight RN was concerned of tachycardia and tachypneic which patient reported similar to each infusion but received IV Benadryl and Solu-Medrol, appear well in the morning, LFT elevated and monitor, C. difficile negative and diarrhea improved, remains afebrile and stopped cefepime, give albumin again and increase lasix, continue supportive care and monitor to Friday.       Ever Rod M.D.   [address], [address] [place] [address]  Office : [phone]  Fax : [phone]

## 2022-02-24 NOTE — PROGRESS NOTES
Problem: Falls - Risk of  Goal: *Absence of Falls  Description: Document Willem Jameson Fall Risk and appropriate interventions in the flowsheet.   Outcome: Progressing Towards Goal  Note: Fall Risk Interventions:            Medication Interventions: Evaluate medications/consider consulting pharmacy         History of Falls Interventions: Evaluate medications/consider consulting pharmacy         Problem: Airway Clearance - Ineffective  Goal: *Patent airway  Outcome: Progressing Towards Goal     Problem: Airway Clearance - Ineffective  Goal: *Patent airway  Outcome: Progressing Towards Goal

## 2022-02-25 VITALS
BODY MASS INDEX: 32.22 KG/M2 | WEIGHT: 164.1 LBS | HEIGHT: 60 IN | RESPIRATION RATE: 18 BRPM | SYSTOLIC BLOOD PRESSURE: 158 MMHG | OXYGEN SATURATION: 96 % | HEART RATE: 106 BPM | TEMPERATURE: 97.7 F | DIASTOLIC BLOOD PRESSURE: 84 MMHG

## 2022-02-25 LAB
ALBUMIN SERPL-MCNC: 2.3 G/DL (ref 3.5–5)
ALBUMIN/GLOB SERPL: 0.6 {RATIO} (ref 1.2–3.5)
ALP SERPL-CCNC: 109 U/L (ref 50–130)
ALT SERPL-CCNC: 162 U/L (ref 12–65)
ANION GAP SERPL CALC-SCNC: 3 MMOL/L (ref 7–16)
AST SERPL-CCNC: 70 U/L (ref 15–37)
BASOPHILS # BLD: 0 K/UL (ref 0–0.2)
BASOPHILS NFR BLD: 0 % (ref 0–2)
BILIRUB SERPL-MCNC: 0.3 MG/DL (ref 0.2–1.1)
BUN SERPL-MCNC: 19 MG/DL (ref 6–23)
CALCIUM SERPL-MCNC: 8.1 MG/DL (ref 8.3–10.4)
CHLORIDE SERPL-SCNC: 109 MMOL/L (ref 98–107)
CO2 SERPL-SCNC: 28 MMOL/L (ref 21–32)
CREAT SERPL-MCNC: 0.74 MG/DL (ref 0.6–1)
DIFFERENTIAL METHOD BLD: ABNORMAL
EOSINOPHIL # BLD: 0.1 K/UL (ref 0–0.8)
EOSINOPHIL NFR BLD: 3 % (ref 0.5–7.8)
ERYTHROCYTE [DISTWIDTH] IN BLOOD BY AUTOMATED COUNT: 13.2 % (ref 11.9–14.6)
GLOBULIN SER CALC-MCNC: 4 G/DL (ref 2.3–3.5)
GLUCOSE SERPL-MCNC: 114 MG/DL (ref 65–100)
HCT VFR BLD AUTO: 27.5 % (ref 35.8–46.3)
HGB BLD-MCNC: 9 G/DL (ref 11.7–15.4)
IMM GRANULOCYTES # BLD AUTO: 0 K/UL (ref 0–0.5)
IMM GRANULOCYTES NFR BLD AUTO: 0 % (ref 0–5)
LYMPHOCYTES # BLD: 1.1 K/UL (ref 0.5–4.6)
LYMPHOCYTES NFR BLD: 48 % (ref 13–44)
MAGNESIUM SERPL-MCNC: 2 MG/DL (ref 1.8–2.4)
MCH RBC QN AUTO: 28.9 PG (ref 26.1–32.9)
MCHC RBC AUTO-ENTMCNC: 32.7 G/DL (ref 31.4–35)
MCV RBC AUTO: 88.4 FL (ref 79.6–97.8)
MONOCYTES # BLD: 0.1 K/UL (ref 0.1–1.3)
MONOCYTES NFR BLD: 5 % (ref 4–12)
NEUTS SEG # BLD: 1 K/UL (ref 1.7–8.2)
NEUTS SEG NFR BLD: 44 % (ref 43–78)
NRBC # BLD: 0 K/UL (ref 0–0.2)
PLATELET # BLD AUTO: 116 K/UL (ref 150–450)
PMV BLD AUTO: 10.9 FL (ref 9.4–12.3)
POTASSIUM SERPL-SCNC: 4.4 MMOL/L (ref 3.5–5.1)
PROT SERPL-MCNC: 6.3 G/DL (ref 6.3–8.2)
RBC # BLD AUTO: 3.11 M/UL (ref 4.05–5.2)
SODIUM SERPL-SCNC: 140 MMOL/L (ref 136–145)
WBC # BLD AUTO: 2.3 K/UL (ref 4.3–11.1)

## 2022-02-25 PROCEDURE — 74011250637 HC RX REV CODE- 250/637: Performed by: INTERNAL MEDICINE

## 2022-02-25 PROCEDURE — 36591 DRAW BLOOD OFF VENOUS DEVICE: CPT

## 2022-02-25 PROCEDURE — 99239 HOSP IP/OBS DSCHRG MGMT >30: CPT | Performed by: INTERNAL MEDICINE

## 2022-02-25 PROCEDURE — 80053 COMPREHEN METABOLIC PANEL: CPT

## 2022-02-25 PROCEDURE — 74011250636 HC RX REV CODE- 250/636: Performed by: FAMILY MEDICINE

## 2022-02-25 PROCEDURE — APPSS180 APP SPLIT SHARED TIME > 60 MINUTES: Performed by: NURSE PRACTITIONER

## 2022-02-25 PROCEDURE — 74011000250 HC RX REV CODE- 250: Performed by: EMERGENCY MEDICINE

## 2022-02-25 PROCEDURE — 83735 ASSAY OF MAGNESIUM: CPT

## 2022-02-25 PROCEDURE — 85025 COMPLETE CBC W/AUTO DIFF WBC: CPT

## 2022-02-25 RX ORDER — EPINEPHRINE 0.3 MG/.3ML
0.3 INJECTION SUBCUTANEOUS
Qty: 1 EACH | Refills: 1 | Status: SHIPPED | OUTPATIENT
Start: 2022-02-25 | End: 2022-02-25

## 2022-02-25 RX ORDER — POTASSIUM CHLORIDE 20 MEQ/1
20 TABLET, EXTENDED RELEASE ORAL DAILY
Qty: 30 TABLET | Refills: 0 | Status: SHIPPED | OUTPATIENT
Start: 2022-02-25

## 2022-02-25 RX ORDER — PREDNISONE 20 MG/1
40 TABLET ORAL
Qty: 10 TABLET | Refills: 0 | Status: SHIPPED | OUTPATIENT
Start: 2022-02-25

## 2022-02-25 RX ORDER — DIPHENHYDRAMINE HCL 25 MG
25 CAPSULE ORAL
Qty: 30 CAPSULE | Refills: 0 | Status: SHIPPED
Start: 2022-02-25 | End: 2022-04-25

## 2022-02-25 RX ADMIN — Medication: at 08:02

## 2022-02-25 RX ADMIN — ENOXAPARIN SODIUM 40 MG: 100 INJECTION SUBCUTANEOUS at 08:02

## 2022-02-25 RX ADMIN — POTASSIUM CHLORIDE 20 MEQ: 20 TABLET, EXTENDED RELEASE ORAL at 08:02

## 2022-02-25 RX ADMIN — SODIUM CHLORIDE, PRESERVATIVE FREE 10 ML: 5 INJECTION INTRAVENOUS at 05:02

## 2022-02-25 RX ADMIN — HYDROCORTISONE: 0.01 CREAM TOPICAL at 08:10

## 2022-02-25 NOTE — DISCHARGE INSTRUCTIONS
DISCHARGE SUMMARY from Nurse    PATIENT INSTRUCTIONS:    After general anesthesia or intravenous sedation, for 24 hours or while taking prescription Narcotics:  · Limit your activities  · Do not drive and operate hazardous machinery  · Do not make important personal or business decisions  · Do  not drink alcoholic beverages  · If you have not urinated within 8 hours after discharge, please contact your surgeon on call. Report the following to your surgeon:  · Excessive pain, swelling, redness or odor of or around the surgical area  · Temperature over 100.5  · Nausea and vomiting lasting longer than 4 hours or if unable to take medications  · Any signs of decreased circulation or nerve impairment to extremity: change in color, persistent  numbness, tingling, coldness or increase pain  · Any questions    What to do at Home:  Recommended activity: Activity as tolerated,     If you experience any of the following symptoms fever,nausea,vomiting,chest discomfort, chest discomfort, please follow up withoncologist.    *  Please give a list of your current medications to your Primary Care Provider. *  Please update this list whenever your medications are discontinued, doses are      changed, or new medications (including over-the-counter products) are added. *  Please carry medication information at all times in case of emergency situations. These are general instructions for a healthy lifestyle:    No smoking/ No tobacco products/ Avoid exposure to second hand smoke  Surgeon General's Warning:  Quitting smoking now greatly reduces serious risk to your health.     Obesity, smoking, and sedentary lifestyle greatly increases your risk for illness    A healthy diet, regular physical exercise & weight monitoring are important for maintaining a healthy lifestyle    You may be retaining fluid if you have a history of heart failure or if you experience any of the following symptoms:  Weight gain of 3 pounds or more overnight or 5 pounds in a week, increased swelling in our hands or feet or shortness of breath while lying flat in bed. Please call your doctor as soon as you notice any of these symptoms; do not wait until your next office visit. The discharge information has been reviewed with the patient. The patient verbalized understanding. Discharge medications reviewed with the patient and appropriate educational materials and side effects teaching were provided.   ___________________________________________________________________________________________________________________________________

## 2022-02-25 NOTE — PROGRESS NOTES
Care Management Interventions  PCP Verified by CM: Yes (Dr. Helga Allen )  Mode of Transport at Discharge: Other (see comment) (family)  Transition of Care Consult (CM Consult): Discharge Planning  Support Systems: Spouse/Significant Other,Child(lisa)  Confirm Follow Up Transport: Family  The Patient and/or Patient Representative was Provided with a Choice of Provider and Agrees with the Discharge Plan?: Yes  Freedom of Choice List was Provided with Basic Dialogue that Supports the Patient's Individualized Plan of Care/Goals, Treatment Preferences and Shares the Quality Data Associated with the Providers?: Yes  Discharge Location  Patient Expects to be Discharged to[de-identified] Home  Patient medically stable for d/c. Patient d/c home with family.

## 2022-02-25 NOTE — PROGRESS NOTES
END OF SHIFT NOTE:    Intake/Output  02/24 1901 - 02/25 0700  In: 480 [P.O.:480]  Out: 550 [Urine:550]   Voiding: YES  Catheter: NO  Drain:              Stool:  0 occurrences. Stool Assessment  Stool Color: Peter Fraction (02/22/22 2344)  Stool Appearance: Soft (02/23/22 0720)  Stool Amount: Small (02/22/22 2344)  Stool Source/Status: Rectum (02/22/22 2344)    Emesis:  0 occurrences. VITAL SIGNS  Patient Vitals for the past 12 hrs:   Temp Pulse Resp BP SpO2   02/25/22 0223 98.8 °F (37.1 °C) 84 20 131/87 96 %   02/24/22 2300 98.1 °F (36.7 °C) 85 18 138/84 96 %   02/24/22 1936 98.3 °F (36.8 °C) 91 18 (!) 146/89 97 %       Pain Assessment  Pain 1  Pain Scale 1: Numeric (0 - 10) (02/25/22 0115)  Pain Intensity 1: 0 (02/25/22 0115)  Patient Stated Pain Goal: 0 (02/25/22 0115)  Pain Reassessment 1: Patient resting w/respiratory rate greater than 10 (02/24/22 1408)  Pain Onset 1: last night (02/22/22 0539)  Pain Location 1: Abdomen (02/22/22 0539)  Pain Orientation 1: Mid (02/22/22 0539)  Pain Description 1: Other (comment) (unable to describe) (02/22/22 0539)  Pain Intervention(s) 1: Medication (see MAR); Repositioned (02/22/22 0539)    Ambulating  Yes    Additional Information:   Remains afebrile. Eyelids remain swollen L>R but has improved. C/O mouth sore;uses own salt-water rinses,added MMW. D/C plan in AM.    Shift report given to oncoming nurse Leonid Wang  at the bedside.     Ian Torres, RN

## 2022-02-25 NOTE — DISCHARGE SUMMARY
[facility] Hematology & Oncology: Inpatient Hematology / Oncology Discharge Summary Note    Patient ID:  Jalil Molbey  [id number]  49 y.o.  1965    Admit Date: 2/17/2022    Discharge Date: 2/25/2022    Admission Diagnoses: Severe sepsis ([facility] [address] [A41.9, R65.20]    Discharge Diagnoses:  Principal Diagnosis: Severe sepsis ([address]  Principal Problem:    Severe sepsis ([address] (2/18/2022)    Active Problems:    Benign essential HTN (4/13/2016)      Carcinoma at 9:00 in left breast in female, estrogen receptor negative ([facility] [address] (1/10/2022)      Overview: cT2N0 left breast IDC with DCIS, triple negative             No previous personal malignancy, prior breast surgery, or other symptoms       reported. No known family breast cancer. 11/22/21 Bilat screening mammo      Scattered fibroglandular densities. A few typically benign-appearing calcifications in the left breast are       similar in appearance to the previous study.             There is a focal asymmetry in the medial inferior left breast located 6 cm       from the nipple. Recommend spot compression magnification views on the direct ML view and       ultrasound, if indicated for further evaluation.             There is otherwise no suspicious mass, calcification, or architectural       distortion in either breast.               IMPRESSION      Focal asymmetry in the medial inferior left breast at 6 cm from the       nipple.                    12/6/21 Left breast dx mammo and US      The area of concern persists with additional imaging. This resides within       the medial left breast at approximately 9:00 position. There      are few associated calcifications which appear somewhat coarse. This       measures approximately 1.3 cm in size. An additional smaller asymmetry       also at the 9:00      position resides 7 cm from the nipple.  A left breast ultrasound is       recommended for further evaluation.             12/6/21 left breast US      At the 9:00 position, 6 cm from the nipple is a lobulated hypoechoic but       mildly heterogeneous appearing mass measuring      approximately 1.5 x 0.8 x 0.8 cm. The margins are occasionally ill-defined       with some components demonstrating posterior acoustic shadowing. The long       axis is      parallel to the plane of the chest wall. There is a 7 mm hypoechoic mass       just adjacent to this This is hypoechoic with a lobulated contour. The       margins are      occasionally ill-defined.              IMPRESSION      Adjacent left breast masses. Recommend tissue sampling of the larger       lesion.              12/23/21 US-guided left breast biopsy      A:  \" LEFT BREAST, 9:00 POSITION, 6 CM FROM NIPPLE, CORE BIOPSY\": FOCI OF       AT LEAST MICROINVASIVE INFILTRATING DUCTAL CARCINOMA,       INTERMEDIATE GRADE (MODERATELY DIFFERENTIATED) IN ASSOCIATION WITH FOCALLY       HIGH GRADE DUCTAL CARCINOMA IN SITU. DEFINITE LYMPHOVASCULAR INVASION IS NOT IDENTIFIED       Sign Out Date: 12/24/2021 Kika Urrutia M.D.       ER:  Negative (0.9%);  [place]: Negative (0.4%); HER-2/COURTNEY: Negative (0)       Post biopsy mammogram shows the biopsy clip in good position. 12/28/21 Breast MRI      The breasts demonstrate moderate glandularity and mild background       enhancement.             Left 9:00 biopsy clip within a lobulated heterogeneously enhancing mass       with angulated and irregular margins overall measuring about 1.8 x 0.7 x       1.2 cm. Posterior to this (about 1 cm away) the smaller mass measuring 0.9 cm is       unchanged from recent mammogram and ultrasound. Additionally there is       clumped      and reticular nonmass enhancement extending superiorly by about 2.7 cm and       anteriorly by about 1.8 cm, in keeping with the DCIS and calcifications.        Overall the expected area of disease measures about 4.5 cm AP by 1.2 cm       transverse and is located at 9:00-9:30, mid to posterior depth. There is no evidence of involvement of skin or chest wall structures.             No other evidence of suspicious enhancing mass, and no other dominant or       unique nonmass enhancement,  to suggest additional malignancy in either       breast.      No evidence of axillary or internal mammary lymphadenopathy.             Elsewhere, limited visualization of the partially included thorax and       upper abdomen shows no acute abnormality, with note of a small benign cyst       in the left liver.                     IMPRESSION      1. Left medial breast cancer measures about 4.5 cm in greatest dimension. 2. No additional suspicious finding elsewhere in either breast. No obvious       lymphadenopathy. 1/13/22 presented at [facility]; genetic testing and refer to med oncology pre-op            1/13/22 BRCAPlus panel of 8 genes associated with hereditary cancer       (including BRCA1 and BRCA2)       No pathogenic variants identified that increase risk of developing breast       cancer               Hospital Course:  Ms. Ashley Rodriguez is a 49 y.o. female admitted on 2/17/2022. The primary encounter diagnosis was Neutropenic fever ([facility] [address] Diagnoses of Rash and Malignant neoplasm of female breast, unspecified estrogen receptor status, unspecified laterality, unspecified site of breast ([facility] [address] were also pertinent to this visit. .       Her PMH includes HTN and heart murmur. She is a patient of Dr. Chapincito Huang with triple negative breast cancer s/p C1D8 Carbo/Taxol/Keytruda on 2/14. D15 due 2/21. She presented to ED with c/o fever and rash. Temp 103 in ED with one episode of vomiting. Rash is occasionally pruritic. She called the clinic regarding this rash and was prescribed steroids and advised to take Benadryl. She met sepsis criteria with fever, tachycardia, leukopenia, and lactic acid 2.1. CXR neg.  UA neg. BCx-NG. COVID neg. Treated with Vanc/Zosyn.       Dr. Chapincito Huang felt presentation more likely r/t allergic reaction rather than infectious process. She was re-challenged with D15 Taxol on 2/21. Tolerated the infusion, but developed abdominal pain, nausea, tachycardia, tachypnea along with shortness of breath around 10 hours after infusion. Symptoms resolved with Lasix, Benadryl, Solucortef, and Zofran. She was also given Lasix/Albumin for fluid overload during this admission. She will be given script for Epi-pen and Prednisone 40mg prn allergic reaction. Referral made to allergist.  Notified navigation that future Taxol to be changed to Taxotere. She is feeling well and is ready for discharge home. She is scheduled to f/u with Dr. Irina Portillo on 2/28. Advised to call with fever, chills, uncontrollable symptoms, or with any other concerns. Triple negative breast cancer  - s/p C1D8 Carbo/Taxol/Keytruda on 2/14.  C15 due 2/21, no plans to give while admitted  -mildly neutropenic-give filgrastim x 3 days  2/19 WBC/ANC improved today. Filgrastim discontinued (after dose 2 completed)    2/21 ANC up to 6.3. D15 Taxol today. C2 due 2/28.     Sepsis / Neutropenic fever  - CXR neg  - UA neg  - BCx pending  - COVID neg  2/19 On Cef/Vanc. BCx NGTD. 2/20 Remains on Cef/Vanc. BCx remain NGTD. D/C Vanc today. 2/22 Remains afebrile. BCx-NGTD. DC Cef.  2/23 Remains afebrile. BCx-NG.     Rash / Angioedema  - cause unknown  2/20 Rash overall with improvement. 2/21 ? allergic reaction. Will rechallenge with Taxol today to monitor. 2/22 Pt completed Taxol last night @ 2000. Became tachycardic and tachypneic with dyspnea this AM.  Also reported abd pain with nausea. EKG with sinus tach. CXR with possible fluid overload. pBNP 3511. Was given Lasix, Benadryl, Solucortef, and Zofran with resolution of symptoms. 2/23 Rash much improved. Now with dry skin on hands bilaterally. Continue Eucerin as ordered. Hydrocortisone cream ordered. Edema ongoing, but improved overalll. Albumin low at 1.8. Lasix/albumin ordered. 2/24 Diuresed well, repeat Albumin/Lasix. Pt with L periorbital edema this afternoon (only received Lasix/Albumin hours ago and she has had no previous reaction to this). Resolved with benadryl.     Diarrhea  2/19 Check C diff d/t c/o watery diarrhea ON  2/20 C diff check not yet completed as she had a couple of solid stools yesterday. Will check C diff today if diarrhea.    2/22 Cdiff neg.     Pancytopenia secondary to chemotherapy  - Transfuse prn per [facility] SOPs     Transaminitis  2/22 LFTs elevated. Monitor. 2/23 LFTs slightly improved (with the exception of ALT)  2/24 LFTs improving    Consults:  IP CONSULT TO INFECTIOUS DISEASES  IP CONSULT TO ONCOLOGY    Pertinent Diagnostic Studies:   Labs:    Recent Labs     02/25/22 0214 02/24/22  0653 02/23/22  0250   WBC 2.3* 3.0* 6.8   HGB 9.0* 8.7* 8.4*   * 98* 90*   ANEU 1.0* 1.6* 5.1      Recent Labs     02/25/22  0214 02/24/22  0653 02/23/22  0250    143 139   K 4.4 4.2 3.6   * 114* 113*   CO2 28 27 24   * 101* 99   BUN 19 14 19   CREA 0.74 0.68 0.90   CA 8.1* 7.7* 7.6*    105 115   TP 6.3 5.8* 5.7*   ALB 2.3* 2.0* 1.8*   MG 2.0 2.1 2.1       Imaging:  XR CHEST SNGL V [[id number]] Collected: 02/22/22 0401   Order Status: Completed Updated: 02/22/22 0404   Narrative:     EXAM: Chest x-ray. INDICATION: Dyspnea. COMPARISON: Prior chest x-ray on February 18, 2022. TECHNIQUE: Frontal view chest x-ray. FINDINGS: The heart is upper normal in size. There is new mild prominence of the   central pulmonary vasculature. No pneumothorax, focal infiltrate or pleural   effusion is seen. A right chest wall infusion port catheter remains in place. Impression:     New mild prominence of the central pulmonary vasculature. This may   be technical related to the portable technique, or early CHF or fluid overload.    XR CHEST PORT [[id number]] Collected: 02/18/22 0053   Order Status: Completed Updated: 02/18/22 0056   Narrative:     EXAM: XR CHEST PORT     HISTORY: meets SIRS criteria.       TECHNIQUE: Frontal chest.     COMPARISON: None available. FINDINGS:   The cardiac silhouette, mediastinum, and pulmonary vasculature are within normal   limits. There is no consolidation, pleural effusion, or pneumothorax. No significant osseous abnormalities are observed. There is a right-sided MediPort. Impression:     No evidence of an acute intrathoracic process. Current Discharge Medication List      START taking these medications    Details   diphenhydrAMINE (BENADRYL) 25 mg capsule Take 1 Capsule by mouth every six (6) hours as needed for Itching, Skin Irritation or Allergies. Qty: 30 Capsule, Refills: 0  Start date: 2/25/2022      diphenhydrAMINE-zinc acetate 2%-0.1% (BENADRYL) 2-0.1 % topical cream Apply  to affected area two (2) times daily as needed for Itching. Qty: 30 g, Refills: 0  Start date: 2/25/2022      predniSONE (DELTASONE) 20 mg tablet Take 40 mg by mouth daily as needed for PRN Reason (Other) (allergic reaction). Qty: 10 Tablet, Refills: 0  Start date: 2/25/2022      EPINEPHrine (EPIPEN) 0.3 mg/0.3 mL injection 0.3 mL by IntraMUSCular route once as needed for Anaphylaxis or Allergic Response for up to 1 dose. Qty: 1 Each, Refills: 1  Start date: 2/25/2022, End date: 2/25/2022      potassium chloride (K-DUR, KLOR-CON M20) 20 mEq tablet Take 1 Tablet by mouth daily. Qty: 30 Tablet, Refills: 0  Start date: 2/25/2022         CONTINUE these medications which have NOT CHANGED    Details   ondansetron hcl (Zofran) 8 mg tablet Take 1 Tablet by mouth every eight (8) hours as needed for Nausea. Indications: prevent nausea and vomiting from cancer chemotherapy  Qty: 90 Tablet, Refills: 2      triamcinolone acetonide (KENALOG) 0.1 % topical cream Apply  to affected area three (3) times daily.  use thin layer over rash  Qty: 45 g, Refills: 1  Start date: 2/17/2022 olmesartan-hydroCHLOROthiazide (BENICAR HCT) 40-25 mg per tablet Take 1 Tablet by mouth daily. prochlorperazine (Compazine) 10 mg tablet Take 1 Tablet by mouth every six (6) hours as needed for Nausea. Indications: prevent nausea and vomiting from cancer chemotherapy  Qty: 60 Tablet, Refills: 2      lidocaine-prilocaine (EMLA) topical cream Apply to port about 45 minutes prior to access  Qty: 30 g, Refills: 5      LORazepam (Ativan) 1 mg tablet Take 0.5-1 Tablets by mouth every eight (8) hours as needed for Anxiety. Max Daily Amount: 3 mg. Indications: anxious, nausea and vomiting caused by cancer drugs  Qty: 60 Tablet, Refills: 0    Associated Diagnoses: Triple negative malignant neoplasm of breast ([address] Nausea; Anxiety      senna-docusate (PERICOLACE) 8.6-50 mg per tablet Take 1 Tablet by mouth daily as needed for Constipation. Qty: 30 Tablet, Refills: 1      montelukast (Singulair) 10 mg tablet Take 10 mg by mouth daily. dilTIAZem CD (CARDIZEM CD) 240 mg ER capsule 1 po qd  Qty: 90 Cap, Refills: 3    Associated Diagnoses: Benign essential HTN         STOP taking these medications       spironolactone (ALDACTONE) 25 mg tablet Comments:   Reason for Stopping:         losartan-hydroCHLOROthiazide (HYZAAR) 100-12.5 mg per tablet Comments:   Reason for Stopping:               OBJECTIVE:  Patient Vitals for the past 8 hrs:   BP Temp Pulse Resp SpO2   22 0744 (!) 148/71 98 °F (36.7 °C) 83 18 96 %     Temp (24hrs), Av.3 °F (36.8 °C), Min:98 °F (36.7 °C), Max:98.8 °F (37.1 °C)     0701 -  1900  In: 320 [P.O.:320]  Out: 500 [Urine:500]    Physical Exam:  Constitutional: Well developed, well nourished female in no acute distress, sitting comfortably in the hospital bed. HEENT: Normocephalic and atraumatic. Oropharynx is clear, mucous membranes are moist.  Neck supple. Skin Warm and dry.  +diffuse erythematous rash (improved).   Dry/peeling skin noted on hands   Respiratory Lungs are clear to auscultation bilaterally without wheezes, rales or rhonchi, normal air exchange without accessory muscle use. CVS Mildly tachycardic rate, regular rhythm and normal S1 and S2. No murmurs, gallops, or rubs. Abdomen Soft, nontender and nondistended, normoactive bowel sounds. No palpable mass. No hepatosplenomegaly. Neuro Grossly nonfocal with no obvious sensory or motor deficits. MSK Normal range of motion in general.  +Trace BLE edema and no tenderness. Psych Appropriate mood and affect.           ASSESSMENT:    Principal Problem:    Severe sepsis ([facility] [address] (2/18/2022)    Active Problems:    Benign essential HTN (4/13/2016)      Carcinoma at 9:00 in left breast in female, estrogen receptor negative ([facility] [address] (1/10/2022)      Overview: cT2N0 left breast IDC with DCIS, triple negative             No previous personal malignancy, prior breast surgery, or other symptoms       reported. No known family breast cancer. 11/22/21 Bilat screening mammo      Scattered fibroglandular densities. A few typically benign-appearing calcifications in the left breast are       similar in appearance to the previous study.             There is a focal asymmetry in the medial inferior left breast located 6 cm       from the nipple. Recommend spot compression magnification views on the direct ML view and       ultrasound, if indicated for further evaluation.             There is otherwise no suspicious mass, calcification, or architectural       distortion in either breast.               IMPRESSION      Focal asymmetry in the medial inferior left breast at 6 cm from the       nipple.                    12/6/21 Left breast dx mammo and US      The area of concern persists with additional imaging. This resides within       the medial left breast at approximately 9:00 position. There      are few associated calcifications which appear somewhat coarse.  This       measures approximately 1.3 cm in size. An additional smaller asymmetry       also at the 9:00      position resides 7 cm from the nipple. A left breast ultrasound is       recommended for further evaluation.             12/6/21 left breast US      At the 9:00 position, 6 cm from the nipple is a lobulated hypoechoic but       mildly heterogeneous appearing mass measuring      approximately 1.5 x 0.8 x 0.8 cm. The margins are occasionally ill-defined       with some components demonstrating posterior acoustic shadowing. The long       axis is      parallel to the plane of the chest wall. There is a 7 mm hypoechoic mass       just adjacent to this This is hypoechoic with a lobulated contour. The       margins are      occasionally ill-defined.              IMPRESSION      Adjacent left breast masses. Recommend tissue sampling of the larger       lesion.              12/23/21 US-guided left breast biopsy      A:  \" LEFT BREAST, 9:00 POSITION, 6 CM FROM NIPPLE, CORE BIOPSY\": FOCI OF       AT LEAST MICROINVASIVE INFILTRATING DUCTAL CARCINOMA,       INTERMEDIATE GRADE (MODERATELY DIFFERENTIATED) IN ASSOCIATION WITH FOCALLY       HIGH GRADE DUCTAL CARCINOMA IN SITU. DEFINITE LYMPHOVASCULAR INVASION IS NOT IDENTIFIED       Sign Out Date: 12/24/2021 Lenore Box M.D.       ER:  Negative (0.9%);  [place]: Negative (0.4%); HER-2/COURTNEY: Negative (0)       Post biopsy mammogram shows the biopsy clip in good position. 12/28/21 Breast MRI      The breasts demonstrate moderate glandularity and mild background       enhancement.             Left 9:00 biopsy clip within a lobulated heterogeneously enhancing mass       with angulated and irregular margins overall measuring about 1.8 x 0.7 x       1.2 cm. Posterior to this (about 1 cm away) the smaller mass measuring 0.9 cm is       unchanged from recent mammogram and ultrasound.  Additionally there is       clumped      and reticular nonmass enhancement extending superiorly by about 2.7 cm and       anteriorly by about 1.8 cm, in keeping with the DCIS and calcifications. Overall the expected area of disease measures about 4.5 cm AP by 1.2 cm       transverse and is located at 9:00-9:30, mid to posterior depth. There is no evidence of involvement of skin or chest wall structures.             No other evidence of suspicious enhancing mass, and no other dominant or       unique nonmass enhancement,  to suggest additional malignancy in either       breast.      No evidence of axillary or internal mammary lymphadenopathy.             Elsewhere, limited visualization of the partially included thorax and       upper abdomen shows no acute abnormality, with note of a small benign cyst       in the left liver.                     IMPRESSION      1. Left medial breast cancer measures about 4.5 cm in greatest dimension. 2. No additional suspicious finding elsewhere in either breast. No obvious       lymphadenopathy. 1/13/22 presented at [facility]; genetic testing and refer to med oncology pre-op            1/13/22 BRCAPlus panel of 8 genes associated with hereditary cancer       (including BRCA1 and BRCA2)       No pathogenic variants identified that increase risk of developing breast       cancer               DISPOSITION:  Follow-up Appointments   Procedures    FOLLOW UP VISIT Appointment in: Other (Specify) Follow up with Dr. Irina Portillo as previously scheduled on 2/28     Follow up with Dr. Irina Portillo as previously scheduled on 2/28     Standing Status:   Standing     Number of Occurrences:   1     Order Specific Question:   Appointment in     Answer: Other (Specify)       Over 60 minutes was spent in discharge planning and coordination of care.             Nellie Priest NP  [facility] Hematology & Oncology  [address] [address]  Office : [phone]  Fax : [phone]   I personally saw, exammed and counselled the patient, and discussed with NP, agree with above history/assessment/plan. 56 y. o.female triple negative breast cancer status post cycle 1 day 8 Taxol on 2/14 and developed acute onset of itching, rash, anasarca, short of breath, fever, hypotension, diarrhea, admitted with antibiotics, antihistamine, IV fluid, most symptom much improved the second day although still lingering, discussed that the picture of allergic reaction but thorough discussion not able to identify new food or medicine, not typical for Taxol or Keytruda reaction given the timing and the acuity, and she reported a similar episode a few years ago that she went to ER for treatment, but never followed up for allergen test and never had EpiPen, discussed the concern of allergy and will arrange allergist work-up and EpiPen that discharge, however would proceed with day 15 Taxol with close observation to rule out atypical Taxol allergy, patient and family understand the risk and agreed to proceed, and reported reasonable tolerance except for mild itching through the infusion, however overnight RN was concerned of tachycardia and tachypneic which patient reported similar to each infusion but received IV Benadryl and Solu-Medrol, appear well in the morning, LFT elevated and monitor, C. difficile negative and diarrhea improved, remains afebrile and stopped cefepime, give albumin with lasix and anasarca improved, DC as above plan with Benadryl/prednisone/EpiPen as needed, discussed with pharmacy to change Taxol to Taxotere on Monday. Kaci Giordano M.D.   [address], [address] [place] [address]  Office : [phone]  Fax : [phone]

## 2022-02-25 NOTE — PROGRESS NOTES
rounded on nurse Malini since I was not able to contact the patient's family member listed on the chart. Malini stated that communication with patient has been ok since she speaks [employer] herself, and Dr. Salomón Hazel speaks [employer] as well.          Thank you,               Hattie Cooks [facility] [employer] Department  [facility]  [phone] (phone)

## 2022-02-28 ENCOUNTER — HOSPITAL ENCOUNTER (OUTPATIENT)
Dept: INFUSION THERAPY | Age: 57
Discharge: HOME | End: 2022-02-28
Payer: COMMERCIAL

## 2022-02-28 ENCOUNTER — PATIENT OUTREACH (OUTPATIENT)
Dept: CASE MANAGEMENT | Age: 57
End: 2022-02-28

## 2022-02-28 DIAGNOSIS — C50.919 TRIPLE NEGATIVE MALIGNANT NEOPLASM OF BREAST (HCC): Primary | ICD-10-CM

## 2022-02-28 DIAGNOSIS — C50.919 TRIPLE NEGATIVE MALIGNANT NEOPLASM OF BREAST (HCC): ICD-10-CM

## 2022-02-28 LAB
ALBUMIN SERPL-MCNC: 3.1 G/DL (ref 3.5–5)
ALBUMIN/GLOB SERPL: 0.7 {RATIO} (ref 1.2–3.5)
ALP SERPL-CCNC: 113 U/L (ref 50–136)
ALT SERPL-CCNC: 99 U/L (ref 12–65)
ANION GAP SERPL CALC-SCNC: 5 MMOL/L (ref 7–16)
AST SERPL-CCNC: 34 U/L (ref 15–37)
BASOPHILS # BLD: 0 K/UL (ref 0–0.2)
BASOPHILS NFR BLD: 1 % (ref 0–2)
BILIRUB SERPL-MCNC: 0.5 MG/DL (ref 0.2–1.1)
BUN SERPL-MCNC: 16 MG/DL (ref 6–23)
CALCIUM SERPL-MCNC: 9 MG/DL (ref 8.3–10.4)
CHLORIDE SERPL-SCNC: 104 MMOL/L (ref 98–107)
CO2 SERPL-SCNC: 27 MMOL/L (ref 21–32)
CREAT SERPL-MCNC: 0.9 MG/DL (ref 0.6–1)
DIFFERENTIAL METHOD BLD: ABNORMAL
EOSINOPHIL # BLD: 0.2 K/UL (ref 0–0.8)
EOSINOPHIL NFR BLD: 5 % (ref 0.5–7.8)
ERYTHROCYTE [DISTWIDTH] IN BLOOD BY AUTOMATED COUNT: 13 % (ref 11.9–14.6)
GLOBULIN SER CALC-MCNC: 4.2 G/DL (ref 2.3–3.5)
GLUCOSE SERPL-MCNC: 157 MG/DL (ref 65–100)
HCT VFR BLD AUTO: 30.1 % (ref 35.8–46.3)
HGB BLD-MCNC: 9.8 G/DL (ref 11.7–15.4)
IMM GRANULOCYTES # BLD AUTO: 0 K/UL (ref 0–0.5)
IMM GRANULOCYTES NFR BLD AUTO: 1 % (ref 0–5)
LYMPHOCYTES # BLD: 1.1 K/UL (ref 0.5–4.6)
LYMPHOCYTES NFR BLD: 26 % (ref 13–44)
MAGNESIUM SERPL-MCNC: 2 MG/DL (ref 1.8–2.4)
MCH RBC QN AUTO: 29.3 PG (ref 26.1–32.9)
MCHC RBC AUTO-ENTMCNC: 32.6 G/DL (ref 31.4–35)
MCV RBC AUTO: 89.9 FL (ref 79.6–97.8)
MONOCYTES # BLD: 0.2 K/UL (ref 0.1–1.3)
MONOCYTES NFR BLD: 6 % (ref 4–12)
NEUTS SEG # BLD: 2.6 K/UL (ref 1.7–8.2)
NEUTS SEG NFR BLD: 63 % (ref 43–78)
NRBC # BLD: 0 K/UL (ref 0–0.2)
PLATELET # BLD AUTO: 159 K/UL (ref 150–450)
PMV BLD AUTO: 9.8 FL (ref 9.4–12.3)
POTASSIUM SERPL-SCNC: 4.1 MMOL/L (ref 3.5–5.1)
PROT SERPL-MCNC: 7.3 G/DL (ref 6.3–8.2)
RBC # BLD AUTO: 3.35 M/UL (ref 4.05–5.2)
SODIUM SERPL-SCNC: 136 MMOL/L (ref 136–145)
WBC # BLD AUTO: 4.1 K/UL (ref 4.3–11.1)

## 2022-02-28 PROCEDURE — 74011000258 HC RX REV CODE- 258: Performed by: INTERNAL MEDICINE

## 2022-02-28 PROCEDURE — 36591 DRAW BLOOD OFF VENOUS DEVICE: CPT

## 2022-02-28 PROCEDURE — 85025 COMPLETE CBC W/AUTO DIFF WBC: CPT

## 2022-02-28 PROCEDURE — 74011250636 HC RX REV CODE- 250/636: Performed by: INTERNAL MEDICINE

## 2022-02-28 PROCEDURE — 74011000250 HC RX REV CODE- 250: Performed by: INTERNAL MEDICINE

## 2022-02-28 PROCEDURE — 96375 TX/PRO/DX INJ NEW DRUG ADDON: CPT

## 2022-02-28 PROCEDURE — 83735 ASSAY OF MAGNESIUM: CPT

## 2022-02-28 PROCEDURE — 96367 TX/PROPH/DG ADDL SEQ IV INF: CPT

## 2022-02-28 PROCEDURE — 96413 CHEMO IV INFUSION 1 HR: CPT

## 2022-02-28 PROCEDURE — 80053 COMPREHEN METABOLIC PANEL: CPT

## 2022-02-28 PROCEDURE — 96417 CHEMO IV INFUS EACH ADDL SEQ: CPT

## 2022-02-28 RX ORDER — SODIUM CHLORIDE 0.9 % (FLUSH) 0.9 %
10 SYRINGE (ML) INJECTION AS NEEDED
Status: ACTIVE | OUTPATIENT
Start: 2022-02-28 | End: 2022-02-28

## 2022-02-28 RX ORDER — DIPHENHYDRAMINE HYDROCHLORIDE 50 MG/ML
50 INJECTION, SOLUTION INTRAMUSCULAR; INTRAVENOUS AS NEEDED
Status: ACTIVE | OUTPATIENT
Start: 2022-02-28 | End: 2022-02-28

## 2022-02-28 RX ORDER — SODIUM CHLORIDE 0.9 % (FLUSH) 0.9 %
10 SYRINGE (ML) INJECTION AS NEEDED
Status: DISCONTINUED | OUTPATIENT
Start: 2022-02-28 | End: 2022-03-02 | Stop reason: HOSPADM

## 2022-02-28 RX ORDER — HYDROCORTISONE SODIUM SUCCINATE 100 MG/2ML
100 INJECTION, POWDER, FOR SOLUTION INTRAMUSCULAR; INTRAVENOUS AS NEEDED
Status: ACTIVE | OUTPATIENT
Start: 2022-02-28 | End: 2022-02-28

## 2022-02-28 RX ORDER — ONDANSETRON 2 MG/ML
8 INJECTION INTRAMUSCULAR; INTRAVENOUS ONCE
Status: COMPLETED | OUTPATIENT
Start: 2022-02-28 | End: 2022-02-28

## 2022-02-28 RX ORDER — DIPHENHYDRAMINE HYDROCHLORIDE 50 MG/ML
50 INJECTION, SOLUTION INTRAMUSCULAR; INTRAVENOUS ONCE
Status: COMPLETED | OUTPATIENT
Start: 2022-02-28 | End: 2022-02-28

## 2022-02-28 RX ORDER — SODIUM CHLORIDE 9 MG/ML
25 INJECTION, SOLUTION INTRAVENOUS CONTINUOUS
Status: ACTIVE | OUTPATIENT
Start: 2022-02-28 | End: 2022-02-28

## 2022-02-28 RX ADMIN — SODIUM CHLORIDE 200 MG: 900 INJECTION, SOLUTION INTRAVENOUS at 11:44

## 2022-02-28 RX ADMIN — DIPHENHYDRAMINE HYDROCHLORIDE 50 MG: 50 INJECTION, SOLUTION INTRAMUSCULAR; INTRAVENOUS at 12:44

## 2022-02-28 RX ADMIN — SODIUM CHLORIDE 25 ML/HR: 9 INJECTION, SOLUTION INTRAVENOUS at 11:10

## 2022-02-28 RX ADMIN — ONDANSETRON 8 MG: 2 INJECTION INTRAMUSCULAR; INTRAVENOUS at 12:22

## 2022-02-28 RX ADMIN — FAMOTIDINE 20 MG: 10 INJECTION, SOLUTION INTRAVENOUS at 12:19

## 2022-02-28 RX ADMIN — SODIUM CHLORIDE, PRESERVATIVE FREE 10 ML: 5 INJECTION INTRAVENOUS at 15:04

## 2022-02-28 RX ADMIN — SODIUM CHLORIDE, PRESERVATIVE FREE 10 ML: 5 INJECTION INTRAVENOUS at 11:10

## 2022-02-28 RX ADMIN — CARBOPLATIN 530 MG: 10 INJECTION, SOLUTION INTRAVENOUS at 14:27

## 2022-02-28 RX ADMIN — Medication 10 ML: at 09:30

## 2022-02-28 RX ADMIN — DOCETAXEL ANHYDROUS 106 MG: 10 INJECTION, SOLUTION INTRAVENOUS at 13:20

## 2022-02-28 RX ADMIN — FOSAPREPITANT 150 MG: 150 INJECTION, POWDER, LYOPHILIZED, FOR SOLUTION INTRAVENOUS at 12:48

## 2022-02-28 RX ADMIN — DEXAMETHASONE SODIUM PHOSPHATE 12 MG: 4 INJECTION INTRA-ARTICULAR; INTRALESIONAL; INTRAMUSCULAR; INTRAVENOUS; SOFT TISSUE at 12:25

## 2022-02-28 NOTE — PROGRESS NOTES
Pt arrived ambulatory to [facility] with port previously accessed with good blood return. NS infusing. keytruda infusing. Pre meds given as ordered. Taxotere infusing. Carboplatin infusing. Pt tolerated well. Pt aware of next appt on 3/21/22 at 1030. Port flushed and de accessed. Pt discharged ambulatory.

## 2022-02-28 NOTE — PROGRESS NOTES
2/28/2022 saw pt today with Dr. Josi Abdul for Breast Cancer follow up and pre chemo visit. Daughter and  present for visit.  # [id number]. Patient was recently inpatient form 2/17/22-2/25/22. Patient will switch to Taxotere, Carboplatin, Keytruda every 3 weeks. Dr. Josi Abdul explained reason for change of drug. Patient brought in [address] [place] [address], Demonstration with patient/family on use of Epipen using Epi . Pt verbalizes understanding of use. Questions answered. Follow up in 1 week for tox check. Encouraged to call with any concerns, uncontrolled side effects from treatment, or fevers. Navigation will continue to follow.

## 2022-03-07 ENCOUNTER — APPOINTMENT (OUTPATIENT)
Dept: GENERAL RADIOLOGY | Age: 57
DRG: 810 | End: 2022-03-07
Attending: NURSE PRACTITIONER
Payer: COMMERCIAL

## 2022-03-07 ENCOUNTER — HOSPITAL ENCOUNTER (OUTPATIENT)
Dept: INFUSION THERAPY | Age: 57
Discharge: HOME | End: 2022-03-07
Payer: COMMERCIAL

## 2022-03-07 ENCOUNTER — PATIENT OUTREACH (OUTPATIENT)
Dept: CASE MANAGEMENT | Age: 57
End: 2022-03-07

## 2022-03-07 ENCOUNTER — APPOINTMENT (OUTPATIENT)
Dept: INFUSION THERAPY | Age: 57
End: 2022-03-07

## 2022-03-07 ENCOUNTER — HOSPITAL ENCOUNTER (INPATIENT)
Age: 57
LOS: 3 days | Discharge: HOME | DRG: 810 | End: 2022-03-10
Attending: INTERNAL MEDICINE | Admitting: INTERNAL MEDICINE
Payer: COMMERCIAL

## 2022-03-07 DIAGNOSIS — R50.81 NEUTROPENIC FEVER (HCC): ICD-10-CM

## 2022-03-07 DIAGNOSIS — Z79.899 HIGH RISK MEDICATION USE: ICD-10-CM

## 2022-03-07 DIAGNOSIS — D70.9 NEUTROPENIC FEVER (HCC): ICD-10-CM

## 2022-03-07 DIAGNOSIS — R11.2 CHEMOTHERAPY INDUCED NAUSEA AND VOMITING: ICD-10-CM

## 2022-03-07 DIAGNOSIS — L27.0 DRUG RASH: ICD-10-CM

## 2022-03-07 DIAGNOSIS — C50.212 CARCINOMA OF UPPER-INNER QUADRANT OF LEFT BREAST IN FEMALE, ESTROGEN RECEPTOR NEGATIVE (HCC): ICD-10-CM

## 2022-03-07 DIAGNOSIS — J30.2 SEASONAL ALLERGIC RHINITIS, UNSPECIFIED TRIGGER: ICD-10-CM

## 2022-03-07 DIAGNOSIS — R50.9 FEVER, UNSPECIFIED FEVER CAUSE: ICD-10-CM

## 2022-03-07 DIAGNOSIS — C50.919 MALIGNANT NEOPLASM OF FEMALE BREAST, UNSPECIFIED ESTROGEN RECEPTOR STATUS, UNSPECIFIED LATERALITY, UNSPECIFIED SITE OF BREAST (HCC): ICD-10-CM

## 2022-03-07 DIAGNOSIS — T45.1X5A CHEMOTHERAPY INDUCED NAUSEA AND VOMITING: ICD-10-CM

## 2022-03-07 DIAGNOSIS — Z17.1 CARCINOMA OF UPPER-INNER QUADRANT OF LEFT BREAST IN FEMALE, ESTROGEN RECEPTOR NEGATIVE (HCC): ICD-10-CM

## 2022-03-07 DIAGNOSIS — R63.4 WEIGHT LOSS: ICD-10-CM

## 2022-03-07 DIAGNOSIS — I10 BENIGN ESSENTIAL HTN: Chronic | ICD-10-CM

## 2022-03-07 LAB
ALBUMIN SERPL-MCNC: 2.8 G/DL (ref 3.5–5)
ALBUMIN/GLOB SERPL: 0.6 {RATIO} (ref 1.2–3.5)
ALP SERPL-CCNC: 88 U/L (ref 50–136)
ALT SERPL-CCNC: 57 U/L (ref 12–65)
ANION GAP SERPL CALC-SCNC: 7 MMOL/L (ref 7–16)
APPEARANCE UR: ABNORMAL
AST SERPL-CCNC: 39 U/L (ref 15–37)
BACTERIA URNS QL MICRO: ABNORMAL /HPF
BASOPHILS # BLD: 0 K/UL (ref 0–0.2)
BASOPHILS NFR BLD: 1 % (ref 0–2)
BILIRUB SERPL-MCNC: 0.3 MG/DL (ref 0.2–1.1)
BILIRUB UR QL: NEGATIVE
BUN SERPL-MCNC: 11 MG/DL (ref 6–23)
CALCIUM SERPL-MCNC: 8.7 MG/DL (ref 8.3–10.4)
CASTS URNS QL MICRO: ABNORMAL /LPF
CHLORIDE SERPL-SCNC: 98 MMOL/L (ref 98–107)
CO2 SERPL-SCNC: 21 MMOL/L (ref 21–32)
COLOR UR: YELLOW
COVID-19 RAPID TEST, COVR: NOT DETECTED
CREAT SERPL-MCNC: 1.1 MG/DL (ref 0.6–1)
DIFFERENTIAL METHOD BLD: ABNORMAL
EOSINOPHIL # BLD: 0 K/UL (ref 0–0.8)
EOSINOPHIL NFR BLD: 1 % (ref 0.5–7.8)
EPI CELLS #/AREA URNS HPF: ABNORMAL /HPF
ERYTHROCYTE [DISTWIDTH] IN BLOOD BY AUTOMATED COUNT: 13 % (ref 11.9–14.6)
GLOBULIN SER CALC-MCNC: 4.7 G/DL (ref 2.3–3.5)
GLUCOSE SERPL-MCNC: 110 MG/DL (ref 65–100)
GLUCOSE UR STRIP.AUTO-MCNC: NEGATIVE MG/DL
HCT VFR BLD AUTO: 26.6 % (ref 35.8–46.3)
HGB BLD-MCNC: 8.8 G/DL (ref 11.7–15.4)
HGB UR QL STRIP: NEGATIVE
IMM GRANULOCYTES # BLD AUTO: 0 K/UL (ref 0–0.5)
IMM GRANULOCYTES NFR BLD AUTO: 0 % (ref 0–5)
KETONES UR QL STRIP.AUTO: NEGATIVE MG/DL
LEUKOCYTE ESTERASE UR QL STRIP.AUTO: NEGATIVE
LYMPHOCYTES # BLD: 0.5 K/UL (ref 0.5–4.6)
LYMPHOCYTES NFR BLD: 36 % (ref 13–44)
MAGNESIUM SERPL-MCNC: 1.8 MG/DL (ref 1.8–2.4)
MCH RBC QN AUTO: 28.7 PG (ref 26.1–32.9)
MCHC RBC AUTO-ENTMCNC: 33.1 G/DL (ref 31.4–35)
MCV RBC AUTO: 86.6 FL (ref 79.6–97.8)
MONOCYTES # BLD: 0.8 K/UL (ref 0.1–1.3)
MONOCYTES NFR BLD: 46 % (ref 4–12)
NEUTS SEG # BLD: 0.2 K/UL (ref 1.7–8.2)
NEUTS SEG NFR BLD: 16 % (ref 43–78)
NITRITE UR QL STRIP.AUTO: NEGATIVE
NRBC # BLD: 0 K/UL (ref 0–0.2)
OTHER OBSERVATIONS,UCOM: ABNORMAL
PH UR STRIP: 5.5 [PH] (ref 5–9)
PLATELET # BLD AUTO: 223 K/UL (ref 150–450)
PLATELET COMMENTS,PCOM: ADEQUATE
PMV BLD AUTO: 9.3 FL (ref 9.4–12.3)
POTASSIUM SERPL-SCNC: 4.4 MMOL/L (ref 3.5–5.1)
PROT SERPL-MCNC: 7.5 G/DL (ref 6.3–8.2)
PROT UR STRIP-MCNC: ABNORMAL MG/DL
RBC # BLD AUTO: 3.07 M/UL (ref 4.05–5.2)
RBC #/AREA URNS HPF: ABNORMAL /HPF
RBC MORPH BLD: ABNORMAL
RBC MORPH BLD: ABNORMAL
SARS-COV-2, COV2: NOT DETECTED
SODIUM SERPL-SCNC: 126 MMOL/L (ref 136–145)
SOURCE, COVRS: NORMAL
SP GR UR REFRACTOMETRY: 1.01 (ref 1–1.02)
SPECIMEN SOURCE, FCOV2M: NORMAL
TSH SERPL DL<=0.005 MIU/L-ACNC: 0.82 UIU/ML (ref 0.36–3)
UROBILINOGEN UR QL STRIP.AUTO: 0.2 EU/DL (ref 0.2–1)
WBC # BLD AUTO: 1.5 K/UL (ref 4.3–11.1)
WBC MORPH BLD: ABNORMAL
WBC URNS QL MICRO: ABNORMAL /HPF

## 2022-03-07 PROCEDURE — 74011250636 HC RX REV CODE- 250/636: Performed by: NURSE PRACTITIONER

## 2022-03-07 PROCEDURE — 80053 COMPREHEN METABOLIC PANEL: CPT

## 2022-03-07 PROCEDURE — 99222 1ST HOSP IP/OBS MODERATE 55: CPT | Performed by: INTERNAL MEDICINE

## 2022-03-07 PROCEDURE — 74011250637 HC RX REV CODE- 250/637: Performed by: INTERNAL MEDICINE

## 2022-03-07 PROCEDURE — 74011000258 HC RX REV CODE- 258: Performed by: NURSE PRACTITIONER

## 2022-03-07 PROCEDURE — 85025 COMPLETE CBC W/AUTO DIFF WBC: CPT

## 2022-03-07 PROCEDURE — 81003 URINALYSIS AUTO W/O SCOPE: CPT

## 2022-03-07 PROCEDURE — 65270000029 HC RM PRIVATE

## 2022-03-07 PROCEDURE — 36591 DRAW BLOOD OFF VENOUS DEVICE: CPT

## 2022-03-07 PROCEDURE — 74011250637 HC RX REV CODE- 250/637: Performed by: NURSE PRACTITIONER

## 2022-03-07 PROCEDURE — 87635 SARS-COV-2 COVID-19 AMP PRB: CPT

## 2022-03-07 PROCEDURE — 87086 URINE CULTURE/COLONY COUNT: CPT

## 2022-03-07 PROCEDURE — U0005 INFEC AGEN DETEC AMPLI PROBE: HCPCS

## 2022-03-07 PROCEDURE — 74011000250 HC RX REV CODE- 250: Performed by: INTERNAL MEDICINE

## 2022-03-07 PROCEDURE — 84443 ASSAY THYROID STIM HORMONE: CPT

## 2022-03-07 PROCEDURE — 71046 X-RAY EXAM CHEST 2 VIEWS: CPT

## 2022-03-07 PROCEDURE — 83735 ASSAY OF MAGNESIUM: CPT

## 2022-03-07 PROCEDURE — 36415 COLL VENOUS BLD VENIPUNCTURE: CPT

## 2022-03-07 PROCEDURE — 87040 BLOOD CULTURE FOR BACTERIA: CPT

## 2022-03-07 RX ORDER — FLUTICASONE PROPIONATE 50 MCG
2 SPRAY, SUSPENSION (ML) NASAL DAILY
Status: DISCONTINUED | OUTPATIENT
Start: 2022-03-08 | End: 2022-03-10 | Stop reason: HOSPADM

## 2022-03-07 RX ORDER — LORAZEPAM 0.5 MG/1
.5-1 TABLET ORAL
Status: DISCONTINUED | OUTPATIENT
Start: 2022-03-07 | End: 2022-03-10 | Stop reason: HOSPADM

## 2022-03-07 RX ORDER — DILTIAZEM HYDROCHLORIDE 240 MG/1
240 CAPSULE, COATED, EXTENDED RELEASE ORAL DAILY
Status: DISCONTINUED | OUTPATIENT
Start: 2022-03-08 | End: 2022-03-10 | Stop reason: HOSPADM

## 2022-03-07 RX ORDER — VANCOMYCIN HYDROCHLORIDE
1250 EVERY 24 HOURS
Status: DISCONTINUED | OUTPATIENT
Start: 2022-03-08 | End: 2022-03-08

## 2022-03-07 RX ORDER — ONDANSETRON 8 MG/1
8 TABLET, ORALLY DISINTEGRATING ORAL
Status: DISCONTINUED | OUTPATIENT
Start: 2022-03-07 | End: 2022-03-10 | Stop reason: HOSPADM

## 2022-03-07 RX ORDER — BENZONATATE 100 MG/1
100 CAPSULE ORAL
Status: DISCONTINUED | OUTPATIENT
Start: 2022-03-07 | End: 2022-03-10 | Stop reason: HOSPADM

## 2022-03-07 RX ORDER — AMOXICILLIN 250 MG
1 CAPSULE ORAL
Status: DISCONTINUED | OUTPATIENT
Start: 2022-03-07 | End: 2022-03-10 | Stop reason: HOSPADM

## 2022-03-07 RX ORDER — SODIUM CHLORIDE 0.9 % (FLUSH) 0.9 %
10 SYRINGE (ML) INJECTION AS NEEDED
Status: DISCONTINUED | OUTPATIENT
Start: 2022-03-07 | End: 2022-03-09 | Stop reason: HOSPADM

## 2022-03-07 RX ORDER — PROCHLORPERAZINE MALEATE 10 MG
10 TABLET ORAL
Status: DISCONTINUED | OUTPATIENT
Start: 2022-03-07 | End: 2022-03-10 | Stop reason: HOSPADM

## 2022-03-07 RX ORDER — POTASSIUM CHLORIDE 20 MEQ/1
20 TABLET, EXTENDED RELEASE ORAL DAILY
Status: DISCONTINUED | OUTPATIENT
Start: 2022-03-08 | End: 2022-03-10 | Stop reason: HOSPADM

## 2022-03-07 RX ORDER — SODIUM CHLORIDE 0.9 % (FLUSH) 0.9 %
10 SYRINGE (ML) INJECTION AS NEEDED
Status: DISCONTINUED | OUTPATIENT
Start: 2022-03-07 | End: 2022-03-10 | Stop reason: HOSPADM

## 2022-03-07 RX ORDER — ACETAMINOPHEN 325 MG/1
650 TABLET ORAL
Status: DISCONTINUED | OUTPATIENT
Start: 2022-03-07 | End: 2022-03-10 | Stop reason: HOSPADM

## 2022-03-07 RX ORDER — ENOXAPARIN SODIUM 100 MG/ML
40 INJECTION SUBCUTANEOUS EVERY 24 HOURS
Status: DISCONTINUED | OUTPATIENT
Start: 2022-03-07 | End: 2022-03-10 | Stop reason: HOSPADM

## 2022-03-07 RX ORDER — SODIUM CHLORIDE 9 MG/ML
100 INJECTION, SOLUTION INTRAVENOUS CONTINUOUS
Status: DISCONTINUED | OUTPATIENT
Start: 2022-03-07 | End: 2022-03-10 | Stop reason: HOSPADM

## 2022-03-07 RX ORDER — VANCOMYCIN 1.75 GRAM/500 ML IN 0.9 % SODIUM CHLORIDE INTRAVENOUS
1750 ONCE
Status: COMPLETED | OUTPATIENT
Start: 2022-03-07 | End: 2022-03-07

## 2022-03-07 RX ORDER — MONTELUKAST SODIUM 10 MG/1
10 TABLET ORAL DAILY
Status: DISCONTINUED | OUTPATIENT
Start: 2022-03-08 | End: 2022-03-10 | Stop reason: HOSPADM

## 2022-03-07 RX ORDER — LIDOCAINE AND PRILOCAINE 25; 25 MG/G; MG/G
CREAM TOPICAL AS NEEDED
Status: DISCONTINUED | OUTPATIENT
Start: 2022-03-07 | End: 2022-03-10 | Stop reason: HOSPADM

## 2022-03-07 RX ORDER — DIPHENHYDRAMINE HCL 25 MG
25 CAPSULE ORAL
Status: DISCONTINUED | OUTPATIENT
Start: 2022-03-07 | End: 2022-03-10 | Stop reason: HOSPADM

## 2022-03-07 RX ORDER — HEPARIN 100 UNIT/ML
300 SYRINGE INTRAVENOUS AS NEEDED
Status: DISCONTINUED | OUTPATIENT
Start: 2022-03-07 | End: 2022-03-10 | Stop reason: HOSPADM

## 2022-03-07 RX ADMIN — ACETAMINOPHEN 650 MG: 325 TABLET, FILM COATED ORAL at 23:09

## 2022-03-07 RX ADMIN — Medication 10 ML: at 21:01

## 2022-03-07 RX ADMIN — NYSTATIN 5 ML: 100000 SUSPENSION ORAL at 23:10

## 2022-03-07 RX ADMIN — ACETAMINOPHEN 650 MG: 325 TABLET, FILM COATED ORAL at 15:18

## 2022-03-07 RX ADMIN — BENZONATATE 100 MG: 100 CAPSULE ORAL at 21:00

## 2022-03-07 RX ADMIN — VANCOMYCIN HYDROCHLORIDE 1750 MG: 10 INJECTION, POWDER, LYOPHILIZED, FOR SOLUTION INTRAVENOUS at 15:19

## 2022-03-07 RX ADMIN — DOCUSATE SODIUM 50MG AND SENNOSIDES 8.6MG 1 TABLET: 8.6; 5 TABLET, FILM COATED ORAL at 21:00

## 2022-03-07 RX ADMIN — SODIUM CHLORIDE 100 ML/HR: 900 INJECTION, SOLUTION INTRAVENOUS at 13:56

## 2022-03-07 RX ADMIN — CEFEPIME HYDROCHLORIDE 2 G: 2 INJECTION, POWDER, FOR SOLUTION INTRAVENOUS at 14:59

## 2022-03-07 RX ADMIN — LORAZEPAM 1 MG: 0.5 TABLET ORAL at 21:00

## 2022-03-07 RX ADMIN — ENOXAPARIN SODIUM 40 MG: 100 INJECTION SUBCUTANEOUS at 15:18

## 2022-03-07 RX ADMIN — DIPHENHYDRAMINE HYDROCHLORIDE 25 MG: 25 CAPSULE ORAL at 17:05

## 2022-03-07 RX ADMIN — Medication 10 ML: at 09:19

## 2022-03-07 RX ADMIN — ONDANSETRON 8 MG: 8 TABLET, ORALLY DISINTEGRATING ORAL at 20:24

## 2022-03-07 NOTE — PROGRESS NOTES
3/7/2022 0949 saw pt today with Dr. Hollie Olsen  for tox check.  Jon Ford  # [id number] Here today with  and daughter. Patient states feels poorly x 3 days. WBC 1.5, ACN 0.2, Temp 101.1 ,no fevers at home. Complains of Cough with white sputum, sore throat, runny nose , extereme fatigue, some SOB states feels breathing is \" heavy\". Some break through nausea despite both Zofran and Compazine, has vomited a small amount ~ 1 x/day. Mild intermittent neuropathy hands and feet, peeling noted hands, states is same as last week. Will admit to hospital  and neutropenic fever, will r/o Covid. Nasal swabs done for covid testing. Awaiting bed from central bed management. Navigation will continue to follow. 11:42 AM  Report called to [facility] on 3rd floor at [place]. Notified rapid Covid in process.

## 2022-03-07 NOTE — PROGRESS NOTES
VANCO DAILY FOLLOW UP NOTE  [address] [facility] Pharmacokinetic Monitoring Service - Vancomycin    Consulting Provider: Mikala Dailey NP   Indication: Febrile Neutropenia  Target Concentration: Goal AUC/TONI 400-600 mg*hr/L  Day of Therapy: 1  Additional Antimicrobials: cefepime    Pertinent Laboratory Values: Wt Readings from Last 1 Encounters:   03/07/22 69.9 kg (154 lb)     Temp Readings from Last 1 Encounters:   03/07/22 98.6 °F (37 °C)     No components found for: PROCAL  Recent Labs     03/07/22  0911   BUN 11   CREA 1.10*   WBC 1.5*     Estimated Creatinine Clearance: 51.7 mL/min (A) (based on SCr of 1.1 mg/dL (H)). Lab Results   Component Value Date/Time    Vancomycin, random 26.2 02/19/2022 05:47 AM       MRSA Nasal Swab: N/A. Non-respiratory infection. .      Assessment:  Date/Time Dose Concentration AUC         Note: Serum concentrations collected for AUC dosing may appear elevated if collected in close proximity to the dose administered, this is not necessarily an indication of toxicity    Plan:  Current dosing regimen is based on [address] vancomycin 1750mg once, then 1250mg q24h  Predicted AUC/Tr 447/12.6  Vancomycin concentrations will be ordered as clinically appropriate   Pharmacy will continue to monitor patient and adjust therapy as indicated    Thank you for the consult,  DAYNA Velazquez

## 2022-03-07 NOTE — PROGRESS NOTES
Problem: Falls - Risk of  Goal: *Absence of Falls  Description: Document [place] Fall Risk and appropriate interventions in the flowsheet.   Outcome: Progressing Towards Goal  Note: Fall Risk Interventions:            Medication Interventions: Evaluate medications/consider consulting pharmacy

## 2022-03-07 NOTE — PROGRESS NOTES
Patient arrived to port lab for port access and lab draw   Port accessed and labs drawn per protocol   / *Port flushed and de-accessed  Patient discharged from port lab ambulatorey*

## 2022-03-07 NOTE — H&P
[employer] [facility]: Admission H&P    Chief Complaint:    Triple negative left breast cancer  Neutropenic fever  Chemo induced nausea and vomiting  Weight loss    History of Present Illness:  49 y.o. F consulted for triple negative left breast cancer and presented to [facility] with  and daughter on 1/19/2022. She has good baseline health and functions well, and annual screening mammogram showed left breast lesion, biopsy showed triple negative grade 2 IDC, cT2 N0 M0, and we discussed the high risks profile for triple negative breast cancer and the most recent FDA approval of neoadjuvant chemoimmunotherapy, she appeared a good candidate and agreed to arrange Lilly Capo for 4 cycles followed by Adriamycin Cytoxan Keytruda for 4 cycles, followed by surgery and adjuvant Keytruda for total systemic therapy of 1 year, discussed toxicity and management, discussed logistics, arrange Zofran Compazine Ativan, baseline echocardiogram showed normal EF, hypertension better but still uncontrolled after adding spironolactone and educated risk of dehydration and hypotension on chemotherapy when she is taking double diuretics, started cycle 1 2/7/22 and left breast tumor responded rapidly, lost 20 pounds after cycle 1 and BP much better controlled even after she runs out of diltiazem, again warned of monitoring dehydration/hypotension, discussed increased protein/calorie intake, may add stimulant if continues to lose weight, occasional mild tingling in fingertips, episodes of constipation responded to laxative, proceed to cycle 1 day 8 carbotaxol Keytruda, educated to monitor for neuropathy, follow next week but call as needed.     However after cycle 1 day 8 Taxol on 2/14 and developed acute onset of itching, rash, anasarca, short of breath, fever, hypotension, diarrhea, admitted with antibiotics, antihistamine, IV fluid, most symptom much improved the second day although still lingering, discussed that the picture of allergic reaction but thorough discussion not able to identify new food or medicine, not typical for Taxol or Keytruda reaction given the timing and the acuity, and she reported a similar episode a few years ago that she went to ER for treatment, but never followed up for allergen test and never had EpiPen, discussed the concern of allergy and will arrange allergist work-up, however proceeded with day 15 Taxol with close observation to rule out atypical Taxol allergy, patient and family understand the risk and agreed to proceed, and reported reasonable tolerance except for mild itching through the infusion, however overnight RN was concerned of tachycardia and tachypneic which patient reported similar to each infusion but received IV Benadryl and Solu-Medrol, LFT elevated for a day, give albumin with lasix and anasarca improved, DC with Benadryl/prednisone/EpiPen as needed, followed on 2/28/2022, swelling resolved, mild rash of hands and arms, discussed to change Taxol to Taxotere for better management of reaction, return on 3/7/2022 reported much better tolerance for the allergic reaction, however more nausea/vomiting/weight loss and neutropenic fever with temperature 101 and ANC 0.2, coughing and rule out Covid, typical seasonal allergic sinusitis symptoms. Review of Systems:  Constitutional  weight loss. Fever. Weight loss. HEENT Denies trauma, bluring vision, hearing loss, ear pain, nosebleeds, sore throat, neck pain and ear discharge. Skin Denies lesions or rashes. Lungs Denies shortness of breath, cough, sputum production or hemoptysis. Cardiovascular Denies chest pain, palpitations, orthopnea, claudication and leg swelling. Gastrointestinal nausea, vomiting. Denies bloody or black stools. Denies abdominal pain.  Denies dysuria, frequency or hesitancy of urination   Neuro Denies headaches, visual changes or ataxia.  Denies dizziness, tingling, tremors, sensory change, speech change, focal weakness and headaches. Hematology Denies nasal/gum bleeding, denies easy bruise   Endo Denies heat/cold intolerance, denies diabetes. MSK Denies back pain, swollen legs, myalgias and falls. Psychiatric/Behavioral Denies depression and substance abuse. The patient is not nervous/anxious.        Allergies   Allergen Reactions    Taxol [Paclitaxel] Angioedema    Lisinopril Cough     Other reaction(s): Cough     Past Medical History:   Diagnosis Date    Breast cancer ([facility] [address]     Hypertension     Murmur, heart     echo 2016 EF 60-65% ; 1/31/22 states never has had symptoms    Poor historian     difficulty with verifying medication list     Past Surgical History:   Procedure Laterality Date    IR INSERT TUNL CVC W PORT OVER 5 YEARS  2/2/2022    CO BREAST SURGERY PROCEDURE UNLISTED      bx of left breast      Family History   Problem Relation Age of Onset    No Known Problems Mother     Hypertension Father     No Known Problems Sister     No Known Problems Brother     Other Other         states no family history    No Known Problems Sister     Breast Cancer Neg Hx     Colon Cancer Neg Hx     Ovarian Cancer Neg Hx     Uterine Cancer Neg Hx      Social History     Socioeconomic History    Marital status:      Spouse name: Not on file    Number of children: 3    Years of education: Not on file    Highest education level: Not on file   Occupational History    Not on file   Tobacco Use    Smoking status: Never Smoker    Smokeless tobacco: Never Used   Substance and Sexual Activity    Alcohol use: No     Alcohol/week: 0.0 standard drinks    Drug use: No    Sexual activity: Yes     Birth control/protection: Condom   Other Topics Concern     Service Not Asked    Blood Transfusions Not Asked    Caffeine Concern No    Occupational Exposure Not Asked    Hobby Hazards Not Asked    Sleep Concern Not Asked    Stress Concern Not Asked    Weight Concern Not Asked    Special Diet No    Back Care Not Asked    Exercise No    Bike Helmet Not Asked    Seat Belt Yes    Self-Exams Yes   Social History Narrative    Abuse: Feels safe at home, no history of physical abuse, no history of sexual abuse     Social Determinants of Health     Financial Resource Strain:     Difficulty of Paying Living Expenses: Not on file   Food Insecurity:     Worried About Running Out of Food in the Last Year: Not on file    Kathy of Food in the Last Year: Not on file   Transportation Needs:     Lack of Transportation (Medical): Not on file    Lack of Transportation (Non-Medical): Not on file   Physical Activity:     Days of Exercise per Week: Not on file    Minutes of Exercise per Session: Not on file   Stress:     Feeling of Stress : Not on file   Social Connections:     Frequency of Communication with Friends and Family: Not on file    Frequency of Social Gatherings with Friends and Family: Not on file    Attends Mormonism Services: Not on file    Active Member of [address] [place] [address] or Organizations: Not on file    Attends Club or Organization Meetings: Not on file    Marital Status: Not on file   Intimate Partner Violence:     Fear of Current or Ex-Partner: Not on file    Emotionally Abused: Not on file    Physically Abused: Not on file    Sexually Abused: Not on file   Housing Stability:     Unable to Pay for Housing in the Last Year: Not on file    Number of [place] in the Last Year: Not on file    Unstable Housing in the Last Year: Not on file     Current Facility-Administered Medications   Medication Dose Route Frequency Provider Last Rate Last Admin    diphenhydrAMINE (BENADRYL) capsule 25 mg  25 mg Oral Q6H PRN Margaree Bence, NP        . PHARMACY TO SUBSTITUTE PER PROTOCOL (Reordered from: diphenhydrAMINE-zinc acetate 2%-0.1% (BENADRYL) 2-0.1 % topical cream)    Per Protocol Margaree Bence, NP        lidocaine-prilocaine (EMLA) 2.5-2.5 % cream   Topical PRN Margaree Bence, NP  LORazepam (ATIVAN) tablet 0.5-1 mg  0.5-1 mg Oral Q8H PRN Yon Alex NP        [START ON 3/8/2022] montelukast (SINGULAIR) tablet 10 mg  10 mg Oral DAILY Yon Alex NP        ondansetron hcl (ZOFRAN) tablet 8 mg  8 mg Oral Q8H PRN Yon Alex NP        prochlorperazine (COMPAZINE) tablet 10 mg  10 mg Oral Q6H PRN Yon Alex NP        senna-docusate (PERICOLACE) 8.6-50 mg per tablet 1 Tablet  1 Tablet Oral DAILY PRN Yon Alex NP       [facility] [START ON 3/8/2022] dilTIAZem ER (CARDIZEM CD) capsule 240 mg  240 mg Oral DAILY Yon Alex NP       [facility] [START ON 3/8/2022] potassium chloride (K-DUR, KLOR-CON M20) SR tablet 20 mEq  20 mEq Oral DAILY Yon Alex NP        0.9% sodium chloride infusion  100 mL/hr IntraVENous CONTINUOUS Yon Alex NP        enoxaparin (LOVENOX) injection 40 mg  40 mg SubCUTAneous Q24H Yon Alex NP        cefepime (MAXIPIME) 2 g in 0.9% sodium chloride (MBP/ADV) 100 mL MBP  2 g IntraVENous Q12H Yon Alex NP        [START ON 3/8/2022] fluticasone propionate (FLONASE) 50 mcg/actuation nasal spray 2 Spray  2 Spray Both Nostrils DAILY Yon Alex NP         Facility-Administered Medications Ordered in Other Encounters   Medication Dose Route Frequency Provider Last Rate Last Admin    saline peripheral flush soln 10 mL  10 mL InterCATHeter PRN Tracy Hensley MD   10 mL at 03/07/22 0919       OBJECTIVE:  Visit Vitals  /72 (BP 1 Location: Right upper arm, BP Patient Position: At rest)   Pulse (!) 101   Temp 98.6 °F (37 °C)   Resp 20   SpO2 97%       Physical Exam:  Constitutional: Oriented to person, place, and time. Well-developed and well-nourished. HEENT: Normocephalic and atraumatic. Oropharynx is clear and moist.   Conjunctivae and EOM are normal. Pupils are equal, round, and reactive to light. No scleral icterus. Neck supple. No JVD present. No tracheal deviation present. No thyromegaly present.     Lymph node No palpable submandibular, cervical, supraclavicular, axillary and inguinal lymph nodes. Breasts Left breast soft, nontender, popular nodular area and 9:00 about 2 cm. Right breast soft, nontender, no mass. Skin  rash mostly resolved. Warm and dry. No bruising noted. No erythema. No pallor. Respiratory Effort normal and breath sounds normal.  No respiratory distress. No wheezes. No rales. No tenderness. CVS Normal rate, regular rhythm and normal heart sounds. Exam reveals no gallop, no friction and no rub. No murmur heard. Abdomen Soft. Bowel sounds are normal. Exhibits no distension. There is no tenderness. There is no rebound and no guarding. Neuro Normal reflexes. No cranial nerve deficit. Exhibits normal muscle tone, 5 of 5 strength of all extremities. MSK Normal range of motion in general.  No edema and no tenderness. Psych Normal mood, affect, behavior, judgment and thought content      Labs:  Recent Results (from the past 24 hour(s))   METABOLIC PANEL, COMPREHENSIVE    Collection Time: 03/07/22  9:11 AM   Result Value Ref Range    Sodium 126 (L) 136 - 145 mmol/L    Potassium 4.4 3.5 - 5.1 mmol/L    Chloride 98 98 - 107 mmol/L    CO2 21 21 - 32 mmol/L    Anion gap 7 7 - 16 mmol/L    Glucose 110 (H) 65 - 100 mg/dL    BUN 11 6 - 23 MG/DL    Creatinine 1.10 (H) 0.6 - 1.0 MG/DL    GFR est AA >60 >60 ml/min/1.73m2    GFR est non-AA 55 (L) >60 ml/min/1.73m2    Calcium 8.7 8.3 - 10.4 MG/DL    Bilirubin, total 0.3 0.2 - 1.1 MG/DL    ALT (SGPT) 57 12 - 65 U/L    AST (SGOT) 39 (H) 15 - 37 U/L    Alk.  phosphatase 88 50 - 136 U/L    Protein, total 7.5 6.3 - 8.2 g/dL    Albumin 2.8 (L) 3.5 - 5.0 g/dL    Globulin 4.7 (H) 2.3 - 3.5 g/dL    A-G Ratio 0.6 (L) 1.2 - 3.5     CBC WITH AUTOMATED DIFF    Collection Time: 03/07/22  9:11 AM   Result Value Ref Range    WBC 1.5 (LL) 4.3 - 11.1 K/uL    RBC 3.07 (L) 4.05 - 5.2 M/uL    HGB 8.8 (L) 11.7 - 15.4 g/dL    HCT 26.6 (L) 35.8 - 46.3 %    MCV 86.6 79.6 - 97.8 FL    MCH 28.7 26.1 - 32.9 PG    MCHC 33.1 31.4 - 35.0 g/dL    RDW 13.0 11.9 - 14.6 %    PLATELET 238 634 - 473 K/uL    MPV 9.3 (L) 9.4 - 12.3 FL    ABSOLUTE NRBC 0.00 0.0 - 0.2 K/uL    NEUTROPHILS 16 (L) 43 - 78 %    LYMPHOCYTES 36 13 - 44 %    MONOCYTES 46 (H) 4.0 - 12.0 %    EOSINOPHILS 1 0.5 - 7.8 %    BASOPHILS 1 0.0 - 2.0 %    IMMATURE GRANULOCYTES 0 0.0 - 5.0 %    ABS. NEUTROPHILS 0.2 (L) 1.7 - 8.2 K/UL    ABS. LYMPHOCYTES 0.5 0.5 - 4.6 K/UL    ABS. MONOCYTES 0.8 0.1 - 1.3 K/UL    ABS. EOSINOPHILS 0.0 0.0 - 0.8 K/UL    ABS. BASOPHILS 0.0 0.0 - 0.2 K/UL    ABS. IMM. GRANS. 0.0 0.0 - 0.5 K/UL    RBC COMMENTS SLIGHT  ANISOCYTOSIS + POIKILOCYTOSIS        RBC COMMENTS OCCASIONAL  OVALOCYTES        WBC COMMENTS Result Confirmed By Smear      PLATELET COMMENTS ADEQUATE      DF AUTOMATED     MAGNESIUM    Collection Time: 03/07/22  9:11 AM   Result Value Ref Range    Magnesium 1.8 1.8 - 2.4 mg/dL   TSH 3RD GENERATION    Collection Time: 03/07/22  9:11 AM   Result Value Ref Range    TSH 0.819 0.358 - 3 uIU/mL   COVID-19 RAPID TEST    Collection Time: 03/07/22 10:35 AM   Result Value Ref Range    Specimen source Nasopharyngeal      COVID-19 rapid test Not detected NOTD         Imaging:  No results found for this or any previous visit. ASSESSMENT/PLAN:    ICD-10-CM ICD-9-CM    1. Neutropenic fever ([facility] [address]  D70.9 288.00     R50.81 780.61    2. Benign essential HTN  I10 401.1    3. Carcinoma of upper-inner quadrant of left breast in female, estrogen receptor negative ([facility]  C50.212 174.2     Z17.1 V86.1    4. Chemotherapy induced nausea and vomiting  R11.2 787.01     T45.1X5A E933.1    5.  Weight loss  R63.4 783.21      Problem List  Date Reviewed: 3/7/2022          Codes Class Noted    Neutropenic fever ([facility] ICD-10-CM: D70.9, R50.81  ICD-9-CM: 288.00, 780.61  3/7/2022        Severe sepsis [facility]) ICD-10-CM: A41.9, R65.20  ICD-9-CM: 038.9, 995.92  2/18/2022        Carcinoma at 9:00 in left breast in female, estrogen receptor negative ([facility] ICD-10-CM: S02.080, Z17.1  ICD-9-CM: 174.2, V86.1  1/10/2022    Overview Addendum 1/27/2022  6:20 AM by Leonardo Meredith MD     cT2N0 left breast IDC with DCIS, triple negative     No previous personal malignancy, prior breast surgery, or other symptoms reported. No known family breast cancer. 11/22/21 Bilat screening mammo  Scattered fibroglandular densities. A few typically benign-appearing calcifications in the left breast are similar in appearance to the previous study.     There is a focal asymmetry in the medial inferior left breast located 6 cm from the nipple. Recommend spot compression magnification views on the direct ML view and ultrasound, if indicated for further evaluation.     There is otherwise no suspicious mass, calcification, or architectural distortion in either breast.       IMPRESSION  Focal asymmetry in the medial inferior left breast at 6 cm from the nipple.        12/6/21 Left breast dx mammo and US  The area of concern persists with additional imaging. This resides within the medial left breast at approximately 9:00 position. There  are few associated calcifications which appear somewhat coarse. This measures approximately 1.3 cm in size. An additional smaller asymmetry also at the 9:00  position resides 7 cm from the nipple. A left breast ultrasound is recommended for further evaluation.     12/6/21 left breast US  At the 9:00 position, 6 cm from the nipple is a lobulated hypoechoic but mildly heterogeneous appearing mass measuring  approximately 1.5 x 0.8 x 0.8 cm. The margins are occasionally ill-defined with some components demonstrating posterior acoustic shadowing. The long axis is  parallel to the plane of the chest wall. There is a 7 mm hypoechoic mass just adjacent to this This is hypoechoic with a lobulated contour. The margins are  occasionally ill-defined.      IMPRESSION  Adjacent left breast masses.  Recommend tissue sampling of the larger lesion.       12/23/21 US-guided left breast biopsy  A:  \" LEFT BREAST, 9:00 POSITION, 6 CM FROM NIPPLE, CORE BIOPSY\": FOCI OF AT LEAST MICROINVASIVE INFILTRATING DUCTAL CARCINOMA,   INTERMEDIATE GRADE (MODERATELY DIFFERENTIATED) IN ASSOCIATION WITH FOCALLY HIGH GRADE DUCTAL CARCINOMA IN SITU. DEFINITE LYMPHOVASCULAR INVASION IS NOT IDENTIFIED   Sign Out Date: 12/24/2021 Shakeel Saini M.D.   ER:  Negative (0.9%);  DC: Negative (0.4%); HER-2/COURTNEY: Negative (0)   Post biopsy mammogram shows the biopsy clip in good position. 12/28/21 Breast MRI  The breasts demonstrate moderate glandularity and mild background enhancement.     Left 9:00 biopsy clip within a lobulated heterogeneously enhancing mass with angulated and irregular margins overall measuring about 1.8 x 0.7 x 1.2 cm. Posterior to this (about 1 cm away) the smaller mass measuring 0.9 cm is unchanged from recent mammogram and ultrasound. Additionally there is clumped  and reticular nonmass enhancement extending superiorly by about 2.7 cm and anteriorly by about 1.8 cm, in keeping with the DCIS and calcifications. Overall the expected area of disease measures about 4.5 cm AP by 1.2 cm transverse and is located at 9:00-9:30, mid to posterior depth. There is no evidence of involvement of skin or chest wall structures.     No other evidence of suspicious enhancing mass, and no other dominant or unique nonmass enhancement,  to suggest additional malignancy in either breast.  No evidence of axillary or internal mammary lymphadenopathy.     Elsewhere, limited visualization of the partially included thorax and upper abdomen shows no acute abnormality, with note of a small benign cyst in the left liver.         IMPRESSION  1. Left medial breast cancer measures about 4.5 cm in greatest dimension. 2. No additional suspicious finding elsewhere in either breast. No obvious lymphadenopathy.       1/13/22 presented at [facility]; genetic testing and refer to med oncology pre-op    1/13/22 BRCAPlus panel of 8 genes associated with hereditary cancer (including BRCA1 and BRCA2)   No pathogenic variants identified that increase risk of developing breast cancer                Triple negative malignant neoplasm of breast [facility]) ICD-10-CM: C50.919  ICD-9-CM: 174.9  1/10/2022        Screening mammogram, encounter for ICD-10-CM: Z12.31  ICD-9-CM: V76.12  3/18/2019    Overview Signed 3/18/2019  4:30 PM by Juancho Inman MD     Ordered for this Oct on 3/18/19             Women's annual routine gynecological examination ICD-10-CM: Z01.419  ICD-9-CM: V72.31  7/24/2017    Overview Signed 7/24/2017  9:42 AM by Juancho Inman MD     Pap with HPV done 7/24/17             Irregular menses ICD-10-CM: N92.6  ICD-9-CM: 626.4  7/24/2017    Overview Signed 7/24/2017  9:42 AM by Juancho Inman MD     noted             Benign essential HTN (Chronic) ICD-10-CM: I10  ICD-9-CM: 401.1  4/13/2016        Mixed hyperlipidemia ICD-10-CM: D19.2  ICD-9-CM: 272.2  4/13/2016        Encephalomalacia on imaging study ICD-10-CM: G93.89  ICD-9-CM: 348.89  4/13/2016        Seasonal allergic rhinitis ICD-10-CM: J30.2  ICD-9-CM: 477.9  4/13/2016        Frequent headaches ICD-10-CM: R51.9  ICD-9-CM: 784.0  2/25/2016          49 y.o. F triple negative breast cancer grade 2 IDC, cT2 N0 M0, status post cycle 1 neoadjuvant carbotaxol Keytruda with allergic reaction and change to [facility] for cycle 2, return on 3/7/2022 reported much better tolerance for the allergic reaction, however more nausea/vomiting/weight loss and neutropenic fever with temperature 101 and ANC 0.2, coughing and rule out Covid, typical seasonal allergic sinusitis symptoms and educated to start using Nasacort spray, admit to hospital for neutropenic fever, chemo induced nausea/vomiting/weight loss. Discussed with inpatient NP. Lex Day M.D.   [address], [address] [place] [address]  Office : [phone]  Fax : [phone]

## 2022-03-07 NOTE — PROGRESS NOTES
END OF SHIFT NOTE:    Intake/Output  03/07 0701 - 03/07 1900  In: 100 [P.O.:100]  Out: 150 [Urine:150]   Voiding: YES  Catheter: NO  Drain:              Stool:  0 occurrences. Emesis:  0 occurrences. VITAL SIGNS  Patient Vitals for the past 12 hrs:   Temp Pulse Resp BP SpO2   03/07/22 1635 99.7 °F (37.6 °C)       03/07/22 1502 (!) 102.7 °F (39.3 °C) 98 20 126/73 98 %   03/07/22 1203 98.6 °F (37 °C) (!) 101 20 125/72 97 %       Pain Assessment       Ambulating  No    Additional Information: vss except temp see flowsheet complain of itching gave Benadryl as ordered in mar pt resting in bed lowest position call light in reach      Shift report given to oncoming nurse at the bedside.     Maritza Marmolejo, RN    Pt came in this afternoon direct admit VSS no complaints of pain or discomfort family at bedside   1500 pt had temp gave tylenol as ordered in [facility] no complaints voiced by pt voiding well pt resting in bed lowest position call light in reach

## 2022-03-08 LAB
ALBUMIN SERPL-MCNC: 1.9 G/DL (ref 3.5–5)
ALBUMIN/GLOB SERPL: 0.5 {RATIO} (ref 1.2–3.5)
ALP SERPL-CCNC: 137 U/L (ref 50–136)
ALT SERPL-CCNC: 139 U/L (ref 12–65)
ANION GAP SERPL CALC-SCNC: 6 MMOL/L (ref 7–16)
AST SERPL-CCNC: 201 U/L (ref 15–37)
BASOPHILS # BLD: 0 K/UL (ref 0–0.2)
BASOPHILS NFR BLD: 0 % (ref 0–2)
BILIRUB SERPL-MCNC: 0.3 MG/DL (ref 0.2–1.1)
BUN SERPL-MCNC: 14 MG/DL (ref 6–23)
CALCIUM SERPL-MCNC: 7 MG/DL (ref 8.3–10.4)
CHLORIDE SERPL-SCNC: 102 MMOL/L (ref 98–107)
CO2 SERPL-SCNC: 18 MMOL/L (ref 21–32)
CREAT SERPL-MCNC: 1.45 MG/DL (ref 0.6–1)
DIFFERENTIAL METHOD BLD: ABNORMAL
EOSINOPHIL # BLD: 0 K/UL (ref 0–0.8)
EOSINOPHIL NFR BLD: 0 % (ref 0.5–7.8)
ERYTHROCYTE [DISTWIDTH] IN BLOOD BY AUTOMATED COUNT: 12.8 % (ref 11.9–14.6)
GLOBULIN SER CALC-MCNC: 4.1 G/DL (ref 2.3–3.5)
GLUCOSE SERPL-MCNC: 134 MG/DL (ref 65–100)
HCT VFR BLD AUTO: 22 % (ref 35.8–46.3)
HGB BLD-MCNC: 7.4 G/DL (ref 11.7–15.4)
IMM GRANULOCYTES # BLD AUTO: 0 K/UL (ref 0–0.5)
IMM GRANULOCYTES NFR BLD AUTO: 2 % (ref 0–5)
LYMPHOCYTES # BLD: 0.2 K/UL (ref 0.5–4.6)
LYMPHOCYTES NFR BLD: 21 % (ref 13–44)
MAGNESIUM SERPL-MCNC: 1.6 MG/DL (ref 1.8–2.4)
MCH RBC QN AUTO: 29.5 PG (ref 26.1–32.9)
MCHC RBC AUTO-ENTMCNC: 33.6 G/DL (ref 31.4–35)
MCV RBC AUTO: 87.6 FL (ref 79.6–97.8)
MONOCYTES # BLD: 0.5 K/UL (ref 0.1–1.3)
MONOCYTES NFR BLD: 48 % (ref 4–12)
NEUTS SEG # BLD: 0.3 K/UL (ref 1.7–8.2)
NEUTS SEG NFR BLD: 29 % (ref 43–78)
NRBC # BLD: 0 K/UL (ref 0–0.2)
PLATELET # BLD AUTO: 204 K/UL (ref 150–450)
PLATELET COMMENTS,PCOM: ADEQUATE
PMV BLD AUTO: 9.5 FL (ref 9.4–12.3)
POTASSIUM SERPL-SCNC: 4.2 MMOL/L (ref 3.5–5.1)
PROT SERPL-MCNC: 6 G/DL (ref 6.3–8.2)
RBC # BLD AUTO: 2.51 M/UL (ref 4.05–5.2)
RBC MORPH BLD: ABNORMAL
RBC MORPH BLD: ABNORMAL
SODIUM SERPL-SCNC: 126 MMOL/L (ref 136–145)
WBC # BLD AUTO: 1 K/UL (ref 4.3–11.1)
WBC MORPH BLD: ABNORMAL

## 2022-03-08 PROCEDURE — 80053 COMPREHEN METABOLIC PANEL: CPT

## 2022-03-08 PROCEDURE — 85025 COMPLETE CBC W/AUTO DIFF WBC: CPT

## 2022-03-08 PROCEDURE — 74011250636 HC RX REV CODE- 250/636: Performed by: INTERNAL MEDICINE

## 2022-03-08 PROCEDURE — 99232 SBSQ HOSP IP/OBS MODERATE 35: CPT | Performed by: INTERNAL MEDICINE

## 2022-03-08 PROCEDURE — 74011250637 HC RX REV CODE- 250/637: Performed by: INTERNAL MEDICINE

## 2022-03-08 PROCEDURE — APPSS45 APP SPLIT SHARED TIME 31-45 MINUTES: Performed by: NURSE PRACTITIONER

## 2022-03-08 PROCEDURE — 74011000258 HC RX REV CODE- 258: Performed by: NURSE PRACTITIONER

## 2022-03-08 PROCEDURE — 65270000029 HC RM PRIVATE

## 2022-03-08 PROCEDURE — 74011000250 HC RX REV CODE- 250: Performed by: INTERNAL MEDICINE

## 2022-03-08 PROCEDURE — 83735 ASSAY OF MAGNESIUM: CPT

## 2022-03-08 PROCEDURE — 74011250637 HC RX REV CODE- 250/637: Performed by: NURSE PRACTITIONER

## 2022-03-08 PROCEDURE — 74011250636 HC RX REV CODE- 250/636: Performed by: NURSE PRACTITIONER

## 2022-03-08 PROCEDURE — 36591 DRAW BLOOD OFF VENOUS DEVICE: CPT

## 2022-03-08 RX ORDER — POLYETHYLENE GLYCOL 3350 17 G/17G
17 POWDER, FOR SOLUTION ORAL DAILY
Status: DISCONTINUED | OUTPATIENT
Start: 2022-03-08 | End: 2022-03-10 | Stop reason: HOSPADM

## 2022-03-08 RX ORDER — MAGNESIUM SULFATE HEPTAHYDRATE 40 MG/ML
2 INJECTION, SOLUTION INTRAVENOUS ONCE
Status: COMPLETED | OUTPATIENT
Start: 2022-03-08 | End: 2022-03-08

## 2022-03-08 RX ORDER — LANOLIN ALCOHOL/MO/W.PET/CERES
400 CREAM (GRAM) TOPICAL 3 TIMES DAILY
Status: DISCONTINUED | OUTPATIENT
Start: 2022-03-08 | End: 2022-03-08

## 2022-03-08 RX ADMIN — ACETAMINOPHEN 650 MG: 325 TABLET, FILM COATED ORAL at 05:54

## 2022-03-08 RX ADMIN — CEFEPIME HYDROCHLORIDE 2 G: 2 INJECTION, POWDER, FOR SOLUTION INTRAVENOUS at 01:02

## 2022-03-08 RX ADMIN — BENZONATATE 100 MG: 100 CAPSULE ORAL at 19:20

## 2022-03-08 RX ADMIN — MAGNESIUM SULFATE HEPTAHYDRATE 2 G: 40 INJECTION, SOLUTION INTRAVENOUS at 05:42

## 2022-03-08 RX ADMIN — SODIUM CHLORIDE 100 ML/HR: 900 INJECTION, SOLUTION INTRAVENOUS at 15:52

## 2022-03-08 RX ADMIN — POLYETHYLENE GLYCOL 3350 17 G: 17 POWDER, FOR SOLUTION ORAL at 09:27

## 2022-03-08 RX ADMIN — ENOXAPARIN SODIUM 40 MG: 100 INJECTION SUBCUTANEOUS at 15:30

## 2022-03-08 RX ADMIN — FILGRASTIM-AAFI 300 MCG: 300 INJECTION, SOLUTION SUBCUTANEOUS at 09:43

## 2022-03-08 RX ADMIN — CEFEPIME HYDROCHLORIDE 2 G: 2 INJECTION, POWDER, FOR SOLUTION INTRAVENOUS at 13:24

## 2022-03-08 RX ADMIN — BENZONATATE 100 MG: 100 CAPSULE ORAL at 05:06

## 2022-03-08 RX ADMIN — FLUTICASONE PROPIONATE 2 SPRAY: 50 SPRAY, METERED NASAL at 09:26

## 2022-03-08 RX ADMIN — VANCOMYCIN HYDROCHLORIDE 1000 MG: 1 INJECTION, POWDER, LYOPHILIZED, FOR SOLUTION INTRAVENOUS at 14:33

## 2022-03-08 RX ADMIN — POTASSIUM CHLORIDE 20 MEQ: 20 TABLET, EXTENDED RELEASE ORAL at 09:27

## 2022-03-08 RX ADMIN — SODIUM CHLORIDE 100 ML/HR: 900 INJECTION, SOLUTION INTRAVENOUS at 02:14

## 2022-03-08 RX ADMIN — ACETAMINOPHEN 650 MG: 325 TABLET, FILM COATED ORAL at 19:28

## 2022-03-08 RX ADMIN — DIPHENHYDRAMINE HYDROCHLORIDE 25 MG: 25 CAPSULE ORAL at 16:05

## 2022-03-08 RX ADMIN — ONDANSETRON 8 MG: 8 TABLET, ORALLY DISINTEGRATING ORAL at 05:06

## 2022-03-08 RX ADMIN — DILTIAZEM HYDROCHLORIDE 240 MG: 240 CAPSULE, COATED, EXTENDED RELEASE ORAL at 09:27

## 2022-03-08 RX ADMIN — MONTELUKAST 10 MG: 10 TABLET, FILM COATED ORAL at 09:27

## 2022-03-08 RX ADMIN — NYSTATIN 5 ML: 100000 SUSPENSION ORAL at 05:07

## 2022-03-08 NOTE — PROGRESS NOTES
VANCO DAILY FOLLOW UP NOTE  [address] [facility] Pharmacokinetic Monitoring Service - Vancomycin    Consulting Provider: Brooke Toscano NP   Indication: Febrile Neutropenia  Target Concentration: Goal AUC/TONI 400-600 mg*hr/L  Day of Therapy: 2  Additional Antimicrobials: cefepime    Pertinent Laboratory Values: Wt Readings from Last 1 Encounters:   03/07/22 70 kg (154 lb 5.2 oz)     Temp Readings from Last 1 Encounters:   03/08/22 100.4 °F (38 °C)     No components found for: PROCAL  Recent Labs     03/08/22  0215 03/07/22  0911   BUN 14 11   CREA 1.45* 1.10*   WBC 1.0* 1.5*     Estimated Creatinine Clearance: 39.2 mL/min (A) (based on SCr of 1.45 mg/dL (H)). Lab Results   Component Value Date/Time    Vancomycin, random 26.2 02/19/2022 05:47 AM       MRSA Nasal Swab: N/A. Non-respiratory infection. .      Assessment:  Date/Time Dose Concentration AUC         Note: Serum concentrations collected for AUC dosing may appear elevated if collected in close proximity to the dose administered, this is not necessarily an indication of toxicity    Plan:  Current dosing regimen is based on Bayesian software  Decrease vancomycin to 1000mg q24h due to worsening renal function  Predicted AUC/Tr450/13.6  Vancomycin concentration ordered for 3/9 @ 0400  Pharmacy will continue to monitor patient and adjust therapy as indicated    Thank you for the consult,  DAYNA Jackson

## 2022-03-08 NOTE — PROGRESS NOTES
[facility] Hematology & Oncology        Inpatient Hematology / Oncology Progress Note      Admission Date: 3/7/2022 11:49 AM  Reason for Admission/Hospital Course: Neutropenic fever ([address] [D70.9, R50.81]      24 Hour Events:  Tmax 103.2  BC/UC pending  Starting granix    ROS:  Constitutional: Positive for fever, chills, weakness, malaise, fatigue. CV: Negative for chest pain, palpitations, edema. Respiratory: Positive for cough. No wheezing. GI: Positive  for nausea. Negative abdominal pain, diarrhea. 10 point review of systems is otherwise negative with the exception of the elements mentioned above in the HPI. Allergies   Allergen Reactions    Taxol [Paclitaxel] Angioedema    Lisinopril Cough     Other reaction(s): Cough       OBJECTIVE:  Patient Vitals for the past 8 hrs:   BP Temp Pulse Resp SpO2   22 0642  (!) 103 °F (39.4 °C)      22 0554  (!) 103.2 °F (39.6 °C)      22 0313 (!) 103/59 100 °F (37.8 °C) 96 18 96 %   22 0110  (!) 101 °F (38.3 °C)      22 0012  (!) 103.1 °F (39.5 °C)        Temp (24hrs), Av.3 °F (38.5 °C), Min:98.5 °F (36.9 °C), Max:103.2 °F (39.6 °C)    No intake/output data recorded. Physical Exam:  Constitutional: Well developed female in no acute distress, sitting comfortably in the hospital bed. HEENT: Normocephalic and atraumatic. Oropharynx is clear, mucous membranes are moist.  Pupils are equal, round, and reactive to light. Extraocular muscles are intact. Sclerae anicteric. Skin Warm and dry. No bruising and no rash noted. No erythema. No pallor. Respiratory Lungs are clear to auscultation bilaterally without wheezes, rales or rhonchi, normal air exchange without accessory muscle use. CVS Normal rate, regular rhythm and normal S1 and S2. No murmurs, gallops, or rubs. Abdomen Soft, nontender and nondistended, normoactive bowel sounds. Neuro Grossly nonfocal with no obvious sensory or motor deficits. MSK Normal range of motion in general.  No edema and no tenderness. Psych Appropriate mood and affect.         Labs:      Recent Labs     03/08/22  0215 03/07/22  0911   WBC 1.0* 1.5*   RBC 2.51* 3.07*   HGB 7.4* 8.8*   HCT 22.0* 26.6*   MCV 87.6 86.6   MCH 29.5 28.7   MCHC 33.6 33.1   RDW 12.8 13.0    223   GRANS 29* 16*   LYMPH 21 36   MONOS 48* 46*   EOS 0* 1   BASOS 0 1   IG 2 0   DF AUTOMATED AUTOMATED   ANEU 0.3* 0.2*   ABL 0.2* 0.5   ABM 0.5 0.8   KARIN 0.0 0.0   ABB 0.0 0.0   AIG 0.0 0.0        Recent Labs     03/08/22 0215 03/07/22  0911   * 126*   K 4.2 4.4    98   CO2 18* 21   AGAP 6* 7   * 110*   BUN 14 11   CREA 1.45* 1.10*   GFRAA 48* >60   GFRNA 40* 55*   CA 7.0* 8.7   * 88   TP 6.0* 7.5   ALB 1.9* 2.8*   GLOB 4.1* 4.7*   AGRAT 0.5* 0.6*   MG 1.6* 1.8         Imaging:    Medications:  Current Facility-Administered Medications   Medication Dose Route Frequency    magnesium sulfate 2 g/50 ml IVPB (premix or compounded)  2 g IntraVENous ONCE    diphenhydrAMINE (BENADRYL) capsule 25 mg  25 mg Oral Q6H PRN    lidocaine-prilocaine (EMLA) 2.5-2.5 % cream   Topical PRN    LORazepam (ATIVAN) tablet 0.5-1 mg  0.5-1 mg Oral Q8H PRN    montelukast (SINGULAIR) tablet 10 mg  10 mg Oral DAILY    ondansetron (ZOFRAN ODT) tablet 8 mg  8 mg Oral Q8H PRN    prochlorperazine (COMPAZINE) tablet 10 mg  10 mg Oral Q6H PRN    senna-docusate (PERICOLACE) 8.6-50 mg per tablet 1 Tablet  1 Tablet Oral DAILY PRN    dilTIAZem ER (CARDIZEM CD) capsule 240 mg  240 mg Oral DAILY    potassium chloride (K-DUR, KLOR-CON M20) SR tablet 20 mEq  20 mEq Oral DAILY    0.9% sodium chloride infusion  100 mL/hr IntraVENous CONTINUOUS    enoxaparin (LOVENOX) injection 40 mg  40 mg SubCUTAneous Q24H    cefepime (MAXIPIME) 2 g in 0.9% sodium chloride (MBP/ADV) 100 mL MBP  2 g IntraVENous Q12H    fluticasone propionate (FLONASE) 50 mcg/actuation nasal spray 2 Spray  2 Spray Both Nostrils DAILY    vancomycin (VANCOCIN) 1250 mg in  ml infusion  1,250 mg IntraVENous Q24H    acetaminophen (TYLENOL) tablet 650 mg  650 mg Oral Q6H PRN    benzonatate (TESSALON) capsule 100 mg  100 mg Oral TID PRN    heparin (porcine) pf 300 Units  300 Units InterCATHeter PRN    central line flush (saline) syringe 10 mL  10 mL InterCATHeter PRN    magic mouthwash (MIKE) oral suspension 5 mL  5 mL Oral Q4H PRN     Facility-Administered Medications Ordered in Other Encounters   Medication Dose Route Frequency    saline peripheral flush soln 10 mL  10 mL InterCATHeter PRN         ASSESSMENT:    Problem List  Date Reviewed: 3/7/2022          Codes Class Noted    Neutropenic fever ([facility] ICD-10-CM: D70.9, R50.81  ICD-9-CM: 288.00, 780.61  3/7/2022        Severe sepsis [facility]) ICD-10-CM: A41.9, R65.20  ICD-9-CM: 038.9, 995.92  2/18/2022        Carcinoma at 9:00 in left breast in female, estrogen receptor negative ([facility] ICD-10-CM: X29.254, Z17.1  ICD-9-CM: 174.2, V86.1  1/10/2022    Overview Addendum 1/27/2022  6:20 AM by Bozena Cabral MD     cT2N0 left breast IDC with DCIS, triple negative     No previous personal malignancy, prior breast surgery, or other symptoms reported. No known family breast cancer. 11/22/21 Bilat screening mammo  Scattered fibroglandular densities. A few typically benign-appearing calcifications in the left breast are similar in appearance to the previous study.     There is a focal asymmetry in the medial inferior left breast located 6 cm from the nipple. Recommend spot compression magnification views on the direct ML view and ultrasound, if indicated for further evaluation.     There is otherwise no suspicious mass, calcification, or architectural distortion in either breast.       IMPRESSION  Focal asymmetry in the medial inferior left breast at 6 cm from the nipple.        12/6/21 Left breast dx mammo and US  The area of concern persists with additional imaging.  This resides within the medial left breast at approximately 9:00 position. There  are few associated calcifications which appear somewhat coarse. This measures approximately 1.3 cm in size. An additional smaller asymmetry also at the 9:00  position resides 7 cm from the nipple. A left breast ultrasound is recommended for further evaluation.     12/6/21 left breast US  At the 9:00 position, 6 cm from the nipple is a lobulated hypoechoic but mildly heterogeneous appearing mass measuring  approximately 1.5 x 0.8 x 0.8 cm. The margins are occasionally ill-defined with some components demonstrating posterior acoustic shadowing. The long axis is  parallel to the plane of the chest wall. There is a 7 mm hypoechoic mass just adjacent to this This is hypoechoic with a lobulated contour. The margins are  occasionally ill-defined.      IMPRESSION  Adjacent left breast masses. Recommend tissue sampling of the larger lesion.       12/23/21 US-guided left breast biopsy  A:  \" LEFT BREAST, 9:00 POSITION, 6 CM FROM NIPPLE, CORE BIOPSY\": FOCI OF AT LEAST MICROINVASIVE INFILTRATING DUCTAL CARCINOMA,   INTERMEDIATE GRADE (MODERATELY DIFFERENTIATED) IN ASSOCIATION WITH FOCALLY HIGH GRADE DUCTAL CARCINOMA IN SITU. DEFINITE LYMPHOVASCULAR INVASION IS NOT IDENTIFIED   Sign Out Date: 12/24/2021 Tre Young M.D.   ER:  Negative (0.9%);  IN: Negative (0.4%); HER-2/COURTNEY: Negative (0)   Post biopsy mammogram shows the biopsy clip in good position. 12/28/21 Breast MRI  The breasts demonstrate moderate glandularity and mild background enhancement.     Left 9:00 biopsy clip within a lobulated heterogeneously enhancing mass with angulated and irregular margins overall measuring about 1.8 x 0.7 x 1.2 cm. Posterior to this (about 1 cm away) the smaller mass measuring 0.9 cm is unchanged from recent mammogram and ultrasound.  Additionally there is clumped  and reticular nonmass enhancement extending superiorly by about 2.7 cm and anteriorly by about 1.8 cm, in keeping with the DCIS and calcifications. Overall the expected area of disease measures about 4.5 cm AP by 1.2 cm transverse and is located at 9:00-9:30, mid to posterior depth. There is no evidence of involvement of skin or chest wall structures.     No other evidence of suspicious enhancing mass, and no other dominant or unique nonmass enhancement,  to suggest additional malignancy in either breast.  No evidence of axillary or internal mammary lymphadenopathy.     Elsewhere, limited visualization of the partially included thorax and upper abdomen shows no acute abnormality, with note of a small benign cyst in the left liver.         IMPRESSION  1. Left medial breast cancer measures about 4.5 cm in greatest dimension. 2. No additional suspicious finding elsewhere in either breast. No obvious lymphadenopathy.       1/13/22 presented at [facility]; genetic testing and refer to med oncology pre-op    1/13/22 BRCAPlus panel of 8 genes associated with hereditary cancer (including BRCA1 and BRCA2)   No pathogenic variants identified that increase risk of developing breast cancer                Triple negative malignant neoplasm of breast [facility]) ICD-10-CM: C50.919  ICD-9-CM: 174.9  1/10/2022        Screening mammogram, encounter for ICD-10-CM: Z12.31  ICD-9-CM: V76.12  3/18/2019    Overview Signed 3/18/2019  4:30 PM by Mimi Maya MD     Ordered for this Oct on 3/18/19             Women's annual routine gynecological examination ICD-10-CM: Z01.419  ICD-9-CM: V72.31  7/24/2017    Overview Signed 7/24/2017  9:42 AM by Mimi Maya MD     Pap with HPV done 7/24/17             Irregular menses ICD-10-CM: N92.6  ICD-9-CM: 626.4  7/24/2017    Overview Signed 7/24/2017  9:42 AM by Mimi Maya MD     noted             Benign essential HTN (Chronic) ICD-10-CM: I10  ICD-9-CM: 401.1  4/13/2016        Mixed hyperlipidemia ICD-10-CM: F65.9  ICD-9-CM: 272.2  4/13/2016        Encephalomalacia on imaging study ICD-10-CM: G93.89  ICD-9-CM: 348.89  4/13/2016        Seasonal allergic rhinitis ICD-10-CM: J30.2  ICD-9-CM: 477.9  4/13/2016        Frequent headaches ICD-10-CM: R51.9  ICD-9-CM: 784.0  2/25/2016                PLAN:  Triple negative breast cancer  3/8 sp cycle 2 carbo/taxotere/keytruda 2/28 (Taxol given first cycle then changed to taxotere due to concerns for allergic reaction. Cycle 3 due 3/21    Neutropenic fever   3/8 Tmax 103.2. Day 2 cefe/vanc. UA clear. CXR clear. UC/BC pending. Starting granix. Elevated creatinine  3/8 Cr 1.45 (1.10, 0.90) BL~<1.0. Continue with fluids. Electrolyte abnormalities  3/8 Roya SOPs    Constipation  3/8 had BM this am. Continue bowel regimen. Seasonal allergies  3/8 on Flonase, Singulair    Roya SOPs  Supportive care    Goals and plan of care reviewed with the patient. All questions answered to the best of our ability.             Karri , NP   [facility] Hematology & Oncology  [address] [address]  Office : [phone]  Fax : [phone]

## 2022-03-08 NOTE — PROGRESS NOTES
Discussed patient in interdisciplinarily rounds with the following disciplines: Physician, Case Management, Nursing, Dietary and Therapy. The plan of care and goals for discharge date/location were reviewed. CM continuing to follow for discharge needs. Zach De La Torre RN CM    Patient was just here approximately 10 days ago. No change in home status. Came in with neutropenic fever. Dischage plan to go home still remains. CM to follow for discharge needs.

## 2022-03-08 NOTE — PROGRESS NOTES
END OF SHIFT NOTE:    Intake/Output  03/07 1901 - 03/08 0700  In: 2477 [P.O.:840; I.V.:1637]  Out: 1000 [Urine:1000]   Voiding: YES  Catheter: NO  Drain:              Stool:  6 occurrences. Stool Assessment  Stool Color: Brown (03/08/22 0110)  Stool Appearance: Soft (03/08/22 0110)  Stool Amount: Small (03/08/22 0110)  Stool Source/Status: Rectum (03/08/22 0110)    Emesis:  1 occurrence         VITAL SIGNS  Patient Vitals for the past 12 hrs:   Temp Pulse Resp BP SpO2   03/08/22 0313 100 °F (37.8 °C) 96 18 (!) 103/59 96 %   03/08/22 0110 (!) 101 °F (38.3 °C)       03/08/22 0012 (!) 103.1 °F (39.5 °C)       03/07/22 2309 (!) 103.1 °F (39.5 °C) (!) 109 20 (!) 111/55 99 %   03/07/22 1919 98.5 °F (36.9 °C) (!) 106 19 129/60 100 %   03/07/22 1635 99.7 °F (37.6 °C)           Pain Assessment  Pain 1  Pain Scale 1: Numeric (0 - 10) (03/08/22 0110)  Pain Intensity 1: 0 (03/08/22 0110)  Patient Stated Pain Goal: 0 (03/08/22 0110)    Ambulating  Yes    Additional Information:   TMax 103.2;continues on IV antibiotics;pending culture results. Per daughter c/o nausea;constipation,cough;sorethroat. Mouth care encouraged;MMW ordered & given. Prn Tessalon x2 for cough. prn po Zofran x2 for n/v  Had 6x soft BM's after prn Pericolace given. x1 emesis this AM.    Shift report given to oncoming nurse ANKIT Alanis at the bedside.     Nasir Solorzano RN

## 2022-03-09 LAB
ABO + RH BLD: NORMAL
ALBUMIN SERPL-MCNC: 1.9 G/DL (ref 3.5–5)
ALBUMIN/GLOB SERPL: 0.5 {RATIO} (ref 1.2–3.5)
ALP SERPL-CCNC: 114 U/L (ref 50–130)
ALT SERPL-CCNC: 94 U/L (ref 12–65)
ANION GAP SERPL CALC-SCNC: 8 MMOL/L (ref 7–16)
AST SERPL-CCNC: 57 U/L (ref 15–37)
BASOPHILS # BLD: 0 K/UL (ref 0–0.2)
BASOPHILS NFR BLD MANUAL: 1 % (ref 0–2)
BILIRUB SERPL-MCNC: 0.2 MG/DL (ref 0.2–1.1)
BLOOD GROUP ANTIBODIES SERPL: NORMAL
BUN SERPL-MCNC: 11 MG/DL (ref 6–23)
CALCIUM SERPL-MCNC: 7.4 MG/DL (ref 8.3–10.4)
CHLORIDE SERPL-SCNC: 111 MMOL/L (ref 98–107)
CO2 SERPL-SCNC: 16 MMOL/L (ref 21–32)
CREAT SERPL-MCNC: 1.28 MG/DL (ref 0.6–1)
DIFFERENTIAL METHOD BLD: ABNORMAL
EOSINOPHIL # BLD: 0 K/UL (ref 0–0.8)
EOSINOPHIL NFR BLD MANUAL: 1 % (ref 1–8)
ERYTHROCYTE [DISTWIDTH] IN BLOOD BY AUTOMATED COUNT: 13.3 % (ref 11.9–14.6)
GLOBULIN SER CALC-MCNC: 4 G/DL (ref 2.3–3.5)
GLUCOSE SERPL-MCNC: 99 MG/DL (ref 65–100)
HCT VFR BLD AUTO: 24.9 % (ref 35.8–46.3)
HGB BLD-MCNC: 8.2 G/DL (ref 11.7–15.4)
LYMPHOCYTES # BLD: 0.9 K/UL (ref 0.5–4.6)
LYMPHOCYTES NFR BLD MANUAL: 19 % (ref 16–44)
MAGNESIUM SERPL-MCNC: 2.1 MG/DL (ref 1.8–2.4)
MCH RBC QN AUTO: 29.3 PG (ref 26.1–32.9)
MCHC RBC AUTO-ENTMCNC: 32.9 G/DL (ref 31.4–35)
MCV RBC AUTO: 88.9 FL (ref 79.6–97.8)
METAMYELOCYTES NFR BLD MANUAL: 8 %
MONOCYTES # BLD: 1.2 K/UL (ref 0.1–1.3)
MONOCYTES NFR BLD MANUAL: 25 % (ref 3–9)
MYELOCYTES NFR BLD MANUAL: 2 %
NEUTS BAND NFR BLD MANUAL: 8 % (ref 0–6)
NEUTS SEG # BLD: 2.6 K/UL (ref 1.7–8.2)
NEUTS SEG NFR BLD MANUAL: 33 % (ref 47–75)
NRBC # BLD: 0 K/UL (ref 0–0.2)
PLATELET # BLD AUTO: 238 K/UL (ref 150–450)
PLATELET COMMENTS,PCOM: ADEQUATE
PMV BLD AUTO: 9.9 FL (ref 9.4–12.3)
POTASSIUM SERPL-SCNC: 3.7 MMOL/L (ref 3.5–5.1)
PROMYELOCYTES NFR BLD MANUAL: 3 %
PROT SERPL-MCNC: 5.9 G/DL (ref 6.3–8.2)
RBC # BLD AUTO: 2.8 M/UL (ref 4.05–5.2)
RBC MORPH BLD: ABNORMAL
SODIUM SERPL-SCNC: 135 MMOL/L (ref 136–145)
SPECIMEN EXP DATE BLD: NORMAL
VANCOMYCIN SERPL-MCNC: 18 UG/ML
WBC # BLD AUTO: 4.7 K/UL (ref 4.3–11.1)
WBC MORPH BLD: ABNORMAL

## 2022-03-09 PROCEDURE — 2709999900 HC NON-CHARGEABLE SUPPLY

## 2022-03-09 PROCEDURE — 36591 DRAW BLOOD OFF VENOUS DEVICE: CPT

## 2022-03-09 PROCEDURE — APPSS45 APP SPLIT SHARED TIME 31-45 MINUTES: Performed by: NURSE PRACTITIONER

## 2022-03-09 PROCEDURE — 86900 BLOOD TYPING SEROLOGIC ABO: CPT

## 2022-03-09 PROCEDURE — 65270000029 HC RM PRIVATE

## 2022-03-09 PROCEDURE — 74011250636 HC RX REV CODE- 250/636: Performed by: NURSE PRACTITIONER

## 2022-03-09 PROCEDURE — 85025 COMPLETE CBC W/AUTO DIFF WBC: CPT

## 2022-03-09 PROCEDURE — 80202 ASSAY OF VANCOMYCIN: CPT

## 2022-03-09 PROCEDURE — 74011250637 HC RX REV CODE- 250/637: Performed by: NURSE PRACTITIONER

## 2022-03-09 PROCEDURE — 80053 COMPREHEN METABOLIC PANEL: CPT

## 2022-03-09 PROCEDURE — 99232 SBSQ HOSP IP/OBS MODERATE 35: CPT | Performed by: INTERNAL MEDICINE

## 2022-03-09 PROCEDURE — 74011250637 HC RX REV CODE- 250/637: Performed by: INTERNAL MEDICINE

## 2022-03-09 PROCEDURE — 83735 ASSAY OF MAGNESIUM: CPT

## 2022-03-09 PROCEDURE — 74011000258 HC RX REV CODE- 258: Performed by: NURSE PRACTITIONER

## 2022-03-09 PROCEDURE — 74011250636 HC RX REV CODE- 250/636: Performed by: INTERNAL MEDICINE

## 2022-03-09 PROCEDURE — 74011000250 HC RX REV CODE- 250: Performed by: INTERNAL MEDICINE

## 2022-03-09 RX ORDER — TEMAZEPAM 15 MG/1
15 CAPSULE ORAL
Status: DISCONTINUED | OUTPATIENT
Start: 2022-03-09 | End: 2022-03-10 | Stop reason: HOSPADM

## 2022-03-09 RX ORDER — LEVOFLOXACIN 5 MG/ML
750 INJECTION, SOLUTION INTRAVENOUS
Status: DISCONTINUED | OUTPATIENT
Start: 2022-03-09 | End: 2022-03-10 | Stop reason: HOSPADM

## 2022-03-09 RX ORDER — GUAIFENESIN/DEXTROMETHORPHAN 100-10MG/5
10 SYRUP ORAL
Status: DISCONTINUED | OUTPATIENT
Start: 2022-03-09 | End: 2022-03-10 | Stop reason: HOSPADM

## 2022-03-09 RX ORDER — TRIAMCINOLONE ACETONIDE 1 MG/G
CREAM TOPICAL 2 TIMES DAILY
Status: DISCONTINUED | OUTPATIENT
Start: 2022-03-09 | End: 2022-03-10 | Stop reason: HOSPADM

## 2022-03-09 RX ADMIN — TRIAMCINOLONE ACETONIDE: 1 CREAM TOPICAL at 10:59

## 2022-03-09 RX ADMIN — BENZONATATE 100 MG: 100 CAPSULE ORAL at 02:45

## 2022-03-09 RX ADMIN — FILGRASTIM-AAFI 300 MCG: 300 INJECTION, SOLUTION SUBCUTANEOUS at 08:41

## 2022-03-09 RX ADMIN — TEMAZEPAM 15 MG: 15 CAPSULE ORAL at 21:33

## 2022-03-09 RX ADMIN — DILTIAZEM HYDROCHLORIDE 240 MG: 240 CAPSULE, COATED, EXTENDED RELEASE ORAL at 08:41

## 2022-03-09 RX ADMIN — CEFEPIME HYDROCHLORIDE 2 G: 2 INJECTION, POWDER, FOR SOLUTION INTRAVENOUS at 02:29

## 2022-03-09 RX ADMIN — BENZONATATE 100 MG: 100 CAPSULE ORAL at 16:21

## 2022-03-09 RX ADMIN — ENOXAPARIN SODIUM 40 MG: 100 INJECTION SUBCUTANEOUS at 16:19

## 2022-03-09 RX ADMIN — TRIAMCINOLONE ACETONIDE: 1 CREAM TOPICAL at 16:19

## 2022-03-09 RX ADMIN — NYSTATIN 5 ML: 100000 SUSPENSION ORAL at 16:21

## 2022-03-09 RX ADMIN — NYSTATIN 5 ML: 100000 SUSPENSION ORAL at 21:33

## 2022-03-09 RX ADMIN — FLUTICASONE PROPIONATE 2 SPRAY: 50 SPRAY, METERED NASAL at 08:41

## 2022-03-09 RX ADMIN — MONTELUKAST 10 MG: 10 TABLET, FILM COATED ORAL at 08:41

## 2022-03-09 RX ADMIN — DOCUSATE SODIUM 50MG AND SENNOSIDES 8.6MG 1 TABLET: 8.6; 5 TABLET, FILM COATED ORAL at 21:33

## 2022-03-09 RX ADMIN — POTASSIUM CHLORIDE 20 MEQ: 20 TABLET, EXTENDED RELEASE ORAL at 08:41

## 2022-03-09 RX ADMIN — DIPHENHYDRAMINE HYDROCHLORIDE 25 MG: 25 CAPSULE ORAL at 21:33

## 2022-03-09 RX ADMIN — GUAIFENESIN AND DEXTROMETHORPHAN 10 ML: 100; 10 SYRUP ORAL at 21:33

## 2022-03-09 RX ADMIN — LEVOFLOXACIN 750 MG: 5 INJECTION, SOLUTION INTRAVENOUS at 11:00

## 2022-03-09 RX ADMIN — ONDANSETRON 8 MG: 8 TABLET, ORALLY DISINTEGRATING ORAL at 16:21

## 2022-03-09 NOTE — PROGRESS NOTES
Navigator rounded on patient's daughter, Ben Ulloa who stated that communication with staff has been ok. LS services were offered. Left my contact information in case any needs arose.          Thank you,               Demi Steele [facility] Senior AUM Cardiovascular Department  [facility]  [phone] (phone)

## 2022-03-09 NOTE — DISCHARGE SUMMARY
[facility] Hematology & Oncology: Inpatient Hematology / Oncology Discharge Summary Note    Patient ID:  Cara Cancer  [id number]  49 y.o.  1965    Admit Date: 3/7/2022    Discharge Date: 3/10/2022    Admission Diagnoses: Neutropenic fever ([address] [D70.9, R50.81]    Discharge Diagnoses:  Principal Diagnosis: <principal problem not specified>  Active Problems:    Neutropenic fever ([address] (3/7/2022)      Hospital Course:  Mrs. Carla Ortiz is a 48 yo female with triple negative breast cancer sp cycle 2 carbo/taoxtere/keytruda on 2/28/2022. Taxol given first cycle then changed to taxotere due to concerns for allergic reaction. She was seen in clinic by Dr. Shaina Holley on day of admission 3/7 for toxicity check. She was complaining of nausea, weight loss, temp at home and allergy type symptoms. Her ANC was 0.2, LFTs elevated, Electrolyte abnormalities, Cr mildly elevated. CXR clear. UA clear. BC/UC obtained NTD. Started on cefe and vanc and fluids. Day 4 of antibiotics  she complained of facial rash-antibiotics changed to Levaquin alone. She is without fever again today. Fever workup remains negative. ANC 15.2. Granix was given x 1. She is feeling much better and is ready for discharge. Levaquin will continue to complete 7 days abx. We will arrange for follow up with NP one week. Educated to call with any fevers or other concerning symptoms.      Consults:  None    Pertinent Diagnostic Studies:   Labs:    Recent Labs     03/10/22  0217 03/09/22  0251 03/08/22  0215   WBC 19.7* 4.7 1.0*   HGB 7.3* 8.2* 7.4*    238 204   ANEU 15.2* 2.6 0.3*      Recent Labs     03/10/22  0217 03/09/22  0251 03/08/22 0215   * 135* 126*   K 3.9 3.7 4.2   * 111* 102   CO2 17* 16* 18*   GLU 85 99 134*   BUN 7 11 14   CREA 1.25* 1.28* 1.45*   CA 8.0* 7.4* 7.0*    114 137*   TP 5.5* 5.9* 6.0*   ALB 1.7* 1.9* 1.9*   MG 1.8 2.1 1.6*     Current Discharge Medication List      START taking these medications    Details guaiFENesin-dextromethorphan (ROBITUSSIN DM) 100-10 mg/5 mL syrup Take 10 mL by mouth every six (6) hours as needed for Cough or Congestion for up to 10 days. Qty: 400 mL, Refills: 0  Start date: 3/10/2022, End date: 3/20/2022      levoFLOXacin (Levaquin) 750 mg tablet Take 1 Tablet by mouth daily for 3 days. Qty: 3 Tablet, Refills: 0  Start date: 3/10/2022, End date: 3/13/2022         CONTINUE these medications which have NOT CHANGED    Details   montelukast (Singulair) 10 mg tablet Take 10 mg by mouth daily. dilTIAZem CD (CARDIZEM CD) 240 mg ER capsule 1 po qd  Qty: 90 Cap, Refills: 3    Associated Diagnoses: Benign essential HTN      ondansetron hcl (Zofran) 8 mg tablet Take 1 Tablet by mouth every eight (8) hours as needed for Nausea. Indications: prevent nausea and vomiting from cancer chemotherapy  Qty: 90 Tablet, Refills: 2      diphenhydrAMINE (BENADRYL) 25 mg capsule Take 1 Capsule by mouth every six (6) hours as needed for Itching, Skin Irritation or Allergies. Qty: 30 Capsule, Refills: 0      diphenhydrAMINE-zinc acetate 2%-0.1% (BENADRYL) 2-0.1 % topical cream Apply  to affected area two (2) times daily as needed for Itching. Qty: 30 g, Refills: 0      potassium chloride (K-DUR, KLOR-CON M20) 20 mEq tablet Take 1 Tablet by mouth daily. Qty: 30 Tablet, Refills: 0      triamcinolone acetonide (KENALOG) 0.1 % topical cream Apply  to affected area three (3) times daily. use thin layer over rash  Qty: 45 g, Refills: 1      prochlorperazine (Compazine) 10 mg tablet Take 1 Tablet by mouth every six (6) hours as needed for Nausea. Indications: prevent nausea and vomiting from cancer chemotherapy  Qty: 60 Tablet, Refills: 2      lidocaine-prilocaine (EMLA) topical cream Apply to port about 45 minutes prior to access  Qty: 30 g, Refills: 5      LORazepam (Ativan) 1 mg tablet Take 0.5-1 Tablets by mouth every eight (8) hours as needed for Anxiety. Max Daily Amount: 3 mg.  Indications: anxious, nausea and vomiting caused by cancer drugs  Qty: 60 Tablet, Refills: 0    Associated Diagnoses: Triple negative malignant neoplasm of breast ([facility] Nausea; Anxiety      senna-docusate (PERICOLACE) 8.6-50 mg per tablet Take 1 Tablet by mouth daily as needed for Constipation. Qty: 30 Tablet, Refills: 1         STOP taking these medications       olmesartan-hydroCHLOROthiazide (BENICAR HCT) 40-25 mg per tablet Comments:   Reason for Stopping:                 OBJECTIVE:  Patient Vitals for the past 8 hrs:   BP Temp Pulse Resp SpO2   03/10/22 0724 120/72 99 °F (37.2 °C) 97 18 99 %   03/10/22 0233 114/61 99.3 °F (37.4 °C) 93 20 97 %     Temp (24hrs), Av.9 °F (37.2 °C), Min:98.1 °F (36.7 °C), Max:99.3 °F (37.4 °C)    03/10 07 - 03/10 1900  In: -   Out: 800 [Urine:800]    Physical Exam:  Constitutional: Well developed, well nourished female in no acute distress, sitting comfortably in bedside chair   HEENT: Normocephalic and atraumatic. Oropharynx is clear, mucous membranes are moist.  Pupils are equal, round, and reactive to light. Extraocular muscles are intact. Sclerae anicteric. Skin Warm and dry. No bruising and no rash noted. No erythema. No pallor. Respiratory Lungs are clear to auscultation bilaterally without wheezes, rales or rhonchi, normal air exchange without accessory muscle use. CVS Normal rate, regular rhythm and normal S1 and S2. No murmurs, gallops, or rubs. Abdomen Soft, nontender and nondistended, normoactive bowel sounds. Neuro Grossly nonfocal with no obvious sensory or motor deficits. MSK Normal range of motion in general.  No edema and no tenderness. Psych Appropriate mood and affect. ASSESSMENT:    Active Problems:    Neutropenic fever ([facility] (3/7/2022)        DISPOSITION:  Follow-up Appointments   Procedures    FOLLOW UP VISIT Appointment in: One Week Please arrange follow up with NP one week. Please arrange follow up with NP one week.      Standing Status:   Standing Number of Occurrences:   1     Order Specific Question:   Appointment in     Answer: One Week         Over 60 minutes was spent in discharge planning and coordination of care.             [facility]  [address] [address]  Office : [phone]  Fax : [phone]

## 2022-03-10 VITALS
HEART RATE: 97 BPM | WEIGHT: 164.1 LBS | SYSTOLIC BLOOD PRESSURE: 120 MMHG | OXYGEN SATURATION: 99 % | TEMPERATURE: 99 F | DIASTOLIC BLOOD PRESSURE: 72 MMHG | RESPIRATION RATE: 18 BRPM | BODY MASS INDEX: 31.52 KG/M2

## 2022-03-10 LAB
ALBUMIN SERPL-MCNC: 1.7 G/DL (ref 3.5–5)
ALBUMIN/GLOB SERPL: 0.4 {RATIO} (ref 1.2–3.5)
ALP SERPL-CCNC: 106 U/L (ref 50–136)
ALT SERPL-CCNC: 65 U/L (ref 12–65)
ANION GAP SERPL CALC-SCNC: 7 MMOL/L (ref 7–16)
AST SERPL-CCNC: 24 U/L (ref 15–37)
BACTERIA SPEC CULT: NORMAL
BILIRUB SERPL-MCNC: 0.2 MG/DL (ref 0.2–1.1)
BUN SERPL-MCNC: 7 MG/DL (ref 6–23)
CALCIUM SERPL-MCNC: 8 MG/DL (ref 8.3–10.4)
CHLORIDE SERPL-SCNC: 111 MMOL/L (ref 98–107)
CO2 SERPL-SCNC: 17 MMOL/L (ref 21–32)
CREAT SERPL-MCNC: 1.25 MG/DL (ref 0.6–1)
DIFFERENTIAL METHOD BLD: ABNORMAL
ERYTHROCYTE [DISTWIDTH] IN BLOOD BY AUTOMATED COUNT: 13.8 % (ref 11.9–14.6)
GLOBULIN SER CALC-MCNC: 3.8 G/DL (ref 2.3–3.5)
GLUCOSE SERPL-MCNC: 85 MG/DL (ref 65–100)
HCT VFR BLD AUTO: 22 % (ref 35.8–46.3)
HGB BLD-MCNC: 7.3 G/DL (ref 11.7–15.4)
LYMPHOCYTES # BLD: 1 K/UL (ref 0.5–4.6)
LYMPHOCYTES NFR BLD MANUAL: 5 % (ref 16–44)
MAGNESIUM SERPL-MCNC: 1.8 MG/DL (ref 1.8–2.4)
MCH RBC QN AUTO: 29.2 PG (ref 26.1–32.9)
MCHC RBC AUTO-ENTMCNC: 33.2 G/DL (ref 31.4–35)
MCV RBC AUTO: 88 FL (ref 79.6–97.8)
METAMYELOCYTES NFR BLD MANUAL: 3 %
MONOCYTES # BLD: 3.5 K/UL (ref 0.1–1.3)
MONOCYTES NFR BLD MANUAL: 18 % (ref 3–9)
MYELOCYTES NFR BLD MANUAL: 4 %
NEUTS BAND NFR BLD MANUAL: 10 % (ref 0–6)
NEUTS SEG # BLD: 15.2 K/UL (ref 1.7–8.2)
NEUTS SEG NFR BLD MANUAL: 59 % (ref 47–75)
NRBC # BLD: 0.02 K/UL (ref 0–0.2)
PLATELET # BLD AUTO: 231 K/UL (ref 150–450)
PLATELET COMMENTS,PCOM: ADEQUATE
PMV BLD AUTO: 9.6 FL (ref 9.4–12.3)
POTASSIUM SERPL-SCNC: 3.9 MMOL/L (ref 3.5–5.1)
PROMYELOCYTES NFR BLD MANUAL: 1 %
PROT SERPL-MCNC: 5.5 G/DL (ref 6.3–8.2)
RBC # BLD AUTO: 2.5 M/UL (ref 4.05–5.2)
RBC MORPH BLD: ABNORMAL
SERVICE CMNT-IMP: NORMAL
SODIUM SERPL-SCNC: 135 MMOL/L (ref 136–145)
WBC # BLD AUTO: 19.7 K/UL (ref 4.3–11.1)

## 2022-03-10 PROCEDURE — 83735 ASSAY OF MAGNESIUM: CPT

## 2022-03-10 PROCEDURE — 36591 DRAW BLOOD OFF VENOUS DEVICE: CPT

## 2022-03-10 PROCEDURE — 99239 HOSP IP/OBS DSCHRG MGMT >30: CPT | Performed by: INTERNAL MEDICINE

## 2022-03-10 PROCEDURE — APPSS180 APP SPLIT SHARED TIME > 60 MINUTES: Performed by: NURSE PRACTITIONER

## 2022-03-10 PROCEDURE — 74011250637 HC RX REV CODE- 250/637: Performed by: NURSE PRACTITIONER

## 2022-03-10 PROCEDURE — 74011250636 HC RX REV CODE- 250/636: Performed by: INTERNAL MEDICINE

## 2022-03-10 PROCEDURE — 85025 COMPLETE CBC W/AUTO DIFF WBC: CPT

## 2022-03-10 PROCEDURE — 80053 COMPREHEN METABOLIC PANEL: CPT

## 2022-03-10 RX ORDER — GUAIFENESIN/DEXTROMETHORPHAN 100-10MG/5
10 SYRUP ORAL
Qty: 400 ML | Refills: 0 | Status: SHIPPED | OUTPATIENT
Start: 2022-03-10 | End: 2022-03-21

## 2022-03-10 RX ORDER — LEVOFLOXACIN 750 MG/1
750 TABLET ORAL DAILY
Qty: 3 TABLET | Refills: 0 | Status: SHIPPED | OUTPATIENT
Start: 2022-03-10 | End: 2022-03-13

## 2022-03-10 RX ORDER — LEVOFLOXACIN 750 MG/1
750 TABLET ORAL DAILY
Qty: 6 TABLET | Refills: 0 | Status: SHIPPED | OUTPATIENT
Start: 2022-03-10 | End: 2022-03-10 | Stop reason: SDUPTHER

## 2022-03-10 RX ADMIN — POLYETHYLENE GLYCOL 3350 17 G: 17 POWDER, FOR SOLUTION ORAL at 09:12

## 2022-03-10 RX ADMIN — GUAIFENESIN AND DEXTROMETHORPHAN 10 ML: 100; 10 SYRUP ORAL at 05:28

## 2022-03-10 RX ADMIN — TRIAMCINOLONE ACETONIDE: 1 CREAM TOPICAL at 09:13

## 2022-03-10 RX ADMIN — GUAIFENESIN AND DEXTROMETHORPHAN 10 ML: 100; 10 SYRUP ORAL at 11:33

## 2022-03-10 RX ADMIN — MONTELUKAST 10 MG: 10 TABLET, FILM COATED ORAL at 09:13

## 2022-03-10 RX ADMIN — HEPARIN SODIUM (PORCINE) LOCK FLUSH IV SOLN 100 UNIT/ML 300 UNITS: 100 SOLUTION at 11:33

## 2022-03-10 RX ADMIN — POTASSIUM CHLORIDE 20 MEQ: 20 TABLET, EXTENDED RELEASE ORAL at 09:12

## 2022-03-10 RX ADMIN — FLUTICASONE PROPIONATE 2 SPRAY: 50 SPRAY, METERED NASAL at 09:11

## 2022-03-10 NOTE — PROGRESS NOTES
Discharge instructions & prescription information given to pt & her family. Verbalized understanding. Port removed; heparin used before port was removed. Opportunity for questions provided. Instructed pt to call when ready to be taken down.

## 2022-03-10 NOTE — PROGRESS NOTES
Navigator rounded on patient's  who was a bedside with his wife, who stated that communication with staff has been ok. [employer]  was used for this call.     Thank you,               Virginia Yusuf [facility] Senior Endocrine Technology Department  [facility]  [phone] (phone)

## 2022-03-10 NOTE — PROGRESS NOTES
Received pt from ANKIT Lyons) in stable condition. Pt up using the bathroom. Resp even & unlabored on room air; no acute signs of distress noted. Bed low & locked; call light in reach; no needs voiced.

## 2022-03-10 NOTE — PROGRESS NOTES
Late entry for 3-9-2022:    Readmission assessment completed. Probable discharge 3/10/2022. CM following.

## 2022-03-10 NOTE — PROGRESS NOTES
Problem: Falls - Risk of  Goal: *Absence of Falls  Description: Document Bj العلي Fall Risk and appropriate interventions in the flowsheet. Outcome: Resolved/Met     Problem: Patient Education: Go to Patient Education Activity  Goal: Patient/Family Education  Outcome: Resolved/Met     Problem:  Body Temperature -  Risk of, Imbalanced  Goal: *Absence of heat stress or hyperthermia signs and symptoms  Outcome: Resolved/Met     Problem: Patient Education: Go to Patient Education Activity  Goal: Patient/Family Education  Outcome: Resolved/Met     Problem: Nausea/Vomiting (Adult)  Goal: *Absence of nausea/vomiting  Outcome: Resolved/Met     Problem: Patient Education: Go to Patient Education Activity  Goal: Patient/Family Education  Outcome: Resolved/Met     Problem: Pain  Goal: *Control of Pain  Outcome: Resolved/Met     Problem: Patient Education: Go to Patient Education Activity  Goal: Patient/Family Education  Outcome: Resolved/Met     Problem: Airway Clearance - Ineffective  Goal: *Patent airway  Outcome: Resolved/Met

## 2022-03-10 NOTE — PROGRESS NOTES
Pt observed with strong cough after tessalon capsule administration. Pt states that Tessalon capsules do not help, Robitussin ordered. Robitussin much more effective in relieving cough. Generalized upper body rash continues, benadryl administered once. Mirlax held during day shift due to loose stool. Pt reported constipation and requested medication to help pass bowel movement. Pericolace x1 administered. NO fever during shift!

## 2022-03-12 LAB
BACTERIA SPEC CULT: NORMAL
BACTERIA SPEC CULT: NORMAL
SERVICE CMNT-IMP: NORMAL
SERVICE CMNT-IMP: NORMAL

## 2022-03-12 NOTE — PROGRESS NOTES
Physician Progress Note      Yoli Govea  CSN #:                  [id number]  :                       1965  ADMIT DATE:       3/7/2022 11:49 AM  DISCH DATE:        3/10/2022 11:45 AM  RESPONDING  PROVIDER #:        Harvinder Ulloa MD          QUERY TEXT:    Pt admitted with neutropenic fever. Pt receiving chemotherapy for breast cancer. If possible, please document in progress notes and discharge summary the relationship, if any, between neutropenic fever and chemotherapy. The medical record reflects the following:  Risk Factors: Ongoing chemotherapy, breast cancer  Clinical Indicators: WBC 1.0, 4.7, 19.7   Per H&P says ANC 0.2 & T 101 up to 103   recent Taxol infusion  on [place]  Treatment: Monitor WBC, IV Vanco, Iv Cefepime  G-CSF given. Richard Finley BSN  [phone]  Options provided:  -- Neutropenic fever due to chemotherapy  -- Neutropenic fever unrelated to chemotherapy  -- Other - I will add my own diagnosis  -- Disagree - Not applicable / Not valid  -- Disagree - Clinically unable to determine / Unknown  -- Refer to Clinical Documentation Reviewer    PROVIDER RESPONSE TEXT:    This patient has neutropenic fever due to chemotherapy.     Query created by: Chris Rodrigues on 3/11/2022 3:20 PM      Electronically signed by:  Harvinder Ulloa MD 3/11/2022 10:20 PM

## 2022-03-14 ENCOUNTER — APPOINTMENT (OUTPATIENT)
Dept: INFUSION THERAPY | Age: 57
End: 2022-03-14

## 2022-03-19 PROBLEM — R50.81 NEUTROPENIC FEVER (HCC): Status: ACTIVE | Noted: 2022-03-07

## 2022-03-19 PROBLEM — Z12.31 SCREENING MAMMOGRAM, ENCOUNTER FOR: Status: ACTIVE | Noted: 2019-03-18

## 2022-03-19 PROBLEM — Z01.419 WOMEN'S ANNUAL ROUTINE GYNECOLOGICAL EXAMINATION: Status: ACTIVE | Noted: 2017-07-24

## 2022-03-19 PROBLEM — Z17.1 CARCINOMA OF UPPER-INNER QUADRANT OF LEFT BREAST IN FEMALE, ESTROGEN RECEPTOR NEGATIVE (HCC): Status: ACTIVE | Noted: 2022-01-10

## 2022-03-19 PROBLEM — C50.919 TRIPLE NEGATIVE MALIGNANT NEOPLASM OF BREAST (HCC): Status: ACTIVE | Noted: 2022-01-10

## 2022-03-19 PROBLEM — C50.212 CARCINOMA OF UPPER-INNER QUADRANT OF LEFT BREAST IN FEMALE, ESTROGEN RECEPTOR NEGATIVE (HCC): Status: ACTIVE | Noted: 2022-01-10

## 2022-03-19 PROBLEM — N92.6 IRREGULAR MENSES: Status: ACTIVE | Noted: 2017-07-24

## 2022-03-19 PROBLEM — D70.9 NEUTROPENIC FEVER (HCC): Status: ACTIVE | Noted: 2022-03-07

## 2022-03-19 PROBLEM — R65.20 SEVERE SEPSIS (HCC): Status: ACTIVE | Noted: 2022-02-18

## 2022-03-19 PROBLEM — A41.9 SEVERE SEPSIS (HCC): Status: ACTIVE | Noted: 2022-02-18

## 2022-03-19 PROBLEM — Z17.421 TRIPLE NEGATIVE MALIGNANT NEOPLASM OF BREAST (HCC): Status: ACTIVE | Noted: 2022-01-10

## 2022-03-21 ENCOUNTER — HOSPITAL ENCOUNTER (OUTPATIENT)
Dept: INFUSION THERAPY | Age: 57
Discharge: HOME | End: 2022-03-21
Payer: COMMERCIAL

## 2022-03-21 DIAGNOSIS — C50.919 TRIPLE NEGATIVE MALIGNANT NEOPLASM OF BREAST (HCC): ICD-10-CM

## 2022-03-21 DIAGNOSIS — Z79.899 HIGH RISK MEDICATION USE: ICD-10-CM

## 2022-03-21 DIAGNOSIS — C50.919 TRIPLE NEGATIVE MALIGNANT NEOPLASM OF BREAST (HCC): Primary | ICD-10-CM

## 2022-03-21 LAB
ALBUMIN SERPL-MCNC: 3.2 G/DL (ref 3.5–5)
ALBUMIN/GLOB SERPL: 0.7 {RATIO} (ref 1.2–3.5)
ALP SERPL-CCNC: 94 U/L (ref 50–136)
ALT SERPL-CCNC: 26 U/L (ref 12–65)
ANION GAP SERPL CALC-SCNC: 5 MMOL/L (ref 7–16)
AST SERPL-CCNC: 19 U/L (ref 15–37)
BASOPHILS # BLD: 0.3 K/UL (ref 0–0.2)
BASOPHILS NFR BLD: 4 % (ref 0–2)
BILIRUB SERPL-MCNC: 0.3 MG/DL (ref 0.2–1.1)
BUN SERPL-MCNC: 17 MG/DL (ref 6–23)
CALCIUM SERPL-MCNC: 9.3 MG/DL (ref 8.3–10.4)
CHLORIDE SERPL-SCNC: 108 MMOL/L (ref 98–107)
CO2 SERPL-SCNC: 24 MMOL/L (ref 21–32)
CREAT SERPL-MCNC: 1.2 MG/DL (ref 0.6–1)
DIFFERENTIAL METHOD BLD: ABNORMAL
EOSINOPHIL # BLD: 0.4 K/UL (ref 0–0.8)
EOSINOPHIL NFR BLD: 6 % (ref 0.5–7.8)
ERYTHROCYTE [DISTWIDTH] IN BLOOD BY AUTOMATED COUNT: 15.2 % (ref 11.9–14.6)
GLOBULIN SER CALC-MCNC: 4.8 G/DL (ref 2.3–3.5)
GLUCOSE SERPL-MCNC: 121 MG/DL (ref 65–100)
HCT VFR BLD AUTO: 27.2 % (ref 35.8–46.3)
HGB BLD-MCNC: 8.5 G/DL (ref 11.7–15.4)
IMM GRANULOCYTES # BLD AUTO: 0 K/UL (ref 0–0.5)
IMM GRANULOCYTES NFR BLD AUTO: 0 % (ref 0–5)
LYMPHOCYTES # BLD: 1.1 K/UL (ref 0.5–4.6)
LYMPHOCYTES NFR BLD: 17 % (ref 13–44)
MAGNESIUM SERPL-MCNC: 1.9 MG/DL (ref 1.8–2.4)
MCH RBC QN AUTO: 29 PG (ref 26.1–32.9)
MCHC RBC AUTO-ENTMCNC: 31.3 G/DL (ref 31.4–35)
MCV RBC AUTO: 92.8 FL (ref 79.6–97.8)
MONOCYTES # BLD: 0.5 K/UL (ref 0.1–1.3)
MONOCYTES NFR BLD: 7 % (ref 4–12)
NEUTS SEG # BLD: 4.4 K/UL (ref 1.7–8.2)
NEUTS SEG NFR BLD: 66 % (ref 43–78)
NRBC # BLD: 0 K/UL (ref 0–0.2)
PLATELET # BLD AUTO: 111 K/UL (ref 150–450)
PMV BLD AUTO: 8.6 FL (ref 9.4–12.3)
POTASSIUM SERPL-SCNC: 4.3 MMOL/L (ref 3.5–5.1)
PROT SERPL-MCNC: 8 G/DL (ref 6.3–8.2)
RBC # BLD AUTO: 2.93 M/UL (ref 4.05–5.2)
SODIUM SERPL-SCNC: 137 MMOL/L (ref 136–145)
TSH SERPL DL<=0.005 MIU/L-ACNC: 0.36 UIU/ML (ref 0.36–3.74)
WBC # BLD AUTO: 6.7 K/UL (ref 4.3–11.1)

## 2022-03-21 PROCEDURE — 85025 COMPLETE CBC W/AUTO DIFF WBC: CPT

## 2022-03-21 PROCEDURE — 96417 CHEMO IV INFUS EACH ADDL SEQ: CPT

## 2022-03-21 PROCEDURE — 36591 DRAW BLOOD OFF VENOUS DEVICE: CPT

## 2022-03-21 PROCEDURE — 96367 TX/PROPH/DG ADDL SEQ IV INF: CPT

## 2022-03-21 PROCEDURE — 96375 TX/PRO/DX INJ NEW DRUG ADDON: CPT

## 2022-03-21 PROCEDURE — 96413 CHEMO IV INFUSION 1 HR: CPT

## 2022-03-21 PROCEDURE — 83735 ASSAY OF MAGNESIUM: CPT

## 2022-03-21 PROCEDURE — 74011250636 HC RX REV CODE- 250/636: Performed by: INTERNAL MEDICINE

## 2022-03-21 PROCEDURE — 74011000250 HC RX REV CODE- 250: Performed by: INTERNAL MEDICINE

## 2022-03-21 PROCEDURE — 84443 ASSAY THYROID STIM HORMONE: CPT

## 2022-03-21 PROCEDURE — 74011000258 HC RX REV CODE- 258: Performed by: INTERNAL MEDICINE

## 2022-03-21 PROCEDURE — 80053 COMPREHEN METABOLIC PANEL: CPT

## 2022-03-21 RX ORDER — SODIUM CHLORIDE 0.9 % (FLUSH) 0.9 %
10 SYRINGE (ML) INJECTION AS NEEDED
Status: DISCONTINUED | OUTPATIENT
Start: 2022-03-21 | End: 2022-03-23 | Stop reason: HOSPADM

## 2022-03-21 RX ORDER — ONDANSETRON 2 MG/ML
8 INJECTION INTRAMUSCULAR; INTRAVENOUS ONCE
Status: COMPLETED | OUTPATIENT
Start: 2022-03-21 | End: 2022-03-21

## 2022-03-21 RX ORDER — SODIUM CHLORIDE 0.9 % (FLUSH) 0.9 %
10 SYRINGE (ML) INJECTION AS NEEDED
Status: ACTIVE | OUTPATIENT
Start: 2022-03-21 | End: 2022-03-21

## 2022-03-21 RX ORDER — SODIUM CHLORIDE 9 MG/ML
25 INJECTION, SOLUTION INTRAVENOUS CONTINUOUS
Status: ACTIVE | OUTPATIENT
Start: 2022-03-21 | End: 2022-03-21

## 2022-03-21 RX ORDER — DIPHENHYDRAMINE HYDROCHLORIDE 50 MG/ML
50 INJECTION, SOLUTION INTRAMUSCULAR; INTRAVENOUS ONCE
Status: COMPLETED | OUTPATIENT
Start: 2022-03-21 | End: 2022-03-21

## 2022-03-21 RX ADMIN — FAMOTIDINE 20 MG: 10 INJECTION, SOLUTION INTRAVENOUS at 11:56

## 2022-03-21 RX ADMIN — ONDANSETRON 8 MG: 2 INJECTION INTRAMUSCULAR; INTRAVENOUS at 12:00

## 2022-03-21 RX ADMIN — CARBOPLATIN 415 MG: 10 INJECTION, SOLUTION INTRAVENOUS at 14:00

## 2022-03-21 RX ADMIN — SODIUM CHLORIDE, PRESERVATIVE FREE 10 ML: 5 INJECTION INTRAVENOUS at 14:50

## 2022-03-21 RX ADMIN — SODIUM CHLORIDE 200 MG: 9 INJECTION, SOLUTION INTRAVENOUS at 11:22

## 2022-03-21 RX ADMIN — DIPHENHYDRAMINE HYDROCHLORIDE 50 MG: 50 INJECTION, SOLUTION INTRAMUSCULAR; INTRAVENOUS at 11:55

## 2022-03-21 RX ADMIN — FOSAPREPITANT 150 MG: 150 INJECTION, POWDER, LYOPHILIZED, FOR SOLUTION INTRAVENOUS at 12:22

## 2022-03-21 RX ADMIN — DOCETAXEL ANHYDROUS 106 MG: 10 INJECTION, SOLUTION INTRAVENOUS at 13:05

## 2022-03-21 RX ADMIN — DEXAMETHASONE SODIUM PHOSPHATE 12 MG: 4 INJECTION, SOLUTION INTRAMUSCULAR; INTRAVENOUS at 12:03

## 2022-03-21 RX ADMIN — Medication 10 ML: at 09:00

## 2022-03-21 RX ADMIN — SODIUM CHLORIDE 25 ML/HR: 9 INJECTION, SOLUTION INTRAVENOUS at 11:22

## 2022-03-21 NOTE — PROGRESS NOTES
Patient arrived to port lab for port access and lab draw   [place] accessed and labs drawn per protocol   *Port remains accessed   Patient discharged from port lab ambulatory

## 2022-03-21 NOTE — PROGRESS NOTES
Arrived to the [facility]. Keytruda, Taxotere and Carboplatin completed. Patient tolerated with mild reaction to [facility]. Any issues or concerns during appointment: patient and her daughter speak very little [place], Video  Massimo used to communicate. Patient v/u that she will be getting Emend today to help with nausea at home, v/u to continue using her home medication as needed. Patient will be receiving G-CSF tomorrow. Explained use of Claritin starting today and ideally starting the day before chemo with her next treatment. At the end of Keytruda, the patient c/o itching, dime sized hives x3 noted to patient's back. Patient was then medicated with Decadron and Benadryl as per treatment plan for Taxotere. Noted much improvement and hive to back disappeared. Two hive to her leg remained and were mildly itchy. Spoke with Dr Cordell Baron and ok to continue with treatment. Follow up email sent to Dr Cordell Baron and Db Knight RN. Patient and family were escorted to the [facility] pharmacy to purchase Calritin. Patient aware of next infusion appointment on 3/22/22 (date) at 1400 (time). Patient instructed to call provider with temperature of 100.4 or greater or nausea/vomiting/ diarrhea or pain not controlled by medications  Discharged ambulatory with family.

## 2022-03-22 ENCOUNTER — HOSPITAL ENCOUNTER (OUTPATIENT)
Dept: INFUSION THERAPY | Age: 57
Discharge: HOME | End: 2022-03-22
Payer: COMMERCIAL

## 2022-03-22 VITALS
RESPIRATION RATE: 16 BRPM | SYSTOLIC BLOOD PRESSURE: 140 MMHG | HEART RATE: 93 BPM | OXYGEN SATURATION: 99 % | TEMPERATURE: 98 F | DIASTOLIC BLOOD PRESSURE: 74 MMHG

## 2022-03-22 DIAGNOSIS — C50.919 TRIPLE NEGATIVE MALIGNANT NEOPLASM OF BREAST (HCC): Primary | ICD-10-CM

## 2022-03-22 PROCEDURE — 96372 THER/PROPH/DIAG INJ SC/IM: CPT

## 2022-03-22 PROCEDURE — 74011250636 HC RX REV CODE- 250/636: Performed by: INTERNAL MEDICINE

## 2022-03-22 RX ORDER — TBO-FILGRASTIM 300 UG/.5ML
300 INJECTION, SOLUTION SUBCUTANEOUS ONCE
Qty: 1 EACH | Refills: 0 | Status: SHIPPED | OUTPATIENT
Start: 2022-03-22 | End: 2022-03-22

## 2022-03-22 RX ADMIN — TBO-FILGRASTIM 300 MCG: 300 INJECTION, SOLUTION SUBCUTANEOUS at 15:18

## 2022-03-22 NOTE — PROGRESS NOTES
Patient arrived ambulatory to infusion center. Granix injection completed. Tolerated well. Discharged ambulatory. Patient aware of next infusion appt on 4/11.

## 2022-04-11 ENCOUNTER — HOSPITAL ENCOUNTER (OUTPATIENT)
Dept: INFUSION THERAPY | Age: 57
Discharge: HOME | End: 2022-04-11
Payer: COMMERCIAL

## 2022-04-11 VITALS
OXYGEN SATURATION: 100 % | SYSTOLIC BLOOD PRESSURE: 136 MMHG | RESPIRATION RATE: 18 BRPM | TEMPERATURE: 97.8 F | HEART RATE: 80 BPM | DIASTOLIC BLOOD PRESSURE: 71 MMHG

## 2022-04-11 DIAGNOSIS — C50.919 TRIPLE NEGATIVE MALIGNANT NEOPLASM OF BREAST (HCC): ICD-10-CM

## 2022-04-11 DIAGNOSIS — D64.9 ANEMIA, UNSPECIFIED TYPE: ICD-10-CM

## 2022-04-11 PROBLEM — D64.89 OTHER SPECIFIED ANEMIAS: Status: ACTIVE | Noted: 2022-04-11

## 2022-04-11 LAB
ALBUMIN SERPL-MCNC: 3.4 G/DL (ref 3.5–5)
ALBUMIN/GLOB SERPL: 0.8 {RATIO} (ref 1.2–3.5)
ALP SERPL-CCNC: 110 U/L (ref 50–136)
ALT SERPL-CCNC: 22 U/L (ref 12–65)
ANION GAP SERPL CALC-SCNC: 5 MMOL/L (ref 7–16)
AST SERPL-CCNC: 18 U/L (ref 15–37)
BASOPHILS # BLD: 0 K/UL (ref 0–0.2)
BASOPHILS NFR BLD: 0 % (ref 0–2)
BILIRUB SERPL-MCNC: 0.3 MG/DL (ref 0.2–1.1)
BUN SERPL-MCNC: 16 MG/DL (ref 6–23)
CALCIUM SERPL-MCNC: 9.2 MG/DL (ref 8.3–10.4)
CHLORIDE SERPL-SCNC: 110 MMOL/L (ref 98–107)
CO2 SERPL-SCNC: 24 MMOL/L (ref 21–32)
CREAT SERPL-MCNC: 1.1 MG/DL (ref 0.6–1)
DIFFERENTIAL METHOD BLD: ABNORMAL
EOSINOPHIL # BLD: 0 K/UL (ref 0–0.8)
EOSINOPHIL NFR BLD: 1 % (ref 0.5–7.8)
ERYTHROCYTE [DISTWIDTH] IN BLOOD BY AUTOMATED COUNT: 17.6 % (ref 11.9–14.6)
GLOBULIN SER CALC-MCNC: 4.3 G/DL (ref 2.3–3.5)
GLUCOSE SERPL-MCNC: 174 MG/DL (ref 65–100)
HCT VFR BLD AUTO: 20.3 % (ref 35.8–46.3)
HGB BLD-MCNC: 6.4 G/DL (ref 11.7–15.4)
HISTORY CHECKED?,CKHIST: NORMAL
IMM GRANULOCYTES # BLD AUTO: 0 K/UL (ref 0–0.5)
IMM GRANULOCYTES NFR BLD AUTO: 0 % (ref 0–5)
LYMPHOCYTES # BLD: 1.2 K/UL (ref 0.5–4.6)
LYMPHOCYTES NFR BLD: 30 % (ref 13–44)
MAGNESIUM SERPL-MCNC: 1.9 MG/DL (ref 1.8–2.4)
MCH RBC QN AUTO: 30.8 PG (ref 26.1–32.9)
MCHC RBC AUTO-ENTMCNC: 31.5 G/DL (ref 31.4–35)
MCV RBC AUTO: 97.6 FL (ref 79.6–97.8)
MONOCYTES # BLD: 0.2 K/UL (ref 0.1–1.3)
MONOCYTES NFR BLD: 6 % (ref 4–12)
NEUTS SEG # BLD: 2.6 K/UL (ref 1.7–8.2)
NEUTS SEG NFR BLD: 64 % (ref 43–78)
NRBC # BLD: 0 K/UL (ref 0–0.2)
PLATELET # BLD AUTO: 61 K/UL (ref 150–450)
PMV BLD AUTO: 9.2 FL (ref 9.4–12.3)
POTASSIUM SERPL-SCNC: 3.5 MMOL/L (ref 3.5–5.1)
PROT SERPL-MCNC: 7.7 G/DL (ref 6.3–8.2)
RBC # BLD AUTO: 2.08 M/UL (ref 4.05–5.2)
SODIUM SERPL-SCNC: 139 MMOL/L (ref 136–145)
WBC # BLD AUTO: 4 K/UL (ref 4.3–11.1)

## 2022-04-11 PROCEDURE — 36591 DRAW BLOOD OFF VENOUS DEVICE: CPT

## 2022-04-11 PROCEDURE — P9040 RBC LEUKOREDUCED IRRADIATED: HCPCS

## 2022-04-11 PROCEDURE — 74011250636 HC RX REV CODE- 250/636: Performed by: INTERNAL MEDICINE

## 2022-04-11 PROCEDURE — 85025 COMPLETE CBC W/AUTO DIFF WBC: CPT

## 2022-04-11 PROCEDURE — 83735 ASSAY OF MAGNESIUM: CPT

## 2022-04-11 PROCEDURE — 36430 TRANSFUSION BLD/BLD COMPNT: CPT

## 2022-04-11 PROCEDURE — 86923 COMPATIBILITY TEST ELECTRIC: CPT

## 2022-04-11 PROCEDURE — 86900 BLOOD TYPING SEROLOGIC ABO: CPT

## 2022-04-11 PROCEDURE — 77030018667 ADMN ST IV BLD FENW -A

## 2022-04-11 PROCEDURE — 80053 COMPREHEN METABOLIC PANEL: CPT

## 2022-04-11 PROCEDURE — 74011250637 HC RX REV CODE- 250/637: Performed by: INTERNAL MEDICINE

## 2022-04-11 RX ORDER — DIPHENHYDRAMINE HCL 25 MG
25 CAPSULE ORAL
Status: DISCONTINUED | OUTPATIENT
Start: 2022-04-11 | End: 2022-04-13 | Stop reason: HOSPADM

## 2022-04-11 RX ORDER — SODIUM CHLORIDE 9 MG/ML
250 INJECTION, SOLUTION INTRAVENOUS AS NEEDED
Status: DISCONTINUED | OUTPATIENT
Start: 2022-04-11 | End: 2022-04-13 | Stop reason: HOSPADM

## 2022-04-11 RX ORDER — SODIUM CHLORIDE 0.9 % (FLUSH) 0.9 %
10 SYRINGE (ML) INJECTION AS NEEDED
Status: DISCONTINUED | OUTPATIENT
Start: 2022-04-11 | End: 2022-04-13 | Stop reason: HOSPADM

## 2022-04-11 RX ORDER — ACETAMINOPHEN 325 MG/1
650 TABLET ORAL ONCE
Status: COMPLETED | OUTPATIENT
Start: 2022-04-11 | End: 2022-04-11

## 2022-04-11 RX ADMIN — ACETAMINOPHEN 650 MG: 325 TABLET ORAL at 14:44

## 2022-04-11 RX ADMIN — SODIUM CHLORIDE 250 ML: 900 INJECTION, SOLUTION INTRAVENOUS at 14:30

## 2022-04-11 RX ADMIN — Medication 10 ML: at 09:42

## 2022-04-11 RX ADMIN — DIPHENHYDRAMINE HYDROCHLORIDE 25 MG: 25 CAPSULE ORAL at 14:44

## 2022-04-11 NOTE — PROGRESS NOTES
Patient arrived to port lab for port access and lab draw   Blossom Byrd accessed and labs drawn per protocol   *Port remains accessed.  Patient discharged from port lab ambulatory*

## 2022-04-12 ENCOUNTER — HOSPITAL ENCOUNTER (OUTPATIENT)
Dept: INFUSION THERAPY | Age: 57
End: 2022-04-12

## 2022-04-12 LAB
ABO + RH BLD: NORMAL
BLD PROD TYP BPU: NORMAL
BLOOD GROUP ANTIBODIES SERPL: NORMAL
BPU ID: NORMAL
CROSSMATCH RESULT,%XM: NORMAL
SPECIMEN EXP DATE BLD: NORMAL
STATUS OF UNIT,%ST: NORMAL
UNIT DIVISION, %UDIV: 0

## 2022-04-18 ENCOUNTER — HOSPITAL ENCOUNTER (OUTPATIENT)
Dept: INFUSION THERAPY | Age: 57
Discharge: HOME | End: 2022-04-18
Payer: COMMERCIAL

## 2022-04-18 DIAGNOSIS — C50.919 TRIPLE NEGATIVE MALIGNANT NEOPLASM OF BREAST (HCC): ICD-10-CM

## 2022-04-18 DIAGNOSIS — Z79.899 HIGH RISK MEDICATION USE: ICD-10-CM

## 2022-04-18 DIAGNOSIS — D64.81 ANEMIA DUE TO ANTINEOPLASTIC CHEMOTHERAPY: Primary | ICD-10-CM

## 2022-04-18 DIAGNOSIS — T45.1X5A ANEMIA DUE TO ANTINEOPLASTIC CHEMOTHERAPY: Primary | ICD-10-CM

## 2022-04-18 LAB
ALBUMIN SERPL-MCNC: 3.8 G/DL (ref 3.5–5)
ALBUMIN/GLOB SERPL: 0.8 {RATIO} (ref 1.2–3.5)
ALP SERPL-CCNC: 131 U/L (ref 50–136)
ALT SERPL-CCNC: 21 U/L (ref 12–65)
ANION GAP SERPL CALC-SCNC: 8 MMOL/L (ref 7–16)
AST SERPL-CCNC: 15 U/L (ref 15–37)
BASOPHILS # BLD: 0 K/UL (ref 0–0.2)
BASOPHILS NFR BLD: 0 % (ref 0–2)
BILIRUB SERPL-MCNC: 0.3 MG/DL (ref 0.2–1.1)
BUN SERPL-MCNC: 23 MG/DL (ref 6–23)
CALCIUM SERPL-MCNC: 9.2 MG/DL (ref 8.3–10.4)
CHLORIDE SERPL-SCNC: 109 MMOL/L (ref 98–107)
CO2 SERPL-SCNC: 23 MMOL/L (ref 21–32)
CREAT SERPL-MCNC: 1.3 MG/DL (ref 0.6–1)
DIFFERENTIAL METHOD BLD: ABNORMAL
EOSINOPHIL # BLD: 0.1 K/UL (ref 0–0.8)
EOSINOPHIL NFR BLD: 2 % (ref 0.5–7.8)
ERYTHROCYTE [DISTWIDTH] IN BLOOD BY AUTOMATED COUNT: 21.4 % (ref 11.9–14.6)
GLOBULIN SER CALC-MCNC: 4.5 G/DL (ref 2.3–3.5)
GLUCOSE SERPL-MCNC: 148 MG/DL (ref 65–100)
HCT VFR BLD AUTO: 26.7 % (ref 35.8–46.3)
HGB BLD-MCNC: 8.3 G/DL (ref 11.7–15.4)
IMM GRANULOCYTES # BLD AUTO: 0 K/UL (ref 0–0.5)
IMM GRANULOCYTES NFR BLD AUTO: 0 % (ref 0–5)
LYMPHOCYTES # BLD: 1.2 K/UL (ref 0.5–4.6)
LYMPHOCYTES NFR BLD: 35 % (ref 13–44)
MAGNESIUM SERPL-MCNC: 2.2 MG/DL (ref 1.8–2.4)
MCH RBC QN AUTO: 29.9 PG (ref 26.1–32.9)
MCHC RBC AUTO-ENTMCNC: 31.1 G/DL (ref 31.4–35)
MCV RBC AUTO: 96 FL (ref 79.6–97.8)
MONOCYTES # BLD: 0.3 K/UL (ref 0.1–1.3)
MONOCYTES NFR BLD: 9 % (ref 4–12)
NEUTS SEG # BLD: 1.8 K/UL (ref 1.7–8.2)
NEUTS SEG NFR BLD: 53 % (ref 43–78)
NRBC # BLD: 0 K/UL (ref 0–0.2)
PLATELET # BLD AUTO: 255 K/UL (ref 150–450)
PMV BLD AUTO: 8.8 FL (ref 9.4–12.3)
POTASSIUM SERPL-SCNC: 4.1 MMOL/L (ref 3.5–5.1)
PROT SERPL-MCNC: 8.3 G/DL (ref 6.3–8.2)
RBC # BLD AUTO: 2.78 M/UL (ref 4.05–5.2)
SODIUM SERPL-SCNC: 140 MMOL/L (ref 136–145)
TSH SERPL DL<=0.005 MIU/L-ACNC: 0.53 UIU/ML (ref 0.36–3.74)
WBC # BLD AUTO: 3.5 K/UL (ref 4.3–11.1)

## 2022-04-18 PROCEDURE — 83735 ASSAY OF MAGNESIUM: CPT

## 2022-04-18 PROCEDURE — 96375 TX/PRO/DX INJ NEW DRUG ADDON: CPT

## 2022-04-18 PROCEDURE — 96367 TX/PROPH/DG ADDL SEQ IV INF: CPT

## 2022-04-18 PROCEDURE — 36591 DRAW BLOOD OFF VENOUS DEVICE: CPT

## 2022-04-18 PROCEDURE — 85025 COMPLETE CBC W/AUTO DIFF WBC: CPT

## 2022-04-18 PROCEDURE — 80053 COMPREHEN METABOLIC PANEL: CPT

## 2022-04-18 PROCEDURE — 84443 ASSAY THYROID STIM HORMONE: CPT

## 2022-04-18 PROCEDURE — 74011000258 HC RX REV CODE- 258: Performed by: INTERNAL MEDICINE

## 2022-04-18 PROCEDURE — 74011250636 HC RX REV CODE- 250/636: Performed by: INTERNAL MEDICINE

## 2022-04-18 PROCEDURE — 74011000250 HC RX REV CODE- 250: Performed by: INTERNAL MEDICINE

## 2022-04-18 PROCEDURE — 96417 CHEMO IV INFUS EACH ADDL SEQ: CPT

## 2022-04-18 PROCEDURE — 96413 CHEMO IV INFUSION 1 HR: CPT

## 2022-04-18 RX ORDER — SODIUM CHLORIDE 0.9 % (FLUSH) 0.9 %
10 SYRINGE (ML) INJECTION AS NEEDED
Status: ACTIVE | OUTPATIENT
Start: 2022-04-18 | End: 2022-04-18

## 2022-04-18 RX ORDER — DIPHENHYDRAMINE HYDROCHLORIDE 50 MG/ML
50 INJECTION, SOLUTION INTRAMUSCULAR; INTRAVENOUS ONCE
Status: COMPLETED | OUTPATIENT
Start: 2022-04-18 | End: 2022-04-18

## 2022-04-18 RX ORDER — SODIUM CHLORIDE 0.9 % (FLUSH) 0.9 %
10 SYRINGE (ML) INJECTION AS NEEDED
Status: DISCONTINUED | OUTPATIENT
Start: 2022-04-18 | End: 2022-04-20 | Stop reason: HOSPADM

## 2022-04-18 RX ORDER — SODIUM CHLORIDE 9 MG/ML
25 INJECTION, SOLUTION INTRAVENOUS CONTINUOUS
Status: ACTIVE | OUTPATIENT
Start: 2022-04-18 | End: 2022-04-18

## 2022-04-18 RX ORDER — ONDANSETRON 2 MG/ML
8 INJECTION INTRAMUSCULAR; INTRAVENOUS ONCE
Status: COMPLETED | OUTPATIENT
Start: 2022-04-18 | End: 2022-04-18

## 2022-04-18 RX ADMIN — FOSAPREPITANT 150 MG: 150 INJECTION, POWDER, LYOPHILIZED, FOR SOLUTION INTRAVENOUS at 12:20

## 2022-04-18 RX ADMIN — SODIUM CHLORIDE 25 ML/HR: 9 INJECTION, SOLUTION INTRAVENOUS at 10:50

## 2022-04-18 RX ADMIN — SODIUM CHLORIDE, PRESERVATIVE FREE 10 ML: 5 INJECTION INTRAVENOUS at 10:49

## 2022-04-18 RX ADMIN — ONDANSETRON 8 MG: 2 INJECTION INTRAMUSCULAR; INTRAVENOUS at 11:52

## 2022-04-18 RX ADMIN — CARBOPLATIN 395 MG: 10 INJECTION, SOLUTION INTRAVENOUS at 14:00

## 2022-04-18 RX ADMIN — FAMOTIDINE 20 MG: 10 INJECTION, SOLUTION INTRAVENOUS at 11:56

## 2022-04-18 RX ADMIN — DOCETAXEL ANHYDROUS 106 MG: 10 INJECTION, SOLUTION INTRAVENOUS at 12:56

## 2022-04-18 RX ADMIN — SODIUM CHLORIDE, PRESERVATIVE FREE 10 ML: 5 INJECTION INTRAVENOUS at 14:35

## 2022-04-18 RX ADMIN — SODIUM CHLORIDE 200 MG: 9 INJECTION, SOLUTION INTRAVENOUS at 11:20

## 2022-04-18 RX ADMIN — DEXAMETHASONE SODIUM PHOSPHATE 12 MG: 4 INJECTION, SOLUTION INTRAMUSCULAR; INTRAVENOUS at 12:00

## 2022-04-18 RX ADMIN — Medication 10 ML: at 09:32

## 2022-04-18 RX ADMIN — DIPHENHYDRAMINE HYDROCHLORIDE 50 MG: 50 INJECTION, SOLUTION INTRAMUSCULAR; INTRAVENOUS at 11:54

## 2022-04-18 NOTE — PROGRESS NOTES
Pt ambulatory to Infusion without complaints. Labs reviewed and pt received treatment per order, tolerated well. Patient aware of next infusion appointment on [email or web address]. Patient instructed to call provider with temperature of 100.4 or greater, nausea/vomiting/diarrhea or pain not controlled by medications. Discharged home with family without complaints.

## 2022-04-19 ENCOUNTER — HOSPITAL ENCOUNTER (OUTPATIENT)
Dept: INFUSION THERAPY | Age: 57
Discharge: HOME | End: 2022-04-19
Payer: COMMERCIAL

## 2022-04-19 VITALS
DIASTOLIC BLOOD PRESSURE: 71 MMHG | SYSTOLIC BLOOD PRESSURE: 134 MMHG | RESPIRATION RATE: 18 BRPM | OXYGEN SATURATION: 99 % | TEMPERATURE: 97.9 F | HEART RATE: 93 BPM

## 2022-04-19 DIAGNOSIS — T45.1X5A ANEMIA DUE TO ANTINEOPLASTIC CHEMOTHERAPY: Primary | ICD-10-CM

## 2022-04-19 DIAGNOSIS — C50.919 TRIPLE NEGATIVE MALIGNANT NEOPLASM OF BREAST (HCC): ICD-10-CM

## 2022-04-19 DIAGNOSIS — D64.81 ANEMIA DUE TO ANTINEOPLASTIC CHEMOTHERAPY: Primary | ICD-10-CM

## 2022-04-19 PROCEDURE — 96372 THER/PROPH/DIAG INJ SC/IM: CPT

## 2022-04-19 PROCEDURE — 74011250636 HC RX REV CODE- 250/636: Performed by: INTERNAL MEDICINE

## 2022-04-19 RX ORDER — TBO-FILGRASTIM 300 UG/.5ML
300 INJECTION, SOLUTION SUBCUTANEOUS ONCE
Qty: 1 EACH | Refills: 0 | Status: SHIPPED | OUTPATIENT
Start: 2022-04-19 | End: 2022-04-19

## 2022-04-19 RX ADMIN — TBO-FILGRASTIM 300 MCG: 300 INJECTION, SOLUTION SUBCUTANEOUS at 15:41

## 2022-04-19 NOTE — PROGRESS NOTES
Arrived to the [facility]. Granix completed. Provided education on Granix    Patient instructed to report any side affects to ordering provider. Patient tolerated without complications. Any issues or concerns during appointment: NO  Patient aware of next infusion appointment on 5/9/22  at 1130. Language Services Video  utilized during visit  Discharged ambulatory with family.

## 2022-05-09 ENCOUNTER — HOSPITAL ENCOUNTER (OUTPATIENT)
Dept: INFUSION THERAPY | Age: 57
Discharge: HOME | End: 2022-05-09
Payer: COMMERCIAL

## 2022-05-09 ENCOUNTER — HOSPITAL ENCOUNTER (OUTPATIENT)
Dept: INFUSION THERAPY | Age: 57
Discharge: HOME | End: 2022-05-09

## 2022-05-09 DIAGNOSIS — C50.919 TRIPLE NEGATIVE MALIGNANT NEOPLASM OF BREAST (HCC): ICD-10-CM

## 2022-05-09 DIAGNOSIS — Z79.899 HIGH RISK MEDICATION USE: ICD-10-CM

## 2022-05-09 LAB
ALBUMIN SERPL-MCNC: 3.4 G/DL (ref 3.5–5)
ALBUMIN/GLOB SERPL: 0.8 {RATIO} (ref 1.2–3.5)
ALP SERPL-CCNC: 96 U/L (ref 50–136)
ALT SERPL-CCNC: 35 U/L (ref 12–65)
ANION GAP SERPL CALC-SCNC: 6 MMOL/L (ref 7–16)
AST SERPL-CCNC: 20 U/L (ref 15–37)
BASOPHILS # BLD: 0 K/UL (ref 0–0.2)
BASOPHILS NFR BLD: 0 % (ref 0–2)
BILIRUB SERPL-MCNC: 0.2 MG/DL (ref 0.2–1.1)
BUN SERPL-MCNC: 24 MG/DL (ref 6–23)
CALCIUM SERPL-MCNC: 9.2 MG/DL (ref 8.3–10.4)
CHLORIDE SERPL-SCNC: 109 MMOL/L (ref 98–107)
CO2 SERPL-SCNC: 24 MMOL/L (ref 21–32)
CREAT SERPL-MCNC: 1 MG/DL (ref 0.6–1)
DIFFERENTIAL METHOD BLD: ABNORMAL
EOSINOPHIL # BLD: 0 K/UL (ref 0–0.8)
EOSINOPHIL NFR BLD: 0 % (ref 0.5–7.8)
ERYTHROCYTE [DISTWIDTH] IN BLOOD BY AUTOMATED COUNT: 21.5 % (ref 11.9–14.6)
GLOBULIN SER CALC-MCNC: 4.3 G/DL (ref 2.3–3.5)
GLUCOSE SERPL-MCNC: 129 MG/DL (ref 65–100)
HCT VFR BLD AUTO: 24.7 % (ref 35.8–46.3)
HGB BLD-MCNC: 7.9 G/DL (ref 11.7–15.4)
IMM GRANULOCYTES # BLD AUTO: 0 K/UL (ref 0–0.5)
IMM GRANULOCYTES NFR BLD AUTO: 0 % (ref 0–5)
LYMPHOCYTES # BLD: 1.5 K/UL (ref 0.5–4.6)
LYMPHOCYTES NFR BLD: 17 % (ref 13–44)
MAGNESIUM SERPL-MCNC: 2.1 MG/DL (ref 1.8–2.4)
MCH RBC QN AUTO: 31.9 PG (ref 26.1–32.9)
MCHC RBC AUTO-ENTMCNC: 32 G/DL (ref 31.4–35)
MCV RBC AUTO: 99.6 FL (ref 79.6–97.8)
MONOCYTES # BLD: 0.6 K/UL (ref 0.1–1.3)
MONOCYTES NFR BLD: 7 % (ref 4–12)
NEUTS SEG # BLD: 6.7 K/UL (ref 1.7–8.2)
NEUTS SEG NFR BLD: 76 % (ref 43–78)
NRBC # BLD: 0 K/UL (ref 0–0.2)
PLATELET # BLD AUTO: 73 K/UL (ref 150–450)
PMV BLD AUTO: 9.1 FL (ref 9.4–12.3)
POTASSIUM SERPL-SCNC: 3.9 MMOL/L (ref 3.5–5.1)
PROT SERPL-MCNC: 7.7 G/DL (ref 6.3–8.2)
RBC # BLD AUTO: 2.48 M/UL (ref 4.05–5.2)
SODIUM SERPL-SCNC: 139 MMOL/L (ref 136–145)
TSH SERPL DL<=0.005 MIU/L-ACNC: 0.23 UIU/ML (ref 0.36–3.74)
WBC # BLD AUTO: 8.9 K/UL (ref 4.3–11.1)

## 2022-05-09 PROCEDURE — 36591 DRAW BLOOD OFF VENOUS DEVICE: CPT

## 2022-05-09 PROCEDURE — 84443 ASSAY THYROID STIM HORMONE: CPT

## 2022-05-09 PROCEDURE — 80053 COMPREHEN METABOLIC PANEL: CPT

## 2022-05-09 PROCEDURE — 83735 ASSAY OF MAGNESIUM: CPT

## 2022-05-09 PROCEDURE — 85025 COMPLETE CBC W/AUTO DIFF WBC: CPT

## 2022-05-09 RX ORDER — SODIUM CHLORIDE 0.9 % (FLUSH) 0.9 %
10 SYRINGE (ML) INJECTION AS NEEDED
Status: DISCONTINUED | OUTPATIENT
Start: 2022-05-09 | End: 2022-05-11 | Stop reason: HOSPADM

## 2022-05-09 RX ADMIN — Medication 10 ML: at 10:22

## 2022-05-09 NOTE — PROGRESS NOTES
Port accessed per protocol with a 0.75 uriostegui needle. Flushed with normal saline 10cc. Positive blood return. Labs drawn per order.  Flushed with 10cc of normal saline and discharged ambulatory to office appointment     Still accessed yes   Dressing applied yes

## 2022-05-10 ENCOUNTER — HOSPITAL ENCOUNTER (OUTPATIENT)
Dept: INFUSION THERAPY | Age: 57
End: 2022-05-10

## 2022-05-16 ENCOUNTER — HOSPITAL ENCOUNTER (OUTPATIENT)
Dept: INFUSION THERAPY | Age: 57
Discharge: HOME | End: 2022-05-16

## 2022-05-16 ENCOUNTER — HOSPITAL ENCOUNTER (OUTPATIENT)
Dept: INFUSION THERAPY | Age: 57
Discharge: HOME | End: 2022-05-16
Payer: COMMERCIAL

## 2022-05-16 DIAGNOSIS — C50.919 TRIPLE NEGATIVE MALIGNANT NEOPLASM OF BREAST (HCC): Primary | ICD-10-CM

## 2022-05-16 DIAGNOSIS — G93.89 ENCEPHALOMALACIA ON IMAGING STUDY: ICD-10-CM

## 2022-05-16 DIAGNOSIS — Z79.899 HIGH RISK MEDICATION USE: ICD-10-CM

## 2022-05-16 DIAGNOSIS — C50.919 TRIPLE NEGATIVE MALIGNANT NEOPLASM OF BREAST (HCC): ICD-10-CM

## 2022-05-16 DIAGNOSIS — D64.9 ANEMIA, UNSPECIFIED TYPE: ICD-10-CM

## 2022-05-16 LAB
ALBUMIN SERPL-MCNC: 3.4 G/DL (ref 3.5–5)
ALBUMIN/GLOB SERPL: 0.8 {RATIO} (ref 1.2–3.5)
ALP SERPL-CCNC: 94 U/L (ref 50–136)
ALT SERPL-CCNC: 35 U/L (ref 12–65)
ANION GAP SERPL CALC-SCNC: 7 MMOL/L (ref 7–16)
AST SERPL-CCNC: 20 U/L (ref 15–37)
BASOPHILS # BLD: 0 K/UL (ref 0–0.2)
BASOPHILS NFR BLD: 0 % (ref 0–2)
BILIRUB SERPL-MCNC: 0.4 MG/DL (ref 0.2–1.1)
BUN SERPL-MCNC: 20 MG/DL (ref 6–23)
CALCIUM SERPL-MCNC: 9 MG/DL (ref 8.3–10.4)
CHLORIDE SERPL-SCNC: 108 MMOL/L (ref 98–107)
CO2 SERPL-SCNC: 25 MMOL/L (ref 21–32)
CREAT SERPL-MCNC: 1 MG/DL (ref 0.6–1)
DIFFERENTIAL METHOD BLD: ABNORMAL
EOSINOPHIL # BLD: 0.1 K/UL (ref 0–0.8)
EOSINOPHIL NFR BLD: 3 % (ref 0.5–7.8)
ERYTHROCYTE [DISTWIDTH] IN BLOOD BY AUTOMATED COUNT: 20.7 % (ref 11.9–14.6)
GLOBULIN SER CALC-MCNC: 4.2 G/DL (ref 2.3–3.5)
GLUCOSE SERPL-MCNC: 173 MG/DL (ref 65–100)
HCT VFR BLD AUTO: 25.7 % (ref 35.8–46.3)
HGB BLD-MCNC: 8.1 G/DL (ref 11.7–15.4)
IMM GRANULOCYTES # BLD AUTO: 0 K/UL (ref 0–0.5)
IMM GRANULOCYTES NFR BLD AUTO: 0 % (ref 0–5)
LYMPHOCYTES # BLD: 1.2 K/UL (ref 0.5–4.6)
LYMPHOCYTES NFR BLD: 23 % (ref 13–44)
MAGNESIUM SERPL-MCNC: 2.1 MG/DL (ref 1.8–2.4)
MCH RBC QN AUTO: 31.9 PG (ref 26.1–32.9)
MCHC RBC AUTO-ENTMCNC: 31.5 G/DL (ref 31.4–35)
MCV RBC AUTO: 101.2 FL (ref 79.6–97.8)
MONOCYTES # BLD: 0.4 K/UL (ref 0.1–1.3)
MONOCYTES NFR BLD: 7 % (ref 4–12)
NEUTS SEG # BLD: 3.5 K/UL (ref 1.7–8.2)
NEUTS SEG NFR BLD: 67 % (ref 43–78)
NRBC # BLD: 0 K/UL (ref 0–0.2)
PLATELET # BLD AUTO: 49 K/UL (ref 150–450)
PMV BLD AUTO: 9.5 FL (ref 9.4–12.3)
POTASSIUM SERPL-SCNC: 3.5 MMOL/L (ref 3.5–5.1)
PROT SERPL-MCNC: 7.6 G/DL (ref 6.3–8.2)
RBC # BLD AUTO: 2.54 M/UL (ref 4.05–5.2)
SODIUM SERPL-SCNC: 140 MMOL/L (ref 136–145)
WBC # BLD AUTO: 5.2 K/UL (ref 4.3–11.1)

## 2022-05-16 PROCEDURE — 83735 ASSAY OF MAGNESIUM: CPT

## 2022-05-16 PROCEDURE — 80053 COMPREHEN METABOLIC PANEL: CPT

## 2022-05-16 PROCEDURE — 85025 COMPLETE CBC W/AUTO DIFF WBC: CPT

## 2022-05-16 PROCEDURE — 36591 DRAW BLOOD OFF VENOUS DEVICE: CPT

## 2022-05-16 RX ORDER — SODIUM CHLORIDE 0.9 % (FLUSH) 0.9 %
10 SYRINGE (ML) INJECTION AS NEEDED
Status: DISCONTINUED | OUTPATIENT
Start: 2022-05-16 | End: 2022-05-18 | Stop reason: HOSPADM

## 2022-05-16 RX ADMIN — Medication 10 ML: at 09:05

## 2022-05-16 NOTE — PROGRESS NOTES
Patient arrived to port lab for port access and lab draw   Catia Liang [address] accessed and labs drawn per protocol   *Port remains accessed   Patient discharged from port lab ambulatory.

## 2022-05-20 DIAGNOSIS — T78.40XA ALLERGIC REACTION, INITIAL ENCOUNTER: Primary | ICD-10-CM

## 2022-05-23 ENCOUNTER — HOSPITAL ENCOUNTER (OUTPATIENT)
Dept: INFUSION THERAPY | Age: 57
Discharge: HOME | End: 2022-05-23
Payer: COMMERCIAL

## 2022-05-23 ENCOUNTER — OFFICE VISIT (OUTPATIENT)
Dept: ONCOLOGY | Age: 57
End: 2022-05-23
Payer: COMMERCIAL

## 2022-05-23 VITALS
SYSTOLIC BLOOD PRESSURE: 127 MMHG | HEART RATE: 90 BPM | OXYGEN SATURATION: 99 % | DIASTOLIC BLOOD PRESSURE: 75 MMHG | WEIGHT: 155.8 LBS | TEMPERATURE: 97.6 F | HEIGHT: 61 IN | RESPIRATION RATE: 18 BRPM | BODY MASS INDEX: 29.42 KG/M2

## 2022-05-23 DIAGNOSIS — Z79.899 HIGH RISK MEDICATION USE: ICD-10-CM

## 2022-05-23 DIAGNOSIS — C50.919 TRIPLE NEGATIVE MALIGNANT NEOPLASM OF BREAST (HCC): Primary | ICD-10-CM

## 2022-05-23 DIAGNOSIS — D69.6 THROMBOCYTOPENIA (HCC): ICD-10-CM

## 2022-05-23 DIAGNOSIS — C50.919 TRIPLE NEGATIVE MALIGNANT NEOPLASM OF BREAST (HCC): ICD-10-CM

## 2022-05-23 DIAGNOSIS — D64.9 ANEMIA, UNSPECIFIED TYPE: ICD-10-CM

## 2022-05-23 DIAGNOSIS — H66.92 LEFT OTITIS MEDIA, UNSPECIFIED OTITIS MEDIA TYPE: ICD-10-CM

## 2022-05-23 LAB
ABO + RH BLD: NORMAL
ALBUMIN SERPL-MCNC: 3.7 G/DL (ref 3.5–5)
ALBUMIN/GLOB SERPL: 0.9 {RATIO} (ref 1.2–3.5)
ALP SERPL-CCNC: 91 U/L (ref 50–136)
ALT SERPL-CCNC: 29 U/L (ref 12–65)
ANION GAP SERPL CALC-SCNC: 7 MMOL/L (ref 7–16)
AST SERPL-CCNC: 20 U/L (ref 15–37)
BASOPHILS # BLD: 0 K/UL (ref 0–0.2)
BASOPHILS NFR BLD: 0 % (ref 0–2)
BILIRUB SERPL-MCNC: 0.4 MG/DL (ref 0.2–1.1)
BLOOD GROUP ANTIBODIES SERPL: NORMAL
BUN SERPL-MCNC: 29 MG/DL (ref 6–23)
CALCIUM SERPL-MCNC: 9.7 MG/DL (ref 8.3–10.4)
CHLORIDE SERPL-SCNC: 109 MMOL/L (ref 98–107)
CO2 SERPL-SCNC: 23 MMOL/L (ref 21–32)
CREAT SERPL-MCNC: 1 MG/DL (ref 0.6–1)
DIFFERENTIAL METHOD BLD: ABNORMAL
EOSINOPHIL # BLD: 0.1 K/UL (ref 0–0.8)
EOSINOPHIL NFR BLD: 2 % (ref 0.5–7.8)
ERYTHROCYTE [DISTWIDTH] IN BLOOD BY AUTOMATED COUNT: 18.6 % (ref 11.9–14.6)
GLOBULIN SER CALC-MCNC: 4 G/DL (ref 2.3–3.5)
GLUCOSE SERPL-MCNC: 113 MG/DL (ref 65–100)
HCT VFR BLD AUTO: 25.5 % (ref 35.8–46.3)
HGB BLD-MCNC: 8.3 G/DL (ref 11.7–15.4)
IMM GRANULOCYTES # BLD AUTO: 0 K/UL (ref 0–0.5)
IMM GRANULOCYTES NFR BLD AUTO: 0 % (ref 0–5)
LYMPHOCYTES # BLD: 1.4 K/UL (ref 0.5–4.6)
LYMPHOCYTES NFR BLD: 23 % (ref 13–44)
MAGNESIUM SERPL-MCNC: 2.2 MG/DL (ref 1.8–2.4)
MCH RBC QN AUTO: 33.2 PG (ref 26.1–32.9)
MCHC RBC AUTO-ENTMCNC: 32.5 G/DL (ref 31.4–35)
MCV RBC AUTO: 102 FL (ref 79.6–97.8)
MONOCYTES # BLD: 0.4 K/UL (ref 0.1–1.3)
MONOCYTES NFR BLD: 7 % (ref 4–12)
NEUTS SEG # BLD: 3.9 K/UL (ref 1.7–8.2)
NEUTS SEG NFR BLD: 68 % (ref 43–78)
NRBC # BLD: 0 K/UL (ref 0–0.2)
PLATELET # BLD AUTO: 92 K/UL (ref 150–450)
PMV BLD AUTO: 9 FL (ref 9.4–12.3)
POTASSIUM SERPL-SCNC: 4 MMOL/L (ref 3.5–5.1)
PROT SERPL-MCNC: 7.7 G/DL (ref 6.3–8.2)
RBC # BLD AUTO: 2.5 M/UL (ref 4.05–5.2)
SODIUM SERPL-SCNC: 139 MMOL/L (ref 136–145)
SPECIMEN EXP DATE BLD: NORMAL
TSH, 3RD GENERATION: 0.01 UIU/ML (ref 0.36–3)
WBC # BLD AUTO: 5.9 K/UL (ref 4.3–11.1)

## 2022-05-23 PROCEDURE — 85025 COMPLETE CBC W/AUTO DIFF WBC: CPT

## 2022-05-23 PROCEDURE — 96411 CHEMO IV PUSH ADDL DRUG: CPT

## 2022-05-23 PROCEDURE — 84443 ASSAY THYROID STIM HORMONE: CPT

## 2022-05-23 PROCEDURE — 86901 BLOOD TYPING SEROLOGIC RH(D): CPT

## 2022-05-23 PROCEDURE — 6360000002 HC RX W HCPCS: Performed by: INTERNAL MEDICINE

## 2022-05-23 PROCEDURE — 83735 ASSAY OF MAGNESIUM: CPT

## 2022-05-23 PROCEDURE — 99215 OFFICE O/P EST HI 40 MIN: CPT | Performed by: INTERNAL MEDICINE

## 2022-05-23 PROCEDURE — 2580000003 HC RX 258: Performed by: INTERNAL MEDICINE

## 2022-05-23 PROCEDURE — 80053 COMPREHEN METABOLIC PANEL: CPT

## 2022-05-23 PROCEDURE — 96413 CHEMO IV INFUSION 1 HR: CPT

## 2022-05-23 PROCEDURE — 96417 CHEMO IV INFUS EACH ADDL SEQ: CPT

## 2022-05-23 PROCEDURE — 96367 TX/PROPH/DG ADDL SEQ IV INF: CPT

## 2022-05-23 PROCEDURE — 36591 DRAW BLOOD OFF VENOUS DEVICE: CPT

## 2022-05-23 PROCEDURE — 96375 TX/PRO/DX INJ NEW DRUG ADDON: CPT

## 2022-05-23 RX ORDER — AZITHROMYCIN 250 MG/1
250 TABLET, FILM COATED ORAL SEE ADMIN INSTRUCTIONS
Qty: 6 TABLET | Refills: 0 | Status: SHIPPED | OUTPATIENT
Start: 2022-05-23 | End: 2022-05-28

## 2022-05-23 RX ORDER — SODIUM CHLORIDE 0.9 % (FLUSH) 0.9 %
5-40 SYRINGE (ML) INJECTION PRN
Status: CANCELLED | OUTPATIENT
Start: 2022-05-23

## 2022-05-23 RX ORDER — ACETAMINOPHEN 325 MG/1
650 TABLET ORAL
Status: CANCELLED | OUTPATIENT
Start: 2022-05-23

## 2022-05-23 RX ORDER — DOXORUBICIN HYDROCHLORIDE 2 MG/ML
48 INJECTION, SOLUTION INTRAVENOUS ONCE
Status: CANCELLED | OUTPATIENT
Start: 2022-05-23 | End: 2022-05-23

## 2022-05-23 RX ORDER — FAMOTIDINE 10 MG/ML
20 INJECTION, SOLUTION INTRAVENOUS
Status: CANCELLED | OUTPATIENT
Start: 2022-05-23

## 2022-05-23 RX ORDER — ONDANSETRON 2 MG/ML
8 INJECTION INTRAMUSCULAR; INTRAVENOUS ONCE
Status: CANCELLED | OUTPATIENT
Start: 2022-05-23 | End: 2022-05-23

## 2022-05-23 RX ORDER — SODIUM CHLORIDE 9 MG/ML
5-250 INJECTION, SOLUTION INTRAVENOUS PRN
Status: CANCELLED | OUTPATIENT
Start: 2022-05-23

## 2022-05-23 RX ORDER — EPINEPHRINE 1 MG/ML
0.3 INJECTION, SOLUTION, CONCENTRATE INTRAVENOUS PRN
Status: CANCELLED | OUTPATIENT
Start: 2022-05-23

## 2022-05-23 RX ORDER — SODIUM CHLORIDE 9 MG/ML
INJECTION, SOLUTION INTRAVENOUS CONTINUOUS
Status: CANCELLED | OUTPATIENT
Start: 2022-05-23

## 2022-05-23 RX ORDER — SODIUM CHLORIDE 9 MG/ML
5-250 INJECTION, SOLUTION INTRAVENOUS PRN
Status: DISCONTINUED | OUTPATIENT
Start: 2022-05-23 | End: 2022-05-24 | Stop reason: HOSPADM

## 2022-05-23 RX ORDER — ALBUTEROL SULFATE 90 UG/1
4 AEROSOL, METERED RESPIRATORY (INHALATION) PRN
Status: CANCELLED | OUTPATIENT
Start: 2022-05-23

## 2022-05-23 RX ORDER — SODIUM CHLORIDE 0.9 % (FLUSH) 0.9 %
10 SYRINGE (ML) INJECTION PRN
Status: DISCONTINUED | OUTPATIENT
Start: 2022-05-23 | End: 2022-05-25 | Stop reason: HOSPADM

## 2022-05-23 RX ORDER — DOXORUBICIN HYDROCHLORIDE 2 MG/ML
80 INJECTION, SOLUTION INTRAVENOUS ONCE
Status: COMPLETED | OUTPATIENT
Start: 2022-05-23 | End: 2022-05-23

## 2022-05-23 RX ORDER — SODIUM CHLORIDE 9 MG/ML
5-40 INJECTION INTRAVENOUS PRN
Status: CANCELLED | OUTPATIENT
Start: 2022-05-23

## 2022-05-23 RX ORDER — DIPHENHYDRAMINE HYDROCHLORIDE 50 MG/ML
50 INJECTION INTRAMUSCULAR; INTRAVENOUS
Status: CANCELLED | OUTPATIENT
Start: 2022-05-23

## 2022-05-23 RX ORDER — SODIUM CHLORIDE 0.9 % (FLUSH) 0.9 %
5-40 SYRINGE (ML) INJECTION PRN
Status: DISCONTINUED | OUTPATIENT
Start: 2022-05-23 | End: 2022-05-24 | Stop reason: HOSPADM

## 2022-05-23 RX ORDER — HEPARIN SODIUM (PORCINE) LOCK FLUSH IV SOLN 100 UNIT/ML 100 UNIT/ML
500 SOLUTION INTRAVENOUS PRN
Status: CANCELLED | OUTPATIENT
Start: 2022-05-23

## 2022-05-23 RX ORDER — MEPERIDINE HYDROCHLORIDE 50 MG/ML
12.5 INJECTION INTRAMUSCULAR; INTRAVENOUS; SUBCUTANEOUS PRN
Status: CANCELLED | OUTPATIENT
Start: 2022-05-23

## 2022-05-23 RX ORDER — ONDANSETRON 2 MG/ML
8 INJECTION INTRAMUSCULAR; INTRAVENOUS
Status: CANCELLED | OUTPATIENT
Start: 2022-05-23

## 2022-05-23 RX ORDER — ONDANSETRON 2 MG/ML
8 INJECTION INTRAMUSCULAR; INTRAVENOUS ONCE
Status: COMPLETED | OUTPATIENT
Start: 2022-05-23 | End: 2022-05-23

## 2022-05-23 RX ADMIN — DOXORUBICIN HYDROCHLORIDE 80 MG: 2 INJECTION, SOLUTION INTRAVENOUS at 13:25

## 2022-05-23 RX ADMIN — ONDANSETRON 8 MG: 2 INJECTION INTRAMUSCULAR; INTRAVENOUS at 12:28

## 2022-05-23 RX ADMIN — CYCLOPHOSPHAMIDE 880 MG: 1 INJECTION, POWDER, FOR SOLUTION INTRAVENOUS; ORAL at 13:35

## 2022-05-23 RX ADMIN — SODIUM CHLORIDE 50 ML/HR: 9 INJECTION, SOLUTION INTRAVENOUS at 11:45

## 2022-05-23 RX ADMIN — Medication 10 ML: at 14:21

## 2022-05-23 RX ADMIN — SODIUM CHLORIDE 200 MG: 9 INJECTION, SOLUTION INTRAVENOUS at 11:50

## 2022-05-23 RX ADMIN — FOSAPREPITANT 150 MG: 150 INJECTION, POWDER, LYOPHILIZED, FOR SOLUTION INTRAVENOUS at 12:48

## 2022-05-23 RX ADMIN — SODIUM CHLORIDE, PRESERVATIVE FREE 10 ML: 5 INJECTION INTRAVENOUS at 09:23

## 2022-05-23 RX ADMIN — DEXAMETHASONE SODIUM PHOSPHATE 12 MG: 4 INJECTION, SOLUTION INTRAMUSCULAR; INTRAVENOUS at 12:33

## 2022-05-23 ASSESSMENT — PATIENT HEALTH QUESTIONNAIRE - PHQ9
SUM OF ALL RESPONSES TO PHQ QUESTIONS 1-9: 0
SUM OF ALL RESPONSES TO PHQ9 QUESTIONS 1 & 2: 0
SUM OF ALL RESPONSES TO PHQ QUESTIONS 1-9: 0
1. LITTLE INTEREST OR PLEASURE IN DOING THINGS: 0
SUM OF ALL RESPONSES TO PHQ QUESTIONS 1-9: 0
2. FEELING DOWN, DEPRESSED OR HOPELESS: 0
SUM OF ALL RESPONSES TO PHQ QUESTIONS 1-9: 0

## 2022-05-23 NOTE — PROGRESS NOTES
[facility] Hematology & Oncology: Office Visit Progress Note    Chief Complaint:    Triple negative left breast cancer    History of Present Illness:  Reason for Referral: Carcinoma of upper-inner  quadrant of left breast in female, estrogen receptor negative; Triple negative malignant neoplasm of breast       Referring Provider: Jenifer Awad MD       Primary Care Provider: Jodi Hicks MD       Family History of Cancer/Hematologic Disorders: None reported       Presenting Symptoms: Abnormal routine screening mammogram        Ms. Hannah Maldonado is a 49 y.o.  female with a history of heart murmur, HTN, mixed HLD, and irregular menses, who was recently diagnosed with breast  cancer. She initially presented on 11/22/21 for a routine bilateral digital screening mammogram which identified focal asymmetry in the medial inferior left breast at 6 cm from the nipple.  Further evaluation with diagnostic left breast mammogram and left  breast ultrasound were completed on 12/6/21 confirming the presence of adjacent left breast masses with one at the 9:00 position, 6 cm from the nipple measuring approximately 1.5 x 0.8 x 0.8 cm and a 7 mm hypoechoic mass just adjacent to this.        Recommended ultrasound-guided biopsy of the medial left breast mass was completed on 12/23/21 with pathology from the core biopsy of the left breast, 9:00 position, 6 cm from nipple, revealing ER 0.9%/AR 0.4%/HER-2 (0) negative, intermediate grade (moderately  differentiated), foci of at least microinvasive infiltrating ductal carcinoma in association with focally high grade ductal carcinoma in situ with no definite lymphovascular invasion identified.        MRI of the bilateral breasts was performed on 12/28/21 demonstrating left medial breast cancer measuring about 4.5 cm in greatest dimension; no additional suspicious finding elsewhere in either breast; and no obvious lymphadenopathy.       Patient was referred to surgery and evaluated in consultation by Surgeon, Dr. Ashley Che, on 1/10/22. She was assessed with signs and symptoms consistent with cT2N0 triple negative left breast IDC with DCIS. Treatment options were discussed, and  Dr. Tara Isabel recommended genetic testing and preoperative evaluation by medical oncology to consider neoadjuvant chemotherapy. Dr. Tara Isabel noted, \"We discussed the strong possibility that she could require left mastectomy/sentinel node should we  proceed with surgery upfront. At this time the enhancement on MRI looks too large, diffuse, and elongated to perform breast preservation surgery and expected good cosmetic result. If she has neoadjuvant treatment I will see her back in about 4 months  to regroup. \"       Patient is now referred to [facility] for oncology evaluation and consideration for neoadjuvant chemotherapy. Patient completed genetic counseling on 1/13/22. She agreed to pursue testing with BRCAPlus panel of 8 genes associated with hereditary cancer  (including BRCA1 and BRCA2 ).     BILATERAL DIGITAL SCREENING MAMMOGRAM  11/22/2021   FINDINGS: There are scattered fibroglandular densities. A few typically benign-appearing calcifications in the left breast are similar in  appearance to the previous study. There is a focal asymmetry in the medial inferior left breast located 6 cm from the nipple. Recommend spot compression magnification views on the direct ML view and ultrasound, if indicated for further evaluation. There  is otherwise no suspicious mass, calcification, or architectural distortion in either breast.      IMPRESSION: Focal asymmetry in the medial inferior left breast at 6 cm from the nipple. for which further evaluation is recommended with ML  and compression magnification views, with possible ultrasound if indicated. BI-RADS Assessment Category 0:  Incomplete: Needs additional imaging evaluation.       DIAGNOSTIC MAMMOGRAM LEFT BREAST, LEFT BREAST ULTRASOUND 12/6/21   FINDING: The area of concern does not persist on additional imaging. There is no architectural distortion. The area of concern persists with  additional imaging. This resides within the medial left breast at approximately 9:00 position. There are few associated calcifications which appear somewhat coarse. This measures approximately 1.3 cm in size. An additional smaller asymmetry also at the  9:00 position resides 7 cm from the nipple. A left breast ultrasound is recommended for further evaluation.    LEFT BREAST ULTRASOUND: At the 9:00 position, 6 cm from the nipple is a lobulated hypoechoic but mildly heterogeneous appearing mass measuring approximately 1.5 x 0.8 x 0.8 cm. The margins are occasionally ill-defined with some components demonstrating  posterior acoustic shadowing. The long axis is parallel to the plane of the chest wall. There is a 7 mm hypoechoic mass just adjacent to this This is hypoechoic with a lobulated contour. The margins are occasionally ill-defined.    IMPRESSION: Adjacent left breast masses. Recommend tissue sampling of the larger lesion. BI-RADS Assessment Category 4: Suspicious Finding- Biopsy should be considered.       [facility] SURGICAL PATHOLOGY REPORT 12/23/21   DIAGNOSIS    A: \" LEFT BREAST, 9:00 POSITION, 6 CM FROM NIPPLE, CORE BIOPSY\": FOCI OF AT LEAST MICROINVASIVE INFILTRATING DUCTAL CARCINOMA, INTERMEDIATE GRADE (MODERATELY DIFFERENTIATED) IN ASSOCIATION WITH FOCALLY HIGH GRADE DUCTAL CARCINOMA IN SITU.  DEFINITE LYMPHOVASCULAR  INVASION IS NOT IDENTIFIED   [facility]- ER/MI/[id number] BY IHC    INTERPRETATION    NXVISION IHC Quantitative Breast Panel    TEST NAME:                                      HFFEBZG:                  PBDBRKQM CONTROLS:    ESTROGEN RECEPTOR:                 Negative (0.9%)          Present, Positive    PROGESTERONE RECEPTOR:       Negative (0.4%)          Present, Positive    HER-2/CHAR:                                        Negative (0)                Percentage of Cells with Uniform Intense Complete Membrane    Stainin%   STF-IMMUNOHISTOCHEMISTRY   Immunohistochemical Stain Panel:    INTERPRETATION: Immunohistochemical findings consistent with breast primary. ANTIBODY/TEST Yoanna Sophia FOR                                               RESULT    Cytokeratin 7               Lung, breast, upper GE, serous ovary           Positive    Cytokeratin 20             Colon, mucinous ovary, urothelium                Negative    GATA3                        Urothelial, breast carcinoma                           Positive    GCDFP                       Breast, salivary gland, skin, adnexa               Positive    Mammaglobin                         Breast                                                              Positive       MRI OF THE BREASTS WITH AND WITHOUT CONTRAST 21   FINDINGS: The breasts demonstrate moderate glandularity and mild background enhancement.  Left 9:00 biopsy clip within a lobulated heterogeneously  enhancing mass with angulated and irregular margins overall measuring about 1.8 x 0.7 x 1.2 cm. Posterior to this (about 1 cm away) the smaller mass measuring 0.9 cm is unchanged from recent mammogram and ultrasound. Additionally there is clumped and  reticular nonmass enhancement extending superiorly by about 2.7 cm and anteriorly by about 1.8 cm, in keeping with the DCIS and calcifications. Overall the expected area of disease measures about 4.5 cm AP by 1.2 cm transverse and is located at 9:00-9:30,  mid to posterior depth. There is no evidence of involvement of skin or chest wall structures. There is no other evidence of suspicious enhancing mass, and no other dominant or unique nonmass enhancement, to suggest additional malignancy in either breast.  There is no evidence of axillary or internal mammary lymphadenopathy.  Elsewhere, limited visualization of the partially included thorax and upper abdomen shows no acute abnormality, with note of a small benign cyst in the left liver.     IMPRESSION   1. Left medial breast cancer measures about 4.5 cm in greatest dimension. 2. No additional suspicious finding elsewhere in either breast. No obvious lymphadenopathy. Recommend continued management as directed clinically. BI-RADS Assessment Category 6: Known Biopsy Proven Malignancy       Notes from Referring Provider: \"Age 49 with cT2N0 triple  negative left breast IDC and DCIS Evaluate    Speaks mandarin   She has been referred to genetics also\"       Interim history update in A/P. Review of Systems:      Constitutional  Denies fever or chills. Denies fatigue. Denies anorexia. HEENT   allergic conjunctivitis and sinusitis. Denies trauma, bluring vision, hearing loss, ear pain, nosebleeds, sore throat, neck pain and ear discharge. Skin  Denies lesions or rashes. Lungs  Denies shortness of breath, cough, sputum production or hemoptysis. Cardiovascular  Denies chest pain, palpitations, orthopnea, claudication and leg swelling. Gastrointestinal  Denies nausea, vomiting. Denies bloody or black stools. Denies abdominal pain.   Denies dysuria, frequency or hesitancy of urination     Neuro  Denies headaches, visual changes or ataxia. Denies dizziness, tingling, tremors, sensory change, speech change, focal weakness and headaches. Hematology  Denies nasal/gum bleeding, denies easy bruise     Endo  Denies heat/cold intolerance, denies diabetes. MSK  Denies back pain, swollen legs, myalgias and falls. Psychiatric/Behavioral  Denies depression and substance abuse. The patient is not nervous/anxious.               Allergies   Allergen Reactions    Paclitaxel Angioedema and Swelling    Lisinopril Other (See Comments)     Other reaction(s): Cough     Past Medical History:   Diagnosis Date    Breast cancer ([facility] [address]     Hypertension     Murmur, heart     echo 2016 EF 60-65% ; 1/31/22 states never has had symptoms    Poor historian difficulty with verifying medication list     Past Surgical History:   Procedure Laterality Date    BREAST SURGERY      bx of left breast     IR PORT PLACEMENT EQUAL OR GREATER THAN 5 YEARS  2/2/2022    IR PORT PLACEMENT EQUAL OR GREATER THAN 5 YEARS  2/2/2022    IR PORT PLACEMENT EQUAL OR GREATER THAN 5 YEARS 2/2/2022 [facility] RADIOLOGY SPECIALS    US BREAST NEEDLE BIOPSY LEFT Left 12/23/2021    US BREAST NEEDLE BIOPSY LEFT 12/23/2021 [facility] RADIOLOGY MAMMO     Family History   Problem Relation Age of Onset    Ovarian Cancer Neg Hx     No Known Problems Mother     Breast Cancer Neg Hx     No Known Problems Sister     Other Other         states no family history    Colon Cancer Neg Hx     Hypertension Father     No Known Problems Sister     No Known Problems Brother     Uterine Cancer Neg Hx      Social History     Socioeconomic History    Marital status:      Spouse name: Not on file    Number of children: Not on file    Years of education: Not on file    Highest education level: Not on file   Occupational History    Not on file   Tobacco Use    Smoking status: Never Smoker    Smokeless tobacco: Never Used   Substance and Sexual Activity    Alcohol use: No     Alcohol/week: 0.0 standard drinks    Drug use: No    Sexual activity: Not on file   Other Topics Concern    Not on file   Social History Narrative    Abuse: Feels safe at home, no history of physical abuse, no history of sexual abuse       Social Determinants of Health     Financial Resource Strain:     Difficulty of Paying Living Expenses: Not on file   Food Insecurity:     Worried About Running Out of Food in the Last Year: Not on file    Elena of Food in the Last Year: Not on file   Transportation Needs:     Lack of Transportation (Medical): Not on file    Lack of Transportation (Non-Medical):  Not on file   Physical Activity:     Days of Exercise per Week: Not on file    Minutes of Exercise per Session: Not on file Stress:     Feeling of Stress : Not on file   Social Connections:     Frequency of Communication with Friends and Family: Not on file    Frequency of Social Gatherings with Friends and Family: Not on file    Attends Mormon Services: Not on file    Active Member of Clubs or Organizations: Not on file    Attends Club or Organization Meetings: Not on file    Marital Status: Not on file   Intimate Partner Violence:     Fear of Current or Ex-Partner: Not on file    Emotionally Abused: Not on file    Physically Abused: Not on file    Sexually Abused: Not on file   Housing Stability:     Unable to Pay for Housing in the Last Year: Not on file    Number of [place] in the Last Year: Not on file    Unstable Housing in the Last Year: Not on file     Current Outpatient Medications   Medication Sig Dispense Refill    azithromycin (ZITHROMAX) 250 MG tablet Take 1 tablet by mouth See Admin Instructions for 5 days 500mg on day 1 followed by 250mg on days 2 - 5 6 tablet 0    dilTIAZem (TIAZAC) 240 MG extended release capsule 1 po qd      diphenhydrAMINE (BENADRYL) 25 MG capsule Take 25 mg by mouth every 6 hours as needed      diphenhydrAMINE-zinc acetate 2-0.1 % cream Apply topically 2 times daily as needed      lidocaine-prilocaine (EMLA) 2.5-2.5 % cream Apply to port about 45 minutes prior to access      LORazepam (ATIVAN) 1 MG tablet Take 0.5-1 mg by mouth every 8 hours as needed.       montelukast (SINGULAIR) 10 MG tablet Take 10 mg by mouth daily      olmesartan (BENICAR) 20 MG tablet Take 20 mg by mouth daily      ondansetron (ZOFRAN) 8 MG tablet Take 8 mg by mouth every 8 hours as needed      potassium chloride (KLOR-CON M) 20 MEQ extended release tablet Take 20 mEq by mouth daily      predniSONE (DELTASONE) 20 MG tablet Take 40 mg by mouth daily as needed      prochlorperazine (COMPAZINE) 10 MG tablet Take 10 mg by mouth every 6 hours as needed      senna-docusate (PERICOLACE) 8.6-50 MG per tablet Take 1 tablet by mouth daily as needed      triamcinolone (KENALOG) 0.1 % cream Apply topically 3 times daily       No current facility-administered medications for this visit. Facility-Administered Medications Ordered in Other Visits   Medication Dose Route Frequency Provider Last Rate Last Admin    sodium chloride flush 0.9 % injection 10 mL  10 mL IntraVENous PRN Bard Sophia MD   10 mL at 05/23/22 0923    sodium chloride flush 0.9 % injection 5-40 mL  5-40 mL IntraVENous PRN Bard Sophia MD   10 mL at 05/23/22 1421    0.9 % sodium chloride infusion  5-250 mL/hr IntraVENous PRN Bard Sophia MD   Stopped at 05/23/22 1420       OBJECTIVE:  /75 (Site: Right Upper Arm, Position: Sitting, Cuff Size: Medium Adult)   Pulse 90   Temp 97.6 °F (36.4 °C) (Oral)   Resp 18   Ht 5' 0.5\" (1.537 m)   Wt 155 lb 12.8 oz (70.7 kg)   SpO2 99%   BMI 29.93 kg/m²     Physical Exam:      Constitutional:  Oriented to person, place, and time. Well-developed and well-nourished. HEENT:  Normocephalic and atraumatic. Oropharynx is clear and moist.    Conjunctivae and EOM are normal. Pupils are equal, round, and reactive to light. No scleral icterus. Neck supple. No JVD present. No tracheal deviation present. No thyromegaly present. Lymph node  No palpable submandibular, cervical, supraclavicular, axillary and inguinal lymph nodes. Breasts  Left breast soft, nontender, popular nodular area and 9:00 about 2 cm at baseline, no longer palpable after chemotherapy. Right breast soft, nontender, no mass. Skin   rash. Warm and dry. No bruising noted. No erythema. No pallor. Respiratory  Effort normal and breath sounds normal.  No respiratory distress. No wheezes. No rales. No tenderness. CVS  Normal rate, regular rhythm and normal heart sounds. Exam reveals no gallop, no friction and no rub. No murmur heard. Abdomen  Soft.  Bowel sounds are normal. Exhibits no distension. There is no tenderness. There is no rebound and no guarding. Neuro  Normal reflexes. No cranial nerve deficit. Exhibits normal muscle tone, 5 of 5 strength of all extremities. MSK  Normal range of motion in general.  No edema and no tenderness.         Psych  Normal mood, affect, behavior, judgment and thought content        Labs:  Recent Results (from the past 24 hour(s))   CBC with Auto Differential    Collection Time: 05/23/22  9:09 AM   Result Value Ref Range    WBC 5.9 4.3 - 11.1 K/uL    RBC 2.50 (L) 4.05 - 5.2 M/uL    Hemoglobin 8.3 (L) 11.7 - 15.4 g/dL    Hematocrit 25.5 (L) 35.8 - 46.3 %    .0 (H) 79.6 - 97.8 FL    MCH 33.2 (H) 26.1 - 32.9 PG    MCHC 32.5 31.4 - 35.0 g/dL    RDW 18.6 (H) 11.9 - 14.6 %    Platelets 92 (L) 267 - 450 K/uL    MPV 9.0 (L) 9.4 - 12.3 FL    nRBC 0.00 0.0 - 0.2 K/uL    Seg Neutrophils 68 43 - 78 %    Lymphocytes 23 13 - 44 %    Monocytes 7 4.0 - 12.0 %    Eosinophils % 2 0.5 - 7.8 %    Basophils 0 0.0 - 2.0 %    Immature Granulocytes 0 0.0 - 5.0 %    Segs Absolute 3.9 1.7 - 8.2 K/UL    Absolute Lymph # 1.4 0.5 - 4.6 K/UL    Absolute Mono # 0.4 0.1 - 1.3 K/UL    Absolute Eos # 0.1 0.0 - 0.8 K/UL    Basophils Absolute 0.0 0.0 - 0.2 K/UL    Absolute Immature Granulocyte 0.0 0.0 - 0.5 K/UL    Differential Type AUTOMATED     Comprehensive Metabolic Panel    Collection Time: 05/23/22  9:09 AM   Result Value Ref Range    Sodium 139 136 - 145 mmol/L    Potassium 4.0 3.5 - 5.1 mmol/L    Chloride 109 (H) 98 - 107 mmol/L    CO2 23 21 - 32 mmol/L    Anion Gap 7 7 - 16 mmol/L    Glucose 113 (H) 65 - 100 mg/dL    BUN 29 (H) 6 - 23 MG/DL    CREATININE 1.00 0.6 - 1.0 MG/DL    GFR African American >60 >60 ml/min/1.73m2    GFR Non- >60 >60 ml/min/1.73m2    Calcium 9.7 8.3 - 10.4 MG/DL    Total Bilirubin 0.4 0.2 - 1.1 MG/DL    ALT 29 12 - 65 U/L    AST 20 15 - 37 U/L    Alk Phosphatase 91 50 - 136 U/L    Total Protein 7.7 6.3 - 8.2 g/dL    Albumin 3.7 3.5 - 5.0 g/dL    Globulin 4.0 (H) 2.3 - 3.5 g/dL    Albumin/Globulin Ratio 0.9 (L) 1.2 - 3.5     Magnesium    Collection Time: 05/23/22  9:09 AM   Result Value Ref Range    Magnesium 2.2 1.8 - 2.4 mg/dL   TYPE AND SCREEN    Collection Time: 05/23/22  9:09 AM   Result Value Ref Range    Crossmatch expiration date 05/26/2022,2359     ABO/Rh B POSITIVE     Antibody Screen NEG    TSH    Collection Time: 05/23/22  9:09 AM   Result Value Ref Range    TSH, 3RD GENERATION 0.014 (L) 0.358 - 3 uIU/mL       Imaging:  No results found for this or any previous visit. ASSESSMENT/PLAN:   Diagnosis Orders   1. Triple negative malignant neoplasm of breast (HCC)  CBC with Auto Differential    CBC with Auto Differential    CBC with Auto Differential    Comprehensive Metabolic Panel    CBC With Auto Differential    Comprehensive metabolic panel   2. Left otitis media, unspecified otitis media type  azithromycin (ZITHROMAX) 250 MG tablet   3. Thrombocytopenia [facility])       [unfilled]    49 y.o. F consulted for triple negative left breast cancer and presented to [facility] with  and daughter on 1/19/2022.    She has good baseline health and functions well, and annual screening mammogram showed left breast lesion, biopsy showed triple negative grade 2 IDC, cT2 N0 M0, and we discussed the high risks profile for triple negative breast cancer and the most recent  FDA approval of neoadjuvant chemoimmunotherapy, she appeared a good candidate and agreed to arrange Kathee Hemp for 4 cycles followed by Adriamycin Cytoxan Keytruda for 4 cycles, followed by surgery and adjuvant Keytruda for total systemic therapy  of 1 year, discussed toxicity and management, discussed logistics, arrange Zofran Compazine Ativan, baseline echocardiogram showed normal EF, hypertension better but still uncontrolled after adding spironolactone and educated risk of dehydration and hypotension  on chemotherapy when she is taking double diuretics, started cycle 1 2/7/22 and left breast tumor responded rapidly, lost 20 pounds after cycle 1 and BP much better controlled even after she runs out of diltiazem, again warned of monitoring dehydration/hypotension,  discussed increased protein/calorie intake, may add stimulant if continues to lose weight, occasional mild tingling in fingertips, episodes of constipation responded to laxative, proceed to cycle 1 day 8 carbotaxol Keytruda, educated to monitor for neuropathy,  follow next week but call as needed.       However after cycle 1 day 8 Taxol on 2/14 and developed acute onset of itching, rash, anasarca, short of breath, fever, hypotension, diarrhea, admitted with antibiotics, antihistamine,  IV fluid, most symptom much improved the second day although still lingering, discussed that the picture of allergic reaction but thorough discussion not able to identify new food or medicine, not typical for Taxol or Keytruda reaction given the timing  and the acuity, and she reported a similar episode a few years ago that she went to ER for treatment, but never followed up for allergen test and never had EpiPen, discussed the concern of allergy and will arrange allergist work-up, however proceeded  with day 15 Taxol with close observation to rule out atypical Taxol allergy, patient and family understand the risk and agreed to proceed, and reported reasonable tolerance except for mild itching through the infusion, however overnight RN was concerned  of tachycardia and tachypneic which patient reported similar to each infusion but received IV Benadryl and Solu-Medrol, LFT elevated for a day, give albumin with lasix and anasarca improved, DC with Benadryl/prednisone/EpiPen as needed, followed  on 2/28/2022, swelling resolved, mild rash of hands and arms, discussed to change Taxol to Taxotere for better management of reaction, return on 3/7/2022 reported much better tolerance for the allergic reaction, however more nausea/vomiting/weight loss and neutropenic fever with temperature 101 and ANC 0.2, coughing and rule out Covid, typical seasonal allergic sinusitis symptoms and educated to start using Nasacort spray, admit to hospital for neutropenic fever, discharge 3/10/2022, had skin rash that  was attributed to cefepime and resolved, again discussed her sensitivity to multiple allergens and different manifestations need to be managed accordingly, Zaditor for allergic conjunctivitis, nasal steroid for sinusitis, Kenalog for skin rash, oral antihistamine,  prednisone 20 mg only as needed, added G-CSF since cycle 3, better controlled for allergy/hypertension/fluid retention, severe diarrhea responded to PRBC, severe thrombocytopenia recovered after delaying cycle 4 by a week, completed cycle 4 with G-CSF  and Emend. She returned on 5/9/2022 to start AC/Keytruda, however platelet was low and chemo had to be deferred for 2 weeks actually and platelets finally recovered to 92 on 5/23/2022, discussed to proceed with caution and reduce AC dose by 20% for next cycle, and check platelet weekly and transfuse as needed, Z-Nacho for tender lymphadenopathy of left jaw, proceed to cycle 5 with G-CSF and Emend. Call as needed. All questions are answered to their satisfaction. They will call for further questions and concerns. ECOG PERFORMANCE STATUS - 1    Pain - 0 - No pain/10. Fatigue - No flowsheet data found. Distress - No flowsheet data found. Elements of this note have been dictated via voice recognition software. Text and phrases may be limited by the accuracy and autoconversion of the software. The chart has been reviewed, but errors may still be present. Linda Toney M.D.   [address], [address] [place] [address]  Office : [phone]  Fax : [phone]

## 2022-05-23 NOTE — PLAN OF CARE
Problem: Infection - Adult  Goal: Absence of fever/infection during anticipated neutropenic period  Outcome: Progressing

## 2022-05-23 NOTE — PROGRESS NOTES
Arrived to the [facility] ambulatory with her daughter. Keytruda, adriamycin and cytoxan completed. Patient tolerated well. Any issues or concerns during appointment: no.  Patient aware of next infusion appointment on 3/24 at   Patient instructed to call provider with temperature of 100.4 or greater or nausea/vomiting/ diarrhea or pain not controlled by medications  Discharged to home ambulatory.

## 2022-05-23 NOTE — PATIENT INSTRUCTIONS
Patient Instructions from Today's Visit    Reason for Visit:  Follow up visit for breast cancer      Plan:    Cycle 5 Keytruda/AC today and Liyah Torres tomorrow. We will send in a z-cristel prescription for your left ear infection. Your thyroid level (TSH) is slightly low but since you are not symptomatic, we will just watch this for now. We will check your CBC weekly to check your platelets. Follow Up:  - weekly CBC  - 3 weeks    Recent Lab Results:  Results for Deyanira Garcia \"ROSEANN WALKER\" (MRN [id number]) as of 5/23/2022 10:01   Ref.  Range 5/23/2022 09:09   Sodium Latest Ref Range: 136 - 145 mmol/L 139   Potassium Latest Ref Range: 3.5 - 5.1 mmol/L 4.0   Chloride Latest Ref Range: 98 - 107 mmol/L 109 (H)   CO2 Latest Ref Range: 21 - 32 mmol/L 23   BUN,BUNPL Latest Ref Range: 6 - 23 MG/DL 29 (H)   Creatinine Latest Ref Range: 0.6 - 1.0 MG/DL 1.00   Anion Gap Latest Ref Range: 7 - 16 mmol/L 7   GFR Non- Latest Ref Range: >60 ml/min/1.73m2 >60   GFR African American Latest Ref Range: >60 ml/min/1.73m2 >60   Magnesium Latest Ref Range: 1.8 - 2.4 mg/dL 2.2   GLUCOSE, FASTING,GF Latest Ref Range: 65 - 100 mg/dL 113 (H)   CALCIUM, SERUM, [id number] Latest Ref Range: 8.3 - 10.4 MG/DL 9.7   ALBUMIN/GLOBULIN RATIO Latest Ref Range: 1.2 - 3.5   0.9 (L)   Total Protein Latest Ref Range: 6.3 - 8.2 g/dL 7.7   Albumin Latest Ref Range: 3.5 - 5.0 g/dL 3.7   Globulin Latest Ref Range: 2.3 - 3.5 g/dL 4.0 (H)   Alk Phosphatase Latest Ref Range: 50 - 136 U/L 91   ALT Latest Ref Range: 12 - 65 U/L 29   AST Latest Ref Range: 15 - 37 U/L 20   Bilirubin Latest Ref Range: 0.2 - 1.1 MG/DL 0.4   WBC Latest Ref Range: 4.3 - 11.1 K/uL 5.9   RBC Latest Ref Range: 4.05 - 5.2 M/uL 2.50 (L)   Hemoglobin Quant Latest Ref Range: 11.7 - 15.4 g/dL 8.3 (L)   Hematocrit Latest Ref Range: 35.8 - 46.3 % 25.5 (L)   MCV Latest Ref Range: 79.6 - 97.8 .0 (H)   MCH Latest Ref Range: 26.1 - 32.9 PG 33.2 (H)   MCHC Latest Ref Range: 31.4 - 35.0 g/dL 32.5   MPV Latest Ref Range: 9.4 - 12.3 FL 9.0 (L)   RDW Latest Ref Range: 11.9 - 14.6 % 18.6 (H)   Platelet Count Latest Ref Range: 150 - 450 K/uL 92 (L)   Absolute Mono # Latest Ref Range: 0.1 - 1.3 K/UL 0.4   Eosinophils % Latest Ref Range: 0.5 - 7.8 % 2   Basophils Absolute Latest Ref Range: 0.0 - 0.2 K/UL 0.0   Differential Type Latest Units:   AUTOMATED   Seg Neutrophils Latest Ref Range: 43 - 78 % 68   Segs Absolute Latest Ref Range: 1.7 - 8.2 K/UL 3.9   Lymphocytes Latest Ref Range: 13 - 44 % 23   Absolute Lymph # Latest Ref Range: 0.5 - 4.6 K/UL 1.4   Monocytes Latest Ref Range: 4.0 - 12.0 % 7   Absolute Eos # Latest Ref Range: 0.0 - 0.8 K/UL 0.1   Basophils Latest Ref Range: 0.0 - 2.0 % 0   Immature Granulocytes Latest Ref Range: 0.0 - 5.0 % 0   Nucleated Red Blood Cells Latest Ref Range: 0.0 - 0.2 K/uL 0.00   Absolute Immature Granulocyte Latest Ref Range: 0.0 - 0.5 K/UL 0.0     Treatment Summary has been discussed and given to patient: n/a        -------------------------------------------------------------------------------------------------------------------  Please call our office at [phone] if you have any  of the following symptoms:   · Fever of 100.5 or greater  · Chills  · Shortness of breath  · Swelling or pain in one leg    After office hours an answering service is available and will contact a provider for emergencies or if you are experiencing any of the above symptoms.  Patient did express an interest in My Chart. My Chart log in information explained on the after visit summary printout at the [facility] [address] desk.     Drema Hodgkins, RN

## 2022-05-24 ENCOUNTER — HOSPITAL ENCOUNTER (OUTPATIENT)
Dept: INFUSION THERAPY | Age: 57
Discharge: HOME | End: 2022-05-24
Payer: COMMERCIAL

## 2022-05-24 VITALS
TEMPERATURE: 98 F | DIASTOLIC BLOOD PRESSURE: 74 MMHG | OXYGEN SATURATION: 99 % | RESPIRATION RATE: 18 BRPM | HEART RATE: 110 BPM | SYSTOLIC BLOOD PRESSURE: 144 MMHG

## 2022-05-24 DIAGNOSIS — C50.919 TRIPLE NEGATIVE MALIGNANT NEOPLASM OF BREAST (HCC): Primary | ICD-10-CM

## 2022-05-24 PROCEDURE — 96372 THER/PROPH/DIAG INJ SC/IM: CPT

## 2022-05-24 PROCEDURE — 6360000002 HC RX W HCPCS: Performed by: INTERNAL MEDICINE

## 2022-05-24 RX ADMIN — PEGFILGRASTIM-CBQV 6 MG: 6 INJECTION, SOLUTION SUBCUTANEOUS at 16:10

## 2022-05-24 NOTE — PROGRESS NOTES
Arrived to the [facility]. Udenyca injection completed. Provided education on [place] and Claritin. Patient instructed to report any side affects to ordering provider. Patient tolerated well. Any issues or concerns during appointment: no  Patient aware of next infusion appointment on 6/13/2022 at 1100  Discharged ambulatory w/family member.

## 2022-05-31 ENCOUNTER — HOSPITAL ENCOUNTER (OUTPATIENT)
Dept: LAB | Age: 57
Discharge: HOME | End: 2022-06-03
Payer: COMMERCIAL

## 2022-05-31 ENCOUNTER — TELEPHONE (OUTPATIENT)
Dept: ONCOLOGY | Age: 57
End: 2022-05-31

## 2022-05-31 DIAGNOSIS — D69.6 THROMBOCYTOPENIA (HCC): Primary | ICD-10-CM

## 2022-05-31 DIAGNOSIS — C50.919 TRIPLE NEGATIVE MALIGNANT NEOPLASM OF BREAST (HCC): ICD-10-CM

## 2022-05-31 LAB
BASOPHILS # BLD: 0 K/UL (ref 0–0.2)
BASOPHILS NFR BLD: 0 % (ref 0–2)
DIFFERENTIAL METHOD BLD: ABNORMAL
EOSINOPHIL # BLD: 0 K/UL (ref 0–0.8)
EOSINOPHIL NFR BLD: 3 % (ref 0.5–7.8)
ERYTHROCYTE [DISTWIDTH] IN BLOOD BY AUTOMATED COUNT: 14.9 % (ref 11.9–14.6)
HCT VFR BLD AUTO: 20.8 % (ref 35.8–46.3)
HGB BLD-MCNC: 6.8 G/DL (ref 11.7–15.4)
IMM GRANULOCYTES # BLD AUTO: 0 K/UL (ref 0–0.5)
IMM GRANULOCYTES NFR BLD AUTO: 0 % (ref 0–5)
LYMPHOCYTES # BLD: 0.8 K/UL (ref 0.5–4.6)
LYMPHOCYTES NFR BLD: 77 % (ref 13–44)
MCH RBC QN AUTO: 33.3 PG (ref 26.1–32.9)
MCHC RBC AUTO-ENTMCNC: 32.7 G/DL (ref 31.4–35)
MCV RBC AUTO: 102 FL (ref 79.6–97.8)
MONOCYTES # BLD: 0.1 K/UL (ref 0.1–1.3)
MONOCYTES NFR BLD: 14 % (ref 4–12)
NEUTS SEG # BLD: 0.1 K/UL (ref 1.7–8.2)
NEUTS SEG NFR BLD: 6 % (ref 43–78)
NRBC # BLD: 0 K/UL (ref 0–0.2)
PLATELET # BLD AUTO: 31 K/UL (ref 150–450)
PLATELET COMMENT: ABNORMAL
PMV BLD AUTO: 11.8 FL (ref 9.4–12.3)
RBC # BLD AUTO: 2.04 M/UL (ref 4.05–5.2)
RBC MORPH BLD: ABNORMAL
WBC # BLD AUTO: 1 K/UL (ref 4.3–11.1)
WBC MORPH BLD: ABNORMAL

## 2022-05-31 PROCEDURE — 85025 COMPLETE CBC W/AUTO DIFF WBC: CPT

## 2022-05-31 PROCEDURE — 36415 COLL VENOUS BLD VENIPUNCTURE: CPT

## 2022-05-31 NOTE — TELEPHONE ENCOUNTER
Wbc 1.0   hgb 6.8   Read back and verified. I reported to NP Shane Stroud. She needs 1 unit of Blood. Msg sent to Herb Vu RN   Call to Interpretinl line and spoke with ID [id number] and She left a VM for the patient to return to the lab and get a Type and screen or return the call to Triage.

## 2022-06-01 ENCOUNTER — HOSPITAL ENCOUNTER (OUTPATIENT)
Dept: LAB | Age: 57
Discharge: HOME | End: 2022-06-04
Payer: COMMERCIAL

## 2022-06-01 DIAGNOSIS — D69.6 THROMBOCYTOPENIA (HCC): ICD-10-CM

## 2022-06-01 PROBLEM — D64.81 ANEMIA DUE TO ANTINEOPLASTIC CHEMOTHERAPY: Status: ACTIVE | Noted: 2022-06-01

## 2022-06-01 PROBLEM — T45.1X5A ANEMIA DUE TO ANTINEOPLASTIC CHEMOTHERAPY: Status: ACTIVE | Noted: 2022-06-01

## 2022-06-01 LAB — HISTORY CHECK: NORMAL

## 2022-06-01 PROCEDURE — 86923 COMPATIBILITY TEST ELECTRIC: CPT

## 2022-06-01 PROCEDURE — 36415 COLL VENOUS BLD VENIPUNCTURE: CPT

## 2022-06-01 PROCEDURE — 86901 BLOOD TYPING SEROLOGIC RH(D): CPT

## 2022-06-01 RX ORDER — SODIUM CHLORIDE 9 MG/ML
INJECTION, SOLUTION INTRAVENOUS CONTINUOUS
Status: CANCELLED | OUTPATIENT
Start: 2022-06-01

## 2022-06-01 RX ORDER — ACETAMINOPHEN 325 MG/1
650 TABLET ORAL
Status: CANCELLED | OUTPATIENT
Start: 2022-06-01

## 2022-06-01 RX ORDER — ALBUTEROL SULFATE 90 UG/1
4 AEROSOL, METERED RESPIRATORY (INHALATION) PRN
Status: CANCELLED | OUTPATIENT
Start: 2022-06-02

## 2022-06-01 RX ORDER — SODIUM CHLORIDE 9 MG/ML
25 INJECTION, SOLUTION INTRAVENOUS PRN
Status: CANCELLED | OUTPATIENT
Start: 2022-06-01

## 2022-06-01 RX ORDER — EPINEPHRINE 1 MG/ML
0.3 INJECTION, SOLUTION, CONCENTRATE INTRAVENOUS PRN
Status: CANCELLED | OUTPATIENT
Start: 2022-06-02

## 2022-06-01 RX ORDER — SODIUM CHLORIDE 9 MG/ML
25 INJECTION, SOLUTION INTRAVENOUS PRN
Status: CANCELLED | OUTPATIENT
Start: 2022-06-02

## 2022-06-01 RX ORDER — ACETAMINOPHEN 325 MG/1
650 TABLET ORAL ONCE
Status: CANCELLED | OUTPATIENT
Start: 2022-06-01 | End: 2022-06-01

## 2022-06-01 RX ORDER — SODIUM CHLORIDE 9 MG/ML
20 INJECTION, SOLUTION INTRAVENOUS CONTINUOUS
Status: CANCELLED | OUTPATIENT
Start: 2022-06-01

## 2022-06-01 RX ORDER — ALBUTEROL SULFATE 90 UG/1
4 AEROSOL, METERED RESPIRATORY (INHALATION) PRN
Status: CANCELLED | OUTPATIENT
Start: 2022-06-01

## 2022-06-01 RX ORDER — SODIUM CHLORIDE 9 MG/ML
INJECTION, SOLUTION INTRAVENOUS CONTINUOUS
Status: CANCELLED | OUTPATIENT
Start: 2022-06-02

## 2022-06-01 RX ORDER — DIPHENHYDRAMINE HYDROCHLORIDE 50 MG/ML
50 INJECTION INTRAMUSCULAR; INTRAVENOUS
Status: CANCELLED | OUTPATIENT
Start: 2022-06-01

## 2022-06-01 RX ORDER — ONDANSETRON 2 MG/ML
8 INJECTION INTRAMUSCULAR; INTRAVENOUS
Status: CANCELLED | OUTPATIENT
Start: 2022-06-01

## 2022-06-01 RX ORDER — ONDANSETRON 2 MG/ML
8 INJECTION INTRAMUSCULAR; INTRAVENOUS
Status: CANCELLED | OUTPATIENT
Start: 2022-06-02

## 2022-06-01 RX ORDER — SODIUM CHLORIDE 0.9 % (FLUSH) 0.9 %
5-40 SYRINGE (ML) INJECTION PRN
Status: CANCELLED | OUTPATIENT
Start: 2022-06-01

## 2022-06-01 RX ORDER — ACETAMINOPHEN 325 MG/1
650 TABLET ORAL
Status: CANCELLED | OUTPATIENT
Start: 2022-06-02

## 2022-06-01 RX ORDER — DIPHENHYDRAMINE HYDROCHLORIDE 50 MG/ML
50 INJECTION INTRAMUSCULAR; INTRAVENOUS
Status: CANCELLED | OUTPATIENT
Start: 2022-06-02

## 2022-06-01 RX ORDER — EPINEPHRINE 1 MG/ML
0.3 INJECTION, SOLUTION, CONCENTRATE INTRAVENOUS PRN
Status: CANCELLED | OUTPATIENT
Start: 2022-06-01

## 2022-06-01 RX ORDER — DIPHENHYDRAMINE HCL 25 MG
25 CAPSULE ORAL ONCE
Status: CANCELLED | OUTPATIENT
Start: 2022-06-01 | End: 2022-06-01

## 2022-06-02 ENCOUNTER — HOSPITAL ENCOUNTER (OUTPATIENT)
Dept: INFUSION THERAPY | Age: 57
Discharge: HOME | End: 2022-06-02
Payer: COMMERCIAL

## 2022-06-02 VITALS
TEMPERATURE: 98.1 F | RESPIRATION RATE: 18 BRPM | HEART RATE: 86 BPM | DIASTOLIC BLOOD PRESSURE: 83 MMHG | SYSTOLIC BLOOD PRESSURE: 132 MMHG | OXYGEN SATURATION: 100 %

## 2022-06-02 DIAGNOSIS — D64.81 ANEMIA DUE TO ANTINEOPLASTIC CHEMOTHERAPY: Primary | ICD-10-CM

## 2022-06-02 DIAGNOSIS — T45.1X5A ANEMIA DUE TO ANTINEOPLASTIC CHEMOTHERAPY: Primary | ICD-10-CM

## 2022-06-02 PROCEDURE — 2580000003 HC RX 258: Performed by: INTERNAL MEDICINE

## 2022-06-02 PROCEDURE — 36430 TRANSFUSION BLD/BLD COMPNT: CPT

## 2022-06-02 PROCEDURE — P9040 RBC LEUKOREDUCED IRRADIATED: HCPCS

## 2022-06-02 PROCEDURE — 6370000000 HC RX 637 (ALT 250 FOR IP): Performed by: INTERNAL MEDICINE

## 2022-06-02 RX ORDER — SODIUM CHLORIDE 9 MG/ML
20 INJECTION, SOLUTION INTRAVENOUS CONTINUOUS
Status: ACTIVE | OUTPATIENT
Start: 2022-06-02 | End: 2022-06-02

## 2022-06-02 RX ORDER — DIPHENHYDRAMINE HCL 25 MG
25 CAPSULE ORAL ONCE
Status: COMPLETED | OUTPATIENT
Start: 2022-06-02 | End: 2022-06-02

## 2022-06-02 RX ORDER — ACETAMINOPHEN 325 MG/1
650 TABLET ORAL ONCE
Status: COMPLETED | OUTPATIENT
Start: 2022-06-02 | End: 2022-06-02

## 2022-06-02 RX ORDER — SODIUM CHLORIDE 0.9 % (FLUSH) 0.9 %
5-40 SYRINGE (ML) INJECTION PRN
Status: DISCONTINUED | OUTPATIENT
Start: 2022-06-02 | End: 2022-06-03 | Stop reason: HOSPADM

## 2022-06-02 RX ADMIN — SODIUM CHLORIDE 20 ML/HR: 9 INJECTION, SOLUTION INTRAVENOUS at 09:40

## 2022-06-02 RX ADMIN — ACETAMINOPHEN 650 MG: 325 TABLET ORAL at 10:17

## 2022-06-02 RX ADMIN — DIPHENHYDRAMINE HYDROCHLORIDE 25 MG: 25 CAPSULE ORAL at 10:17

## 2022-06-02 RX ADMIN — SODIUM CHLORIDE, PRESERVATIVE FREE 10 ML: 5 INJECTION INTRAVENOUS at 09:40

## 2022-06-02 RX ADMIN — SODIUM CHLORIDE, PRESERVATIVE FREE 10 ML: 5 INJECTION INTRAVENOUS at 13:20

## 2022-06-02 NOTE — PROGRESS NOTES
Arrived to the [facility]. Assessment completed. 1 unit of PRBC completed. Patient tolerated without problems. Any issues or concerns during appointment: None  Blood education reviewed with patient and daughter and education sheet sent home with daughter  Patient instructed to call provider with temperature of 100.4 or greater and to call Dr Kasandra Baca with any side effects or other concerns  Patient aware of next infusion appointment on 6/13/22(date) at 17 23 (time).   Discharged ambulatory with daughter

## 2022-06-03 LAB
ABO + RH BLD: NORMAL
BLD PROD TYP BPU: NORMAL
BLOOD BANK DISPENSE STATUS: NORMAL
BLOOD GROUP ANTIBODIES SERPL: NORMAL
BPU ID: NORMAL
CROSSMATCH RESULT: NORMAL
SPECIMEN EXP DATE BLD: NORMAL
UNIT DIVISION: 0

## 2022-06-06 ENCOUNTER — HOSPITAL ENCOUNTER (OUTPATIENT)
Dept: LAB | Age: 57
Discharge: HOME | End: 2022-06-09
Payer: COMMERCIAL

## 2022-06-06 DIAGNOSIS — C50.919 TRIPLE NEGATIVE MALIGNANT NEOPLASM OF BREAST (HCC): ICD-10-CM

## 2022-06-06 LAB
BASOPHILS # BLD: 0 K/UL (ref 0–0.2)
BASOPHILS NFR BLD: 0 % (ref 0–2)
DIFFERENTIAL METHOD BLD: ABNORMAL
EOSINOPHIL # BLD: 0 K/UL (ref 0–0.8)
EOSINOPHIL NFR BLD: 0 % (ref 0.5–7.8)
ERYTHROCYTE [DISTWIDTH] IN BLOOD BY AUTOMATED COUNT: 17.8 % (ref 11.9–14.6)
HCT VFR BLD AUTO: 28.6 % (ref 35.8–46.3)
HGB BLD-MCNC: 9.4 G/DL (ref 11.7–15.4)
IMM GRANULOCYTES # BLD AUTO: 0.4 K/UL (ref 0–0.5)
IMM GRANULOCYTES NFR BLD AUTO: 5 % (ref 0–5)
LYMPHOCYTES # BLD: 1.5 K/UL (ref 0.5–4.6)
LYMPHOCYTES NFR BLD: 19 % (ref 13–44)
MCH RBC QN AUTO: 32 PG (ref 26.1–32.9)
MCHC RBC AUTO-ENTMCNC: 32.9 G/DL (ref 31.4–35)
MCV RBC AUTO: 97.3 FL (ref 79.6–97.8)
MONOCYTES # BLD: 1.5 K/UL (ref 0.1–1.3)
MONOCYTES NFR BLD: 18 % (ref 4–12)
NEUTS SEG # BLD: 4.7 K/UL (ref 1.7–8.2)
NEUTS SEG NFR BLD: 58 % (ref 43–78)
NRBC # BLD: 0.03 K/UL (ref 0–0.2)
PLATELET # BLD AUTO: 180 K/UL (ref 150–450)
PMV BLD AUTO: 9.6 FL (ref 9.4–12.3)
RBC # BLD AUTO: 2.94 M/UL (ref 4.05–5.2)
WBC # BLD AUTO: 8 K/UL (ref 4.3–11.1)

## 2022-06-06 PROCEDURE — 85025 COMPLETE CBC W/AUTO DIFF WBC: CPT

## 2022-06-06 PROCEDURE — 36415 COLL VENOUS BLD VENIPUNCTURE: CPT

## 2022-06-08 DIAGNOSIS — C50.919 TRIPLE NEGATIVE MALIGNANT NEOPLASM OF BREAST (HCC): Primary | ICD-10-CM

## 2022-06-13 ENCOUNTER — OFFICE VISIT (OUTPATIENT)
Dept: ONCOLOGY | Age: 57
End: 2022-06-13
Payer: COMMERCIAL

## 2022-06-13 ENCOUNTER — HOSPITAL ENCOUNTER (OUTPATIENT)
Dept: INFUSION THERAPY | Age: 57
Discharge: HOME | End: 2022-06-13
Payer: COMMERCIAL

## 2022-06-13 VITALS
SYSTOLIC BLOOD PRESSURE: 134 MMHG | TEMPERATURE: 97.8 F | OXYGEN SATURATION: 100 % | HEIGHT: 61 IN | DIASTOLIC BLOOD PRESSURE: 75 MMHG | BODY MASS INDEX: 29.7 KG/M2 | RESPIRATION RATE: 16 BRPM | HEART RATE: 83 BPM | WEIGHT: 157.3 LBS

## 2022-06-13 DIAGNOSIS — D70.1 CHEMOTHERAPY-INDUCED NEUTROPENIA (HCC): ICD-10-CM

## 2022-06-13 DIAGNOSIS — R11.0 CHEMOTHERAPY-INDUCED NAUSEA: ICD-10-CM

## 2022-06-13 DIAGNOSIS — T45.1X5A CHEMOTHERAPY-INDUCED NAUSEA: ICD-10-CM

## 2022-06-13 DIAGNOSIS — C50.919 TRIPLE NEGATIVE MALIGNANT NEOPLASM OF BREAST (HCC): Primary | ICD-10-CM

## 2022-06-13 DIAGNOSIS — T45.1X5A CHEMOTHERAPY-INDUCED NEUTROPENIA (HCC): ICD-10-CM

## 2022-06-13 DIAGNOSIS — C50.919 TRIPLE NEGATIVE MALIGNANT NEOPLASM OF BREAST (HCC): ICD-10-CM

## 2022-06-13 DIAGNOSIS — Z79.899 HIGH RISK MEDICATION USE: ICD-10-CM

## 2022-06-13 LAB
ALBUMIN SERPL-MCNC: 3.6 G/DL (ref 3.5–5)
ALBUMIN/GLOB SERPL: 0.9 {RATIO} (ref 1.2–3.5)
ALP SERPL-CCNC: 86 U/L (ref 50–136)
ALT SERPL-CCNC: 38 U/L (ref 12–65)
ANION GAP SERPL CALC-SCNC: 6 MMOL/L (ref 7–16)
AST SERPL-CCNC: 26 U/L (ref 15–37)
BASOPHILS # BLD: 0 K/UL (ref 0–0.2)
BASOPHILS NFR BLD: 1 % (ref 0–2)
BILIRUB SERPL-MCNC: 0.2 MG/DL (ref 0.2–1.1)
BUN SERPL-MCNC: 20 MG/DL (ref 6–23)
CALCIUM SERPL-MCNC: 9.4 MG/DL (ref 8.3–10.4)
CHLORIDE SERPL-SCNC: 107 MMOL/L (ref 98–107)
CO2 SERPL-SCNC: 26 MMOL/L (ref 21–32)
CREAT SERPL-MCNC: 0.9 MG/DL (ref 0.6–1)
DIFFERENTIAL METHOD BLD: ABNORMAL
EOSINOPHIL # BLD: 0 K/UL (ref 0–0.8)
EOSINOPHIL NFR BLD: 0 % (ref 0.5–7.8)
ERYTHROCYTE [DISTWIDTH] IN BLOOD BY AUTOMATED COUNT: 17.2 % (ref 11.9–14.6)
GLOBULIN SER CALC-MCNC: 4 G/DL (ref 2.3–3.5)
GLUCOSE SERPL-MCNC: 115 MG/DL (ref 65–100)
HCT VFR BLD AUTO: 29.1 % (ref 35.8–46.3)
HGB BLD-MCNC: 9.1 G/DL (ref 11.7–15.4)
IMM GRANULOCYTES # BLD AUTO: 0 K/UL (ref 0–0.5)
IMM GRANULOCYTES NFR BLD AUTO: 0 % (ref 0–5)
LYMPHOCYTES # BLD: 1.1 K/UL (ref 0.5–4.6)
LYMPHOCYTES NFR BLD: 21 % (ref 13–44)
MAGNESIUM SERPL-MCNC: 2.2 MG/DL (ref 1.8–2.4)
MCH RBC QN AUTO: 31.2 PG (ref 26.1–32.9)
MCHC RBC AUTO-ENTMCNC: 31.3 G/DL (ref 31.4–35)
MCV RBC AUTO: 99.7 FL (ref 79.6–97.8)
MONOCYTES # BLD: 0.7 K/UL (ref 0.1–1.3)
MONOCYTES NFR BLD: 13 % (ref 4–12)
NEUTS SEG # BLD: 3.4 K/UL (ref 1.7–8.2)
NEUTS SEG NFR BLD: 65 % (ref 43–78)
NRBC # BLD: 0 K/UL (ref 0–0.2)
PLATELET # BLD AUTO: 292 K/UL (ref 150–450)
PMV BLD AUTO: 8.6 FL (ref 9.4–12.3)
POTASSIUM SERPL-SCNC: 4.3 MMOL/L (ref 3.5–5.1)
PROT SERPL-MCNC: 7.6 G/DL (ref 6.3–8.2)
RBC # BLD AUTO: 2.92 M/UL (ref 4.05–5.2)
SODIUM SERPL-SCNC: 139 MMOL/L (ref 136–145)
WBC # BLD AUTO: 5.2 K/UL (ref 4.3–11.1)

## 2022-06-13 PROCEDURE — 85025 COMPLETE CBC W/AUTO DIFF WBC: CPT

## 2022-06-13 PROCEDURE — 6360000002 HC RX W HCPCS: Performed by: INTERNAL MEDICINE

## 2022-06-13 PROCEDURE — 96417 CHEMO IV INFUS EACH ADDL SEQ: CPT

## 2022-06-13 PROCEDURE — 96367 TX/PROPH/DG ADDL SEQ IV INF: CPT

## 2022-06-13 PROCEDURE — 2580000003 HC RX 258: Performed by: INTERNAL MEDICINE

## 2022-06-13 PROCEDURE — 96411 CHEMO IV PUSH ADDL DRUG: CPT

## 2022-06-13 PROCEDURE — 96375 TX/PRO/DX INJ NEW DRUG ADDON: CPT

## 2022-06-13 PROCEDURE — 96413 CHEMO IV INFUSION 1 HR: CPT

## 2022-06-13 PROCEDURE — 80053 COMPREHEN METABOLIC PANEL: CPT

## 2022-06-13 PROCEDURE — 99215 OFFICE O/P EST HI 40 MIN: CPT | Performed by: INTERNAL MEDICINE

## 2022-06-13 PROCEDURE — 83735 ASSAY OF MAGNESIUM: CPT

## 2022-06-13 PROCEDURE — 36591 DRAW BLOOD OFF VENOUS DEVICE: CPT

## 2022-06-13 RX ORDER — ONDANSETRON 2 MG/ML
8 INJECTION INTRAMUSCULAR; INTRAVENOUS
Status: CANCELLED | OUTPATIENT
Start: 2022-07-05

## 2022-06-13 RX ORDER — ONDANSETRON 2 MG/ML
8 INJECTION INTRAMUSCULAR; INTRAVENOUS
Status: CANCELLED | OUTPATIENT
Start: 2022-08-01

## 2022-06-13 RX ORDER — FAMOTIDINE 10 MG/ML
20 INJECTION, SOLUTION INTRAVENOUS
Status: CANCELLED | OUTPATIENT
Start: 2022-06-13

## 2022-06-13 RX ORDER — DOXORUBICIN HYDROCHLORIDE 2 MG/ML
60 INJECTION, SOLUTION INTRAVENOUS ONCE
Status: CANCELLED | OUTPATIENT
Start: 2022-08-01 | End: 2022-07-25

## 2022-06-13 RX ORDER — EPINEPHRINE 1 MG/ML
0.3 INJECTION, SOLUTION, CONCENTRATE INTRAVENOUS PRN
Status: CANCELLED | OUTPATIENT
Start: 2022-08-01

## 2022-06-13 RX ORDER — SODIUM CHLORIDE 0.9 % (FLUSH) 0.9 %
10 SYRINGE (ML) INJECTION PRN
Status: DISCONTINUED | OUTPATIENT
Start: 2022-06-13 | End: 2022-06-14 | Stop reason: HOSPADM

## 2022-06-13 RX ORDER — SODIUM CHLORIDE 9 MG/ML
5-40 INJECTION INTRAVENOUS PRN
Status: CANCELLED | OUTPATIENT
Start: 2022-08-01

## 2022-06-13 RX ORDER — MEPERIDINE HYDROCHLORIDE 50 MG/ML
12.5 INJECTION INTRAMUSCULAR; INTRAVENOUS; SUBCUTANEOUS PRN
Status: CANCELLED | OUTPATIENT
Start: 2022-08-01

## 2022-06-13 RX ORDER — SODIUM CHLORIDE 9 MG/ML
5-250 INJECTION, SOLUTION INTRAVENOUS PRN
Status: CANCELLED | OUTPATIENT
Start: 2022-08-01

## 2022-06-13 RX ORDER — HEPARIN SODIUM (PORCINE) LOCK FLUSH IV SOLN 100 UNIT/ML 100 UNIT/ML
500 SOLUTION INTRAVENOUS PRN
Status: CANCELLED | OUTPATIENT
Start: 2022-06-13

## 2022-06-13 RX ORDER — SODIUM CHLORIDE 9 MG/ML
5-40 INJECTION INTRAVENOUS PRN
Status: CANCELLED | OUTPATIENT
Start: 2022-07-05

## 2022-06-13 RX ORDER — SODIUM CHLORIDE 9 MG/ML
5-250 INJECTION, SOLUTION INTRAVENOUS PRN
Status: CANCELLED | OUTPATIENT
Start: 2022-06-13

## 2022-06-13 RX ORDER — SODIUM CHLORIDE 9 MG/ML
5-250 INJECTION, SOLUTION INTRAVENOUS PRN
Status: DISCONTINUED | OUTPATIENT
Start: 2022-06-13 | End: 2022-06-14 | Stop reason: HOSPADM

## 2022-06-13 RX ORDER — MEPERIDINE HYDROCHLORIDE 50 MG/ML
12.5 INJECTION INTRAMUSCULAR; INTRAVENOUS; SUBCUTANEOUS PRN
Status: CANCELLED | OUTPATIENT
Start: 2022-07-05

## 2022-06-13 RX ORDER — SODIUM CHLORIDE 9 MG/ML
INJECTION, SOLUTION INTRAVENOUS CONTINUOUS
Status: CANCELLED | OUTPATIENT
Start: 2022-07-05

## 2022-06-13 RX ORDER — SODIUM CHLORIDE 9 MG/ML
5-40 INJECTION INTRAVENOUS PRN
Status: CANCELLED | OUTPATIENT
Start: 2022-06-13

## 2022-06-13 RX ORDER — ACETAMINOPHEN 325 MG/1
650 TABLET ORAL
Status: CANCELLED | OUTPATIENT
Start: 2022-06-13

## 2022-06-13 RX ORDER — EPINEPHRINE 1 MG/ML
0.3 INJECTION, SOLUTION, CONCENTRATE INTRAVENOUS PRN
Status: CANCELLED | OUTPATIENT
Start: 2022-06-13

## 2022-06-13 RX ORDER — SODIUM CHLORIDE 9 MG/ML
5-250 INJECTION, SOLUTION INTRAVENOUS PRN
Status: CANCELLED | OUTPATIENT
Start: 2022-07-05

## 2022-06-13 RX ORDER — HEPARIN SODIUM (PORCINE) LOCK FLUSH IV SOLN 100 UNIT/ML 100 UNIT/ML
500 SOLUTION INTRAVENOUS PRN
Status: CANCELLED | OUTPATIENT
Start: 2022-07-05

## 2022-06-13 RX ORDER — FAMOTIDINE 10 MG/ML
20 INJECTION, SOLUTION INTRAVENOUS
Status: CANCELLED | OUTPATIENT
Start: 2022-07-05

## 2022-06-13 RX ORDER — DIPHENHYDRAMINE HYDROCHLORIDE 50 MG/ML
50 INJECTION INTRAMUSCULAR; INTRAVENOUS
Status: CANCELLED | OUTPATIENT
Start: 2022-06-13

## 2022-06-13 RX ORDER — ALBUTEROL SULFATE 90 UG/1
4 AEROSOL, METERED RESPIRATORY (INHALATION) PRN
Status: CANCELLED | OUTPATIENT
Start: 2022-06-13

## 2022-06-13 RX ORDER — DIPHENHYDRAMINE HYDROCHLORIDE 50 MG/ML
50 INJECTION INTRAMUSCULAR; INTRAVENOUS
Status: CANCELLED | OUTPATIENT
Start: 2022-07-05

## 2022-06-13 RX ORDER — DOXORUBICIN HYDROCHLORIDE 2 MG/ML
60 INJECTION, SOLUTION INTRAVENOUS ONCE
Status: CANCELLED | OUTPATIENT
Start: 2022-06-13 | End: 2022-06-13

## 2022-06-13 RX ORDER — ONDANSETRON 2 MG/ML
8 INJECTION INTRAMUSCULAR; INTRAVENOUS ONCE
Status: CANCELLED | OUTPATIENT
Start: 2022-07-05 | End: 2022-07-04

## 2022-06-13 RX ORDER — ONDANSETRON 2 MG/ML
8 INJECTION INTRAMUSCULAR; INTRAVENOUS ONCE
Status: COMPLETED | OUTPATIENT
Start: 2022-06-13 | End: 2022-06-13

## 2022-06-13 RX ORDER — DIPHENHYDRAMINE HYDROCHLORIDE 50 MG/ML
50 INJECTION INTRAMUSCULAR; INTRAVENOUS
Status: CANCELLED | OUTPATIENT
Start: 2022-08-01

## 2022-06-13 RX ORDER — ONDANSETRON 2 MG/ML
8 INJECTION INTRAMUSCULAR; INTRAVENOUS ONCE
Status: CANCELLED | OUTPATIENT
Start: 2022-08-01 | End: 2022-07-25

## 2022-06-13 RX ORDER — SODIUM CHLORIDE 9 MG/ML
INJECTION, SOLUTION INTRAVENOUS CONTINUOUS
Status: CANCELLED | OUTPATIENT
Start: 2022-06-13

## 2022-06-13 RX ORDER — ACETAMINOPHEN 325 MG/1
650 TABLET ORAL
Status: CANCELLED | OUTPATIENT
Start: 2022-08-01

## 2022-06-13 RX ORDER — FAMOTIDINE 10 MG/ML
20 INJECTION, SOLUTION INTRAVENOUS
Status: CANCELLED | OUTPATIENT
Start: 2022-08-01

## 2022-06-13 RX ORDER — ALBUTEROL SULFATE 90 UG/1
4 AEROSOL, METERED RESPIRATORY (INHALATION) PRN
Status: CANCELLED | OUTPATIENT
Start: 2022-07-05

## 2022-06-13 RX ORDER — EPINEPHRINE 1 MG/ML
0.3 INJECTION, SOLUTION, CONCENTRATE INTRAVENOUS PRN
Status: CANCELLED | OUTPATIENT
Start: 2022-07-05

## 2022-06-13 RX ORDER — ONDANSETRON 2 MG/ML
8 INJECTION INTRAMUSCULAR; INTRAVENOUS ONCE
Status: CANCELLED | OUTPATIENT
Start: 2022-06-13 | End: 2022-06-13

## 2022-06-13 RX ORDER — MEPERIDINE HYDROCHLORIDE 50 MG/ML
12.5 INJECTION INTRAMUSCULAR; INTRAVENOUS; SUBCUTANEOUS PRN
Status: CANCELLED | OUTPATIENT
Start: 2022-06-13

## 2022-06-13 RX ORDER — ONDANSETRON 2 MG/ML
8 INJECTION INTRAMUSCULAR; INTRAVENOUS
Status: CANCELLED | OUTPATIENT
Start: 2022-06-13

## 2022-06-13 RX ORDER — DOXORUBICIN HYDROCHLORIDE 2 MG/ML
60 INJECTION, SOLUTION INTRAVENOUS ONCE
Status: COMPLETED | OUTPATIENT
Start: 2022-06-13 | End: 2022-06-13

## 2022-06-13 RX ORDER — SODIUM CHLORIDE 0.9 % (FLUSH) 0.9 %
5-40 SYRINGE (ML) INJECTION PRN
Status: DISCONTINUED | OUTPATIENT
Start: 2022-06-13 | End: 2022-06-14 | Stop reason: HOSPADM

## 2022-06-13 RX ORDER — ACETAMINOPHEN 325 MG/1
650 TABLET ORAL
Status: CANCELLED | OUTPATIENT
Start: 2022-07-05

## 2022-06-13 RX ORDER — SODIUM CHLORIDE 9 MG/ML
INJECTION, SOLUTION INTRAVENOUS CONTINUOUS
Status: CANCELLED | OUTPATIENT
Start: 2022-08-01

## 2022-06-13 RX ORDER — SODIUM CHLORIDE 0.9 % (FLUSH) 0.9 %
5-40 SYRINGE (ML) INJECTION PRN
Status: CANCELLED | OUTPATIENT
Start: 2022-07-05

## 2022-06-13 RX ORDER — DOXORUBICIN HYDROCHLORIDE 2 MG/ML
60 INJECTION, SOLUTION INTRAVENOUS ONCE
Status: CANCELLED | OUTPATIENT
Start: 2022-07-05 | End: 2022-07-04

## 2022-06-13 RX ORDER — ALBUTEROL SULFATE 90 UG/1
4 AEROSOL, METERED RESPIRATORY (INHALATION) PRN
Status: CANCELLED | OUTPATIENT
Start: 2022-08-01

## 2022-06-13 RX ORDER — HEPARIN SODIUM (PORCINE) LOCK FLUSH IV SOLN 100 UNIT/ML 100 UNIT/ML
500 SOLUTION INTRAVENOUS PRN
Status: CANCELLED | OUTPATIENT
Start: 2022-08-01

## 2022-06-13 RX ORDER — SODIUM CHLORIDE 0.9 % (FLUSH) 0.9 %
5-40 SYRINGE (ML) INJECTION PRN
Status: CANCELLED | OUTPATIENT
Start: 2022-06-13

## 2022-06-13 RX ORDER — SODIUM CHLORIDE 0.9 % (FLUSH) 0.9 %
5-40 SYRINGE (ML) INJECTION PRN
Status: CANCELLED | OUTPATIENT
Start: 2022-08-01

## 2022-06-13 RX ADMIN — SODIUM CHLORIDE 50 ML/HR: 9 INJECTION, SOLUTION INTRAVENOUS at 11:45

## 2022-06-13 RX ADMIN — DOXORUBICIN HYDROCHLORIDE 106 MG: 2 INJECTION, SOLUTION INTRAVENOUS at 14:03

## 2022-06-13 RX ADMIN — CYCLOPHOSPHAMIDE 1060 MG: 500 INJECTION, POWDER, FOR SOLUTION INTRAVENOUS; ORAL at 14:10

## 2022-06-13 RX ADMIN — DEXAMETHASONE SODIUM PHOSPHATE 12 MG: 4 INJECTION, SOLUTION INTRAMUSCULAR; INTRAVENOUS at 12:50

## 2022-06-13 RX ADMIN — SODIUM CHLORIDE 200 MG: 9 INJECTION, SOLUTION INTRAVENOUS at 12:15

## 2022-06-13 RX ADMIN — ONDANSETRON 8 MG: 2 INJECTION INTRAMUSCULAR; INTRAVENOUS at 12:46

## 2022-06-13 RX ADMIN — Medication 10 ML: at 10:16

## 2022-06-13 RX ADMIN — FOSAPREPITANT 150 MG: 150 INJECTION, POWDER, LYOPHILIZED, FOR SOLUTION INTRAVENOUS at 13:15

## 2022-06-13 RX ADMIN — SODIUM CHLORIDE, PRESERVATIVE FREE 10 ML: 5 INJECTION INTRAVENOUS at 11:45

## 2022-06-13 RX ADMIN — SODIUM CHLORIDE, PRESERVATIVE FREE 10 ML: 5 INJECTION INTRAVENOUS at 14:45

## 2022-06-13 NOTE — PROGRESS NOTES
Patient arrived to port lab for port access and lab draw   [facility] accessed and labs drawn per protocol   *Port remains accessed   Patient discharged from port lab discharged ambulatory*

## 2022-06-13 NOTE — PROGRESS NOTES
[facility] Hematology & Oncology: Office Visit Progress Note    Chief Complaint:    Triple negative left breast cancer    History of Present Illness:  Reason for Referral: Carcinoma of upper-inner  quadrant of left breast in female, estrogen receptor negative; Triple negative malignant neoplasm of breast       Referring Provider: Manisha Awad MD       Primary Care Provider: Alisia Majano MD       Family History of Cancer/Hematologic Disorders: None reported       Presenting Symptoms: Abnormal routine screening mammogram        Ms. Tc Linda is a 49 y.o.  female with a history of heart murmur, HTN, mixed HLD, and irregular menses, who was recently diagnosed with breast  cancer. She initially presented on 11/22/21 for a routine bilateral digital screening mammogram which identified focal asymmetry in the medial inferior left breast at 6 cm from the nipple.  Further evaluation with diagnostic left breast mammogram and left  breast ultrasound were completed on 12/6/21 confirming the presence of adjacent left breast masses with one at the 9:00 position, 6 cm from the nipple measuring approximately 1.5 x 0.8 x 0.8 cm and a 7 mm hypoechoic mass just adjacent to this.        Recommended ultrasound-guided biopsy of the medial left breast mass was completed on 12/23/21 with pathology from the core biopsy of the left breast, 9:00 position, 6 cm from nipple, revealing ER 0.9%/MA 0.4%/HER-2 (0) negative, intermediate grade (moderately  differentiated), foci of at least microinvasive infiltrating ductal carcinoma in association with focally high grade ductal carcinoma in situ with no definite lymphovascular invasion identified.        MRI of the bilateral breasts was performed on 12/28/21 demonstrating left medial breast cancer measuring about 4.5 cm in greatest dimension; no additional suspicious finding elsewhere in either breast; and no obvious lymphadenopathy.       Patient was referred to surgery and evaluated in consultation by Surgeon, Dr. Earnest Scott, on 1/10/22. She was assessed with signs and symptoms consistent with cT2N0 triple negative left breast IDC with DCIS. Treatment options were discussed, and  Dr. Chetna Burger recommended genetic testing and preoperative evaluation by medical oncology to consider neoadjuvant chemotherapy. Dr. Chetna Burger noted, \"We discussed the strong possibility that she could require left mastectomy/sentinel node should we  proceed with surgery upfront. At this time the enhancement on MRI looks too large, diffuse, and elongated to perform breast preservation surgery and expected good cosmetic result. If she has neoadjuvant treatment I will see her back in about 4 months  to regroup. \"       Patient is now referred to [facility] for oncology evaluation and consideration for neoadjuvant chemotherapy. Patient completed genetic counseling on 1/13/22. She agreed to pursue testing with BRCAPlus panel of 8 genes associated with hereditary cancer  (including BRCA1 and BRCA2 ).     BILATERAL DIGITAL SCREENING MAMMOGRAM  11/22/2021   FINDINGS: There are scattered fibroglandular densities. A few typically benign-appearing calcifications in the left breast are similar in  appearance to the previous study. There is a focal asymmetry in the medial inferior left breast located 6 cm from the nipple. Recommend spot compression magnification views on the direct ML view and ultrasound, if indicated for further evaluation. There  is otherwise no suspicious mass, calcification, or architectural distortion in either breast.      IMPRESSION: Focal asymmetry in the medial inferior left breast at 6 cm from the nipple. for which further evaluation is recommended with ML  and compression magnification views, with possible ultrasound if indicated. BI-RADS Assessment Category 0:  Incomplete: Needs additional imaging evaluation.       DIAGNOSTIC MAMMOGRAM LEFT BREAST, LEFT BREAST ULTRASOUND 12/6/21   FINDING: The area of concern does not persist on additional imaging. There is no architectural distortion. The area of concern persists with  additional imaging. This resides within the medial left breast at approximately 9:00 position. There are few associated calcifications which appear somewhat coarse. This measures approximately 1.3 cm in size. An additional smaller asymmetry also at the  9:00 position resides 7 cm from the nipple. A left breast ultrasound is recommended for further evaluation.    LEFT BREAST ULTRASOUND: At the 9:00 position, 6 cm from the nipple is a lobulated hypoechoic but mildly heterogeneous appearing mass measuring approximately 1.5 x 0.8 x 0.8 cm. The margins are occasionally ill-defined with some components demonstrating  posterior acoustic shadowing. The long axis is parallel to the plane of the chest wall. There is a 7 mm hypoechoic mass just adjacent to this This is hypoechoic with a lobulated contour. The margins are occasionally ill-defined.    IMPRESSION: Adjacent left breast masses. Recommend tissue sampling of the larger lesion. BI-RADS Assessment Category 4: Suspicious Finding- Biopsy should be considered.       [facility] SURGICAL PATHOLOGY REPORT 12/23/21   DIAGNOSIS    A: \" LEFT BREAST, 9:00 POSITION, 6 CM FROM NIPPLE, CORE BIOPSY\": FOCI OF AT LEAST MICROINVASIVE INFILTRATING DUCTAL CARCINOMA, INTERMEDIATE GRADE (MODERATELY DIFFERENTIATED) IN ASSOCIATION WITH FOCALLY HIGH GRADE DUCTAL CARCINOMA IN SITU.  DEFINITE LYMPHOVASCULAR  INVASION IS NOT IDENTIFIED   [facility]- ER/VT/[id number] BY IHC    INTERPRETATION    cuaQea IHC Quantitative Breast Panel    TEST NAME:                                      GSYSAFT:                  [id number] CONTROLS:    ESTROGEN RECEPTOR:                 Negative (0.9%)          Present, Positive    PROGESTERONE RECEPTOR:       Negative (0.4%)          Present, Positive    HER-2/CHAR:                                        Negative (0)                Percentage of Cells with Uniform Intense Complete Membrane    Stainin%   STF-IMMUNOHISTOCHEMISTRY   Immunohistochemical Stain Panel:    INTERPRETATION: Immunohistochemical findings consistent with breast primary. ANTIBODY/[facility]                                               RESULT    Cytokeratin 7               Lung, breast, upper GE, serous ovary           Positive    Cytokeratin 20             Colon, mucinous ovary, urothelium                Negative    GATA3                        Urothelial, breast carcinoma                           Positive    GCDFP                       Breast, salivary gland, skin, adnexa               Positive    Mammaglobin                         Breast                                                              Positive       MRI OF THE BREASTS WITH AND WITHOUT CONTRAST 21   FINDINGS: The breasts demonstrate moderate glandularity and mild background enhancement.  Left 9:00 biopsy clip within a lobulated heterogeneously  enhancing mass with angulated and irregular margins overall measuring about 1.8 x 0.7 x 1.2 cm. Posterior to this (about 1 cm away) the smaller mass measuring 0.9 cm is unchanged from recent mammogram and ultrasound. Additionally there is clumped and  reticular nonmass enhancement extending superiorly by about 2.7 cm and anteriorly by about 1.8 cm, in keeping with the DCIS and calcifications. Overall the expected area of disease measures about 4.5 cm AP by 1.2 cm transverse and is located at 9:00-9:30,  mid to posterior depth. There is no evidence of involvement of skin or chest wall structures. There is no other evidence of suspicious enhancing mass, and no other dominant or unique nonmass enhancement, to suggest additional malignancy in either breast.  There is no evidence of axillary or internal mammary lymphadenopathy.  Elsewhere, limited visualization of the partially included thorax and upper abdomen shows no acute abnormality, with note of a small benign cyst in the left liver.     IMPRESSION   1. Left medial breast cancer measures about 4.5 cm in greatest dimension. 2. No additional suspicious finding elsewhere in either breast. No obvious lymphadenopathy. Recommend continued management as directed clinically. BI-RADS Assessment Category 6: Known Biopsy Proven Malignancy       Notes from Referring Provider: \"Age 49 with cT2N0 triple  negative left breast IDC and DCIS Evaluate    Speaks mandarin   She has been referred to genetics also\"       Interim history update in A/P. Review of Systems:      Constitutional  Denies fever or chills. Denies fatigue. Denies anorexia. HEENT   allergic conjunctivitis and sinusitis. Denies trauma, bluring vision, hearing loss, ear pain, nosebleeds, sore throat, neck pain and ear discharge. Skin  Denies lesions or rashes. Lungs  Denies shortness of breath, cough, sputum production or hemoptysis. Cardiovascular  Denies chest pain, palpitations, orthopnea, claudication and leg swelling. Gastrointestinal  Nausea. Denies vomiting. Denies bloody or black stools. Denies abdominal pain.   Denies dysuria, frequency or hesitancy of urination     Neuro  Denies headaches, visual changes or ataxia. Denies dizziness, tingling, tremors, sensory change, speech change, focal weakness and headaches. Hematology  Denies nasal/gum bleeding, denies easy bruise     Endo  Denies heat/cold intolerance, denies diabetes. MSK  Denies back pain, swollen legs, myalgias and falls. Psychiatric/Behavioral  Denies depression and substance abuse. The patient is not nervous/anxious.               Allergies   Allergen Reactions    Paclitaxel Angioedema and Swelling    Lisinopril Other (See Comments)     Other reaction(s): Cough     Past Medical History:   Diagnosis Date    Breast cancer ([facility] [address]     Hypertension     Murmur, heart     echo 2016 EF 60-65% ; 1/31/22 states never has had symptoms    Poor historian difficulty with verifying medication list     Past Surgical History:   Procedure Laterality Date    BREAST SURGERY      bx of left breast     IR PORT PLACEMENT EQUAL OR GREATER THAN 5 YEARS  2/2/2022    IR PORT PLACEMENT EQUAL OR GREATER THAN 5 YEARS  2/2/2022    IR PORT PLACEMENT EQUAL OR GREATER THAN 5 YEARS 2/2/2022 [facility] RADIOLOGY SPECIALS    US BREAST NEEDLE BIOPSY LEFT Left 12/23/2021    US BREAST NEEDLE BIOPSY LEFT 12/23/2021 [facility] RADIOLOGY MAMMO     Family History   Problem Relation Age of Onset    Ovarian Cancer Neg Hx     No Known Problems Mother     Breast Cancer Neg Hx     No Known Problems Sister     Other Other         states no family history    Colon Cancer Neg Hx     Hypertension Father     No Known Problems Sister     No Known Problems Brother     Uterine Cancer Neg Hx      Social History     Socioeconomic History    Marital status:      Spouse name: Not on file    Number of children: Not on file    Years of education: Not on file    Highest education level: Not on file   Occupational History    Not on file   Tobacco Use    Smoking status: Never Smoker    Smokeless tobacco: Never Used   Substance and Sexual Activity    Alcohol use: No     Alcohol/week: 0.0 standard drinks    Drug use: No    Sexual activity: Not on file   Other Topics Concern    Not on file   Social History Narrative    Abuse: Feels safe at home, no history of physical abuse, no history of sexual abuse       Social Determinants of Health     Financial Resource Strain:     Difficulty of Paying Living Expenses: Not on file   Food Insecurity:     Worried About Running Out of Food in the Last Year: Not on file    Elena of Food in the Last Year: Not on file   Transportation Needs:     Lack of Transportation (Medical): Not on file    Lack of Transportation (Non-Medical):  Not on file   Physical Activity:     Days of Exercise per Week: Not on file    Minutes of Exercise per Session: Not on file medications for this visit. Facility-Administered Medications Ordered in Other Visits   Medication Dose Route Frequency Provider Last Rate Last Admin    sodium chloride flush 0.9 % injection 10 mL  10 mL IntraVENous PRN Cala Rinne, MD   10 mL at 06/13/22 1016    0.9 % sodium chloride infusion  5-250 mL/hr IntraVENous PRN Cala Rinne, MD   Stopped at 06/13/22 1445    sodium chloride flush 0.9 % injection 5-40 mL  5-40 mL IntraVENous PRN Cala Rinne, MD   10 mL at 06/13/22 1445       OBJECTIVE:  /75   Pulse 83   Temp 97.8 °F (36.6 °C)   Resp 16   Ht 5' 0.5\" (1.537 m)   Wt 157 lb 4.8 oz (71.4 kg)   SpO2 100%   BMI 30.21 kg/m²     Physical Exam:      Constitutional:  Oriented to person, place, and time. Well-developed and well-nourished. HEENT:  Normocephalic and atraumatic. Oropharynx is clear and moist.    Conjunctivae and EOM are normal. Pupils are equal, round, and reactive to light. No scleral icterus. Neck supple. No JVD present. No tracheal deviation present. No thyromegaly present. Lymph node  No palpable submandibular, cervical, supraclavicular, axillary and inguinal lymph nodes. Breasts  Left breast soft, nontender, popular nodular area and 9:00 about 2 cm at baseline, no longer palpable after chemotherapy. Right breast soft, nontender, no mass. Skin   rash. Warm and dry. No bruising noted. No erythema. No pallor. Respiratory  Effort normal and breath sounds normal.  No respiratory distress. No wheezes. No rales. No tenderness. CVS  Normal rate, regular rhythm and normal heart sounds. Exam reveals no gallop, no friction and no rub. No murmur heard. Abdomen  Soft. Bowel sounds are normal. Exhibits no distension. There is no tenderness. There is no rebound and no guarding. Neuro  Normal reflexes. No cranial nerve deficit. Exhibits normal muscle tone, 5 of 5 strength of all extremities.      MSK  Normal range of motion in general.  No edema and no tenderness.         Psych  Normal mood, affect, behavior, judgment and thought content        Labs:  Recent Results (from the past 24 hour(s))   CBC with Auto Differential    Collection Time: 06/13/22 10:08 AM   Result Value Ref Range    WBC 5.2 4.3 - 11.1 K/uL    RBC 2.92 (L) 4.05 - 5.2 M/uL    Hemoglobin 9.1 (L) 11.7 - 15.4 g/dL    Hematocrit 29.1 (L) 35.8 - 46.3 %    MCV 99.7 (H) 79.6 - 97.8 FL    MCH 31.2 26.1 - 32.9 PG    MCHC 31.3 (L) 31.4 - 35.0 g/dL    RDW 17.2 (H) 11.9 - 14.6 %    Platelets 475 941 - 072 K/uL    MPV 8.6 (L) 9.4 - 12.3 FL    nRBC 0.00 0.0 - 0.2 K/uL    Differential Type AUTOMATED      Seg Neutrophils 65 43 - 78 %    Lymphocytes 21 13 - 44 %    Monocytes 13 (H) 4.0 - 12.0 %    Eosinophils % 0 (L) 0.5 - 7.8 %    Basophils 1 0.0 - 2.0 %    Immature Granulocytes 0 0.0 - 5.0 %    Segs Absolute 3.4 1.7 - 8.2 K/UL    Absolute Lymph # 1.1 0.5 - 4.6 K/UL    Absolute Mono # 0.7 0.1 - 1.3 K/UL    Absolute Eos # 0.0 0.0 - 0.8 K/UL    Basophils Absolute 0.0 0.0 - 0.2 K/UL    Absolute Immature Granulocyte 0.0 0.0 - 0.5 K/UL   Comprehensive Metabolic Panel    Collection Time: 06/13/22 10:08 AM   Result Value Ref Range    Sodium 139 136 - 145 mmol/L    Potassium 4.3 3.5 - 5.1 mmol/L    Chloride 107 98 - 107 mmol/L    CO2 26 21 - 32 mmol/L    Anion Gap 6 (L) 7 - 16 mmol/L    Glucose 115 (H) 65 - 100 mg/dL    BUN 20 6 - 23 MG/DL    CREATININE 0.90 0.6 - 1.0 MG/DL    GFR African American >60 >60 ml/min/1.73m2    GFR Non- >60 >60 ml/min/1.73m2    Calcium 9.4 8.3 - 10.4 MG/DL    Total Bilirubin 0.2 0.2 - 1.1 MG/DL    ALT 38 12 - 65 U/L    AST 26 15 - 37 U/L    Alk Phosphatase 86 50 - 136 U/L    Total Protein 7.6 6.3 - 8.2 g/dL    Albumin 3.6 3.5 - 5.0 g/dL    Globulin 4.0 (H) 2.3 - 3.5 g/dL    Albumin/Globulin Ratio 0.9 (L) 1.2 - 3.5     Magnesium    Collection Time: 06/13/22 10:08 AM   Result Value Ref Range    Magnesium 2.2 1.8 - 2.4 mg/dL       Imaging:  No results found for this or any previous visit. ASSESSMENT/PLAN:   Diagnosis Orders   1. Triple negative malignant neoplasm of breast (HCC)  CBC With Auto Differential    Comprehensive metabolic panel    CBC With Auto Differential    Comprehensive metabolic panel    CBC With Auto Differential    Comprehensive metabolic panel    CBC with Auto Differential    Comprehensive Metabolic Panel   2. High risk medication use  TSH   3. Chemotherapy-induced neutropenia (HCC)     4. Chemotherapy-induced nausea       [unfilled]    49 y.o. F consulted for triple negative left breast cancer and presented to [facility] with  and daughter on 1/19/2022.    She has good baseline health and functions well, and annual screening mammogram showed left breast lesion, biopsy showed triple negative grade 2 IDC, cT2 N0 M0, and we discussed the high risks profile for triple negative breast cancer and the most recent  FDA approval of neoadjuvant chemoimmunotherapy, she appeared a good candidate and agreed to arrange Jacquiline Ao for 4 cycles followed by Adriamycin Cytoxan Keytruda for 4 cycles, followed by surgery and adjuvant Keytruda for total systemic therapy  of 1 year, discussed toxicity and management, discussed logistics, arrange Zofran Compazine Ativan, baseline echocardiogram showed normal EF, hypertension better but still uncontrolled after adding spironolactone and educated risk of dehydration and hypotension  on chemotherapy when she is taking double diuretics, started cycle 1 2/7/22 and left breast tumor responded rapidly, lost 20 pounds after cycle 1 and BP much better controlled even after she runs out of diltiazem, again warned of monitoring dehydration/hypotension,  discussed increased protein/calorie intake, may add stimulant if continues to lose weight, occasional mild tingling in fingertips, episodes of constipation responded to laxative, proceed to cycle 1 day 8 carbotaxol Keytruda, educated to monitor for neuropathy, follow next week but call as needed.       However after cycle 1 day 8 Taxol on 2/14 and developed acute onset of itching, rash, anasarca, short of breath, fever, hypotension, diarrhea, admitted with antibiotics, antihistamine,  IV fluid, most symptom much improved the second day although still lingering, discussed that the picture of allergic reaction but thorough discussion not able to identify new food or medicine, not typical for Taxol or Keytruda reaction given the timing  and the acuity, and she reported a similar episode a few years ago that she went to ER for treatment, but never followed up for allergen test and never had EpiPen, discussed the concern of allergy and will arrange allergist work-up, however proceeded  with day 15 Taxol with close observation to rule out atypical Taxol allergy, patient and family understand the risk and agreed to proceed, and reported reasonable tolerance except for mild itching through the infusion, however overnight RN was concerned  of tachycardia and tachypneic which patient reported similar to each infusion but received IV Benadryl and Solu-Medrol, LFT elevated for a day, give albumin with lasix and anasarca improved, DC with Benadryl/prednisone/EpiPen as needed, followed  on 2/28/2022, swelling resolved, mild rash of hands and arms, discussed to change Taxol to Taxotere for better management of reaction, return on 3/7/2022 reported much better tolerance for the allergic reaction, however more nausea/vomiting/weight loss  and neutropenic fever with temperature 101 and ANC 0.2, coughing and rule out Covid, typical seasonal allergic sinusitis symptoms and educated to start using Nasacort spray, admit to hospital for neutropenic fever, discharge 3/10/2022, had skin rash that  was attributed to cefepime and resolved, again discussed her sensitivity to multiple allergens and different manifestations need to be managed accordingly, Zaditor for allergic conjunctivitis, nasal steroid for sinusitis, Kenalog for skin rash, oral antihistamine,  prednisone 20 mg only as needed, added G-CSF since cycle 3, better controlled for allergy/hypertension/fluid retention, severe diarrhea responded to PRBC, severe thrombocytopenia recovered after delaying cycle 4 by a week, completed cycle 4 with G-CSF  and Emend. She returned on 5/9/2022 to start AC/Keytruda, however platelet was low and chemo had to be deferred for 2 weeks actually and platelets finally recovered to 92 on 5/23/2022, discussed to proceed with caution and reduce AC dose by 20%, which she tolerated reasonably well and plt recovered, proceed to cycle 2 AC/keytruda with dose reduction, GCSF, antiemetics, RTC in 3 weeks for cycle 3 but call as needed. All questions are answered to their satisfaction. They will call for further questions and concerns. ECOG PERFORMANCE STATUS - 1    Pain - 0 - No pain/10. Fatigue - No flowsheet data found. Distress - No flowsheet data found. Elements of this note have been dictated via voice recognition software. Text and phrases may be limited by the accuracy and autoconversion of the software. The chart has been reviewed, but errors may still be present. Shashi Church M.D.   [address]  Office : [phone]  Fax : [phone]

## 2022-06-13 NOTE — PROGRESS NOTES
6/13/2022  Pt ambulatory to Infusion without complaints. Labs reviewed and pt received treatment per order, tolerated well. Patient instructed to call provider with temperature of 100.4 or greater, nausea/vomiting/diarrhea/pain not controlled by medications, or any other issues/concerns. Aware of next Infusion appt on [email or web address]. Discharged home with family.

## 2022-06-13 NOTE — ADDENDUM NOTE
Encounter addended by: NORI Murphy [facility] [place] on: 6/13/2022 11:49 AM   Actions taken: [employer]-Vent created or edited

## 2022-06-13 NOTE — PATIENT INSTRUCTIONS
Patient Instructions from Today's Visit    Reason for Visit:  Follow-up visit for breast cancer    Diagnosis Information:  [email or web address]-[email or web address]      Plan:  -Proceed with treatment today.      Follow Up:  -3 weeks    Recent Lab Results:  Hospital Outpatient Visit on 06/13/2022   Component Date Value Ref Range Status    WBC 06/13/2022 5.2  4.3 - 11.1 K/uL Final    RBC 06/13/2022 2.92* 4.05 - 5.2 M/uL Final    Hemoglobin 06/13/2022 9.1* 11.7 - 15.4 g/dL Final    Hematocrit 06/13/2022 29.1* 35.8 - 46.3 % Final    MCV 06/13/2022 99.7* 79.6 - 97.8 FL Final    MCH 06/13/2022 31.2  26.1 - 32.9 PG Final    MCHC 06/13/2022 31.3* 31.4 - 35.0 g/dL Final    RDW 06/13/2022 17.2* 11.9 - 14.6 % Final    Platelets 31/66/4705 292  150 - 450 K/uL Final    MPV 06/13/2022 8.6* 9.4 - 12.3 FL Final    nRBC 06/13/2022 0.00  0.0 - 0.2 K/uL Final    **Note: Absolute NRBC parameter is now reported with Hemogram**    Differential Type 06/13/2022 AUTOMATED    Final    Seg Neutrophils 06/13/2022 65  43 - 78 % Final    Lymphocytes 06/13/2022 21  13 - 44 % Final    Monocytes 06/13/2022 13* 4.0 - 12.0 % Final    Eosinophils % 06/13/2022 0* 0.5 - 7.8 % Final    Basophils 06/13/2022 1  0.0 - 2.0 % Final    Immature Granulocytes 06/13/2022 0  0.0 - 5.0 % Final    Segs Absolute 06/13/2022 3.4  1.7 - 8.2 K/UL Final    Absolute Lymph # 06/13/2022 1.1  0.5 - 4.6 K/UL Final    Absolute Mono # 06/13/2022 0.7  0.1 - 1.3 K/UL Final    Absolute Eos # 06/13/2022 0.0  0.0 - 0.8 K/UL Final    Basophils Absolute 06/13/2022 0.0  0.0 - 0.2 K/UL Final    Absolute Immature Granulocyte 06/13/2022 0.0  0.0 - 0.5 K/UL Final    Sodium 06/13/2022 139  136 - 145 mmol/L Final    Potassium 06/13/2022 4.3  3.5 - 5.1 mmol/L Final    Chloride 06/13/2022 107  98 - 107 mmol/L Final    CO2 06/13/2022 26  21 - 32 mmol/L Final    Anion Gap 06/13/2022 6* 7 - 16 mmol/L Final    Glucose 06/13/2022 115* 65 - 100 mg/dL Final    BUN 06/13/2022 20  6 - 23 MG/DL Final    CREATININE 06/13/2022 0.90  0.6 - 1.0 MG/DL Final    GFR  06/13/2022 >60  >60 ml/min/1.73m2 Final    GFR Non- 06/13/2022 >60  >60 ml/min/1.73m2 Final    Comment:      Estimated GFR is calculated using the Modification of Diet in Renal Disease (MDRD) Study equation, reported for both  Americans (GFRAA) and non- Americans (GFRNA), and normalized to 1.73m2 body surface area. The physician must decide which value applies to the patient. The MDRD study equation should only be used in individuals age 25 or older. It has not been validated for the following: pregnant women, patients with serious comorbid conditions,or on certain medications, or persons with extremes of body size, muscle mass, or nutritional status.  Calcium 06/13/2022 9.4  8.3 - 10.4 MG/DL Final    Total Bilirubin 06/13/2022 0.2  0.2 - 1.1 MG/DL Final    ALT 06/13/2022 38  12 - 65 U/L Final    AST 06/13/2022 26  15 - 37 U/L Final    Alk Phosphatase 06/13/2022 86  50 - 136 U/L Final    Total Protein 06/13/2022 7.6  6.3 - 8.2 g/dL Final    Albumin 06/13/2022 3.6  3.5 - 5.0 g/dL Final    Globulin 06/13/2022 4.0* 2.3 - 3.5 g/dL Final    Albumin/Globulin Ratio 06/13/2022 0.9* 1.2 - 3.5   Final    Magnesium 06/13/2022 2.2  1.8 - 2.4 mg/dL Final       Treatment Summary has been discussed and given to patient: N/A      -------------------------------------------------------------------------------------------------------------------  Please call our office at [phone] if you have any  of the following symptoms:   · Fever of 100.5 or greater  · Chills  · Shortness of breath  · Swelling or pain in one leg    After office hours an answering service is available and will contact a provider for emergencies or if you are experiencing any of the above symptoms.  Patient does express an interest in My Chart.   My Chart log in information explained on the after visit summary printout at the [facility] [address] desk.     Priscilla Cespedes RN

## 2022-06-14 ENCOUNTER — HOSPITAL ENCOUNTER (OUTPATIENT)
Dept: INFUSION THERAPY | Age: 57
Discharge: HOME | End: 2022-06-14
Payer: COMMERCIAL

## 2022-06-14 VITALS
RESPIRATION RATE: 18 BRPM | DIASTOLIC BLOOD PRESSURE: 76 MMHG | SYSTOLIC BLOOD PRESSURE: 112 MMHG | HEART RATE: 88 BPM | TEMPERATURE: 97.8 F | OXYGEN SATURATION: 99 %

## 2022-06-14 DIAGNOSIS — C50.919 TRIPLE NEGATIVE MALIGNANT NEOPLASM OF BREAST (HCC): Primary | ICD-10-CM

## 2022-06-14 PROCEDURE — 6360000002 HC RX W HCPCS: Performed by: INTERNAL MEDICINE

## 2022-06-14 PROCEDURE — 96372 THER/PROPH/DIAG INJ SC/IM: CPT

## 2022-06-14 RX ADMIN — PEGFILGRASTIM-CBQV 6 MG: 6 INJECTION, SOLUTION SUBCUTANEOUS at 16:01

## 2022-06-14 NOTE — PROGRESS NOTES
Arrived to the [facility]. Udenyca injection completed. Patient tolerated well. Any issues or concerns during appointment: none. Patient aware of next infusion appointment on 7/5 (date) at 11:30 (time). Patient instructed to call provider with temperature of 100.4 or greater or nausea/vomiting/ diarrhea or pain not controlled by medications  Discharged ambulatory.

## 2022-07-01 RX ORDER — SODIUM CHLORIDE 0.9 % (FLUSH) 0.9 %
5-40 SYRINGE (ML) INJECTION PRN
Status: CANCELLED | OUTPATIENT
Start: 2022-07-01

## 2022-07-05 ENCOUNTER — HOSPITAL ENCOUNTER (OUTPATIENT)
Dept: INFUSION THERAPY | Age: 57
Discharge: HOME | End: 2022-07-05
Payer: COMMERCIAL

## 2022-07-05 ENCOUNTER — OFFICE VISIT (OUTPATIENT)
Dept: ONCOLOGY | Age: 57
End: 2022-07-05
Payer: COMMERCIAL

## 2022-07-05 VITALS
TEMPERATURE: 98.7 F | OXYGEN SATURATION: 98 % | HEIGHT: 61 IN | WEIGHT: 156.4 LBS | RESPIRATION RATE: 16 BRPM | BODY MASS INDEX: 29.53 KG/M2 | HEART RATE: 86 BPM | DIASTOLIC BLOOD PRESSURE: 73 MMHG | SYSTOLIC BLOOD PRESSURE: 121 MMHG

## 2022-07-05 DIAGNOSIS — C50.919 TRIPLE NEGATIVE MALIGNANT NEOPLASM OF BREAST (HCC): ICD-10-CM

## 2022-07-05 DIAGNOSIS — G47.00 INSOMNIA, UNSPECIFIED TYPE: ICD-10-CM

## 2022-07-05 DIAGNOSIS — D70.1 CHEMOTHERAPY-INDUCED NEUTROPENIA (HCC): ICD-10-CM

## 2022-07-05 DIAGNOSIS — T45.1X5A CHEMOTHERAPY-INDUCED NAUSEA: ICD-10-CM

## 2022-07-05 DIAGNOSIS — R11.0 CHEMOTHERAPY-INDUCED NAUSEA: ICD-10-CM

## 2022-07-05 DIAGNOSIS — C50.919 TRIPLE NEGATIVE MALIGNANT NEOPLASM OF BREAST (HCC): Primary | ICD-10-CM

## 2022-07-05 DIAGNOSIS — T45.1X5A CHEMOTHERAPY-INDUCED NEUTROPENIA (HCC): ICD-10-CM

## 2022-07-05 DIAGNOSIS — Z79.899 HIGH RISK MEDICATION USE: ICD-10-CM

## 2022-07-05 LAB
ALBUMIN SERPL-MCNC: 3.8 G/DL (ref 3.5–5)
ALBUMIN/GLOB SERPL: 1 {RATIO} (ref 1.2–3.5)
ALP SERPL-CCNC: 95 U/L (ref 50–136)
ALT SERPL-CCNC: 32 U/L (ref 12–65)
ANION GAP SERPL CALC-SCNC: 8 MMOL/L (ref 7–16)
AST SERPL-CCNC: 19 U/L (ref 15–37)
BASOPHILS # BLD: 0 K/UL (ref 0–0.2)
BASOPHILS NFR BLD: 1 % (ref 0–2)
BILIRUB SERPL-MCNC: 0.2 MG/DL (ref 0.2–1.1)
BUN SERPL-MCNC: 30 MG/DL (ref 6–23)
CALCIUM SERPL-MCNC: 9.4 MG/DL (ref 8.3–10.4)
CHLORIDE SERPL-SCNC: 108 MMOL/L (ref 98–107)
CO2 SERPL-SCNC: 24 MMOL/L (ref 21–32)
CREAT SERPL-MCNC: 1 MG/DL (ref 0.6–1)
DIFFERENTIAL METHOD BLD: ABNORMAL
EOSINOPHIL # BLD: 0 K/UL (ref 0–0.8)
EOSINOPHIL NFR BLD: 0 % (ref 0.5–7.8)
ERYTHROCYTE [DISTWIDTH] IN BLOOD BY AUTOMATED COUNT: 16.7 % (ref 11.9–14.6)
GLOBULIN SER CALC-MCNC: 4 G/DL (ref 2.3–3.5)
GLUCOSE SERPL-MCNC: 148 MG/DL (ref 65–100)
HCT VFR BLD AUTO: 29 % (ref 35.8–46.3)
HGB BLD-MCNC: 9.3 G/DL (ref 11.7–15.4)
IMM GRANULOCYTES # BLD AUTO: 0 K/UL (ref 0–0.5)
IMM GRANULOCYTES NFR BLD AUTO: 0 % (ref 0–5)
LYMPHOCYTES # BLD: 0.9 K/UL (ref 0.5–4.6)
LYMPHOCYTES NFR BLD: 17 % (ref 13–44)
MCH RBC QN AUTO: 32.6 PG (ref 26.1–32.9)
MCHC RBC AUTO-ENTMCNC: 32.1 G/DL (ref 31.4–35)
MCV RBC AUTO: 101.8 FL (ref 79.6–97.8)
MONOCYTES # BLD: 0.6 K/UL (ref 0.1–1.3)
MONOCYTES NFR BLD: 11 % (ref 4–12)
NEUTS SEG # BLD: 3.7 K/UL (ref 1.7–8.2)
NEUTS SEG NFR BLD: 71 % (ref 43–78)
NRBC # BLD: 0 K/UL (ref 0–0.2)
PLATELET # BLD AUTO: 190 K/UL (ref 150–450)
PMV BLD AUTO: 8.9 FL (ref 9.4–12.3)
POTASSIUM SERPL-SCNC: 4 MMOL/L (ref 3.5–5.1)
PROT SERPL-MCNC: 7.8 G/DL (ref 6.3–8.2)
RBC # BLD AUTO: 2.85 M/UL (ref 4.05–5.2)
SODIUM SERPL-SCNC: 140 MMOL/L (ref 136–145)
TSH, 3RD GENERATION: 2.38 UIU/ML (ref 0.36–3)
WBC # BLD AUTO: 5.2 K/UL (ref 4.3–11.1)

## 2022-07-05 PROCEDURE — 96411 CHEMO IV PUSH ADDL DRUG: CPT

## 2022-07-05 PROCEDURE — 80053 COMPREHEN METABOLIC PANEL: CPT

## 2022-07-05 PROCEDURE — 96413 CHEMO IV INFUSION 1 HR: CPT

## 2022-07-05 PROCEDURE — 96367 TX/PROPH/DG ADDL SEQ IV INF: CPT

## 2022-07-05 PROCEDURE — 96417 CHEMO IV INFUS EACH ADDL SEQ: CPT

## 2022-07-05 PROCEDURE — 99215 OFFICE O/P EST HI 40 MIN: CPT | Performed by: INTERNAL MEDICINE

## 2022-07-05 PROCEDURE — 36591 DRAW BLOOD OFF VENOUS DEVICE: CPT

## 2022-07-05 PROCEDURE — 85025 COMPLETE CBC W/AUTO DIFF WBC: CPT

## 2022-07-05 PROCEDURE — 2580000003 HC RX 258: Performed by: INTERNAL MEDICINE

## 2022-07-05 PROCEDURE — 96375 TX/PRO/DX INJ NEW DRUG ADDON: CPT

## 2022-07-05 PROCEDURE — 84443 ASSAY THYROID STIM HORMONE: CPT

## 2022-07-05 PROCEDURE — 6360000002 HC RX W HCPCS: Performed by: INTERNAL MEDICINE

## 2022-07-05 RX ORDER — SPIRONOLACTONE 25 MG/1
25 TABLET ORAL
COMMUNITY
Start: 2022-06-28

## 2022-07-05 RX ORDER — SODIUM CHLORIDE 0.9 % (FLUSH) 0.9 %
5-40 SYRINGE (ML) INJECTION PRN
Status: DISCONTINUED | OUTPATIENT
Start: 2022-07-05 | End: 2022-07-06 | Stop reason: HOSPADM

## 2022-07-05 RX ORDER — SODIUM CHLORIDE 9 MG/ML
5-250 INJECTION, SOLUTION INTRAVENOUS PRN
Status: DISCONTINUED | OUTPATIENT
Start: 2022-07-05 | End: 2022-07-06 | Stop reason: HOSPADM

## 2022-07-05 RX ORDER — ONDANSETRON 2 MG/ML
8 INJECTION INTRAMUSCULAR; INTRAVENOUS ONCE
Status: COMPLETED | OUTPATIENT
Start: 2022-07-05 | End: 2022-07-05

## 2022-07-05 RX ORDER — DIPHENHYDRAMINE HYDROCHLORIDE 50 MG/ML
50 INJECTION INTRAMUSCULAR; INTRAVENOUS
Status: ACTIVE | OUTPATIENT
Start: 2022-07-05 | End: 2022-07-05

## 2022-07-05 RX ORDER — DOXORUBICIN HYDROCHLORIDE 2 MG/ML
60 INJECTION, SOLUTION INTRAVENOUS ONCE
Status: COMPLETED | OUTPATIENT
Start: 2022-07-05 | End: 2022-07-05

## 2022-07-05 RX ADMIN — SODIUM CHLORIDE 100 ML/HR: 9 INJECTION, SOLUTION INTRAVENOUS at 12:15

## 2022-07-05 RX ADMIN — CYCLOPHOSPHAMIDE 1060 MG: 1 INJECTION, POWDER, FOR SOLUTION INTRAVENOUS; ORAL at 14:20

## 2022-07-05 RX ADMIN — SODIUM CHLORIDE, PRESERVATIVE FREE 10 ML: 5 INJECTION INTRAVENOUS at 12:15

## 2022-07-05 RX ADMIN — SODIUM CHLORIDE, PRESERVATIVE FREE 10 ML: 5 INJECTION INTRAVENOUS at 15:05

## 2022-07-05 RX ADMIN — FOSAPREPITANT 150 MG: 150 INJECTION, POWDER, LYOPHILIZED, FOR SOLUTION INTRAVENOUS at 13:36

## 2022-07-05 RX ADMIN — ONDANSETRON 8 MG: 2 INJECTION INTRAMUSCULAR; INTRAVENOUS at 13:15

## 2022-07-05 RX ADMIN — DEXAMETHASONE SODIUM PHOSPHATE 12 MG: 4 INJECTION, SOLUTION INTRAMUSCULAR; INTRAVENOUS at 13:17

## 2022-07-05 RX ADMIN — SODIUM CHLORIDE 200 MG: 9 INJECTION, SOLUTION INTRAVENOUS at 12:43

## 2022-07-05 RX ADMIN — SODIUM CHLORIDE, PRESERVATIVE FREE 10 ML: 5 INJECTION INTRAVENOUS at 10:44

## 2022-07-05 RX ADMIN — DOXORUBICIN HYDROCHLORIDE 106 MG: 2 INJECTION, SOLUTION INTRAVENOUS at 14:09

## 2022-07-05 NOTE — PROGRESS NOTES
[facility] Hematology & Oncology: Office Visit Progress Note    Chief Complaint:    Triple negative left breast cancer    History of Present Illness:  Reason for Referral: Carcinoma of upper-inner  quadrant of left breast in female, estrogen receptor negative; Triple negative malignant neoplasm of breast       Referring Provider: Alberto Awad MD       Primary Care Provider: Monalisa Pulido MD       Family History of Cancer/Hematologic Disorders: None reported       Presenting Symptoms: Abnormal routine screening mammogram        Ms. Adam Villalta is a 49 y.o.  female with a history of heart murmur, HTN, mixed HLD, and irregular menses, who was recently diagnosed with breast  cancer. She initially presented on 11/22/21 for a routine bilateral digital screening mammogram which identified focal asymmetry in the medial inferior left breast at 6 cm from the nipple.  Further evaluation with diagnostic left breast mammogram and left  breast ultrasound were completed on 12/6/21 confirming the presence of adjacent left breast masses with one at the 9:00 position, 6 cm from the nipple measuring approximately 1.5 x 0.8 x 0.8 cm and a 7 mm hypoechoic mass just adjacent to this.        Recommended ultrasound-guided biopsy of the medial left breast mass was completed on 12/23/21 with pathology from the core biopsy of the left breast, 9:00 position, 6 cm from nipple, revealing ER 0.9%/SD 0.4%/HER-2 (0) negative, intermediate grade (moderately  differentiated), foci of at least microinvasive infiltrating ductal carcinoma in association with focally high grade ductal carcinoma in situ with no definite lymphovascular invasion identified.        MRI of the bilateral breasts was performed on 12/28/21 demonstrating left medial breast cancer measuring about 4.5 cm in greatest dimension; no additional suspicious finding elsewhere in either breast; and no obvious lymphadenopathy.       Patient was referred to surgery and evaluated in consultation by Surgeon, Dr. Brock Phillips, on 1/10/22. She was assessed with signs and symptoms consistent with cT2N0 triple negative left breast IDC with DCIS. Treatment options were discussed, and  Dr. Tiffany Lazcano recommended genetic testing and preoperative evaluation by medical oncology to consider neoadjuvant chemotherapy. Dr. Tiffany Lazcano noted, \"We discussed the strong possibility that she could require left mastectomy/sentinel node should we  proceed with surgery upfront. At this time the enhancement on MRI looks too large, diffuse, and elongated to perform breast preservation surgery and expected good cosmetic result. If she has neoadjuvant treatment I will see her back in about 4 months  to regroup. \"       Patient is now referred to [facility] for oncology evaluation and consideration for neoadjuvant chemotherapy. Patient completed genetic counseling on 1/13/22. She agreed to pursue testing with BRCAPlus panel of 8 genes associated with hereditary cancer  (including BRCA1 and BRCA2 ).     BILATERAL DIGITAL SCREENING MAMMOGRAM  11/22/2021   FINDINGS: There are scattered fibroglandular densities. A few typically benign-appearing calcifications in the left breast are similar in  appearance to the previous study. There is a focal asymmetry in the medial inferior left breast located 6 cm from the nipple. Recommend spot compression magnification views on the direct ML view and ultrasound, if indicated for further evaluation. There  is otherwise no suspicious mass, calcification, or architectural distortion in either breast.      IMPRESSION: Focal asymmetry in the medial inferior left breast at 6 cm from the nipple. for which further evaluation is recommended with ML  and compression magnification views, with possible ultrasound if indicated. BI-RADS Assessment Category 0:  Incomplete: Needs additional imaging evaluation.       DIAGNOSTIC MAMMOGRAM LEFT BREAST, LEFT BREAST ULTRASOUND 12/6/21   FINDING: The area of concern does not persist on additional imaging. There is no architectural distortion. The area of concern persists with  additional imaging. This resides within the medial left breast at approximately 9:00 position. There are few associated calcifications which appear somewhat coarse. This measures approximately 1.3 cm in size. An additional smaller asymmetry also at the  9:00 position resides 7 cm from the nipple. A left breast ultrasound is recommended for further evaluation.    LEFT BREAST ULTRASOUND: At the 9:00 position, 6 cm from the nipple is a lobulated hypoechoic but mildly heterogeneous appearing mass measuring approximately 1.5 x 0.8 x 0.8 cm. The margins are occasionally ill-defined with some components demonstrating  posterior acoustic shadowing. The long axis is parallel to the plane of the chest wall. There is a 7 mm hypoechoic mass just adjacent to this This is hypoechoic with a lobulated contour. The margins are occasionally ill-defined.    IMPRESSION: Adjacent left breast masses. Recommend tissue sampling of the larger lesion. BI-RADS Assessment Category 4: Suspicious Finding- Biopsy should be considered.       [facility] SURGICAL PATHOLOGY REPORT 12/23/21   DIAGNOSIS    A: \" LEFT BREAST, 9:00 POSITION, 6 CM FROM NIPPLE, CORE BIOPSY\": FOCI OF AT LEAST MICROINVASIVE INFILTRATING DUCTAL CARCINOMA, INTERMEDIATE GRADE (MODERATELY DIFFERENTIATED) IN ASSOCIATION WITH FOCALLY HIGH GRADE DUCTAL CARCINOMA IN SITU.  DEFINITE LYMPHOVASCULAR  INVASION IS NOT IDENTIFIED   [facility]- ER/[place]/[id number] BY IHC    INTERPRETATION    [employer] IHC Quantitative Breast Panel    TEST NAME:                                      VVXLGDR:                  IOIFADRQ CONTROLS:    ESTROGEN RECEPTOR:                 Negative (0.9%)          Present, Positive    PROGESTERONE RECEPTOR:       Negative (0.4%)          Present, Positive    HER-2/CHAR:                                        Negative (0)                Percentage of Cells with Uniform Intense Complete Membrane    Stainin%   STF-IMMUNOHISTOCHEMISTRY   Immunohistochemical Stain Panel:    INTERPRETATION: Immunohistochemical findings consistent with breast primary. ANTIBODY/TEST Filiberto Charles FOR                                               RESULT    Cytokeratin 7               Lung, breast, upper GE, serous ovary           Positive    Cytokeratin 20             Colon, mucinous ovary, urothelium                Negative    GATA3                        Urothelial, breast carcinoma                           Positive    GCDFP                       Breast, salivary gland, skin, adnexa               Positive    Mammaglobin                         Breast                                                              Positive       MRI OF THE BREASTS WITH AND WITHOUT CONTRAST 21   FINDINGS: The breasts demonstrate moderate glandularity and mild background enhancement.  Left 9:00 biopsy clip within a lobulated heterogeneously  enhancing mass with angulated and irregular margins overall measuring about 1.8 x 0.7 x 1.2 cm. Posterior to this (about 1 cm away) the smaller mass measuring 0.9 cm is unchanged from recent mammogram and ultrasound. Additionally there is clumped and  reticular nonmass enhancement extending superiorly by about 2.7 cm and anteriorly by about 1.8 cm, in keeping with the DCIS and calcifications. Overall the expected area of disease measures about 4.5 cm AP by 1.2 cm transverse and is located at 9:00-9:30,  mid to posterior depth. There is no evidence of involvement of skin or chest wall structures. There is no other evidence of suspicious enhancing mass, and no other dominant or unique nonmass enhancement, to suggest additional malignancy in either breast.  There is no evidence of axillary or internal mammary lymphadenopathy.  Elsewhere, limited visualization of the partially included thorax and upper abdomen shows no acute abnormality, with note of a small benign cyst in the left liver.     IMPRESSION   1. Left medial breast cancer measures about 4.5 cm in greatest dimension. 2. No additional suspicious finding elsewhere in either breast. No obvious lymphadenopathy. Recommend continued management as directed clinically. BI-RADS Assessment Category 6: Known Biopsy Proven Malignancy       Notes from Referring Provider: \"Age 49 with cT2N0 triple  negative left breast IDC and DCIS Evaluate    Speaks mandarin   She has been referred to genetics also\"       Interim history update in A/P. Review of Systems:      Constitutional  Denies fever or chills. Denies fatigue. Denies anorexia. HEENT   allergic conjunctivitis and sinusitis. Denies trauma, bluring vision, hearing loss, ear pain, nosebleeds, sore throat, neck pain and ear discharge. Skin  Denies lesions or rashes. Lungs  Denies shortness of breath, cough, sputum production or hemoptysis. Cardiovascular  Denies chest pain, palpitations, orthopnea, claudication and leg swelling. Gastrointestinal  Nausea. Denies vomiting. Denies bloody or black stools. Denies abdominal pain.   Denies dysuria, frequency or hesitancy of urination     Neuro  Denies headaches, visual changes or ataxia. Denies dizziness, tingling, tremors, sensory change, speech change, focal weakness and headaches. Hematology  Denies nasal/gum bleeding, denies easy bruise     Endo  Denies heat/cold intolerance, denies diabetes. MSK  Denies back pain, swollen legs, myalgias and falls. Psychiatric/Behavioral   insomnia. Denies depression and substance abuse. The patient is not nervous/anxious.               Allergies   Allergen Reactions    Paclitaxel Angioedema and Swelling    Lisinopril Other (See Comments)     Other reaction(s): Cough     Past Medical History:   Diagnosis Date    Breast cancer ([facility] [address]     Hypertension     Murmur, heart     echo 2016 EF 60-65% ; 1/31/22 states never has had symptoms    Poor historian     difficulty with verifying medication list     Past Surgical History:   Procedure Laterality Date    BREAST SURGERY      bx of left breast     IR PORT PLACEMENT EQUAL OR GREATER THAN 5 YEARS  2/2/2022    IR PORT PLACEMENT EQUAL OR GREATER THAN 5 YEARS  2/2/2022    IR PORT PLACEMENT EQUAL OR GREATER THAN 5 YEARS 2/2/2022 [facility] RADIOLOGY SPECIALS    US BREAST NEEDLE BIOPSY LEFT Left 12/23/2021    US BREAST NEEDLE BIOPSY LEFT 12/23/2021 [facility] RADIOLOGY MAMMO     Family History   Problem Relation Age of Onset    Ovarian Cancer Neg Hx     No Known Problems Mother     Breast Cancer Neg Hx     No Known Problems Sister     Other Other         states no family history    Colon Cancer Neg Hx     Hypertension Father     No Known Problems Sister     No Known Problems Brother     Uterine Cancer Neg Hx      Social History     Socioeconomic History    Marital status:      Spouse name: Not on file    Number of children: Not on file    Years of education: Not on file    Highest education level: Not on file   Occupational History    Not on file   Tobacco Use    Smoking status: Never Smoker    Smokeless tobacco: Never Used   Substance and Sexual Activity    Alcohol use: No     Alcohol/week: 0.0 standard drinks    Drug use: No    Sexual activity: Not on file   Other Topics Concern    Not on file   Social History Narrative    Abuse: Feels safe at home, no history of physical abuse, no history of sexual abuse       Social Determinants of Health     Financial Resource Strain:     Difficulty of Paying Living Expenses: Not on file   Food Insecurity:     Worried About Running Out of Food in the Last Year: Not on file    Elena of Food in the Last Year: Not on file   Transportation Needs:     Lack of Transportation (Medical): Not on file    Lack of Transportation (Non-Medical):  Not on file   Physical Activity:     Days of Exercise per Week: Not on file    Minutes of Exercise per Session: Not on file   Stress:     Feeling of Stress : Not on file   Social Connections:     Frequency of Communication with Friends and Family: Not on file    Frequency of Social Gatherings with Friends and Family: Not on file    Attends Scientology Services: Not on file    Active Member of Clubs or Organizations: Not on file    Attends Club or Organization Meetings: Not on file    Marital Status: Not on file   Intimate Partner Violence:     Fear of Current or Ex-Partner: Not on file    Emotionally Abused: Not on file    Physically Abused: Not on file    Sexually Abused: Not on file   Housing Stability:     Unable to Pay for Housing in the Last Year: Not on file    Number of [place] in the Last Year: Not on file    Unstable Housing in the Last Year: Not on file     Current Outpatient Medications   Medication Sig Dispense Refill    spironolactone (ALDACTONE) 25 MG tablet       dilTIAZem (TIAZAC) 240 MG extended release capsule 1 po qd      lidocaine-prilocaine (EMLA) 2.5-2.5 % cream Apply to port about 45 minutes prior to access      montelukast (SINGULAIR) 10 MG tablet Take 10 mg by mouth daily      olmesartan (BENICAR) 20 MG tablet Take 20 mg by mouth daily      diphenhydrAMINE (BENADRYL) 25 MG capsule Take 25 mg by mouth every 6 hours as needed (Patient not taking: Reported on 7/5/2022)      diphenhydrAMINE-zinc acetate 2-0.1 % cream Apply topically 2 times daily as needed (Patient not taking: Reported on 7/5/2022)      LORazepam (ATIVAN) 1 MG tablet Take 0.5-1 mg by mouth every 8 hours as needed.  (Patient not taking: Reported on 7/5/2022)      ondansetron (ZOFRAN) 8 MG tablet Take 8 mg by mouth every 8 hours as needed (Patient not taking: Reported on 7/5/2022)      potassium chloride (KLOR-CON M) 20 MEQ extended release tablet Take 20 mEq by mouth daily (Patient not taking: Reported on 7/5/2022)      predniSONE (DELTASONE) 20 MG tablet Take 40 mg by mouth daily as needed (Patient not taking: Reported on 7/5/2022)      prochlorperazine (COMPAZINE) 10 MG tablet Take 10 mg by mouth every 6 hours as needed (Patient not taking: Reported on 7/5/2022)      senna-docusate (PERICOLACE) 8.6-50 MG per tablet Take 1 tablet by mouth daily as needed (Patient not taking: Reported on 7/5/2022)      triamcinolone (KENALOG) 0.1 % cream Apply topically 3 times daily (Patient not taking: Reported on 7/5/2022)       No current facility-administered medications for this visit. Facility-Administered Medications Ordered in Other Visits   Medication Dose Route Frequency Provider Last Rate Last Admin    sodium chloride flush 0.9 % injection 5-40 mL  5-40 mL IntraVENous PRN Pierre Mc MD   10 mL at 07/05/22 1044       OBJECTIVE:  /73 Comment: standing  Pulse 86   Temp 98.7 °F (37.1 °C) (Oral)   Resp 16   Ht 5' 0.5\" (1.537 m)   Wt 156 lb 6.4 oz (70.9 kg)   SpO2 98%   BMI 30.04 kg/m²     Physical Exam:      Constitutional:  Oriented to person, place, and time. Well-developed and well-nourished. HEENT:  Normocephalic and atraumatic. Oropharynx is clear and moist.    Conjunctivae and EOM are normal. Pupils are equal, round, and reactive to light. No scleral icterus. Neck supple. No JVD present. No tracheal deviation present. No thyromegaly present. Lymph node  No palpable submandibular, cervical, supraclavicular, axillary and inguinal lymph nodes. Breasts  Left breast soft, nontender, popular nodular area and 9:00 about 2 cm at baseline, no longer palpable after chemotherapy. Right breast soft, nontender, no mass. Skin   rash. Warm and dry. No bruising noted. No erythema. No pallor. Respiratory  Effort normal and breath sounds normal.  No respiratory distress. No wheezes. No rales. No tenderness. CVS  Normal rate, regular rhythm and normal heart sounds. Exam reveals no gallop, no friction and no rub. No murmur heard. Abdomen  Soft.  Bowel sounds are normal. Exhibits no distension. There is no tenderness. There is no rebound and no guarding. Neuro  Normal reflexes. No cranial nerve deficit. Exhibits normal muscle tone, 5 of 5 strength of all extremities. MSK  Normal range of motion in general.  No edema and no tenderness. Psych  Normal mood, affect, behavior, judgment and thought content        Labs:  Recent Results (from the past 24 hour(s))   CBC with Auto Differential    Collection Time: 07/05/22 10:41 AM   Result Value Ref Range    WBC 5.2 4.3 - 11.1 K/uL    RBC 2.85 (L) 4.05 - 5.2 M/uL    Hemoglobin 9.3 (L) 11.7 - 15.4 g/dL    Hematocrit 29.0 (L) 35.8 - 46.3 %    .8 (H) 79.6 - 97.8 FL    MCH 32.6 26.1 - 32.9 PG    MCHC 32.1 31.4 - 35.0 g/dL    RDW 16.7 (H) 11.9 - 14.6 %    Platelets 142 219 - 739 K/uL    MPV 8.9 (L) 9.4 - 12.3 FL    nRBC 0.00 0.0 - 0.2 K/uL    Differential Type AUTOMATED      Seg Neutrophils 71 43 - 78 %    Lymphocytes 17 13 - 44 %    Monocytes 11 4.0 - 12.0 %    Eosinophils % 0 (L) 0.5 - 7.8 %    Basophils 1 0.0 - 2.0 %    Immature Granulocytes 0 0.0 - 5.0 %    Segs Absolute 3.7 1.7 - 8.2 K/UL    Absolute Lymph # 0.9 0.5 - 4.6 K/UL    Absolute Mono # 0.6 0.1 - 1.3 K/UL    Absolute Eos # 0.0 0.0 - 0.8 K/UL    Basophils Absolute 0.0 0.0 - 0.2 K/UL    Absolute Immature Granulocyte 0.0 0.0 - 0.5 K/UL       Imaging:  No results found for this or any previous visit. ASSESSMENT/PLAN:   Diagnosis Orders   1. Triple negative malignant neoplasm of breast ([address]     2. High risk medication use     3. Chemotherapy-induced neutropenia (HCC)     4. Chemotherapy-induced nausea     5. Insomnia, unspecified type       [unfilled]    49 y.o. F consulted for triple negative left breast cancer and presented to [facility] with  and daughter on 1/19/2022.    She has good baseline health and functions well, and annual screening mammogram showed left breast lesion, biopsy showed triple negative grade 2 IDC, cT2 N0 M0, and we discussed the high risks profile for triple negative breast cancer and the most recent  FDA approval of neoadjuvant chemoimmunotherapy, she appeared a good candidate and agreed to arrange Frankey Gong for 4 cycles followed by Adriamycin Cytoxan Keytruda for 4 cycles, followed by surgery and adjuvant Keytruda for total systemic therapy  of 1 year, discussed toxicity and management, discussed logistics, arrange Zofran Compazine Ativan, baseline echocardiogram showed normal EF, hypertension better but still uncontrolled after adding spironolactone and educated risk of dehydration and hypotension  on chemotherapy when she is taking double diuretics, started cycle 1 2/7/22 and left breast tumor responded rapidly, lost 20 pounds after cycle 1 and BP much better controlled even after she runs out of diltiazem, again warned of monitoring dehydration/hypotension,  discussed increased protein/calorie intake, may add stimulant if continues to lose weight, occasional mild tingling in fingertips, episodes of constipation responded to laxative, proceed to cycle 1 day 8 carbotaxol Keytruda, educated to monitor for neuropathy,  follow next week but call as needed.       However after cycle 1 day 8 Taxol on 2/14 and developed acute onset of itching, rash, anasarca, short of breath, fever, hypotension, diarrhea, admitted with antibiotics, antihistamine,  IV fluid, most symptom much improved the second day although still lingering, discussed that the picture of allergic reaction but thorough discussion not able to identify new food or medicine, not typical for Taxol or Keytruda reaction given the timing  and the acuity, and she reported a similar episode a few years ago that she went to ER for treatment, but never followed up for allergen test and never had EpiPen, discussed the concern of allergy and will arrange allergist work-up, however proceeded  with day 15 Taxol with close observation to rule out atypical Taxol allergy, patient and family understand the risk and agreed to proceed, and reported reasonable tolerance except for mild itching through the infusion, however overnight RN was concerned  of tachycardia and tachypneic which patient reported similar to each infusion but received IV Benadryl and Solu-Medrol, LFT elevated for a day, give albumin with lasix and anasarca improved, DC with Benadryl/prednisone/EpiPen as needed, followed  on 2/28/2022, swelling resolved, mild rash of hands and arms, discussed to change Taxol to Taxotere for better management of reaction, return on 3/7/2022 reported much better tolerance for the allergic reaction, however more nausea/vomiting/weight loss  and neutropenic fever with temperature 101 and ANC 0.2, coughing and rule out Covid, typical seasonal allergic sinusitis symptoms and educated to start using Nasacort spray, admit to hospital for neutropenic fever, discharge 3/10/2022, had skin rash that  was attributed to cefepime and resolved, again discussed her sensitivity to multiple allergens and different manifestations need to be managed accordingly, Zaditor for allergic conjunctivitis, nasal steroid for sinusitis, Kenalog for skin rash, oral antihistamine,  prednisone 20 mg only as needed, added G-CSF since cycle 3, better controlled for allergy/hypertension/fluid retention, severe diarrhea responded to PRBC, severe thrombocytopenia recovered after delaying cycle 4 by a week, completed cycle 4 with G-CSF  and Emend.    She returned on 5/9/2022 to start AC/Keytruda, however platelet was low and chemo had to be deferred for 2 weeks actually and platelets finally recovered to 92 on 5/23/2022, discussed to proceed with caution and reduce AC dose by 20%, which she tolerated reasonably well and plt recovered, labs reviewed today, proceed to cycle 3 AC/Keytruda with dose reduction, G-CSF tomorrow, antiemetics, lorazepam for insomnia, return in 3 weeks for next cycle we will schedule for surgical resection, discussed options and she prefers lumpectomy and will discuss with Dr. Mika Clemons, call as needed. All questions are answered to their satisfaction. They will call for further questions and concerns. ECOG PERFORMANCE STATUS - 1    Pain - 0 - No pain/10. Fatigue - No flowsheet data found. Distress - No flowsheet data found. Elements of this note have been dictated via voice recognition software. Text and phrases may be limited by the accuracy and autoconversion of the software. The chart has been reviewed, but errors may still be present. Sarah Porter M.D.   [address] [place], [address] [place] [address]  Office : [phone]  Fax : [phone]

## 2022-07-05 NOTE — PATIENT INSTRUCTIONS
given to patient: N/A        -------------------------------------------------------------------------------------------------------------------  Please call our office at [phone] if you have any  of the following symptoms:   · Fever of 100.5 or greater  · Chills  · Shortness of breath  · Swelling or pain in one leg    After office hours an answering service is available and will contact a provider for emergencies or if you are experiencing any of the above symptoms.  Patient does express an interest in My Chart. My Chart log in information explained on the after visit summary printout at the [facility] [address] desk.     Gwyndolyn Collet, JOSE

## 2022-07-05 NOTE — PROGRESS NOTES
Arrived to the [facility]. Keytruda, Adriamycin, Cytoxan completed. Patient tolerated without difficulty. Any issues or concerns during appointment: None. Patient aware of next infusion appointment on 07/06/2022 (date) at 1600 (time). Patient instructed to call provider with temperature of 100.4 or greater or nausea/vomiting/ diarrhea or pain not controlled by medications  Discharged Ambulatory with family member. Patient is aware to start Claritin 10 mg tonight and will take it for 5 days as directed by MD office. Patient directed to take Zofran tonight at 2100 and that she may use secondary antiemetic prior to that time if needed. Verbalizes understanding.

## 2022-07-06 ENCOUNTER — HOSPITAL ENCOUNTER (OUTPATIENT)
Dept: INFUSION THERAPY | Age: 57
Discharge: HOME | End: 2022-07-06
Payer: COMMERCIAL

## 2022-07-06 VITALS
RESPIRATION RATE: 18 BRPM | TEMPERATURE: 97.8 F | DIASTOLIC BLOOD PRESSURE: 82 MMHG | OXYGEN SATURATION: 98 % | HEART RATE: 77 BPM | SYSTOLIC BLOOD PRESSURE: 136 MMHG

## 2022-07-06 DIAGNOSIS — C50.919 TRIPLE NEGATIVE MALIGNANT NEOPLASM OF BREAST (HCC): Primary | ICD-10-CM

## 2022-07-06 PROCEDURE — 96372 THER/PROPH/DIAG INJ SC/IM: CPT

## 2022-07-06 PROCEDURE — 6360000002 HC RX W HCPCS: Performed by: INTERNAL MEDICINE

## 2022-07-06 RX ADMIN — PEGFILGRASTIM-CBQV 6 MG: 6 INJECTION, SOLUTION SUBCUTANEOUS at 16:14

## 2022-07-06 NOTE — PROGRESS NOTES
Arrived to the [facility]. Jesus completed. Patient tolerated well. Any issues or concerns during appointment: none. Pt aware of next infusion appointment on 7-25-22 at 1130. Patient instructed to call provider with temperature of 100.4 or greater or nausea/vomiting/ diarrhea or pain not controlled by medications  Discharged via ambulatory.

## 2022-07-12 ENCOUNTER — TELEPHONE (OUTPATIENT)
Dept: ONCOLOGY | Age: 57
End: 2022-07-12

## 2022-07-12 NOTE — TELEPHONE ENCOUNTER
PT daughter called in regards to PT testing positive for Covid today/states was given molnupiravir and is wanting to confirm okay for PT to take

## 2022-07-12 NOTE — TELEPHONE ENCOUNTER
Call to [facility] who is on the [facility]. Yes she can take the med that was ordered. Her next appt is 7/25/22. She can come as long as her temp has been gone for 24 hours with out medication.  Joyce Pandya understanding of instructions

## 2022-07-22 DIAGNOSIS — Z79.899 HIGH RISK MEDICATION USE: ICD-10-CM

## 2022-07-22 DIAGNOSIS — C50.919 TRIPLE NEGATIVE MALIGNANT NEOPLASM OF BREAST (HCC): Primary | ICD-10-CM

## 2022-07-22 RX ORDER — SODIUM CHLORIDE 0.9 % (FLUSH) 0.9 %
10 SYRINGE (ML) INJECTION PRN
Status: CANCELLED | OUTPATIENT
Start: 2022-07-22

## 2022-07-25 ENCOUNTER — HOSPITAL ENCOUNTER (OUTPATIENT)
Dept: INFUSION THERAPY | Age: 57
Discharge: HOME | End: 2022-07-25
Payer: COMMERCIAL

## 2022-07-25 ENCOUNTER — OFFICE VISIT (OUTPATIENT)
Dept: ONCOLOGY | Age: 57
End: 2022-07-25
Payer: COMMERCIAL

## 2022-07-25 VITALS
BODY MASS INDEX: 29.11 KG/M2 | HEART RATE: 91 BPM | OXYGEN SATURATION: 100 % | TEMPERATURE: 98.5 F | HEIGHT: 61 IN | WEIGHT: 154.19 LBS | DIASTOLIC BLOOD PRESSURE: 71 MMHG | SYSTOLIC BLOOD PRESSURE: 112 MMHG | RESPIRATION RATE: 16 BRPM

## 2022-07-25 DIAGNOSIS — U07.1 COVID: ICD-10-CM

## 2022-07-25 DIAGNOSIS — D69.6 THROMBOCYTOPENIA (HCC): ICD-10-CM

## 2022-07-25 DIAGNOSIS — C50.919 TRIPLE NEGATIVE MALIGNANT NEOPLASM OF BREAST (HCC): ICD-10-CM

## 2022-07-25 DIAGNOSIS — Z79.899 HIGH RISK MEDICATION USE: ICD-10-CM

## 2022-07-25 DIAGNOSIS — C50.919 TRIPLE NEGATIVE MALIGNANT NEOPLASM OF BREAST (HCC): Primary | ICD-10-CM

## 2022-07-25 LAB
ALBUMIN SERPL-MCNC: 3.6 G/DL (ref 3.5–5)
ALBUMIN/GLOB SERPL: 0.8 {RATIO} (ref 1.2–3.5)
ALP SERPL-CCNC: 92 U/L (ref 50–136)
ALT SERPL-CCNC: 24 U/L (ref 12–65)
ANION GAP SERPL CALC-SCNC: 6 MMOL/L (ref 7–16)
AST SERPL-CCNC: 16 U/L (ref 15–37)
BASOPHILS # BLD: 0 K/UL (ref 0–0.2)
BASOPHILS NFR BLD: 1 % (ref 0–2)
BILIRUB SERPL-MCNC: 0.2 MG/DL (ref 0.2–1.1)
BUN SERPL-MCNC: 24 MG/DL (ref 6–23)
CALCIUM SERPL-MCNC: 9.5 MG/DL (ref 8.3–10.4)
CHLORIDE SERPL-SCNC: 106 MMOL/L (ref 98–107)
CO2 SERPL-SCNC: 25 MMOL/L (ref 21–32)
CREAT SERPL-MCNC: 1 MG/DL (ref 0.6–1)
DIFFERENTIAL METHOD BLD: ABNORMAL
EOSINOPHIL # BLD: 0 K/UL (ref 0–0.8)
EOSINOPHIL NFR BLD: 0 % (ref 0.5–7.8)
ERYTHROCYTE [DISTWIDTH] IN BLOOD BY AUTOMATED COUNT: 15.8 % (ref 11.9–14.6)
GLOBULIN SER CALC-MCNC: 4.4 G/DL (ref 2.3–3.5)
GLUCOSE SERPL-MCNC: 139 MG/DL (ref 65–100)
HCT VFR BLD AUTO: 23.8 % (ref 35.8–46.3)
HGB BLD-MCNC: 8 G/DL (ref 11.7–15.4)
IMM GRANULOCYTES # BLD AUTO: 0 K/UL (ref 0–0.5)
IMM GRANULOCYTES NFR BLD AUTO: 0 % (ref 0–5)
LYMPHOCYTES # BLD: 0.8 K/UL (ref 0.5–4.6)
LYMPHOCYTES NFR BLD: 17 % (ref 13–44)
MAGNESIUM SERPL-MCNC: 2.1 MG/DL (ref 1.8–2.4)
MCH RBC QN AUTO: 33.1 PG (ref 26.1–32.9)
MCHC RBC AUTO-ENTMCNC: 33.6 G/DL (ref 31.4–35)
MCV RBC AUTO: 98.3 FL (ref 79.6–97.8)
MONOCYTES # BLD: 0.6 K/UL (ref 0.1–1.3)
MONOCYTES NFR BLD: 12 % (ref 4–12)
NEUTS SEG # BLD: 3.1 K/UL (ref 1.7–8.2)
NEUTS SEG NFR BLD: 70 % (ref 43–78)
NRBC # BLD: 0 K/UL (ref 0–0.2)
PLATELET # BLD AUTO: 87 K/UL (ref 150–450)
PMV BLD AUTO: 9.2 FL (ref 9.4–12.3)
POTASSIUM SERPL-SCNC: 4.1 MMOL/L (ref 3.5–5.1)
PROT SERPL-MCNC: 8 G/DL (ref 6.3–8.2)
RBC # BLD AUTO: 2.42 M/UL (ref 4.05–5.2)
SODIUM SERPL-SCNC: 137 MMOL/L (ref 136–145)
TSH, 3RD GENERATION: 2.22 UIU/ML (ref 0.36–3)
WBC # BLD AUTO: 4.5 K/UL (ref 4.3–11.1)

## 2022-07-25 PROCEDURE — 80053 COMPREHEN METABOLIC PANEL: CPT

## 2022-07-25 PROCEDURE — 84443 ASSAY THYROID STIM HORMONE: CPT

## 2022-07-25 PROCEDURE — 85025 COMPLETE CBC W/AUTO DIFF WBC: CPT

## 2022-07-25 PROCEDURE — 99215 OFFICE O/P EST HI 40 MIN: CPT | Performed by: INTERNAL MEDICINE

## 2022-07-25 PROCEDURE — 36591 DRAW BLOOD OFF VENOUS DEVICE: CPT

## 2022-07-25 PROCEDURE — 2580000003 HC RX 258: Performed by: INTERNAL MEDICINE

## 2022-07-25 PROCEDURE — 83735 ASSAY OF MAGNESIUM: CPT

## 2022-07-25 RX ORDER — CHLORHEXIDINE GLUCONATE 0.12 MG/ML
15 RINSE ORAL 2 TIMES DAILY
Qty: 420 ML | Refills: 0 | Status: SHIPPED | OUTPATIENT
Start: 2022-07-25 | End: 2022-08-08

## 2022-07-25 RX ORDER — SODIUM CHLORIDE 0.9 % (FLUSH) 0.9 %
5-40 SYRINGE (ML) INJECTION PRN
Status: DISCONTINUED | OUTPATIENT
Start: 2022-07-25 | End: 2022-07-25 | Stop reason: HOSPADM

## 2022-07-25 RX ADMIN — SODIUM CHLORIDE, PRESERVATIVE FREE 10 ML: 5 INJECTION INTRAVENOUS at 10:45

## 2022-07-25 ASSESSMENT — PATIENT HEALTH QUESTIONNAIRE - PHQ9
SUM OF ALL RESPONSES TO PHQ QUESTIONS 1-9: 0
2. FEELING DOWN, DEPRESSED OR HOPELESS: 0
SUM OF ALL RESPONSES TO PHQ QUESTIONS 1-9: 0

## 2022-07-25 NOTE — PROGRESS NOTES
Patient ambulatory to port lab. Port accessed, flushed, and labs drawn per protocol. Port remains accessed. Patient ambulatory to office visit.

## 2022-07-25 NOTE — PATIENT INSTRUCTIONS
Patient Instructions from Today's Visit    Reason for Visit:  Follow-up visit for breast cancer    Diagnosis Information:  [email or web address]/. net/about-us/asco-answers-patient-education-materials/payc-labutcy-qjlv-sheets    Plan:  -Appointment with surgery (Dr. Kendra Danielle) is scheduled 8/22.   -We will prescribe Peridex mouth wash. Use Neosporin on the skin lesions.   -Defer chemotherapy until next week.      Follow Up:  -1 week    Recent Lab Results:  Hospital Outpatient Visit on 07/25/2022   Component Date Value Ref Range Status    WBC 07/25/2022 4.5  4.3 - 11.1 K/uL Final    RBC 07/25/2022 2.42 (A) 4.05 - 5.2 M/uL Final    Hemoglobin 07/25/2022 8.0 (A) 11.7 - 15.4 g/dL Final    Hematocrit 07/25/2022 23.8 (A) 35.8 - 46.3 % Final    MCV 07/25/2022 98.3 (A) 79.6 - 97.8 FL Final    MCH 07/25/2022 33.1 (A) 26.1 - 32.9 PG Final    MCHC 07/25/2022 33.6  31.4 - 35.0 g/dL Final    RDW 07/25/2022 15.8 (A) 11.9 - 14.6 % Final    Platelets 77/78/1067 87 (A) 150 - 450 K/uL Final    MPV 07/25/2022 9.2 (A) 9.4 - 12.3 FL Final    nRBC 07/25/2022 0.00  0.0 - 0.2 K/uL Final    **Note: Absolute NRBC parameter is now reported with Hemogram**    Differential Type 07/25/2022 AUTOMATED    Final    Seg Neutrophils 07/25/2022 70  43 - 78 % Final    Lymphocytes 07/25/2022 17  13 - 44 % Final    Monocytes 07/25/2022 12  4.0 - 12.0 % Final    Eosinophils % 07/25/2022 0 (A) 0.5 - 7.8 % Final    Basophils 07/25/2022 1  0.0 - 2.0 % Final    Immature Granulocytes 07/25/2022 0  0.0 - 5.0 % Final    Segs Absolute 07/25/2022 3.1  1.7 - 8.2 K/UL Final    Absolute Lymph # 07/25/2022 0.8  0.5 - 4.6 K/UL Final    Absolute Mono # 07/25/2022 0.6  0.1 - 1.3 K/UL Final    Absolute Eos # 07/25/2022 0.0  0.0 - 0.8 K/UL Final    Basophils Absolute 07/25/2022 0.0  0.0 - 0.2 K/UL Final    Absolute Immature Granulocyte 07/25/2022 0.0  0.0 - 0.5 K/UL Final    Sodium 07/25/2022 137  136 - 145 mmol/L Final    Potassium 07/25/2022 4.1  3.5 - 5.1 mmol/L Final Chloride 07/25/2022 106  98 - 107 mmol/L Final    CO2 07/25/2022 25  21 - 32 mmol/L Final    Anion Gap 07/25/2022 6 (A) 7 - 16 mmol/L Final    Glucose 07/25/2022 139 (A) 65 - 100 mg/dL Final    BUN 07/25/2022 24 (A) 6 - 23 MG/DL Final    Creatinine 07/25/2022 1.00  0.6 - 1.0 MG/DL Final    GFR  07/25/2022 >60  >60 ml/min/1.73m2 Final    GFR Non- 07/25/2022 >60  >60 ml/min/1.73m2 Final    Comment:      Estimated GFR is calculated using the Modification of Diet in Renal Disease (MDRD) Study equation, reported for both  Americans (GFRAA) and non- Americans (GFRNA), and normalized to 1.73m2 body surface area. The physician must decide which value applies to the patient. The MDRD study equation should only be used in individuals age 25 or older. It has not been validated for the following: pregnant women, patients with serious comorbid conditions,or on certain medications, or persons with extremes of body size, muscle mass, or nutritional status.       Calcium 07/25/2022 9.5  8.3 - 10.4 MG/DL Final    Total Bilirubin 07/25/2022 0.2  0.2 - 1.1 MG/DL Final    ALT 07/25/2022 24  12 - 65 U/L Final    AST 07/25/2022 16  15 - 37 U/L Final    Alk Phosphatase 07/25/2022 92  50 - 136 U/L Final    Total Protein 07/25/2022 8.0  6.3 - 8.2 g/dL Final    Albumin 07/25/2022 3.6  3.5 - 5.0 g/dL Final    Globulin 07/25/2022 4.4 (A) 2.3 - 3.5 g/dL Final    Albumin/Globulin Ratio 07/25/2022 0.8 (A) 1.2 - 3.5   Final    Magnesium 07/25/2022 2.1  1.8 - 2.4 mg/dL Final    TSH, 3RD GENERATION 07/25/2022 2.220  0.358 - 3 uIU/mL Final       Treatment Summary has been discussed and given to patient: N/A      -------------------------------------------------------------------------------------------------------------------  Please call our office at [phone] if you have any  of the following symptoms:   Fever of 100.5 or greater  Chills  Shortness of breath  Swelling or pain in one leg    After office hours an answering service is available and will contact a provider for emergencies or if you are experiencing any of the above symptoms. Patient does express an interest in My Chart. My Chart log in information explained on the after visit summary printout at the [facility] [address] desk.     Aracely Medina RN

## 2022-07-25 NOTE — PROGRESS NOTES
[facility]: Office Visit Progress Note    Chief Complaint:    Triple negative left breast cancer    History of Present Illness:  Reason for Referral: Carcinoma of upper-inner  quadrant of left breast in female, estrogen receptor negative; Triple negative malignant neoplasm of breast       Referring Provider: Love Dinh MD       Primary Care Provider: Matt Vaughn MD       Family History of Cancer/Hematologic Disorders: None reported       Presenting Symptoms: Abnormal routine screening mammogram        Ms. Erica Morales is a 49 y.o.  female with a history of heart murmur, HTN, mixed HLD, and irregular menses, who was recently diagnosed with breast  cancer. She initially presented on 11/22/21 for a routine bilateral digital screening mammogram which identified focal asymmetry in the medial inferior left breast at 6 cm from the nipple. Further evaluation with diagnostic left breast mammogram and left  breast ultrasound were completed on 12/6/21 confirming the presence of adjacent left breast masses with one at the 9:00 position, 6 cm from the nipple measuring approximately 1.5 x 0.8 x 0.8 cm and a 7 mm hypoechoic mass just adjacent to this. Recommended ultrasound-guided biopsy of the medial left breast mass was completed on 12/23/21 with pathology from the core biopsy of the left breast, 9:00 position, 6 cm from nipple, revealing ER 0.9%/WA 0.4%/HER-2 (0) negative, intermediate grade (moderately  differentiated), foci of at least microinvasive infiltrating ductal carcinoma in association with focally high grade ductal carcinoma in situ with no definite lymphovascular invasion identified. MRI of the bilateral breasts was performed on 12/28/21 demonstrating left medial breast cancer measuring about 4.5 cm in greatest dimension; no additional suspicious finding elsewhere in either breast; and no obvious lymphadenopathy.        Patient was referred to surgery and evaluated in consultation by Surgeon, Dr. Jessiac Burciaga, on 1/10/22. She was assessed with signs and symptoms consistent with cT2N0 triple negative left breast IDC with DCIS. Treatment options were discussed, and  Dr. Braydon Juarez recommended genetic testing and preoperative evaluation by medical oncology to consider neoadjuvant chemotherapy. Dr. Braydon Juarez noted, \"We discussed the strong possibility that she could require left mastectomy/sentinel node should we  proceed with surgery upfront. At this time the enhancement on MRI looks too large, diffuse, and elongated to perform breast preservation surgery and expected good cosmetic result. If she has neoadjuvant treatment I will see her back in about 4 months  to regroup. \"       Patient is now referred to [facility] for oncology evaluation and consideration for neoadjuvant chemotherapy. Patient completed genetic counseling on 1/13/22. She agreed to pursue testing with BRCAPlus panel of 8 genes associated with hereditary cancer  (including BRCA1 and BRCA2 ). BILATERAL DIGITAL SCREENING MAMMOGRAM  11/22/2021   FINDINGS: There are scattered fibroglandular densities. A few typically benign-appearing calcifications in the left breast are similar in  appearance to the previous study. There is a focal asymmetry in the medial inferior left breast located 6 cm from the nipple. Recommend spot compression magnification views on the direct ML view and ultrasound, if indicated for further evaluation. There  is otherwise no suspicious mass, calcification, or architectural distortion in either breast.      IMPRESSION: Focal asymmetry in the medial inferior left breast at 6 cm from the nipple. for which further evaluation is recommended with ML  and compression magnification views, with possible ultrasound if indicated. BI-RADS Assessment Category 0: Incomplete: Needs additional imaging evaluation.        DIAGNOSTIC MAMMOGRAM LEFT BREAST, LEFT BREAST ULTRASOUND 12/6/21   FINDING: The area of concern Intense Complete Membrane    Stainin%   STF-IMMUNOHISTOCHEMISTRY   Immunohistochemical Stain Panel:    INTERPRETATION: Immunohistochemical findings consistent with breast primary. ANTIBODY/TEST       MARKER FOR                                               RESULT    Cytokeratin 7               Lung, breast, upper GE, serous ovary           Positive    Cytokeratin 20             Colon, mucinous ovary, urothelium                Negative    GATA3                        Urothelial, breast carcinoma                           Positive    GCDFP                       Breast, salivary gland, skin, adnexa               Positive    Mammaglobin                         Breast                                                              Positive       MRI OF THE BREASTS WITH AND WITHOUT CONTRAST 21   FINDINGS: The breasts demonstrate moderate glandularity and mild background enhancement. Left 9:00 biopsy clip within a lobulated heterogeneously  enhancing mass with angulated and irregular margins overall measuring about 1.8 x 0.7 x 1.2 cm. Posterior to this (about 1 cm away) the smaller mass measuring 0.9 cm is unchanged from recent mammogram and ultrasound. Additionally there is clumped and  reticular nonmass enhancement extending superiorly by about 2.7 cm and anteriorly by about 1.8 cm, in keeping with the DCIS and calcifications. Overall the expected area of disease measures about 4.5 cm AP by 1.2 cm transverse and is located at 9:00-9:30,  mid to posterior depth. There is no evidence of involvement of skin or chest wall structures. There is no other evidence of suspicious enhancing mass, and no other dominant or unique nonmass enhancement, to suggest additional malignancy in either breast.  There is no evidence of axillary or internal mammary lymphadenopathy.  Elsewhere, limited visualization of the partially included thorax and upper abdomen shows no acute abnormality, with note of a small benign cyst in the left liver. IMPRESSION   1. Left medial breast cancer measures about 4.5 cm in greatest dimension. 2. No additional suspicious finding elsewhere in either breast. No obvious lymphadenopathy. Recommend continued management as directed clinically. BI-RADS Assessment Category 6: Known Biopsy Proven Malignancy       Notes from Referring Provider: \"Age 49 with cT2N0 triple  negative left breast IDC and DCIS Evaluate    Speaks mandarin   She has been referred to genetics also\"       Interim history update in A/P. Review of Systems:      Constitutional  Denies fever or chills. Denies fatigue. Denies anorexia. HEENT   allergic conjunctivitis and sinusitis. Denies trauma, bluring vision, hearing loss, ear pain, nosebleeds, sore throat, neck pain and ear discharge. Skin  Denies lesions or rashes. Lungs  Denies shortness of breath, cough, sputum production or hemoptysis. Cardiovascular  Denies chest pain, palpitations, orthopnea, claudication and leg swelling. Gastrointestinal  Nausea. Denies vomiting. Denies bloody or black stools. Denies abdominal pain.   Denies dysuria, frequency or hesitancy of urination     Neuro  Denies headaches, visual changes or ataxia. Denies dizziness, tingling, tremors, sensory change, speech change, focal weakness and headaches. Hematology  Denies nasal/gum bleeding, denies easy bruise     Endo  Denies heat/cold intolerance, denies diabetes. MSK  Denies back pain, swollen legs, myalgias and falls. Psychiatric/Behavioral   insomnia. Denies depression and substance abuse. The patient is not nervous/anxious.               Allergies   Allergen Reactions    Paclitaxel Angioedema and Swelling    Lisinopril Other (See Comments)     Other reaction(s): Cough     Past Medical History:   Diagnosis Date    Breast cancer ([facility] [address]     Hypertension     Murmur, heart     echo 2016 EF 60-65% ; 1/31/22 states never has had symptoms    Poor historian difficulty with verifying medication list     Past Surgical History:   Procedure Laterality Date    BREAST SURGERY      bx of left breast     IR PORT PLACEMENT EQUAL OR GREATER THAN 5 YEARS  2/2/2022    IR PORT PLACEMENT EQUAL OR GREATER THAN 5 YEARS  2/2/2022    IR PORT PLACEMENT EQUAL OR GREATER THAN 5 YEARS 2/2/2022 [facility] RADIOLOGY SPECIALS    US BREAST NEEDLE BIOPSY LEFT Left 12/23/2021    US BREAST NEEDLE BIOPSY LEFT 12/23/2021 [facility] RADIOLOGY MAMMO     Family History   Problem Relation Age of Onset    Ovarian Cancer Neg Hx     No Known Problems Mother     Breast Cancer Neg Hx     No Known Problems Sister     Other Other         states no family history    Colon Cancer Neg Hx     Hypertension Father     No Known Problems Sister     No Known Problems Brother     Uterine Cancer Neg Hx      Social History     Socioeconomic History    Marital status:      Spouse name: Not on file    Number of children: Not on file    Years of education: Not on file    Highest education level: Not on file   Occupational History    Not on file   Tobacco Use    Smoking status: Never    Smokeless tobacco: Never   Substance and Sexual Activity    Alcohol use: No     Alcohol/week: 0.0 standard drinks    Drug use: No    Sexual activity: Not on file   Other Topics Concern    Not on file   Social History Narrative    Abuse: Feels safe at home, no history of physical abuse, no history of sexual abuse       Social Determinants of Health     Financial Resource Strain: Not on file   Food Insecurity: Not on file   Transportation Needs: Not on file   Physical Activity: Not on file   Stress: Not on file   Social Connections: Not on file   Intimate Partner Violence: Not on file   Housing Stability: Not on file     Current Outpatient Medications   Medication Sig Dispense Refill    chlorhexidine (PERIDEX) 0.12 % solution Take 15 mLs by mouth in the morning and 15 mLs before bedtime. Do all this for 14 days.  420 mL 0    dilTIAZem (TIAZAC) 240 MG extended release capsule 1 po qd      lidocaine-prilocaine (EMLA) 2.5-2.5 % cream Apply to port about 45 minutes prior to access      montelukast (SINGULAIR) 10 MG tablet Take 10 mg by mouth daily      olmesartan (BENICAR) 20 MG tablet Take 20 mg by mouth daily      spironolactone (ALDACTONE) 25 MG tablet       diphenhydrAMINE (BENADRYL) 25 MG capsule Take 25 mg by mouth every 6 hours as needed (Patient not taking: No sig reported)      diphenhydrAMINE-zinc acetate 2-0.1 % cream Apply topically 2 times daily as needed (Patient not taking: No sig reported)      LORazepam (ATIVAN) 1 MG tablet Take 0.5-1 mg by mouth every 8 hours as needed. (Patient not taking: No sig reported)      ondansetron (ZOFRAN) 8 MG tablet Take 8 mg by mouth every 8 hours as needed (Patient not taking: No sig reported)      potassium chloride (KLOR-CON M) 20 MEQ extended release tablet Take 20 mEq by mouth daily (Patient not taking: No sig reported)      predniSONE (DELTASONE) 20 MG tablet Take 40 mg by mouth daily as needed (Patient not taking: No sig reported)      prochlorperazine (COMPAZINE) 10 MG tablet Take 10 mg by mouth every 6 hours as needed (Patient not taking: No sig reported)      senna-docusate (PERICOLACE) 8.6-50 MG per tablet Take 1 tablet by mouth daily as needed (Patient not taking: No sig reported)      triamcinolone (KENALOG) 0.1 % cream Apply topically 3 times daily (Patient not taking: No sig reported)       No current facility-administered medications for this visit.      Facility-Administered Medications Ordered in Other Visits   Medication Dose Route Frequency Provider Last Rate Last Admin    sodium chloride flush 0.9 % injection 5-40 mL  5-40 mL IntraVENous PRN Natalia Beckford MD   10 mL at 07/25/22 1045       OBJECTIVE:  /71 (Site: Left Upper Arm, Position: Standing)   Pulse 91   Temp 98.5 °F (36.9 °C)   Resp 16   Ht 5' 0.5\" (1.537 m)   Wt 154 lb 3 oz (69.9 kg)   SpO2 100%   BMI 29.62 kg/m²     Physical Exam:      Constitutional:  Oriented to person, place, and time. Well-developed and well-nourished. HEENT:  Normocephalic and atraumatic. Oropharynx is clear and moist.    Conjunctivae and EOM are normal. Pupils are equal, round, and reactive to light. No scleral icterus. Neck supple. No JVD present. No tracheal deviation present. No thyromegaly present. Lymph node  No palpable submandibular, cervical, supraclavicular, axillary and inguinal lymph nodes. Breasts  Left breast soft, nontender, popular nodular area and 9:00 about 2 cm at baseline, no longer palpable after chemotherapy. Right breast soft, nontender, no mass. Skin   rash. Warm and dry. No bruising noted. No erythema. No pallor. Respiratory  Effort normal and breath sounds normal.  No respiratory distress. No wheezes. No rales. No tenderness. CVS  Normal rate, regular rhythm and normal heart sounds. Exam reveals no gallop, no friction and no rub. No murmur heard. Abdomen  Soft. Bowel sounds are normal. Exhibits no distension. There is no tenderness. There is no rebound and no guarding. Neuro  Normal reflexes. No cranial nerve deficit. Exhibits normal muscle tone, 5 of 5 strength of all extremities. MSK  Normal range of motion in general.  No edema and no tenderness.         Psych  Normal mood, affect, behavior, judgment and thought content        Labs:  Recent Results (from the past 24 hour(s))   CBC with Auto Differential    Collection Time: 07/25/22 10:44 AM   Result Value Ref Range    WBC 4.5 4.3 - 11.1 K/uL    RBC 2.42 (L) 4.05 - 5.2 M/uL    Hemoglobin 8.0 (L) 11.7 - 15.4 g/dL    Hematocrit 23.8 (L) 35.8 - 46.3 %    MCV 98.3 (H) 79.6 - 97.8 FL    MCH 33.1 (H) 26.1 - 32.9 PG    MCHC 33.6 31.4 - 35.0 g/dL    RDW 15.8 (H) 11.9 - 14.6 %    Platelets 87 (L) 950 - 450 K/uL    MPV 9.2 (L) 9.4 - 12.3 FL    nRBC 0.00 0.0 - 0.2 K/uL    Differential Type AUTOMATED      Seg Neutrophils 70 43 - 78 % Lymphocytes 17 13 - 44 %    Monocytes 12 4.0 - 12.0 %    Eosinophils % 0 (L) 0.5 - 7.8 %    Basophils 1 0.0 - 2.0 %    Immature Granulocytes 0 0.0 - 5.0 %    Segs Absolute 3.1 1.7 - 8.2 K/UL    Absolute Lymph # 0.8 0.5 - 4.6 K/UL    Absolute Mono # 0.6 0.1 - 1.3 K/UL    Absolute Eos # 0.0 0.0 - 0.8 K/UL    Basophils Absolute 0.0 0.0 - 0.2 K/UL    Absolute Immature Granulocyte 0.0 0.0 - 0.5 K/UL   Comprehensive Metabolic Panel    Collection Time: 07/25/22 10:44 AM   Result Value Ref Range    Sodium 137 136 - 145 mmol/L    Potassium 4.1 3.5 - 5.1 mmol/L    Chloride 106 98 - 107 mmol/L    CO2 25 21 - 32 mmol/L    Anion Gap 6 (L) 7 - 16 mmol/L    Glucose 139 (H) 65 - 100 mg/dL    BUN 24 (H) 6 - 23 MG/DL    Creatinine 1.00 0.6 - 1.0 MG/DL    GFR African American >60 >60 ml/min/1.73m2    GFR Non- >60 >60 ml/min/1.73m2    Calcium 9.5 8.3 - 10.4 MG/DL    Total Bilirubin 0.2 0.2 - 1.1 MG/DL    ALT 24 12 - 65 U/L    AST 16 15 - 37 U/L    Alk Phosphatase 92 50 - 136 U/L    Total Protein 8.0 6.3 - 8.2 g/dL    Albumin 3.6 3.5 - 5.0 g/dL    Globulin 4.4 (H) 2.3 - 3.5 g/dL    Albumin/Globulin Ratio 0.8 (L) 1.2 - 3.5     Magnesium    Collection Time: 07/25/22 10:44 AM   Result Value Ref Range    Magnesium 2.1 1.8 - 2.4 mg/dL   TSH    Collection Time: 07/25/22 10:44 AM   Result Value Ref Range    TSH, 3RD GENERATION 2.220 0.358 - 3 uIU/mL       Imaging:  No results found for this or any previous visit. ASSESSMENT/PLAN:   Diagnosis Orders   1. Triple negative malignant neoplasm of breast (HCC)  CBC with Auto Differential    Comprehensive Metabolic Panel    Magnesium      2. High risk medication use        3. Thrombocytopenia ([address]        4. COVID            [unfilled]    49 y.o. F consulted for triple negative left breast cancer and presented to [facility] with  and daughter on 1/19/2022.    She has good baseline health and functions well, and annual screening mammogram showed left breast lesion, biopsy showed triple negative grade 2 IDC, cT2 N0 M0, and we discussed the high risks profile for triple negative breast cancer and the most recent  FDA approval of neoadjuvant chemoimmunotherapy, she appeared a good candidate and agreed to arrange Myla Mickey for 4 cycles followed by Adriamycin Cytoxan Keytruda for 4 cycles, followed by surgery and adjuvant Keytruda for total systemic therapy  of 1 year, discussed toxicity and management, discussed logistics, arrange Zofran Compazine Ativan, baseline echocardiogram showed normal EF, hypertension better but still uncontrolled after adding spironolactone and educated risk of dehydration and hypotension  on chemotherapy when she is taking double diuretics, started cycle 1 2/7/22 and left breast tumor responded rapidly, lost 20 pounds after cycle 1 and BP much better controlled even after she runs out of diltiazem, again warned of monitoring dehydration/hypotension,  discussed increased protein/calorie intake, may add stimulant if continues to lose weight, occasional mild tingling in fingertips, episodes of constipation responded to laxative, proceed to cycle 1 day 8 carbotaxol Keytruda, educated to monitor for neuropathy,  follow next week but call as needed.       However after cycle 1 day 8 Taxol on 2/14 and developed acute onset of itching, rash, anasarca, short of breath, fever, hypotension, diarrhea, admitted with antibiotics, antihistamine,  IV fluid, most symptom much improved the second day although still lingering, discussed that the picture of allergic reaction but thorough discussion not able to identify new food or medicine, not typical for Taxol or Keytruda reaction given the timing  and the acuity, and she reported a similar episode a few years ago that she went to ER for treatment, but never followed up for allergen test and never had EpiPen, discussed the concern of allergy and will arrange allergist work-up, however proceeded  with day 15 Taxol with close observation to rule out atypical Taxol allergy, patient and family understand the risk and agreed to proceed, and reported reasonable tolerance except for mild itching through the infusion, however overnight RN was concerned  of tachycardia and tachypneic which patient reported similar to each infusion but received IV Benadryl and Solu-Medrol, LFT elevated for a day, give albumin with lasix and anasarca improved, DC with Benadryl/prednisone/EpiPen as needed, followed  on 2/28/2022, swelling resolved, mild rash of hands and arms, discussed to change Taxol to Taxotere for better management of reaction, return on 3/7/2022 reported much better tolerance for the allergic reaction, however more nausea/vomiting/weight loss  and neutropenic fever with temperature 101 and ANC 0.2, coughing and rule out Covid, typical seasonal allergic sinusitis symptoms and educated to start using Nasacort spray, admit to hospital for neutropenic fever, discharge 3/10/2022, had skin rash that  was attributed to cefepime and resolved, again discussed her sensitivity to multiple allergens and different manifestations need to be managed accordingly, Zaditor for allergic conjunctivitis, nasal steroid for sinusitis, Kenalog for skin rash, oral antihistamine,  prednisone 20 mg only as needed, added G-CSF since cycle 3, better controlled for allergy/hypertension/fluid retention, severe diarrhea responded to PRBC, severe thrombocytopenia recovered after delaying cycle 4 by a week, completed cycle 4 with G-CSF  and Emend.    She returned on 5/9/2022 to start AC/Keytruda, however platelet was low and chemo had to be deferred for 2 weeks actually and platelets finally recovered to 92 on 5/23/2022, discussed to proceed with caution and reduce AC dose by 20%, which she tolerated reasonably well and plt recovered, labs reviewed today, discussed that her thrombocytopenia again and concern of she just had recent COVID infection, also skin infection that is still healing, due for cycle 4 AC/Keytruda for next week, Neosporin ointment for skin infection, lorazepam for insomnia, scheduled to see Dr. Florecita Castillo afterward, call as needed. All questions are answered to their satisfaction. They will call for further questions and concerns. ECOG PERFORMANCE STATUS - 1    Pain - 0 - No pain/10. Fatigue - No flowsheet data found. Distress - No flowsheet data found. Elements of this note have been dictated via voice recognition software. Text and phrases may be limited by the accuracy and autoconversion of the software. The chart has been reviewed, but errors may still be present. Kavya Long M.D.   [address], [address] [place] [address]  Office : [phone]  Fax : [phone]

## 2022-07-26 ENCOUNTER — HOSPITAL ENCOUNTER (OUTPATIENT)
Dept: INFUSION THERAPY | Age: 57
End: 2022-07-26

## 2022-08-01 ENCOUNTER — HOSPITAL ENCOUNTER (OUTPATIENT)
Dept: INFUSION THERAPY | Age: 57
Discharge: HOME | End: 2022-08-01
Payer: COMMERCIAL

## 2022-08-01 ENCOUNTER — TELEPHONE (OUTPATIENT)
Dept: ONCOLOGY | Age: 57
End: 2022-08-01

## 2022-08-01 ENCOUNTER — OFFICE VISIT (OUTPATIENT)
Dept: ONCOLOGY | Age: 57
End: 2022-08-01
Payer: COMMERCIAL

## 2022-08-01 VITALS
RESPIRATION RATE: 14 BRPM | HEIGHT: 61 IN | WEIGHT: 152.2 LBS | TEMPERATURE: 97.9 F | HEART RATE: 85 BPM | BODY MASS INDEX: 28.74 KG/M2 | DIASTOLIC BLOOD PRESSURE: 65 MMHG | OXYGEN SATURATION: 99 % | SYSTOLIC BLOOD PRESSURE: 113 MMHG

## 2022-08-01 DIAGNOSIS — C50.919 TRIPLE NEGATIVE MALIGNANT NEOPLASM OF BREAST (HCC): ICD-10-CM

## 2022-08-01 DIAGNOSIS — C50.919 TRIPLE NEGATIVE MALIGNANT NEOPLASM OF BREAST (HCC): Primary | ICD-10-CM

## 2022-08-01 DIAGNOSIS — C50.212 CARCINOMA OF UPPER-INNER QUADRANT OF LEFT BREAST IN FEMALE, ESTROGEN RECEPTOR NEGATIVE (HCC): ICD-10-CM

## 2022-08-01 DIAGNOSIS — D69.6 THROMBOCYTOPENIA (HCC): ICD-10-CM

## 2022-08-01 DIAGNOSIS — Z17.1 CARCINOMA OF UPPER-INNER QUADRANT OF LEFT BREAST IN FEMALE, ESTROGEN RECEPTOR NEGATIVE (HCC): ICD-10-CM

## 2022-08-01 LAB
ALBUMIN SERPL-MCNC: 3.6 G/DL (ref 3.5–5)
ALBUMIN/GLOB SERPL: 0.9 {RATIO} (ref 1.2–3.5)
ALP SERPL-CCNC: 106 U/L (ref 50–136)
ALT SERPL-CCNC: 37 U/L (ref 12–65)
ANION GAP SERPL CALC-SCNC: 5 MMOL/L (ref 7–16)
AST SERPL-CCNC: 24 U/L (ref 15–37)
BASOPHILS # BLD: 0 K/UL (ref 0–0.2)
BASOPHILS NFR BLD: 0 % (ref 0–2)
BILIRUB SERPL-MCNC: 0.2 MG/DL (ref 0.2–1.1)
BUN SERPL-MCNC: 15 MG/DL (ref 6–23)
CALCIUM SERPL-MCNC: 9.6 MG/DL (ref 8.3–10.4)
CHLORIDE SERPL-SCNC: 109 MMOL/L (ref 98–107)
CO2 SERPL-SCNC: 26 MMOL/L (ref 21–32)
CREAT SERPL-MCNC: 1 MG/DL (ref 0.6–1)
DIFFERENTIAL METHOD BLD: ABNORMAL
EOSINOPHIL # BLD: 0.1 K/UL (ref 0–0.8)
EOSINOPHIL NFR BLD: 4 % (ref 0.5–7.8)
ERYTHROCYTE [DISTWIDTH] IN BLOOD BY AUTOMATED COUNT: 16.3 % (ref 11.9–14.6)
GLOBULIN SER CALC-MCNC: 4.1 G/DL (ref 2.3–3.5)
GLUCOSE SERPL-MCNC: 147 MG/DL (ref 65–100)
HCT VFR BLD AUTO: 26.6 % (ref 35.8–46.3)
HGB BLD-MCNC: 8.6 G/DL (ref 11.7–15.4)
IMM GRANULOCYTES # BLD AUTO: 0 K/UL (ref 0–0.5)
IMM GRANULOCYTES NFR BLD AUTO: 0 % (ref 0–5)
LYMPHOCYTES # BLD: 0.9 K/UL (ref 0.5–4.6)
LYMPHOCYTES NFR BLD: 25 % (ref 13–44)
MAGNESIUM SERPL-MCNC: 2 MG/DL (ref 1.8–2.4)
MCH RBC QN AUTO: 33.2 PG (ref 26.1–32.9)
MCHC RBC AUTO-ENTMCNC: 32.3 G/DL (ref 31.4–35)
MCV RBC AUTO: 102.7 FL (ref 79.6–97.8)
MONOCYTES # BLD: 0.4 K/UL (ref 0.1–1.3)
MONOCYTES NFR BLD: 11 % (ref 4–12)
NEUTS SEG # BLD: 2.2 K/UL (ref 1.7–8.2)
NEUTS SEG NFR BLD: 59 % (ref 43–78)
NRBC # BLD: 0 K/UL (ref 0–0.2)
PLATELET # BLD AUTO: 123 K/UL (ref 150–450)
PMV BLD AUTO: 9.3 FL (ref 9.4–12.3)
POTASSIUM SERPL-SCNC: 4.3 MMOL/L (ref 3.5–5.1)
PROT SERPL-MCNC: 7.7 G/DL (ref 6.3–8.2)
RBC # BLD AUTO: 2.59 M/UL (ref 4.05–5.2)
SODIUM SERPL-SCNC: 140 MMOL/L (ref 136–145)
WBC # BLD AUTO: 3.6 K/UL (ref 4.3–11.1)

## 2022-08-01 PROCEDURE — 36591 DRAW BLOOD OFF VENOUS DEVICE: CPT

## 2022-08-01 PROCEDURE — 2580000003 HC RX 258: Performed by: INTERNAL MEDICINE

## 2022-08-01 PROCEDURE — 96413 CHEMO IV INFUSION 1 HR: CPT

## 2022-08-01 PROCEDURE — 96411 CHEMO IV PUSH ADDL DRUG: CPT

## 2022-08-01 PROCEDURE — 96375 TX/PRO/DX INJ NEW DRUG ADDON: CPT

## 2022-08-01 PROCEDURE — 85025 COMPLETE CBC W/AUTO DIFF WBC: CPT

## 2022-08-01 PROCEDURE — 6360000002 HC RX W HCPCS: Performed by: INTERNAL MEDICINE

## 2022-08-01 PROCEDURE — 99214 OFFICE O/P EST MOD 30 MIN: CPT | Performed by: NURSE PRACTITIONER

## 2022-08-01 PROCEDURE — 96417 CHEMO IV INFUS EACH ADDL SEQ: CPT

## 2022-08-01 PROCEDURE — 80053 COMPREHEN METABOLIC PANEL: CPT

## 2022-08-01 PROCEDURE — 83735 ASSAY OF MAGNESIUM: CPT

## 2022-08-01 PROCEDURE — 96367 TX/PROPH/DG ADDL SEQ IV INF: CPT

## 2022-08-01 RX ORDER — SODIUM CHLORIDE 0.9 % (FLUSH) 0.9 %
10 SYRINGE (ML) INJECTION PRN
Status: DISCONTINUED | OUTPATIENT
Start: 2022-08-01 | End: 2022-08-02 | Stop reason: HOSPADM

## 2022-08-01 RX ORDER — SODIUM CHLORIDE 0.9 % (FLUSH) 0.9 %
5-40 SYRINGE (ML) INJECTION PRN
Status: DISCONTINUED | OUTPATIENT
Start: 2022-08-01 | End: 2022-08-02 | Stop reason: HOSPADM

## 2022-08-01 RX ORDER — ONDANSETRON 2 MG/ML
8 INJECTION INTRAMUSCULAR; INTRAVENOUS ONCE
Status: COMPLETED | OUTPATIENT
Start: 2022-08-01 | End: 2022-08-01

## 2022-08-01 RX ORDER — SODIUM CHLORIDE 9 MG/ML
5-250 INJECTION, SOLUTION INTRAVENOUS PRN
Status: DISCONTINUED | OUTPATIENT
Start: 2022-08-01 | End: 2022-08-02 | Stop reason: HOSPADM

## 2022-08-01 RX ORDER — DOXORUBICIN HYDROCHLORIDE 2 MG/ML
60 INJECTION, SOLUTION INTRAVENOUS ONCE
Status: COMPLETED | OUTPATIENT
Start: 2022-08-01 | End: 2022-08-01

## 2022-08-01 RX ADMIN — ONDANSETRON 8 MG: 2 INJECTION INTRAMUSCULAR; INTRAVENOUS at 14:52

## 2022-08-01 RX ADMIN — DEXAMETHASONE SODIUM PHOSPHATE 12 MG: 4 INJECTION, SOLUTION INTRAMUSCULAR; INTRAVENOUS at 14:56

## 2022-08-01 RX ADMIN — SODIUM CHLORIDE 25 ML/HR: 9 INJECTION, SOLUTION INTRAVENOUS at 14:00

## 2022-08-01 RX ADMIN — DOXORUBICIN HYDROCHLORIDE 106 MG: 2 INJECTION, SOLUTION INTRAVENOUS at 15:42

## 2022-08-01 RX ADMIN — FOSAPREPITANT 150 MG: 150 INJECTION, POWDER, LYOPHILIZED, FOR SOLUTION INTRAVENOUS at 15:15

## 2022-08-01 RX ADMIN — SODIUM CHLORIDE 200 MG: 9 INJECTION, SOLUTION INTRAVENOUS at 14:20

## 2022-08-01 RX ADMIN — SODIUM CHLORIDE, PRESERVATIVE FREE 10 ML: 5 INJECTION INTRAVENOUS at 13:55

## 2022-08-01 RX ADMIN — CYCLOPHOSPHAMIDE 1060 MG: 1 INJECTION, POWDER, FOR SOLUTION INTRAVENOUS; ORAL at 15:50

## 2022-08-01 RX ADMIN — Medication 10 ML: at 11:06

## 2022-08-01 ASSESSMENT — PATIENT HEALTH QUESTIONNAIRE - PHQ9
SUM OF ALL RESPONSES TO PHQ QUESTIONS 1-9: 0
SUM OF ALL RESPONSES TO PHQ9 QUESTIONS 1 & 2: 0
1. LITTLE INTEREST OR PLEASURE IN DOING THINGS: 0
2. FEELING DOWN, DEPRESSED OR HOPELESS: 0
SUM OF ALL RESPONSES TO PHQ QUESTIONS 1-9: 0

## 2022-08-01 NOTE — PROGRESS NOTES
Arrived to the [facility]. Keytruda, Adriamycin, and cytoxan completed. Patient tolerated well. Any issues or concerns during appointment: none. Patient aware of next infusion appointment on 8/2/22 (date) at 36 (time). Patient aware of next lab and [facility] office visit on 8/22/22 (date) at 1300 (time). Patient instructed to call provider with temperature of 100.4 or greater or nausea/vomiting/ diarrhea or pain not controlled by medications  Discharged ambulatory.

## 2022-08-01 NOTE — PROGRESS NOTES
[address] [place] [address] Hematology & Oncology: Office Visit Progress Note    Chief Complaint:    Triple negative left breast cancer    History of Present Illness:  Reason for Referral: Carcinoma of upper-inner  quadrant of left breast in female, estrogen receptor negative; Triple negative malignant neoplasm of breast       Referring Provider: Caryle Median, MD       Primary Care Provider: Sujata Domingo MD       Family History of Cancer/Hematologic Disorders: None reported       Presenting Symptoms: Abnormal routine screening mammogram        Ms. Saralyn Riedel is a 49 y.o.  female with a history of heart murmur, HTN, mixed HLD, and irregular menses, who was recently diagnosed with breast  cancer. She initially presented on 11/22/21 for a routine bilateral digital screening mammogram which identified focal asymmetry in the medial inferior left breast at 6 cm from the nipple. Further evaluation with diagnostic left breast mammogram and left  breast ultrasound were completed on 12/6/21 confirming the presence of adjacent left breast masses with one at the 9:00 position, 6 cm from the nipple measuring approximately 1.5 x 0.8 x 0.8 cm and a 7 mm hypoechoic mass just adjacent to this. Recommended ultrasound-guided biopsy of the medial left breast mass was completed on 12/23/21 with pathology from the core biopsy of the left breast, 9:00 position, 6 cm from nipple, revealing ER 0.9%/MO 0.4%/HER-2 (0) negative, intermediate grade (moderately  differentiated), foci of at least microinvasive infiltrating ductal carcinoma in association with focally high grade ductal carcinoma in situ with no definite lymphovascular invasion identified. MRI of the bilateral breasts was performed on 12/28/21 demonstrating left medial breast cancer measuring about 4.5 cm in greatest dimension; no additional suspicious finding elsewhere in either breast; and no obvious lymphadenopathy.        Patient was referred to surgery and evaluated in consultation by Surgeon, Dr. Mis Metcalf, on 1/10/22. She was assessed with signs and symptoms consistent with cT2N0 triple negative left breast IDC with DCIS. Treatment options were discussed, and  Dr. Nabila Ge recommended genetic testing and preoperative evaluation by medical oncology to consider neoadjuvant chemotherapy. Dr. Nabila Ge noted, \"We discussed the strong possibility that she could require left mastectomy/sentinel node should we  proceed with surgery upfront. At this time the enhancement on MRI looks too large, diffuse, and elongated to perform breast preservation surgery and expected good cosmetic result. If she has neoadjuvant treatment I will see her back in about 4 months  to regroup. \"       Patient is now referred to [facility] for oncology evaluation and consideration for neoadjuvant chemotherapy. Patient completed genetic counseling on 1/13/22. She agreed to pursue testing with BRCAPlus panel of 8 genes associated with hereditary cancer  (including BRCA1 and BRCA2 ). BILATERAL DIGITAL SCREENING MAMMOGRAM  11/22/2021   FINDINGS: There are scattered fibroglandular densities. A few typically benign-appearing calcifications in the left breast are similar in  appearance to the previous study. There is a focal asymmetry in the medial inferior left breast located 6 cm from the nipple. Recommend spot compression magnification views on the direct ML view and ultrasound, if indicated for further evaluation. There  is otherwise no suspicious mass, calcification, or architectural distortion in either breast.      IMPRESSION: Focal asymmetry in the medial inferior left breast at 6 cm from the nipple. for which further evaluation is recommended with ML  and compression magnification views, with possible ultrasound if indicated. BI-RADS Assessment Category 0: Incomplete: Needs additional imaging evaluation.        DIAGNOSTIC MAMMOGRAM LEFT BREAST, LEFT BREAST ULTRASOUND 12/6/21   FINDING: The area of concern does not persist on additional imaging. There is no architectural distortion. The area of concern persists with  additional imaging. This resides within the medial left breast at approximately 9:00 position. There are few associated calcifications which appear somewhat coarse. This measures approximately 1.3 cm in size. An additional smaller asymmetry also at the  9:00 position resides 7 cm from the nipple. A left breast ultrasound is recommended for further evaluation. LEFT BREAST ULTRASOUND: At the 9:00 position, 6 cm from the nipple is a lobulated hypoechoic but mildly heterogeneous appearing mass measuring approximately 1.5 x 0.8 x 0.8 cm. The margins are occasionally ill-defined with some components demonstrating  posterior acoustic shadowing. The long axis is parallel to the plane of the chest wall. There is a 7 mm hypoechoic mass just adjacent to this This is hypoechoic with a lobulated contour. The margins are occasionally ill-defined. IMPRESSION: Adjacent left breast masses. Recommend tissue sampling of the larger lesion. BI-RADS Assessment Category 4: Suspicious Finding- Biopsy should be considered. [facility] SURGICAL PATHOLOGY REPORT 12/23/21   DIAGNOSIS    A: \" LEFT BREAST, 9:00 POSITION, 6 CM FROM NIPPLE, CORE BIOPSY\": FOCI OF AT LEAST MICROINVASIVE INFILTRATING DUCTAL CARCINOMA, INTERMEDIATE GRADE (MODERATELY DIFFERENTIATED) IN ASSOCIATION WITH FOCALLY HIGH GRADE DUCTAL CARCINOMA IN SITU.  DEFINITE LYMPHOVASCULAR  INVASION IS NOT IDENTIFIED   [facility]- ER/AL/[id number] BY IHC    INTERPRETATION    [facility]po AI IHC Quantitative Breast Panel    TEST NAME:                                      RESULTS:                  INTERNAL CONTROLS:    ESTROGEN RECEPTOR:                 Negative (0.9%)          Present, Positive    PROGESTERONE RECEPTOR:       Negative (0.4%)          Present, Positive    HER-2/CHAR:                                        Negative (0)                Percentage of Cells with Uniform Intense Complete Membrane    Stainin%   STF-IMMUNOHISTOCHEMISTRY   Immunohistochemical Stain Panel:    INTERPRETATION: Immunohistochemical findings consistent with breast primary. ANTIBODY/TEST       MARKER FOR                                               RESULT    Cytokeratin 7               Lung, breast, upper GE, serous ovary           Positive    Cytokeratin 20             Colon, mucinous ovary, urothelium                Negative    GATA3                        Urothelial, breast carcinoma                           Positive    GCDFP                       Breast, salivary gland, skin, adnexa               Positive    Mammaglobin                         Breast                                                              Positive       MRI OF THE BREASTS WITH AND WITHOUT CONTRAST 21   FINDINGS: The breasts demonstrate moderate glandularity and mild background enhancement. Left 9:00 biopsy clip within a lobulated heterogeneously  enhancing mass with angulated and irregular margins overall measuring about 1.8 x 0.7 x 1.2 cm. Posterior to this (about 1 cm away) the smaller mass measuring 0.9 cm is unchanged from recent mammogram and ultrasound. Additionally there is clumped and  reticular nonmass enhancement extending superiorly by about 2.7 cm and anteriorly by about 1.8 cm, in keeping with the DCIS and calcifications. Overall the expected area of disease measures about 4.5 cm AP by 1.2 cm transverse and is located at 9:00-9:30,  mid to posterior depth. There is no evidence of involvement of skin or chest wall structures. There is no other evidence of suspicious enhancing mass, and no other dominant or unique nonmass enhancement, to suggest additional malignancy in either breast.  There is no evidence of axillary or internal mammary lymphadenopathy.  Elsewhere, limited visualization of the partially included thorax and upper abdomen shows no acute abnormality, with note of a small benign cyst in the left liver. IMPRESSION   1. Left medial breast cancer measures about 4.5 cm in greatest dimension. 2. No additional suspicious finding elsewhere in either breast. No obvious lymphadenopathy. Recommend continued management as directed clinically. BI-RADS Assessment Category 6: Known Biopsy Proven Malignancy       Notes from Referring Provider: \"Age 49 with cT2N0 triple  negative left breast IDC and DCIS Evaluate    Speaks mandarin   She has been referred to genetics also\"       Interim history update in A/P. Review of Systems:      Constitutional  Denies fever or chills. Denies fatigue. Denies anorexia. HEENT   allergic conjunctivitis and sinusitis. Denies trauma, bluring vision, hearing loss, ear pain, nosebleeds, sore throat, neck pain and ear discharge. Skin  Denies lesions or rashes. Lungs  Denies shortness of breath, cough, sputum production or hemoptysis. Cardiovascular  Denies chest pain, palpitations, orthopnea, claudication and leg swelling. Gastrointestinal  Nausea. Denies vomiting. Denies bloody or black stools. Denies abdominal pain.   Denies dysuria, frequency or hesitancy of urination     Neuro  Denies headaches, visual changes or ataxia. Denies dizziness, tingling, tremors, sensory change, speech change, focal weakness and headaches. Hematology  Denies nasal/gum bleeding, denies easy bruise     Endo  Denies heat/cold intolerance, denies diabetes. MSK  Denies back pain, swollen legs, myalgias and falls. Psychiatric/Behavioral   insomnia. Denies depression and substance abuse. The patient is not nervous/anxious.               Allergies   Allergen Reactions    Paclitaxel Angioedema and Swelling    Lisinopril Other (See Comments)     Other reaction(s): Cough     Past Medical History:   Diagnosis Date    Breast cancer ([facility] [address]     Hypertension     Murmur, heart     echo 2016 EF 60-65% ; 1/31/22 states never has had symptoms    Poor historian difficulty with verifying medication list     Past Surgical History:   Procedure Laterality Date    BREAST SURGERY      bx of left breast     IR PORT PLACEMENT EQUAL OR GREATER THAN 5 YEARS  2/2/2022    IR PORT PLACEMENT EQUAL OR GREATER THAN 5 YEARS  2/2/2022    IR PORT PLACEMENT EQUAL OR GREATER THAN 5 YEARS 2/2/2022 [facility] RADIOLOGY SPECIALS    US BREAST NEEDLE BIOPSY LEFT Left 12/23/2021    US BREAST NEEDLE BIOPSY LEFT 12/23/2021 [facility] RADIOLOGY MAMMO     Family History   Problem Relation Age of Onset    Ovarian Cancer Neg Hx     No Known Problems Mother     Breast Cancer Neg Hx     No Known Problems Sister     Other Other         states no family history    Colon Cancer Neg Hx     Hypertension Father     No Known Problems Sister     No Known Problems Brother     Uterine Cancer Neg Hx      Social History     Socioeconomic History    Marital status:      Spouse name: Not on file    Number of children: Not on file    Years of education: Not on file    Highest education level: Not on file   Occupational History    Not on file   Tobacco Use    Smoking status: Never    Smokeless tobacco: Never   Substance and Sexual Activity    Alcohol use: No     Alcohol/week: 0.0 standard drinks    Drug use: No    Sexual activity: Not on file   Other Topics Concern    Not on file   Social History Narrative    Abuse: Feels safe at home, no history of physical abuse, no history of sexual abuse       Social Determinants of Health     Financial Resource Strain: Not on file   Food Insecurity: Not on file   Transportation Needs: Not on file   Physical Activity: Not on file   Stress: Not on file   Social Connections: Not on file   Intimate Partner Violence: Not on file   Housing Stability: Not on file     Current Outpatient Medications   Medication Sig Dispense Refill    chlorhexidine (PERIDEX) 0.12 % solution Take 15 mLs by mouth in the morning and 15 mLs before bedtime. Do all this for 14 days.  420 mL 0    spironolactone (ALDACTONE) 25 MG tablet       dilTIAZem (TIAZAC) 240 MG extended release capsule 1 po qd      lidocaine-prilocaine (EMLA) 2.5-2.5 % cream Apply to port about 45 minutes prior to access      montelukast (SINGULAIR) 10 MG tablet Take 10 mg by mouth daily      olmesartan (BENICAR) 20 MG tablet Take 20 mg by mouth daily      diphenhydrAMINE (BENADRYL) 25 MG capsule Take 25 mg by mouth every 6 hours as needed (Patient not taking: No sig reported)      diphenhydrAMINE-zinc acetate 2-0.1 % cream Apply topically 2 times daily as needed (Patient not taking: No sig reported)      LORazepam (ATIVAN) 1 MG tablet Take 0.5-1 mg by mouth every 8 hours as needed. (Patient not taking: No sig reported)      ondansetron (ZOFRAN) 8 MG tablet Take 8 mg by mouth every 8 hours as needed (Patient not taking: No sig reported)      potassium chloride (KLOR-CON M) 20 MEQ extended release tablet Take 20 mEq by mouth daily (Patient not taking: No sig reported)      predniSONE (DELTASONE) 20 MG tablet Take 40 mg by mouth daily as needed (Patient not taking: No sig reported)      prochlorperazine (COMPAZINE) 10 MG tablet Take 10 mg by mouth every 6 hours as needed (Patient not taking: No sig reported)      senna-docusate (PERICOLACE) 8.6-50 MG per tablet Take 1 tablet by mouth daily as needed (Patient not taking: No sig reported)      triamcinolone (KENALOG) 0.1 % cream Apply topically 3 times daily (Patient not taking: No sig reported)       No current facility-administered medications for this visit.      Facility-Administered Medications Ordered in Other Visits   Medication Dose Route Frequency Provider Last Rate Last Admin    sodium chloride flush 0.9 % injection 10 mL  10 mL IntraVENous PRN Sharon Us MD   10 mL at 08/01/22 1106       OBJECTIVE:  /65 (Site: Left Upper Arm, Position: Standing, Cuff Size: Medium Adult)   Pulse 85   Temp 97.9 °F (36.6 °C) (Oral)   Resp 14   Ht 5' 0.5\" (1.537 m)   Wt 152 lb 3.2 oz (69 kg) AUTOMATED      Seg Neutrophils 59 43 - 78 %    Lymphocytes 25 13 - 44 %    Monocytes 11 4.0 - 12.0 %    Eosinophils % 4 0.5 - 7.8 %    Basophils 0 0.0 - 2.0 %    Immature Granulocytes 0 0.0 - 5.0 %    Segs Absolute 2.2 1.7 - 8.2 K/UL    Absolute Lymph # 0.9 0.5 - 4.6 K/UL    Absolute Mono # 0.4 0.1 - 1.3 K/UL    Absolute Eos # 0.1 0.0 - 0.8 K/UL    Basophils Absolute 0.0 0.0 - 0.2 K/UL    Absolute Immature Granulocyte 0.0 0.0 - 0.5 K/UL   Comprehensive Metabolic Panel    Collection Time: 08/01/22 10:58 AM   Result Value Ref Range    Sodium 140 136 - 145 mmol/L    Potassium 4.3 3.5 - 5.1 mmol/L    Chloride 109 (H) 98 - 107 mmol/L    CO2 26 21 - 32 mmol/L    Anion Gap 5 (L) 7 - 16 mmol/L    Glucose 147 (H) 65 - 100 mg/dL    BUN 15 6 - 23 MG/DL    Creatinine 1.00 0.6 - 1.0 MG/DL    GFR African American >60 >60 ml/min/1.73m2    GFR Non- >60 >60 ml/min/1.73m2    Calcium 9.6 8.3 - 10.4 MG/DL    Total Bilirubin 0.2 0.2 - 1.1 MG/DL    ALT 37 12 - 65 U/L    AST 24 15 - 37 U/L    Alk Phosphatase 106 50 - 136 U/L    Total Protein 7.7 6.3 - 8.2 g/dL    Albumin 3.6 3.5 - 5.0 g/dL    Globulin 4.1 (H) 2.3 - 3.5 g/dL    Albumin/Globulin Ratio 0.9 (L) 1.2 - 3.5     Magnesium    Collection Time: 08/01/22 10:58 AM   Result Value Ref Range    Magnesium 2.0 1.8 - 2.4 mg/dL         Imaging:  No results found for this or any previous visit. ASSESSMENT/PLAN:   Diagnosis Orders   1. Triple negative malignant neoplasm of breast ([facility] [address]        2. Carcinoma of upper-inner quadrant of left breast in female, estrogen receptor negative ([facility] [address]        3. Thrombocytopenia [facility])              [unfilled]    49 y.o. F consulted for triple negative left breast cancer and presented to [facility] with  and daughter on 1/19/2022.    She has good baseline health and functions well, and annual screening mammogram showed left breast lesion, biopsy showed triple negative grade 2 IDC, cT2 N0 M0, and we discussed the high risks profile for triple negative breast cancer and the most recent  FDA approval of neoadjuvant chemoimmunotherapy, she appeared a good candidate and agreed to arrange Walterine Elizabeth for 4 cycles followed by Adriamycin Cytoxan Keytruda for 4 cycles, followed by surgery and adjuvant Keytruda for total systemic therapy  of 1 year, discussed toxicity and management, discussed logistics, arrange Zofran Compazine Ativan, baseline echocardiogram showed normal EF, hypertension better but still uncontrolled after adding spironolactone and educated risk of dehydration and hypotension  on chemotherapy when she is taking double diuretics, started cycle 1 2/7/22 and left breast tumor responded rapidly, lost 20 pounds after cycle 1 and BP much better controlled even after she runs out of diltiazem, again warned of monitoring dehydration/hypotension,  discussed increased protein/calorie intake, may add stimulant if continues to lose weight, occasional mild tingling in fingertips, episodes of constipation responded to laxative, proceed to cycle 1 day 8 carbotaxol Keytruda, educated to monitor for neuropathy,  follow next week but call as needed.       However after cycle 1 day 8 Taxol on 2/14 and developed acute onset of itching, rash, anasarca, short of breath, fever, hypotension, diarrhea, admitted with antibiotics, antihistamine,  IV fluid, most symptom much improved the second day although still lingering, discussed that the picture of allergic reaction but thorough discussion not able to identify new food or medicine, not typical for Taxol or Keytruda reaction given the timing  and the acuity, and she reported a similar episode a few years ago that she went to ER for treatment, but never followed up for allergen test and never had EpiPen, discussed the concern of allergy and will arrange allergist work-up, however proceeded  with day 15 Taxol with close observation to rule out atypical Taxol allergy, patient and family understand the risk and agreed to proceed, and reported reasonable tolerance except for mild itching through the infusion, however overnight RN was concerned  of tachycardia and tachypneic which patient reported similar to each infusion but received IV Benadryl and Solu-Medrol, LFT elevated for a day, give albumin with lasix and anasarca improved, DC with Benadryl/prednisone/EpiPen as needed, followed  on 2/28/2022, swelling resolved, mild rash of hands and arms, discussed to change Taxol to Taxotere for better management of reaction, return on 3/7/2022 reported much better tolerance for the allergic reaction, however more nausea/vomiting/weight loss  and neutropenic fever with temperature 101 and ANC 0.2, coughing and rule out Covid, typical seasonal allergic sinusitis symptoms and educated to start using Nasacort spray, admit to hospital for neutropenic fever, discharge 3/10/2022, had skin rash that  was attributed to cefepime and resolved, again discussed her sensitivity to multiple allergens and different manifestations need to be managed accordingly, Zaditor for allergic conjunctivitis, nasal steroid for sinusitis, Kenalog for skin rash, oral antihistamine,  prednisone 20 mg only as needed, added G-CSF since cycle 3, better controlled for allergy/hypertension/fluid retention, severe diarrhea responded to PRBC, severe thrombocytopenia recovered after delaying cycle 4 by a week, completed cycle 4 with G-CSF  and Emend.    She returned on 5/9/2022 to start AC/Keytruda, however platelet was low and chemo had to be deferred for 2 weeks actually and platelets finally recovered to 92 on 5/23/2022, discussed to proceed with caution and reduce AC dose by 20%, which she tolerated reasonably well and plt recovered, labs reviewed today, discussed that her thrombocytopenia again and concern of she just had recent COVID infection, also skin infection that is still healing, due for cycle 4 AC/Keytruda for next week, Neosporin ointment for skin infection, lorazepam for insomnia, scheduled to see Dr. Nabila Ge afterward, call as needed. 8/1/2022 Here today for f/u and last [facility] and Keytruda after it was deferred x1 week for thrombocytopenia. She has been well in the interim. No complaints. Denies fever, chills, or infectious symptoms. No respiratory or GI complaints. Will proceed with AC/Keytruda as planned today. She is scheduled to see Dr Nabila Ge 8/22/22 and explained that they will need to return in 3 weeks for Mauricio Brand and with plans to continue x1 year total but further plan would depend on surgical findings. Audio  used throughout visit and daughters provided some interpretation as well. Labs reviewed today. ANC adequate and plts improved to 123k. Proceed with treatment today. Return to clinic in 3 weeks for next Mauricio Brand. Call sooner if needed. All questions are answered to their satisfaction. They will call for further questions and concerns. ECOG PERFORMANCE STATUS - 1    Pain - 0 - No pain/10. Fatigue - No flowsheet data found. Distress - No flowsheet data found. Elements of this note have been dictated via voice recognition software. Text and phrases may be limited by the accuracy and autoconversion of the software. The chart has been reviewed, but errors may still be present.                NIGEL Coombs  Hematology & Oncology  [id number] [address]  Office : [phone]  Fax : [phone]

## 2022-08-01 NOTE — TELEPHONE ENCOUNTER
Pt requesting to speak with Dr Ernestina Lane today/is aware that her OV this morning is with NP/requesting to speak with him while she is I her treatment this afternoon

## 2022-08-01 NOTE — ADDENDUM NOTE
Encounter addended by: NORI Noe [facility] [place] on: 8/1/2022 1:56 PM   Actions taken: Nicolás created or edited

## 2022-08-01 NOTE — PROGRESS NOTES
Patient arrived to port lab for port access and lab draw   Michel Brown 45 accessed and labs drawn per protocol   *Port remains accessed   Patient discharged from port lab ambulatory*

## 2022-08-02 ENCOUNTER — HOSPITAL ENCOUNTER (OUTPATIENT)
Dept: INFUSION THERAPY | Age: 57
Discharge: HOME | End: 2022-08-02
Payer: COMMERCIAL

## 2022-08-02 VITALS
RESPIRATION RATE: 16 BRPM | HEART RATE: 82 BPM | OXYGEN SATURATION: 96 % | TEMPERATURE: 98 F | SYSTOLIC BLOOD PRESSURE: 116 MMHG | DIASTOLIC BLOOD PRESSURE: 64 MMHG

## 2022-08-02 DIAGNOSIS — C50.919 TRIPLE NEGATIVE MALIGNANT NEOPLASM OF BREAST (HCC): Primary | ICD-10-CM

## 2022-08-02 PROCEDURE — 6360000002 HC RX W HCPCS: Performed by: INTERNAL MEDICINE

## 2022-08-02 PROCEDURE — 96372 THER/PROPH/DIAG INJ SC/IM: CPT

## 2022-08-02 RX ADMIN — PEGFILGRASTIM-CBQV 6 MG: 6 INJECTION, SOLUTION SUBCUTANEOUS at 16:53

## 2022-08-02 NOTE — PROGRESS NOTES
Arrived to the [facility]. Udenyca injection completed. Provided education on falls prevention. Patient instructed to report any side affects to ordering provider. Patient tolerated well. Any issues or concerns during appointment: none. Patient aware of next infusion appointment on 8/22 at 2:00 pm..  Discharged ambulatory to home.

## 2022-08-15 ENCOUNTER — HOSPITAL ENCOUNTER (OUTPATIENT)
Dept: MRI IMAGING | Age: 57
Discharge: HOME | End: 2022-08-18
Payer: COMMERCIAL

## 2022-08-15 DIAGNOSIS — C50.212 CARCINOMA OF UPPER-INNER QUADRANT OF LEFT BREAST IN FEMALE, ESTROGEN RECEPTOR NEGATIVE (HCC): ICD-10-CM

## 2022-08-15 DIAGNOSIS — C50.919 TRIPLE NEGATIVE MALIGNANT NEOPLASM OF BREAST (HCC): ICD-10-CM

## 2022-08-15 DIAGNOSIS — Z17.1 CARCINOMA OF UPPER-INNER QUADRANT OF LEFT BREAST IN FEMALE, ESTROGEN RECEPTOR NEGATIVE (HCC): ICD-10-CM

## 2022-08-15 PROCEDURE — C8908 MRI W/O FOL W/CONT, BREAST,: HCPCS

## 2022-08-15 PROCEDURE — A9579 GAD-BASE MR CONTRAST NOS,1ML: HCPCS | Performed by: NURSE PRACTITIONER

## 2022-08-15 PROCEDURE — 6360000004 HC RX CONTRAST MEDICATION: Performed by: NURSE PRACTITIONER

## 2022-08-15 RX ADMIN — GADOTERIDOL 13 ML: 279.3 INJECTION, SOLUTION INTRAVENOUS at 14:57

## 2022-08-17 DIAGNOSIS — C50.919 TRIPLE NEGATIVE MALIGNANT NEOPLASM OF BREAST (HCC): Primary | ICD-10-CM

## 2022-08-22 ENCOUNTER — PREP FOR PROCEDURE (OUTPATIENT)
Dept: SURGERY | Age: 57
End: 2022-08-22

## 2022-08-22 ENCOUNTER — HOSPITAL ENCOUNTER (OUTPATIENT)
Dept: INFUSION THERAPY | Age: 57
Discharge: HOME | End: 2022-08-22
Payer: COMMERCIAL

## 2022-08-22 ENCOUNTER — OFFICE VISIT (OUTPATIENT)
Dept: SURGERY | Age: 57
End: 2022-08-22
Payer: COMMERCIAL

## 2022-08-22 ENCOUNTER — OFFICE VISIT (OUTPATIENT)
Dept: ONCOLOGY | Age: 57
End: 2022-08-22
Payer: COMMERCIAL

## 2022-08-22 VITALS
DIASTOLIC BLOOD PRESSURE: 74 MMHG | WEIGHT: 151.2 LBS | TEMPERATURE: 98.3 F | HEIGHT: 61 IN | BODY MASS INDEX: 28.55 KG/M2 | HEART RATE: 102 BPM | OXYGEN SATURATION: 97 % | RESPIRATION RATE: 14 BRPM | SYSTOLIC BLOOD PRESSURE: 127 MMHG

## 2022-08-22 VITALS — WEIGHT: 152 LBS | BODY MASS INDEX: 28.7 KG/M2 | HEIGHT: 61 IN

## 2022-08-22 DIAGNOSIS — D64.81 ANEMIA DUE TO ANTINEOPLASTIC CHEMOTHERAPY: ICD-10-CM

## 2022-08-22 DIAGNOSIS — C50.919 TRIPLE NEGATIVE MALIGNANT NEOPLASM OF BREAST (HCC): Primary | ICD-10-CM

## 2022-08-22 DIAGNOSIS — C50.919 TRIPLE NEGATIVE MALIGNANT NEOPLASM OF BREAST (HCC): ICD-10-CM

## 2022-08-22 DIAGNOSIS — Z17.1 CARCINOMA OF UPPER-INNER QUADRANT OF LEFT BREAST IN FEMALE, ESTROGEN RECEPTOR NEGATIVE (HCC): ICD-10-CM

## 2022-08-22 DIAGNOSIS — C50.212 CARCINOMA OF UPPER-INNER QUADRANT OF LEFT BREAST IN FEMALE, ESTROGEN RECEPTOR NEGATIVE (HCC): ICD-10-CM

## 2022-08-22 DIAGNOSIS — D64.9 SEVERE ANEMIA: ICD-10-CM

## 2022-08-22 DIAGNOSIS — T45.1X5A ANEMIA DUE TO ANTINEOPLASTIC CHEMOTHERAPY: ICD-10-CM

## 2022-08-22 DIAGNOSIS — Z79.899 HIGH RISK MEDICATION USE: ICD-10-CM

## 2022-08-22 PROBLEM — C50.911 MALIGNANT NEOPLASM OF RIGHT BREAST (HCC): Status: ACTIVE | Noted: 2022-08-22

## 2022-08-22 LAB
ALBUMIN SERPL-MCNC: 3.6 G/DL (ref 3.5–5)
ALBUMIN/GLOB SERPL: 0.8 {RATIO} (ref 1.2–3.5)
ALP SERPL-CCNC: 106 U/L (ref 50–136)
ALT SERPL-CCNC: 28 U/L (ref 12–65)
ANION GAP SERPL CALC-SCNC: 7 MMOL/L (ref 7–16)
AST SERPL-CCNC: 22 U/L (ref 15–37)
BASOPHILS # BLD: 0 K/UL (ref 0–0.2)
BASOPHILS NFR BLD: 1 % (ref 0–2)
BILIRUB SERPL-MCNC: 0.2 MG/DL (ref 0.2–1.1)
BUN SERPL-MCNC: 19 MG/DL (ref 6–23)
CALCIUM SERPL-MCNC: 9.1 MG/DL (ref 8.3–10.4)
CHLORIDE SERPL-SCNC: 107 MMOL/L (ref 98–107)
CO2 SERPL-SCNC: 24 MMOL/L (ref 21–32)
CREAT SERPL-MCNC: 0.9 MG/DL (ref 0.6–1)
DIFFERENTIAL METHOD BLD: ABNORMAL
EOSINOPHIL # BLD: 0 K/UL (ref 0–0.8)
EOSINOPHIL NFR BLD: 0 % (ref 0.5–7.8)
ERYTHROCYTE [DISTWIDTH] IN BLOOD BY AUTOMATED COUNT: 15.9 % (ref 11.9–14.6)
GLOBULIN SER CALC-MCNC: 4.4 G/DL (ref 2.3–3.5)
GLUCOSE SERPL-MCNC: 114 MG/DL (ref 65–100)
HCT VFR BLD AUTO: 23.3 % (ref 35.8–46.3)
HGB BLD-MCNC: 7.5 G/DL (ref 11.7–15.4)
IMM GRANULOCYTES # BLD AUTO: 0 K/UL (ref 0–0.5)
IMM GRANULOCYTES NFR BLD AUTO: 0 % (ref 0–5)
LYMPHOCYTES # BLD: 0.5 K/UL (ref 0.5–4.6)
LYMPHOCYTES NFR BLD: 11 % (ref 13–44)
MCH RBC QN AUTO: 33.5 PG (ref 26.1–32.9)
MCHC RBC AUTO-ENTMCNC: 32.2 G/DL (ref 31.4–35)
MCV RBC AUTO: 104 FL (ref 79.6–97.8)
MONOCYTES # BLD: 0.6 K/UL (ref 0.1–1.3)
MONOCYTES NFR BLD: 13 % (ref 4–12)
NEUTS SEG # BLD: 3.1 K/UL (ref 1.7–8.2)
NEUTS SEG NFR BLD: 75 % (ref 43–78)
NRBC # BLD: 0 K/UL (ref 0–0.2)
PLATELET # BLD AUTO: 164 K/UL (ref 150–450)
PMV BLD AUTO: 9 FL (ref 9.4–12.3)
POTASSIUM SERPL-SCNC: 4.2 MMOL/L (ref 3.5–5.1)
PROT SERPL-MCNC: 8 G/DL (ref 6.3–8.2)
RBC # BLD AUTO: 2.24 M/UL (ref 4.05–5.2)
SODIUM SERPL-SCNC: 138 MMOL/L (ref 136–145)
WBC # BLD AUTO: 4.2 K/UL (ref 4.3–11.1)

## 2022-08-22 PROCEDURE — 85025 COMPLETE CBC W/AUTO DIFF WBC: CPT

## 2022-08-22 PROCEDURE — 86901 BLOOD TYPING SEROLOGIC RH(D): CPT

## 2022-08-22 PROCEDURE — 96413 CHEMO IV INFUSION 1 HR: CPT

## 2022-08-22 PROCEDURE — 99214 OFFICE O/P EST MOD 30 MIN: CPT | Performed by: SURGERY

## 2022-08-22 PROCEDURE — 80053 COMPREHEN METABOLIC PANEL: CPT

## 2022-08-22 PROCEDURE — 86923 COMPATIBILITY TEST ELECTRIC: CPT

## 2022-08-22 PROCEDURE — 36591 DRAW BLOOD OFF VENOUS DEVICE: CPT

## 2022-08-22 PROCEDURE — 6360000002 HC RX W HCPCS: Performed by: INTERNAL MEDICINE

## 2022-08-22 PROCEDURE — 2580000003 HC RX 258: Performed by: INTERNAL MEDICINE

## 2022-08-22 PROCEDURE — 99215 OFFICE O/P EST HI 40 MIN: CPT | Performed by: INTERNAL MEDICINE

## 2022-08-22 RX ORDER — SODIUM CHLORIDE 9 MG/ML
5-250 INJECTION, SOLUTION INTRAVENOUS PRN
OUTPATIENT
Start: 2022-12-26

## 2022-08-22 RX ORDER — ALBUTEROL SULFATE 90 UG/1
4 AEROSOL, METERED RESPIRATORY (INHALATION) PRN
Status: CANCELLED | OUTPATIENT
Start: 2022-09-12

## 2022-08-22 RX ORDER — SODIUM CHLORIDE 9 MG/ML
5-250 INJECTION, SOLUTION INTRAVENOUS PRN
OUTPATIENT
Start: 2022-12-05

## 2022-08-22 RX ORDER — SODIUM CHLORIDE 9 MG/ML
5-40 INJECTION INTRAVENOUS PRN
OUTPATIENT
Start: 2022-11-14

## 2022-08-22 RX ORDER — SODIUM CHLORIDE 0.9 % (FLUSH) 0.9 %
5-40 SYRINGE (ML) INJECTION PRN
OUTPATIENT
Start: 2022-12-05

## 2022-08-22 RX ORDER — HEPARIN SODIUM (PORCINE) LOCK FLUSH IV SOLN 100 UNIT/ML 100 UNIT/ML
500 SOLUTION INTRAVENOUS PRN
Status: CANCELLED | OUTPATIENT
Start: 2022-09-12

## 2022-08-22 RX ORDER — FAMOTIDINE 10 MG/ML
20 INJECTION, SOLUTION INTRAVENOUS
Status: CANCELLED | OUTPATIENT
Start: 2022-10-03

## 2022-08-22 RX ORDER — SODIUM CHLORIDE 9 MG/ML
5-40 INJECTION INTRAVENOUS PRN
OUTPATIENT
Start: 2023-01-16

## 2022-08-22 RX ORDER — EPINEPHRINE 1 MG/ML
0.3 INJECTION, SOLUTION, CONCENTRATE INTRAVENOUS PRN
OUTPATIENT
Start: 2023-01-16

## 2022-08-22 RX ORDER — SODIUM CHLORIDE 9 MG/ML
INJECTION, SOLUTION INTRAVENOUS CONTINUOUS
OUTPATIENT
Start: 2022-11-14

## 2022-08-22 RX ORDER — ONDANSETRON 2 MG/ML
8 INJECTION INTRAMUSCULAR; INTRAVENOUS
OUTPATIENT
Start: 2022-12-05

## 2022-08-22 RX ORDER — DIPHENHYDRAMINE HYDROCHLORIDE 50 MG/ML
50 INJECTION INTRAMUSCULAR; INTRAVENOUS
Status: CANCELLED | OUTPATIENT
Start: 2022-10-03

## 2022-08-22 RX ORDER — SODIUM CHLORIDE 9 MG/ML
INJECTION, SOLUTION INTRAVENOUS CONTINUOUS
OUTPATIENT
Start: 2023-01-16

## 2022-08-22 RX ORDER — FAMOTIDINE 10 MG/ML
20 INJECTION, SOLUTION INTRAVENOUS
OUTPATIENT
Start: 2022-12-05

## 2022-08-22 RX ORDER — MEPERIDINE HYDROCHLORIDE 50 MG/ML
12.5 INJECTION INTRAMUSCULAR; INTRAVENOUS; SUBCUTANEOUS PRN
Status: CANCELLED | OUTPATIENT
Start: 2022-10-24

## 2022-08-22 RX ORDER — ACETAMINOPHEN 325 MG/1
650 TABLET ORAL
OUTPATIENT
Start: 2022-12-26

## 2022-08-22 RX ORDER — SODIUM CHLORIDE 9 MG/ML
5-250 INJECTION, SOLUTION INTRAVENOUS PRN
OUTPATIENT
Start: 2023-01-16

## 2022-08-22 RX ORDER — ACETAMINOPHEN 325 MG/1
650 TABLET ORAL
Status: CANCELLED | OUTPATIENT
Start: 2022-10-03

## 2022-08-22 RX ORDER — EPINEPHRINE 1 MG/ML
0.3 INJECTION, SOLUTION, CONCENTRATE INTRAVENOUS PRN
Status: CANCELLED | OUTPATIENT
Start: 2022-09-12

## 2022-08-22 RX ORDER — ALBUTEROL SULFATE 90 UG/1
4 AEROSOL, METERED RESPIRATORY (INHALATION) PRN
OUTPATIENT
Start: 2022-11-14

## 2022-08-22 RX ORDER — HEPARIN SODIUM (PORCINE) LOCK FLUSH IV SOLN 100 UNIT/ML 100 UNIT/ML
500 SOLUTION INTRAVENOUS PRN
Status: CANCELLED | OUTPATIENT
Start: 2022-10-03

## 2022-08-22 RX ORDER — MEPERIDINE HYDROCHLORIDE 50 MG/ML
12.5 INJECTION INTRAMUSCULAR; INTRAVENOUS; SUBCUTANEOUS PRN
Status: CANCELLED | OUTPATIENT
Start: 2022-09-12

## 2022-08-22 RX ORDER — ONDANSETRON 2 MG/ML
8 INJECTION INTRAMUSCULAR; INTRAVENOUS
Status: CANCELLED | OUTPATIENT
Start: 2022-09-12

## 2022-08-22 RX ORDER — ALBUTEROL SULFATE 90 UG/1
4 AEROSOL, METERED RESPIRATORY (INHALATION) PRN
OUTPATIENT
Start: 2023-01-16

## 2022-08-22 RX ORDER — HEPARIN SODIUM (PORCINE) LOCK FLUSH IV SOLN 100 UNIT/ML 100 UNIT/ML
500 SOLUTION INTRAVENOUS PRN
OUTPATIENT
Start: 2022-11-14

## 2022-08-22 RX ORDER — MEPERIDINE HYDROCHLORIDE 50 MG/ML
12.5 INJECTION INTRAMUSCULAR; INTRAVENOUS; SUBCUTANEOUS PRN
OUTPATIENT
Start: 2022-11-14

## 2022-08-22 RX ORDER — SODIUM CHLORIDE 9 MG/ML
5-40 INJECTION INTRAVENOUS PRN
OUTPATIENT
Start: 2022-12-26

## 2022-08-22 RX ORDER — EPINEPHRINE 1 MG/ML
0.3 INJECTION, SOLUTION, CONCENTRATE INTRAVENOUS PRN
Status: CANCELLED | OUTPATIENT
Start: 2022-10-03

## 2022-08-22 RX ORDER — SODIUM CHLORIDE 9 MG/ML
5-250 INJECTION, SOLUTION INTRAVENOUS PRN
Status: CANCELLED | OUTPATIENT
Start: 2022-10-24

## 2022-08-22 RX ORDER — DIPHENHYDRAMINE HYDROCHLORIDE 50 MG/ML
50 INJECTION INTRAMUSCULAR; INTRAVENOUS
OUTPATIENT
Start: 2022-11-14

## 2022-08-22 RX ORDER — ONDANSETRON 2 MG/ML
8 INJECTION INTRAMUSCULAR; INTRAVENOUS
Status: CANCELLED | OUTPATIENT
Start: 2022-10-24

## 2022-08-22 RX ORDER — SODIUM CHLORIDE 9 MG/ML
5-250 INJECTION, SOLUTION INTRAVENOUS PRN
Status: CANCELLED | OUTPATIENT
Start: 2022-10-03

## 2022-08-22 RX ORDER — ONDANSETRON 2 MG/ML
8 INJECTION INTRAMUSCULAR; INTRAVENOUS
OUTPATIENT
Start: 2022-12-26

## 2022-08-22 RX ORDER — SODIUM CHLORIDE 9 MG/ML
5-250 INJECTION, SOLUTION INTRAVENOUS PRN
Status: DISCONTINUED | OUTPATIENT
Start: 2022-08-22 | End: 2022-08-23 | Stop reason: HOSPADM

## 2022-08-22 RX ORDER — DIPHENHYDRAMINE HYDROCHLORIDE 50 MG/ML
50 INJECTION INTRAMUSCULAR; INTRAVENOUS
OUTPATIENT
Start: 2022-12-26

## 2022-08-22 RX ORDER — HEPARIN SODIUM (PORCINE) LOCK FLUSH IV SOLN 100 UNIT/ML 100 UNIT/ML
500 SOLUTION INTRAVENOUS PRN
Status: CANCELLED | OUTPATIENT
Start: 2022-10-24

## 2022-08-22 RX ORDER — SODIUM CHLORIDE 9 MG/ML
5-40 INJECTION INTRAVENOUS PRN
OUTPATIENT
Start: 2022-12-05

## 2022-08-22 RX ORDER — DIPHENHYDRAMINE HYDROCHLORIDE 50 MG/ML
50 INJECTION INTRAMUSCULAR; INTRAVENOUS
Status: CANCELLED | OUTPATIENT
Start: 2022-08-22

## 2022-08-22 RX ORDER — ACETAMINOPHEN 325 MG/1
650 TABLET ORAL
OUTPATIENT
Start: 2022-12-05

## 2022-08-22 RX ORDER — ONDANSETRON 2 MG/ML
8 INJECTION INTRAMUSCULAR; INTRAVENOUS
OUTPATIENT
Start: 2022-11-14

## 2022-08-22 RX ORDER — ALBUTEROL SULFATE 90 UG/1
4 AEROSOL, METERED RESPIRATORY (INHALATION) PRN
OUTPATIENT
Start: 2022-12-26

## 2022-08-22 RX ORDER — SODIUM CHLORIDE 0.9 % (FLUSH) 0.9 %
5-40 SYRINGE (ML) INJECTION PRN
OUTPATIENT
Start: 2023-01-16

## 2022-08-22 RX ORDER — FAMOTIDINE 10 MG/ML
20 INJECTION, SOLUTION INTRAVENOUS
Status: CANCELLED | OUTPATIENT
Start: 2022-08-22

## 2022-08-22 RX ORDER — EPINEPHRINE 1 MG/ML
0.3 INJECTION, SOLUTION, CONCENTRATE INTRAVENOUS PRN
OUTPATIENT
Start: 2022-11-14

## 2022-08-22 RX ORDER — FAMOTIDINE 10 MG/ML
20 INJECTION, SOLUTION INTRAVENOUS
Status: CANCELLED | OUTPATIENT
Start: 2022-10-24

## 2022-08-22 RX ORDER — ALBUTEROL SULFATE 90 UG/1
4 AEROSOL, METERED RESPIRATORY (INHALATION) PRN
Status: CANCELLED | OUTPATIENT
Start: 2022-08-22

## 2022-08-22 RX ORDER — SODIUM CHLORIDE 9 MG/ML
INJECTION, SOLUTION INTRAVENOUS CONTINUOUS
Status: CANCELLED | OUTPATIENT
Start: 2022-08-22

## 2022-08-22 RX ORDER — SODIUM CHLORIDE 9 MG/ML
5-250 INJECTION, SOLUTION INTRAVENOUS PRN
Status: CANCELLED | OUTPATIENT
Start: 2022-09-12

## 2022-08-22 RX ORDER — SODIUM CHLORIDE 9 MG/ML
5-250 INJECTION, SOLUTION INTRAVENOUS PRN
OUTPATIENT
Start: 2022-11-14

## 2022-08-22 RX ORDER — SODIUM CHLORIDE 9 MG/ML
5-250 INJECTION, SOLUTION INTRAVENOUS PRN
Status: CANCELLED | OUTPATIENT
Start: 2022-08-22

## 2022-08-22 RX ORDER — ACETAMINOPHEN 325 MG/1
650 TABLET ORAL
OUTPATIENT
Start: 2023-01-16

## 2022-08-22 RX ORDER — ALBUTEROL SULFATE 90 UG/1
4 AEROSOL, METERED RESPIRATORY (INHALATION) PRN
OUTPATIENT
Start: 2022-12-05

## 2022-08-22 RX ORDER — HEPARIN SODIUM (PORCINE) LOCK FLUSH IV SOLN 100 UNIT/ML 100 UNIT/ML
500 SOLUTION INTRAVENOUS PRN
OUTPATIENT
Start: 2022-12-05

## 2022-08-22 RX ORDER — DIPHENHYDRAMINE HYDROCHLORIDE 50 MG/ML
50 INJECTION INTRAMUSCULAR; INTRAVENOUS
Status: CANCELLED | OUTPATIENT
Start: 2022-10-24

## 2022-08-22 RX ORDER — SODIUM CHLORIDE 0.9 % (FLUSH) 0.9 %
5-40 SYRINGE (ML) INJECTION PRN
Status: CANCELLED | OUTPATIENT
Start: 2022-08-22

## 2022-08-22 RX ORDER — HEPARIN SODIUM (PORCINE) LOCK FLUSH IV SOLN 100 UNIT/ML 100 UNIT/ML
500 SOLUTION INTRAVENOUS PRN
Status: CANCELLED | OUTPATIENT
Start: 2022-08-22

## 2022-08-22 RX ORDER — SODIUM CHLORIDE 0.9 % (FLUSH) 0.9 %
5-40 SYRINGE (ML) INJECTION PRN
OUTPATIENT
Start: 2022-12-26

## 2022-08-22 RX ORDER — ACETAMINOPHEN 325 MG/1
650 TABLET ORAL
Status: CANCELLED | OUTPATIENT
Start: 2022-10-24

## 2022-08-22 RX ORDER — ONDANSETRON 2 MG/ML
8 INJECTION INTRAMUSCULAR; INTRAVENOUS
OUTPATIENT
Start: 2023-01-16

## 2022-08-22 RX ORDER — HEPARIN SODIUM (PORCINE) LOCK FLUSH IV SOLN 100 UNIT/ML 100 UNIT/ML
500 SOLUTION INTRAVENOUS PRN
OUTPATIENT
Start: 2022-12-26

## 2022-08-22 RX ORDER — SODIUM CHLORIDE 9 MG/ML
INJECTION, SOLUTION INTRAVENOUS CONTINUOUS
OUTPATIENT
Start: 2022-12-05

## 2022-08-22 RX ORDER — MEPERIDINE HYDROCHLORIDE 50 MG/ML
12.5 INJECTION INTRAMUSCULAR; INTRAVENOUS; SUBCUTANEOUS PRN
Status: CANCELLED | OUTPATIENT
Start: 2022-10-03

## 2022-08-22 RX ORDER — SODIUM CHLORIDE 9 MG/ML
5-40 INJECTION INTRAVENOUS PRN
Status: CANCELLED | OUTPATIENT
Start: 2022-08-22

## 2022-08-22 RX ORDER — FAMOTIDINE 10 MG/ML
20 INJECTION, SOLUTION INTRAVENOUS
Status: CANCELLED | OUTPATIENT
Start: 2022-09-12

## 2022-08-22 RX ORDER — FAMOTIDINE 10 MG/ML
20 INJECTION, SOLUTION INTRAVENOUS
OUTPATIENT
Start: 2023-01-16

## 2022-08-22 RX ORDER — EPINEPHRINE 1 MG/ML
0.3 INJECTION, SOLUTION, CONCENTRATE INTRAVENOUS PRN
Status: CANCELLED | OUTPATIENT
Start: 2022-10-24

## 2022-08-22 RX ORDER — ACETAMINOPHEN 325 MG/1
650 TABLET ORAL
Status: CANCELLED | OUTPATIENT
Start: 2022-09-12

## 2022-08-22 RX ORDER — SODIUM CHLORIDE 0.9 % (FLUSH) 0.9 %
5-40 SYRINGE (ML) INJECTION PRN
Status: CANCELLED | OUTPATIENT
Start: 2022-10-03

## 2022-08-22 RX ORDER — SODIUM CHLORIDE 9 MG/ML
5-40 INJECTION INTRAVENOUS PRN
Status: CANCELLED | OUTPATIENT
Start: 2022-10-03

## 2022-08-22 RX ORDER — SODIUM CHLORIDE 9 MG/ML
5-40 INJECTION INTRAVENOUS PRN
Status: CANCELLED | OUTPATIENT
Start: 2022-09-12

## 2022-08-22 RX ORDER — DIPHENHYDRAMINE HYDROCHLORIDE 50 MG/ML
50 INJECTION INTRAMUSCULAR; INTRAVENOUS
OUTPATIENT
Start: 2023-01-16

## 2022-08-22 RX ORDER — ONDANSETRON 2 MG/ML
8 INJECTION INTRAMUSCULAR; INTRAVENOUS
Status: CANCELLED | OUTPATIENT
Start: 2022-10-03

## 2022-08-22 RX ORDER — EPINEPHRINE 1 MG/ML
0.3 INJECTION, SOLUTION, CONCENTRATE INTRAVENOUS PRN
Status: CANCELLED | OUTPATIENT
Start: 2022-08-22

## 2022-08-22 RX ORDER — DIPHENHYDRAMINE HYDROCHLORIDE 50 MG/ML
50 INJECTION INTRAMUSCULAR; INTRAVENOUS
OUTPATIENT
Start: 2022-12-05

## 2022-08-22 RX ORDER — HEPARIN SODIUM (PORCINE) LOCK FLUSH IV SOLN 100 UNIT/ML 100 UNIT/ML
500 SOLUTION INTRAVENOUS PRN
OUTPATIENT
Start: 2023-01-16

## 2022-08-22 RX ORDER — ONDANSETRON 2 MG/ML
8 INJECTION INTRAMUSCULAR; INTRAVENOUS
Status: CANCELLED | OUTPATIENT
Start: 2022-08-22

## 2022-08-22 RX ORDER — SODIUM CHLORIDE 0.9 % (FLUSH) 0.9 %
10 SYRINGE (ML) INJECTION PRN
Status: DISCONTINUED | OUTPATIENT
Start: 2022-08-22 | End: 2022-08-23 | Stop reason: HOSPADM

## 2022-08-22 RX ORDER — MEPERIDINE HYDROCHLORIDE 50 MG/ML
12.5 INJECTION INTRAMUSCULAR; INTRAVENOUS; SUBCUTANEOUS PRN
OUTPATIENT
Start: 2022-12-26

## 2022-08-22 RX ORDER — SODIUM CHLORIDE 9 MG/ML
INJECTION, SOLUTION INTRAVENOUS CONTINUOUS
OUTPATIENT
Start: 2022-12-26

## 2022-08-22 RX ORDER — MEPERIDINE HYDROCHLORIDE 50 MG/ML
12.5 INJECTION INTRAMUSCULAR; INTRAVENOUS; SUBCUTANEOUS PRN
OUTPATIENT
Start: 2023-01-16

## 2022-08-22 RX ORDER — MEPERIDINE HYDROCHLORIDE 50 MG/ML
12.5 INJECTION INTRAMUSCULAR; INTRAVENOUS; SUBCUTANEOUS PRN
OUTPATIENT
Start: 2022-12-05

## 2022-08-22 RX ORDER — SODIUM CHLORIDE 9 MG/ML
INJECTION, SOLUTION INTRAVENOUS CONTINUOUS
Status: CANCELLED | OUTPATIENT
Start: 2022-10-03

## 2022-08-22 RX ORDER — MEPERIDINE HYDROCHLORIDE 50 MG/ML
12.5 INJECTION INTRAMUSCULAR; INTRAVENOUS; SUBCUTANEOUS PRN
Status: CANCELLED | OUTPATIENT
Start: 2022-08-22

## 2022-08-22 RX ORDER — DIPHENHYDRAMINE HYDROCHLORIDE 50 MG/ML
50 INJECTION INTRAMUSCULAR; INTRAVENOUS
Status: CANCELLED | OUTPATIENT
Start: 2022-09-12

## 2022-08-22 RX ORDER — SODIUM CHLORIDE 9 MG/ML
5-40 INJECTION INTRAVENOUS PRN
Status: DISCONTINUED | OUTPATIENT
Start: 2022-08-22 | End: 2022-08-23 | Stop reason: HOSPADM

## 2022-08-22 RX ORDER — FAMOTIDINE 10 MG/ML
20 INJECTION, SOLUTION INTRAVENOUS
OUTPATIENT
Start: 2022-12-26

## 2022-08-22 RX ORDER — SODIUM CHLORIDE 9 MG/ML
INJECTION, SOLUTION INTRAVENOUS CONTINUOUS
Status: CANCELLED | OUTPATIENT
Start: 2022-09-12

## 2022-08-22 RX ORDER — SODIUM CHLORIDE 0.9 % (FLUSH) 0.9 %
5-40 SYRINGE (ML) INJECTION PRN
Status: CANCELLED | OUTPATIENT
Start: 2022-10-24

## 2022-08-22 RX ORDER — ALBUTEROL SULFATE 90 UG/1
4 AEROSOL, METERED RESPIRATORY (INHALATION) PRN
Status: CANCELLED | OUTPATIENT
Start: 2022-10-24

## 2022-08-22 RX ORDER — FAMOTIDINE 10 MG/ML
20 INJECTION, SOLUTION INTRAVENOUS
OUTPATIENT
Start: 2022-11-14

## 2022-08-22 RX ORDER — EPINEPHRINE 1 MG/ML
0.3 INJECTION, SOLUTION, CONCENTRATE INTRAVENOUS PRN
OUTPATIENT
Start: 2022-12-26

## 2022-08-22 RX ORDER — SODIUM CHLORIDE 9 MG/ML
INJECTION, SOLUTION INTRAVENOUS CONTINUOUS
Status: CANCELLED | OUTPATIENT
Start: 2022-10-24

## 2022-08-22 RX ORDER — EPINEPHRINE 1 MG/ML
0.3 INJECTION, SOLUTION, CONCENTRATE INTRAVENOUS PRN
OUTPATIENT
Start: 2022-12-05

## 2022-08-22 RX ORDER — ACETAMINOPHEN 325 MG/1
650 TABLET ORAL
Status: CANCELLED | OUTPATIENT
Start: 2022-08-22

## 2022-08-22 RX ORDER — SODIUM CHLORIDE 9 MG/ML
5-40 INJECTION INTRAVENOUS PRN
Status: CANCELLED | OUTPATIENT
Start: 2022-10-24

## 2022-08-22 RX ORDER — ALBUTEROL SULFATE 90 UG/1
4 AEROSOL, METERED RESPIRATORY (INHALATION) PRN
Status: CANCELLED | OUTPATIENT
Start: 2022-10-03

## 2022-08-22 RX ORDER — SODIUM CHLORIDE 0.9 % (FLUSH) 0.9 %
5-40 SYRINGE (ML) INJECTION PRN
Status: CANCELLED | OUTPATIENT
Start: 2022-09-12

## 2022-08-22 RX ORDER — ACETAMINOPHEN 325 MG/1
650 TABLET ORAL
OUTPATIENT
Start: 2022-11-14

## 2022-08-22 RX ORDER — SODIUM CHLORIDE 0.9 % (FLUSH) 0.9 %
5-40 SYRINGE (ML) INJECTION PRN
OUTPATIENT
Start: 2022-11-14

## 2022-08-22 RX ADMIN — SODIUM CHLORIDE 200 MG: 9 INJECTION, SOLUTION INTRAVENOUS at 14:42

## 2022-08-22 RX ADMIN — SODIUM CHLORIDE, PRESERVATIVE FREE 10 ML: 5 INJECTION INTRAVENOUS at 12:32

## 2022-08-22 RX ADMIN — SODIUM CHLORIDE, PRESERVATIVE FREE 10 ML: 5 INJECTION INTRAVENOUS at 15:25

## 2022-08-22 NOTE — PROGRESS NOTES
H&P/Consult Note/Progress Note/Office Note:   Cassius De La Cruz  MRN: [id number]  :1965  Age:57 y.o.    HPI: Cassius De La Cruz is a 56 y.o. female who is here in follow-up for a cT2N0, ycT0N0 triple negative left breast carcinoma. She originally presented with an abnormal screening mammogram of the left breast which led to diagnostic imaging, breast US, and ultimately US-guided left breast biopsy    which identified a small focus of infiltrating ductal carcinoma associated with DCIS in the left breast 9:00. MRI imaging estimated the overall size of disease of 4.5 cm. She had no adenopathy. This is all shown below in detail in the expanded problem list       She has been treated with neoadjuvant chemotherapy by Dr. Vamsi Klein. Follow-up MRI has been performed with good response as shown below. 21 pre-op Breast MRI                    8/15/22 Breast MRI (post-neoadjuvant Rx)  Hx:  Triple negative breast cancer. Upper normal quadrant of left breast. Status post chemotherapy. No current breast complaints. FINDINGS: Artifact is now seen in the upper inner, posterior soft tissues of the right breast likely representing a port reservoir. The breasts are composed of heterogeneous fibroglandular elements. No enlarged axillary lymph nodes are seen. No enlarged internal mammary lymph nodes are seen. Evaluation of the lungs is limited by  MRI imaging. However, no obvious pulmonary masses are seen. No significant pleural effusions are seen. The liver is obscured by cardiac motion artifact and only partially visualized. However, no focal signal abnormalities are seen prior to, or following administration of contrast to suggest a possible hepatic lesion. Only benign-appearing hepatic cysts are seen the largest of which resides in the left lobe measuring 1.6 cm in size.    Following the administration of intravenous contrast, normal enhancement is seen of the heart and vascular structures of the breasts. Mild background physiologic enhancement is seen of the breasts. Prior abnormal enhancement extending through the medial left breast is no longer appreciated. No new suspicious dominant enhancing lesion are suspicious asymmetric nonmass like enhancement is otherwise seen. Only mild symmetric nodular enhancement is seen in the bilateral breasts most consistent with benign physiologic enhancement. No evidence of skin thickening, or nipple retraction is seen. No enhancing osseous lesion is seen. 1.  No clear persistent asymmetric enhancement in the medial left breast suggesting an excellent response to treatment. Only mild back on physiologic enhancement is seen. No new suspicious enhancement is seen in either breast.   No findings concerning for evolving metastatic lesions are seen in the visualized chest.   Further management of the patient's known left breast cancer as clinically indicated is recommended.      BI-RADS Assessment Category 6: Known Biopsy Proven Malignancy           Past Medical History:   Diagnosis Date    Breast cancer ([facility] [address]     Hypertension     Murmur, heart     echo 2016 EF 60-65% ; 1/31/22 states never has had symptoms    Poor historian     difficulty with verifying medication list     Past Surgical History:   Procedure Laterality Date    BREAST SURGERY      bx of left breast     IR PORT PLACEMENT EQUAL OR GREATER THAN 5 YEARS  2/2/2022    IR PORT PLACEMENT EQUAL OR GREATER THAN 5 YEARS  2/2/2022    IR PORT PLACEMENT EQUAL OR GREATER THAN 5 YEARS 2/2/2022 [facility] SPECI[facility]    US BREAST NEEDLE BIOPSY LEFT Left 12/23/2021    US BREAST NEEDLE BIOPSY LEFT 12/23/2021 [facility]AMMO     Current Outpatient Medications   Medication Sig    spironolactone (ALDACTONE) 25 MG tablet     dilTIAZem (TIAZAC) 240 MG extended release capsule 1 po qd    diphenhydrAMINE (BENADRYL) 25 MG capsule Take 25 mg by mouth every 6 hours as needed    diphenhydrAMINE-zinc acetate 2-0.1 % cream Apply topically 2 times daily as needed    lidocaine-prilocaine (EMLA) 2.5-2.5 % cream Apply to port about 45 minutes prior to access    LORazepam (ATIVAN) 1 MG tablet Take 0.5-1 mg by mouth every 8 hours as needed. montelukast (SINGULAIR) 10 MG tablet Take 10 mg by mouth daily    olmesartan (BENICAR) 20 MG tablet Take 20 mg by mouth daily    ondansetron (ZOFRAN) 8 MG tablet Take 8 mg by mouth every 8 hours as needed    potassium chloride (KLOR-CON M) 20 MEQ extended release tablet Take 20 mEq by mouth daily    predniSONE (DELTASONE) 20 MG tablet Take 40 mg by mouth daily as needed    prochlorperazine (COMPAZINE) 10 MG tablet Take 10 mg by mouth every 6 hours as needed    senna-docusate (PERICOLACE) 8.6-50 MG per tablet Take 1 tablet by mouth daily as needed    triamcinolone (KENALOG) 0.1 % cream Apply topically 3 times daily     No current facility-administered medications for this visit. ALLERGIES:  Paclitaxel and Lisinopril    Social History     Socioeconomic History    Marital status:      Spouse name: None    Number of children: None    Years of education: None    Highest education level: None   Tobacco Use    Smoking status: Never    Smokeless tobacco: Never   Substance and Sexual Activity    Alcohol use: No     Alcohol/week: 0.0 standard drinks    Drug use: No   Social History Narrative    Abuse: Feels safe at home, no history of physical abuse, no history of sexual abuse       Social History     Tobacco Use   Smoking Status Never   Smokeless Tobacco Never     Family History   Problem Relation Age of Onset    Ovarian Cancer Neg Hx     No Known Problems Mother     Breast Cancer Neg Hx     No Known Problems Sister     Other Other         states no family history    Colon Cancer Neg Hx     Hypertension Father     No Known Problems Sister     No Known Problems Brother     Uterine Cancer Neg Hx      ROS: The patient has no difficulty with chest pain or shortness of breath. No fever or chills. Comprehensive review of systems was otherwise unremarkable except as noted above. Physical Exam:   Ht 5' 0.5\" (1.537 m)   Wt 152 lb (68.9 kg)   BMI 29.20 kg/m²   Vitals:    08/22/22 1031   Weight: 152 lb (68.9 kg)   Height: 5' 0.5\" (1.537 m)     [unfilled]  [unfilled]    Constitutional: Alert, oriented, cooperative patient in no acute distress; appears stated age    Eyes:Sclera are clear. EOMs intact  ENMT: no external lesions gross hearing normal; no obvious neck masses, no ear or lip lesions, nares normal  CV: RRR. Normal perfusion  Resp: No JVD. Breathing is  non-labored; no audible wheezing. No palpable breast masses on either side. Small breasts   No regional adenopathy      GI: soft and non-distended     Musculoskeletal: unremarkable with normal function. No embolic signs or cyanosis. Neuro:  Oriented; moves all 4; no focal deficits  Psychiatric: normal affect and mood, no memory impairment    Recent vitals (if inpt):  @IPVITALS(24:)@    Amount and/or Complexity of Data Reviewed and Analyzed:  I reviewed and analyzed all of the unique labs and radiologic studies that are shown below as well as any that are in the HPI, and any that are in the expanded problem list below  *Each unique test, order, or document contributes to the combination of 2 or combination of 3 in Category 1 below. For this visit I also reviewed old records and prior notes. No results for input(s): WBC, HGB, PLT, NA, K, CL, CO2, BUN, CREA, GLU, INR, APTT, ALT, AML, AML, LCAD, PCO2, PO2, HCO3 in the last 72 hours.     Invalid input(s): PTP, TBIL, TBILI, CBIL, SGOT, GPT, AP, LPSE, NH4, TROPT, TROIQ,  PH  Review of most recent CBC  Lab Results   Component Value Date    WBC 3.6 (L) 08/01/2022    HGB 8.6 (L) 08/01/2022    HCT 26.6 (L) 08/01/2022    .7 (H) 08/01/2022     (L) 08/01/2022       Review of most recent BMP  Lab Results   Component Value Date/Time     08/01/2022 10:58 AM    K 4.3 08/01/2022 10:58 AM     08/01/2022 10:58 AM    CO2 26 08/01/2022 10:58 AM    BUN 15 08/01/2022 10:58 AM    CREATININE 1.00 08/01/2022 10:58 AM    GLUCOSE 147 08/01/2022 10:58 AM    CALCIUM 9.6 08/01/2022 10:58 AM        Review of most recent LFTs (and lipase if done)  Lab Results   Component Value Date    ALT 37 08/01/2022    AST 24 08/01/2022    ALKPHOS 106 08/01/2022    BILITOT 0.2 08/01/2022     No results found for: LIPASE    No results found for: INR, APTT, LCAD    Review of most recent HgbA1c  No results found for: LABA1C  No results found for: EAG    Nutritional assessment screen for wound healing issues:  Lab Results   Component Value Date    LABALBU 3.6 08/01/2022       @lastcovr@    Xray Result (most recent):  XR CHEST STANDARD TWO VW 03/07/2022    Narrative  EXAM: 2 view chest radiograph. INDICATION: Neutropenic fever. COMPARISON: Chest radiograph dated February 22, 2022. FINDINGS:  Limited crosstable lateral projection. No focal consolidation to suggest  pneumonia. No pneumothorax or pleural effusion. Unchanged cardiomegaly. No  evidence of acute osseous abnormality. Right-sided chest port terminates in the  cavoatrial junction. Impression  1. No focal consolidation to suggest pneumonia. CT Result (most recent):  No results found for this or any previous visit from the past 3650 days. US Result (most recent):  US BREAST NEEDLE BIOPSY LEFT 12/23/2021    Addendum 12/30/2021 11:36 AM  Addendum: Tavo Lentz, accession number: [id number]  Date: 12/28/2021 Pathology was noted as A: Left breast, 9:00 position, 6 cm from  nipple, core biopsy: Foci of at least microinvasive infiltrating ductal  carcinoma, intermediate grade (moderately differentiated) in association with  focally high grade ductal carcinoma in situ. Definite lymphovascular invasion is  not identified. Results concordant with imaging. Pathology report was called to  Dr. Doree Landau office on 12/27/2021 by HAL Alford)([facility]). appearance to the previous study. There is a focal asymmetry in the medial inferior left breast located 6 cm   from the nipple. Recommend spot compression magnification views on the direct ML view and   ultrasound, if indicated for further evaluation. There is otherwise no suspicious mass, calcification, or architectural   distortion in either breast.       IMPRESSION  Focal asymmetry in the medial inferior left breast at 6 cm from the   nipple. 12/6/21 Left breast dx mammo and US  The area of concern persists with additional imaging. This resides within   the medial left breast at approximately 9:00 position. There  are few associated calcifications which appear somewhat coarse. This   measures approximately 1.3 cm in size. An additional smaller asymmetry   also at the 9:00  position resides 7 cm from the nipple. A left breast ultrasound is   recommended for further evaluation. 12/6/21 left breast US  At the 9:00 position, 6 cm from the nipple is a lobulated hypoechoic but   mildly heterogeneous appearing mass measuring  approximately 1.5 x 0.8 x 0.8 cm. The margins are occasionally ill-defined   with some components demonstrating posterior acoustic shadowing. The long   axis is  parallel to the plane of the chest wall. There is a 7 mm hypoechoic mass   just adjacent to this This is hypoechoic with a lobulated contour. The   margins are  occasionally ill-defined. IMPRESSION  Adjacent left breast masses. Recommend tissue sampling of the larger   lesion. 12/23/21 US-guided left breast biopsy  A:  \" LEFT BREAST, 9:00 POSITION, 6 CM FROM NIPPLE, CORE BIOPSY\": FOCI OF   AT LEAST MICROINVASIVE INFILTRATING DUCTAL CARCINOMA,   INTERMEDIATE GRADE (MODERATELY DIFFERENTIATED) IN ASSOCIATION WITH FOCALLY   HIGH GRADE DUCTAL CARCINOMA IN SITU. DEFINITE LYMPHOVASCULAR INVASION IS NOT IDENTIFIED   Sign Out Date: 12/24/2021  ABDIRASHID Ibarra M.D.   ER:  Negative (0.9%);  LA: Negative (0.4%); HER-2/CHAR: Negative (0)   Post biopsy mammogram shows the biopsy clip in good position. 12/28/21 Breast MRI  The breasts demonstrate moderate glandularity and mild background   enhancement. Left 9:00 biopsy clip within a lobulated heterogeneously enhancing mass   with angulated and irregular margins overall measuring about 1.8 x 0.7 x   1.2 cm. Posterior to this (about 1 cm away) the smaller mass measuring 0.9 cm is   unchanged from recent mammogram and ultrasound. Additionally there is   clumped  and reticular nonmass enhancement extending superiorly by about 2.7 cm and   anteriorly by about 1.8 cm, in keeping with the DCIS and calcifications. Overall the expected area of disease measures about 4.5 cm AP by 1.2 cm   transverse and is located at 9:00-9:30, mid to posterior depth. There is no evidence of involvement of skin or chest wall structures. No other evidence of suspicious enhancing mass, and no other dominant or   unique nonmass enhancement,  to suggest additional malignancy in either   breast.  No evidence of axillary or internal mammary lymphadenopathy. Elsewhere, limited visualization of the partially included thorax and   upper abdomen shows no acute abnormality, with note of a small benign cyst   in the left liver. IMPRESSION  1. Left medial breast cancer measures about 4.5 cm in greatest dimension. 2. No additional suspicious finding elsewhere in either breast. No obvious   lymphadenopathy.   1/13/22 presented at [facility]; genetic testing and refer to med oncology pre-op  1/13/22 BRCAPlus panel of 8 genes associated with hereditary cancer   (including BRCA1 and BRCA2)   No pathogenic variants identified that increase risk of developing breast   cancer           Screening mammogram, encounter for 03/18/2019     Ordered for this Oct on 3/18/19        Irregular menses 07/24/2017     noted        Women's annual routine gynecological examination 07/24/2017     Pap with HPV done 7/24/17        Mixed hyperlipidemia 04/13/2016    Seasonal allergic rhinitis 04/13/2016    Encephalomalacia on imaging study 04/13/2016    Benign essential HTN 04/13/2016    Frequent headaches 02/25/2016          Number and Complexity of Problems addressed and   Risks of complications and/or morbidity of management        cT2N0, ycT0N0 triple negative left breast IDC with DCIS  We had a long discussion in the office today with the help of a Chinese/Mandarin certified  who came in person    I discussed the patient's condition and treatment options. We discussed treatment options  We discussed left mastectomy/sentinel node versus lumpectomy/sentinel node/XRT. We discussed risk/benefits and advantages/disadvantages of each. In particular we discussed risk of a positive margin should lumpectomy be pursued which could require reexcision lumpectomy or completion mastectomy. Risks inherent to the either surgery were discussed including bleeding, infection, recurrence, blood clots, risk of anesthesia, etc.. All her questions were answered. She is in favor of breast preservation surgery. We will schedule left NL lumpectomy/sentinel node for her soon. Level of MDM (2/3 elements below)  Number and Complexity of Problems Addressed Amount and/or Complexity of Data to be Reviewed and Analyzed  *Each unique test, order, or document contributes to the combination of 2 or combination of 3 in Category 1 below. Risk of Complications and/or Morbidity or Mortality of pt Management     20981  [id number] SF Minimal  ?1self-limited or minor problem Minimal or none Minimal risk of morbidity from additional diagnostic testing or Rx   87796  52461 Low Low  ? 2or more self-limited or minor problems;    or  ? 1stable chronic illness;    or  ?1acute, uncomplicated illness or injury   Limited  (Must meet the requirements of at least 1 of the 2 categories)  Category 1: Tests and documents   ? Any combination of 2 from the following:  ?Review of prior external note(s) from each unique source*;  ?review of the result(s) of each unique test*;   ?ordering of each unique test*    or   Category 2: Assessment requiring an independent historian(s)  (For the categories of independent interpretation of tests and discussion of management or test interpretation, see moderate or high) Low risk of morbidity from additional diagnostic testing or treatment     45163  19918 Mod Moderate  ? 1or more chronic illnesses with exacerbation, progression, or side effects of treatment;    or  ?2or more stable chronic illnesses;    or  ?1undiagnosed new problem with uncertain prognosis;    or  ?1acute illness with systemic symptoms;    or  ?1acute complicated injury   Moderate  (Must meet the requirements of at least 1 out of 3 categories)  Category 1: Tests, documents, or independent historian(s)  ? Any combination of 3 from the following:   ?Review of prior external note(s) from each unique source*;  ?Review of the result(s) of each unique test*;  ?Ordering of each unique test*;  ?Assessment requiring an independent historian(s)    or  Category 2: Independent interpretation of tests   ? Independent interpretation of a test performed by another physician/other qualified health care professional (not separately reported);     or  Category 3: Discussion of management or test interpretation  ? Discussion of management or test interpretation with external physician/other qualified health care professional/appropriate source (not separately reported)   Moderate risk of morbidity from additional diagnostic testing or treatment  Examples only:  ?Prescription drug management   ? Decision regarding minor surgery with identified patient or procedure risk factors  ? Decision regarding elective major surgery without identified patient or procedure risk factors   ? Diagnosis or treatment significantly limited by social determinants of health       [id number] High High  ?1or more chronic illnesses with severe exacerbation, progression, or side effects of treatment;    or  ?1 acute or chronic illness or injury that poses a threat to life or bodily function   Extensive  (Must meet the requirements of at least 2 out of 3 categories)  Category 1: Tests, documents, or independent historian(s)  ? Any combination of 3 from the following:   ?Review of prior external note(s) from each unique source*;  ?Review of the result(s) of each unique test*;   ?Ordering of each unique test*;   ?Assessment requiring an independent historian(s)    or   Category 2: Independent interpretation of tests   ? Independent interpretation of a test performed by another physician/other qualified health care professional (not separately reported);     or  Category 3: Discussion of management or test interpretation  ? Discussion of management or test interpretation with external physician/other qualified health care professional/appropriate source (not separately reported)   High risk of morbidity from additional diagnostic testing or treatment  Examples only:  ?Drug therapy requiring intensive monitoring for toxicity  ? Decision regarding elective major surgery with identified patient or procedure risk factors  ? Decision regarding emergency major surgery  ? Decision regarding hospitalization  ? Decision not to resuscitate or to de-escalate care because of poor prognosis             I have personally performed a face-to-face diagnostic evaluation and management  service on this patient. I have independently seen the patient. I have independently obtained the above history from the patient/family. I have independently examined the patient with above findings. I have independently reviewed data/labs for this patient and developed the above plan of care (MDM).       Signed: Ricci Sotomayor MD  8/22/2022    10:40 AM

## 2022-08-22 NOTE — PROGRESS NOTES
Patient arrived to port lab for port access and lab draw   Im Kevin 45 accessed and labs drawn per protocol   *Port remains accessed for infusion  Patient discharged from port lab ambulatory*

## 2022-08-22 NOTE — PROGRESS NOTES
[facility] Hematology & Oncology: Office Visit Progress Note    Chief Complaint:    Triple negative left breast cancer    History of Present Illness:  Reason for Referral: Carcinoma of upper-inner  quadrant of left breast in female, estrogen receptor negative; Triple negative malignant neoplasm of breast       Referring Provider: Catherine Hill MD       Primary Care Provider: Adeel Anders MD       Family History of Cancer/Hematologic Disorders: None reported       Presenting Symptoms: Abnormal routine screening mammogram        Ms. Lyndsey Paulson is a 56 y.o.  female with a history of heart murmur, HTN, mixed HLD, and irregular menses, who was recently diagnosed with breast  cancer. She initially presented on 11/22/21 for a routine bilateral digital screening mammogram which identified focal asymmetry in the medial inferior left breast at 6 cm from the nipple. Further evaluation with diagnostic left breast mammogram and left  breast ultrasound were completed on 12/6/21 confirming the presence of adjacent left breast masses with one at the 9:00 position, 6 cm from the nipple measuring approximately 1.5 x 0.8 x 0.8 cm and a 7 mm hypoechoic mass just adjacent to this. Recommended ultrasound-guided biopsy of the medial left breast mass was completed on 12/23/21 with pathology from the core biopsy of the left breast, 9:00 position, 6 cm from nipple, revealing ER 0.9%/SD 0.4%/HER-2 (0) negative, intermediate grade (moderately  differentiated), foci of at least microinvasive infiltrating ductal carcinoma in association with focally high grade ductal carcinoma in situ with no definite lymphovascular invasion identified. MRI of the bilateral breasts was performed on 12/28/21 demonstrating left medial breast cancer measuring about 4.5 cm in greatest dimension; no additional suspicious finding elsewhere in either breast; and no obvious lymphadenopathy.        Patient was referred to surgery and evaluated in consultation by Surgeon, Dr. Haydee Cisneros, on 1/10/22. She was assessed with signs and symptoms consistent with cT2N0 triple negative left breast IDC with DCIS. Treatment options were discussed, and  Dr. Kendra Danielle recommended genetic testing and preoperative evaluation by medical oncology to consider neoadjuvant chemotherapy. Dr. Kendra Danielle noted, \"We discussed the strong possibility that she could require left mastectomy/sentinel node should we  proceed with surgery upfront. At this time the enhancement on MRI looks too large, diffuse, and elongated to perform breast preservation surgery and expected good cosmetic result. If she has neoadjuvant treatment I will see her back in about 4 months  to regroup. \"       Patient is now referred to [facility] for oncology evaluation and consideration for neoadjuvant chemotherapy. Patient completed genetic counseling on 1/13/22. She agreed to pursue testing with BRCAPlus panel of 8 genes associated with hereditary cancer  (including BRCA1 and BRCA2 ). BILATERAL DIGITAL SCREENING MAMMOGRAM  11/22/2021   FINDINGS: There are scattered fibroglandular densities. A few typically benign-appearing calcifications in the left breast are similar in  appearance to the previous study. There is a focal asymmetry in the medial inferior left breast located 6 cm from the nipple. Recommend spot compression magnification views on the direct ML view and ultrasound, if indicated for further evaluation. There  is otherwise no suspicious mass, calcification, or architectural distortion in either breast.      IMPRESSION: Focal asymmetry in the medial inferior left breast at 6 cm from the nipple. for which further evaluation is recommended with ML  and compression magnification views, with possible ultrasound if indicated. BI-RADS Assessment Category 0: Incomplete: Needs additional imaging evaluation.        DIAGNOSTIC MAMMOGRAM LEFT BREAST, LEFT BREAST ULTRASOUND 12/6/21   FINDING: The area of concern does not persist on additional imaging. There is no architectural distortion. The area of concern persists with  additional imaging. This resides within the medial left breast at approximately 9:00 position. There are few associated calcifications which appear somewhat coarse. This measures approximately 1.3 cm in size. An additional smaller asymmetry also at the  9:00 position resides 7 cm from the nipple. A left breast ultrasound is recommended for further evaluation. LEFT BREAST ULTRASOUND: At the 9:00 position, 6 cm from the nipple is a lobulated hypoechoic but mildly heterogeneous appearing mass measuring approximately 1.5 x 0.8 x 0.8 cm. The margins are occasionally ill-defined with some components demonstrating  posterior acoustic shadowing. The long axis is parallel to the plane of the chest wall. There is a 7 mm hypoechoic mass just adjacent to this This is hypoechoic with a lobulated contour. The margins are occasionally ill-defined. IMPRESSION: Adjacent left breast masses. Recommend tissue sampling of the larger lesion. BI-RADS Assessment Category 4: Suspicious Finding- Biopsy should be considered. [facility] SURGICAL PATHOLOGY REPORT 12/23/21   DIAGNOSIS    A: \" LEFT BREAST, 9:00 POSITION, 6 CM FROM NIPPLE, CORE BIOPSY\": FOCI OF AT LEAST MICROINVASIVE INFILTRATING DUCTAL CARCINOMA, INTERMEDIATE GRADE (MODERATELY DIFFERENTIATED) IN ASSOCIATION WITH FOCALLY HIGH GRADE DUCTAL CARCINOMA IN SITU.  DEFINITE LYMPHOVASCULAR  INVASION IS NOT IDENTIFIED   [facility]- ER/OK/[id number] BY IHC    INTERPRETATION    [facility] IHC Quantitative Breast Panel    TEST NAME:                                      RESULTS:                  INTERNAL CONTROLS:    ESTROGEN RECEPTOR:                 Negative (0.9%)          Present, Positive    PROGESTERONE RECEPTOR:       Negative (0.4%)          Present, Positive    HER-2/CHAR:                                        Negative (0)                Percentage of Cells with Uniform Intense Complete Membrane    Stainin%   STF-IMMUNOHISTOCHEMISTRY   Immunohistochemical Stain Panel:    INTERPRETATION: Immunohistochemical findings consistent with breast primary. ANTIBODY/TEST       MARKER FOR                                               RESULT    Cytokeratin 7               Lung, breast, upper GE, serous ovary           Positive    Cytokeratin 20             Colon, mucinous ovary, urothelium                Negative    GATA3                        Urothelial, breast carcinoma                           Positive    GCDFP                       Breast, salivary gland, skin, adnexa               Positive    Mammaglobin                         Breast                                                              Positive       MRI OF THE BREASTS WITH AND WITHOUT CONTRAST 21   FINDINGS: The breasts demonstrate moderate glandularity and mild background enhancement. Left 9:00 biopsy clip within a lobulated heterogeneously  enhancing mass with angulated and irregular margins overall measuring about 1.8 x 0.7 x 1.2 cm. Posterior to this (about 1 cm away) the smaller mass measuring 0.9 cm is unchanged from recent mammogram and ultrasound. Additionally there is clumped and  reticular nonmass enhancement extending superiorly by about 2.7 cm and anteriorly by about 1.8 cm, in keeping with the DCIS and calcifications. Overall the expected area of disease measures about 4.5 cm AP by 1.2 cm transverse and is located at 9:00-9:30,  mid to posterior depth. There is no evidence of involvement of skin or chest wall structures. There is no other evidence of suspicious enhancing mass, and no other dominant or unique nonmass enhancement, to suggest additional malignancy in either breast.  There is no evidence of axillary or internal mammary lymphadenopathy.  Elsewhere, limited visualization of the partially included thorax and upper abdomen shows no acute abnormality, with note of a small benign cyst in the left liver. IMPRESSION   1. Left medial breast cancer measures about 4.5 cm in greatest dimension. 2. No additional suspicious finding elsewhere in either breast. No obvious lymphadenopathy. Recommend continued management as directed clinically. BI-RADS Assessment Category 6: Known Biopsy Proven Malignancy       Notes from Referring Provider: \"Age 49 with cT2N0 triple  negative left breast IDC and DCIS Evaluate    Speaks mandarin   She has been referred to genetics also\"       Interim history update in A/P. Review of Systems:      Constitutional  Denies fever or chills. Denies fatigue. Denies anorexia. HEENT   allergic conjunctivitis and sinusitis. Denies trauma, bluring vision, hearing loss, ear pain, nosebleeds, sore throat, neck pain and ear discharge. Skin  Denies lesions or rashes. Lungs  Denies shortness of breath, cough, sputum production or hemoptysis. Cardiovascular  Denies chest pain, palpitations, orthopnea, claudication and leg swelling. Gastrointestinal  Nausea. Denies vomiting. Denies bloody or black stools. Denies abdominal pain.   Denies dysuria, frequency or hesitancy of urination     Neuro  Denies headaches, visual changes or ataxia. Denies dizziness, tingling, tremors, sensory change, speech change, focal weakness and headaches. Hematology  Denies nasal/gum bleeding, denies easy bruise     Endo  Denies heat/cold intolerance, denies diabetes. MSK  Denies back pain, swollen legs, myalgias and falls. Psychiatric/Behavioral   insomnia. Denies depression and substance abuse. The patient is not nervous/anxious.               Allergies   Allergen Reactions    Paclitaxel Angioedema and Swelling    Lisinopril Other (See Comments)     Other reaction(s): Cough     Past Medical History:   Diagnosis Date    Breast cancer ([address]     Hypertension     Murmur, heart     echo 2016 EF 60-65% ; 1/31/22 states never has had symptoms    Poor historian difficulty with verifying medication list     Past Surgical History:   Procedure Laterality Date    BREAST SURGERY      bx of left breast     IR PORT PLACEMENT EQUAL OR GREATER THAN 5 YEARS  2/2/2022    IR PORT PLACEMENT EQUAL OR GREATER THAN 5 YEARS  2/2/2022    IR PORT PLACEMENT EQUAL OR GREATER THAN 5 YEARS 2/2/2022 [facility] RADIOLOGY SPECIALS    US BREAST NEEDLE BIOPSY LEFT Left 12/23/2021    US BREAST NEEDLE BIOPSY LEFT 12/23/2021 [facility] RADIOLOGY MAMMO     Family History   Problem Relation Age of Onset    Ovarian Cancer Neg Hx     No Known Problems Mother     Breast Cancer Neg Hx     No Known Problems Sister     Other Other         states no family history    Colon Cancer Neg Hx     Hypertension Father     No Known Problems Sister     No Known Problems Brother     Uterine Cancer Neg Hx      Social History     Socioeconomic History    Marital status:      Spouse name: Not on file    Number of children: Not on file    Years of education: Not on file    Highest education level: Not on file   Occupational History    Not on file   Tobacco Use    Smoking status: Never    Smokeless tobacco: Never   Substance and Sexual Activity    Alcohol use: No     Alcohol/week: 0.0 standard drinks    Drug use: No    Sexual activity: Not on file   Other Topics Concern    Not on file   Social History Narrative    Abuse: Feels safe at home, no history of physical abuse, no history of sexual abuse       Social Determinants of Health     Financial Resource Strain: Not on file   Food Insecurity: Not on file   Transportation Needs: Not on file   Physical Activity: Not on file   Stress: Not on file   Social Connections: Not on file   Intimate Partner Violence: Not on file   Housing Stability: Not on file     Current Outpatient Medications   Medication Sig Dispense Refill    spironolactone (ALDACTONE) 25 MG tablet       dilTIAZem (TIAZAC) 240 MG extended release capsule 1 po qd      diphenhydrAMINE (BENADRYL) 25 MG capsule Take 25 mg by mouth every 6 hours as needed      diphenhydrAMINE-zinc acetate 2-0.1 % cream Apply topically 2 times daily as needed      lidocaine-prilocaine (EMLA) 2.5-2.5 % cream Apply to port about 45 minutes prior to access      LORazepam (ATIVAN) 1 MG tablet Take 0.5-1 mg by mouth every 8 hours as needed. montelukast (SINGULAIR) 10 MG tablet Take 10 mg by mouth daily      olmesartan (BENICAR) 20 MG tablet Take 20 mg by mouth daily      ondansetron (ZOFRAN) 8 MG tablet Take 8 mg by mouth every 8 hours as needed      potassium chloride (KLOR-CON M) 20 MEQ extended release tablet Take 20 mEq by mouth daily      predniSONE (DELTASONE) 20 MG tablet Take 40 mg by mouth daily as needed      prochlorperazine (COMPAZINE) 10 MG tablet Take 10 mg by mouth every 6 hours as needed      senna-docusate (PERICOLACE) 8.6-50 MG per tablet Take 1 tablet by mouth daily as needed      triamcinolone (KENALOG) 0.1 % cream Apply topically 3 times daily       No current facility-administered medications for this visit. OBJECTIVE:  /74 (Site: Left Upper Arm, Position: Standing, Cuff Size: Medium Adult)   Pulse (!) 102   Temp 98.3 °F (36.8 °C) (Oral)   Resp 14   Ht 5' 0.5\" (1.537 m)   Wt 151 lb 3.2 oz (68.6 kg)   SpO2 97%   BMI 29.04 kg/m²     Physical Exam:      Constitutional:  Oriented to person, place, and time. Well-developed and well-nourished. HEENT:  Normocephalic and atraumatic. Oropharynx is clear and moist.    Conjunctivae and EOM are normal. Pupils are equal, round, and reactive to light. No scleral icterus. Neck supple. No JVD present. No tracheal deviation present. No thyromegaly present. Lymph node  No palpable submandibular, cervical, supraclavicular, axillary and inguinal lymph nodes. Breasts  Left breast soft, nontender, popular nodular area and 9:00 about 2 cm at baseline, no longer palpable after chemotherapy. Right breast soft, nontender, no mass. Skin   rash. Warm and dry.   No bruising noted. No erythema. No pallor. Respiratory  Effort normal and breath sounds normal.  No respiratory distress. No wheezes. No rales. No tenderness. CVS  Normal rate, regular rhythm and normal heart sounds. Exam reveals no gallop, no friction and no rub. No murmur heard. Abdomen  Soft. Bowel sounds are normal. Exhibits no distension. There is no tenderness. There is no rebound and no guarding. Neuro  Normal reflexes. No cranial nerve deficit. Exhibits normal muscle tone, 5 of 5 strength of all extremities. MSK  Normal range of motion in general.  No edema and no tenderness.         Psych  Normal mood, affect, behavior, judgment and thought content        Labs:  Recent Results (from the past 24 hour(s))   CBC with Auto Differential    Collection Time: 08/22/22 12:42 PM   Result Value Ref Range    WBC 4.2 (L) 4.3 - 11.1 K/uL    RBC 2.24 (L) 4.05 - 5.2 M/uL    Hemoglobin 7.5 (L) 11.7 - 15.4 g/dL    Hematocrit 23.3 (L) 35.8 - 46.3 %    .0 (H) 79.6 - 97.8 FL    MCH 33.5 (H) 26.1 - 32.9 PG    MCHC 32.2 31.4 - 35.0 g/dL    RDW 15.9 (H) 11.9 - 14.6 %    Platelets 566 544 - 370 K/uL    MPV 9.0 (L) 9.4 - 12.3 FL    nRBC 0.00 0.0 - 0.2 K/uL    Seg Neutrophils 75 43 - 78 %    Lymphocytes 11 (L) 13 - 44 %    Monocytes 13 (H) 4.0 - 12.0 %    Eosinophils % 0 (L) 0.5 - 7.8 %    Basophils 1 0.0 - 2.0 %    Immature Granulocytes 0 0.0 - 5.0 %    Segs Absolute 3.1 1.7 - 8.2 K/UL    Absolute Lymph # 0.5 0.5 - 4.6 K/UL    Absolute Mono # 0.6 0.1 - 1.3 K/UL    Absolute Eos # 0.0 0.0 - 0.8 K/UL    Basophils Absolute 0.0 0.0 - 0.2 K/UL    Absolute Immature Granulocyte 0.0 0.0 - 0.5 K/UL    Differential Type AUTOMATED     Comprehensive Metabolic Panel    Collection Time: 08/22/22 12:42 PM   Result Value Ref Range    Sodium 138 136 - 145 mmol/L    Potassium 4.2 3.5 - 5.1 mmol/L    Chloride 107 98 - 107 mmol/L    CO2 24 21 - 32 mmol/L    Anion Gap 7 7 - 16 mmol/L    Glucose 114 (H) 65 - 100 mg/dL BUN 19 6 - 23 MG/DL    Creatinine 0.90 0.6 - 1.0 MG/DL    GFR African American >60 >60 ml/min/1.73m2    GFR Non- >60 >60 ml/min/1.73m2    Calcium 9.1 8.3 - 10.4 MG/DL    Total Bilirubin 0.2 0.2 - 1.1 MG/DL    ALT 28 12 - 65 U/L    AST 22 15 - 37 U/L    Alk Phosphatase 106 50 - 136 U/L    Total Protein 8.0 6.3 - 8.2 g/dL    Albumin 3.6 3.5 - 5.0 g/dL    Globulin 4.4 (H) 2.3 - 3.5 g/dL    Albumin/Globulin Ratio 0.8 (L) 1.2 - 3.5         Imaging:  No results found for this or any previous visit. ASSESSMENT/PLAN:   Diagnosis Orders   1. Triple negative malignant neoplasm of breast (HCC)  CBC With Auto Differential    Comprehensive metabolic panel    CBC With Auto Differential    Comprehensive metabolic panel    CBC With Auto Differential    Comprehensive metabolic panel    CBC With Auto Differential    Comprehensive metabolic panel    CBC With Auto Differential    Comprehensive metabolic panel    CBC With Auto Differential    Comprehensive metabolic panel    CBC With Auto Differential    Comprehensive metabolic panel    CBC With Auto Differential    Comprehensive metabolic panel    CBC with Auto Differential    Comprehensive Metabolic Panel    TSH    Type and Screen      2. Severe anemia  CBC with Auto Differential      3. High risk medication use  TSH      4. Anemia due to antineoplastic chemotherapy  Type and Screen            [unfilled]    56 y.o. F consulted for triple negative left breast cancer and presented to [facility] with  and daughter on 1/19/2022.    She has good baseline health and functions well, and annual screening mammogram showed left breast lesion, biopsy showed triple negative grade 2 IDC, cT2 N0 M0, and we discussed the high risks profile for triple negative breast cancer and the most recent  FDA approval of neoadjuvant chemoimmunotherapy, she appeared a good candidate and agreed to arrange Duarte Avni for 4 cycles followed by Adriamycin Cytoxan Keytruda for 4 cycles, followed by surgery and adjuvant Keytruda for total systemic therapy  of 1 year, discussed toxicity and management, discussed logistics, arrange Zofran Compazine Ativan, baseline echocardiogram showed normal EF, hypertension better but still uncontrolled after adding spironolactone and educated risk of dehydration and hypotension  on chemotherapy when she is taking double diuretics, started cycle 1 2/7/22 and left breast tumor responded rapidly, lost 20 pounds after cycle 1 and BP much better controlled even after she runs out of diltiazem, again warned of monitoring dehydration/hypotension,  discussed increased protein/calorie intake, may add stimulant if continues to lose weight, occasional mild tingling in fingertips, episodes of constipation responded to laxative, proceed to cycle 1 day 8 carbotaxol Keytruda, educated to monitor for neuropathy,  follow next week but call as needed.       However after cycle 1 day 8 Taxol on 2/14 and developed acute onset of itching, rash, anasarca, short of breath, fever, hypotension, diarrhea, admitted with antibiotics, antihistamine,  IV fluid, most symptom much improved the second day although still lingering, discussed that the picture of allergic reaction but thorough discussion not able to identify new food or medicine, not typical for Taxol or Keytruda reaction given the timing  and the acuity, and she reported a similar episode a few years ago that she went to ER for treatment, but never followed up for allergen test and never had EpiPen, discussed the concern of allergy and will arrange allergist work-up, however proceeded  with day 15 Taxol with close observation to rule out atypical Taxol allergy, patient and family understand the risk and agreed to proceed, and reported reasonable tolerance except for mild itching through the infusion, however overnight RN was concerned  of tachycardia and tachypneic which patient reported similar to each infusion but received IV Benadryl and Solu-Medrol, LFT elevated for a day, give albumin with lasix and anasarca improved, DC with Benadryl/prednisone/EpiPen as needed, followed  on 2/28/2022, swelling resolved, mild rash of hands and arms, discussed to change Taxol to Taxotere for better management of reaction, return on 3/7/2022 reported much better tolerance for the allergic reaction, however more nausea/vomiting/weight loss  and neutropenic fever with temperature 101 and ANC 0.2, coughing and rule out Covid, typical seasonal allergic sinusitis symptoms and educated to start using Nasacort spray, admit to hospital for neutropenic fever, discharge 3/10/2022, had skin rash that  was attributed to cefepime and resolved, again discussed her sensitivity to multiple allergens and different manifestations need to be managed accordingly, Zaditor for allergic conjunctivitis, nasal steroid for sinusitis, Kenalog for skin rash, oral antihistamine,  prednisone 20 mg only as needed, added G-CSF since cycle 3, better controlled for allergy/hypertension/fluid retention, severe diarrhea responded to PRBC, severe thrombocytopenia recovered after delaying cycle 4 by a week, completed cycle 4 with G-CSF  and Emend. She returned on 5/9/2022 to start AC/Keytruda, however platelet was low and chemo had to be deferred for 2 weeks actually and platelets finally recovered to 92 on 5/23/2022, discussed to proceed with caution and reduce AC dose by 20%, which she tolerated reasonably well and completed 4 cycles of AC/Keytruda without major complication, repeat MRI showed cCR and saw Dr. Manjinder Rizo surgery 9/21, proceed to cycle 9 Keytruda and arrange 1 unit PRBC given his severe anemia and upcoming surgery, return in 3 weeks but call as needed. All questions are answered to their satisfaction. They will call for further questions and concerns. ECOG PERFORMANCE STATUS - 1    Pain - 0 - No pain/10. Fatigue - No flowsheet data found.   Distress - No flowsheet data found. Elements of this note have been dictated via voice recognition software. Text and phrases may be limited by the accuracy and autoconversion of the software. The chart has been reviewed, but errors may still be present. Sade Engle M.D.   [address]  Office : [phone]  Fax : [phone]

## 2022-08-22 NOTE — PATIENT INSTRUCTIONS
Patient Instructions from Today's Visit    Reason for Visit:  Follow-up visit for breast cancer     Diagnosis Information:  [email or web address]    Plan:  -You will get [facility] today.   -We will arrange for you to get a blood transfusion.      Follow Up:  -    Recent Lab Results:  Hospital Outpatient Visit on 08/22/2022   Component Date Value Ref Range Status    WBC 08/22/2022 4.2 (A) 4.3 - 11.1 K/uL Final    RBC 08/22/2022 2.24 (A) 4.05 - 5.2 M/uL Final    Hemoglobin 08/22/2022 7.5 (A) 11.7 - 15.4 g/dL Final    Hematocrit 08/22/2022 23.3 (A) 35.8 - 46.3 % Final    MCV 08/22/2022 104.0 (A) 79.6 - 97.8 FL Final    MCH 08/22/2022 33.5 (A) 26.1 - 32.9 PG Final    MCHC 08/22/2022 32.2  31.4 - 35.0 g/dL Final    RDW 08/22/2022 15.9 (A) 11.9 - 14.6 % Final    Platelets 95/23/7586 164  150 - 450 K/uL Final    MPV 08/22/2022 9.0 (A) 9.4 - 12.3 FL Final    nRBC 08/22/2022 0.00  0.0 - 0.2 K/uL Final    **Note: Absolute NRBC parameter is now reported with Hemogram**    Seg Neutrophils 08/22/2022 75  43 - 78 % Final    Lymphocytes 08/22/2022 11 (A) 13 - 44 % Final    Monocytes 08/22/2022 13 (A) 4.0 - 12.0 % Final    Eosinophils % 08/22/2022 0 (A) 0.5 - 7.8 % Final    Basophils 08/22/2022 1  0.0 - 2.0 % Final    Immature Granulocytes 08/22/2022 0  0.0 - 5.0 % Final    Segs Absolute 08/22/2022 3.1  1.7 - 8.2 K/UL Final    Absolute Lymph # 08/22/2022 0.5  0.5 - 4.6 K/UL Final    Absolute Mono # 08/22/2022 0.6  0.1 - 1.3 K/UL Final    Absolute Eos # 08/22/2022 0.0  0.0 - 0.8 K/UL Final    Basophils Absolute 08/22/2022 0.0  0.0 - 0.2 K/UL Final    Absolute Immature Granulocyte 08/22/2022 0.0  0.0 - 0.5 K/UL Final    Differential Type 08/22/2022 AUTOMATED    Final    Sodium 08/22/2022 138  136 - 145 mmol/L Final    Potassium 08/22/2022 4.2  3.5 - 5.1 mmol/L Final    Chloride 08/22/2022 107  98 - 107 mmol/L Final    CO2 08/22/2022 24  21 - 32 mmol/L Final Anion Gap 08/22/2022 7  7 - 16 mmol/L Final    Glucose 08/22/2022 114 (A) 65 - 100 mg/dL Final    BUN 08/22/2022 19  6 - 23 MG/DL Final    Creatinine 08/22/2022 0.90  0.6 - 1.0 MG/DL Final    GFR  08/22/2022 >60  >60 ml/min/1.73m2 Final    GFR Non- 08/22/2022 >60  >60 ml/min/1.73m2 Final    Comment:      Estimated GFR is calculated using the Modification of Diet in Renal Disease (MDRD) Study equation, reported for both  Americans (GFRAA) and non- Americans (GFRNA), and normalized to 1.73m2 body surface area. The physician must decide which value applies to the patient. The MDRD study equation should only be used in individuals age 25 or older. It has not been validated for the following: pregnant women, patients with serious comorbid conditions,or on certain medications, or persons with extremes of body size, muscle mass, or nutritional status. Calcium 08/22/2022 9.1  8.3 - 10.4 MG/DL Final    Total Bilirubin 08/22/2022 0.2  0.2 - 1.1 MG/DL Final    ALT 08/22/2022 28  12 - 65 U/L Final    AST 08/22/2022 22  15 - 37 U/L Final    Alk Phosphatase 08/22/2022 106  50 - 136 U/L Final    Total Protein 08/22/2022 8.0  6.3 - 8.2 g/dL Final    Albumin 08/22/2022 3.6  3.5 - 5.0 g/dL Final    Globulin 08/22/2022 4.4 (A) 2.3 - 3.5 g/dL Final    Albumin/Globulin Ratio 08/22/2022 0.8 (A) 1.2 - 3.5   Final       Treatment Summary has been discussed and given to patient: N/A      -------------------------------------------------------------------------------------------------------------------  Please call our office at [phone] if you have any  of the following symptoms:   Fever of 100.5 or greater  Chills  Shortness of breath  Swelling or pain in one leg    After office hours an answering service is available and will contact a provider for emergencies or if you are experiencing any of the above symptoms. Patient does express an interest in My Chart.   My Chart log in information explained on the after visit summary printout at the [facility] [address] desk.     Cecile Castellanos RN

## 2022-08-23 LAB — HISTORY CHECK: NORMAL

## 2022-08-23 RX ORDER — ACETAMINOPHEN 325 MG/1
650 TABLET ORAL
Status: CANCELLED | OUTPATIENT
Start: 2022-08-24

## 2022-08-23 RX ORDER — ONDANSETRON 2 MG/ML
8 INJECTION INTRAMUSCULAR; INTRAVENOUS
OUTPATIENT
Start: 2022-08-23

## 2022-08-23 RX ORDER — SODIUM CHLORIDE 9 MG/ML
20 INJECTION, SOLUTION INTRAVENOUS CONTINUOUS
Status: CANCELLED | OUTPATIENT
Start: 2022-08-23

## 2022-08-23 RX ORDER — ACETAMINOPHEN 325 MG/1
650 TABLET ORAL
OUTPATIENT
Start: 2022-08-23

## 2022-08-23 RX ORDER — DIPHENHYDRAMINE HYDROCHLORIDE 50 MG/ML
50 INJECTION INTRAMUSCULAR; INTRAVENOUS
OUTPATIENT
Start: 2022-08-23

## 2022-08-23 RX ORDER — SODIUM CHLORIDE 9 MG/ML
INJECTION, SOLUTION INTRAVENOUS CONTINUOUS
Status: CANCELLED | OUTPATIENT
Start: 2022-08-24

## 2022-08-23 RX ORDER — SODIUM CHLORIDE 9 MG/ML
INJECTION, SOLUTION INTRAVENOUS CONTINUOUS
OUTPATIENT
Start: 2022-08-23

## 2022-08-23 RX ORDER — ONDANSETRON 2 MG/ML
8 INJECTION INTRAMUSCULAR; INTRAVENOUS
Status: CANCELLED | OUTPATIENT
Start: 2022-08-24

## 2022-08-23 RX ORDER — SODIUM CHLORIDE 0.9 % (FLUSH) 0.9 %
5-40 SYRINGE (ML) INJECTION PRN
Status: CANCELLED | OUTPATIENT
Start: 2022-08-23

## 2022-08-23 RX ORDER — ALBUTEROL SULFATE 90 UG/1
4 AEROSOL, METERED RESPIRATORY (INHALATION) PRN
OUTPATIENT
Start: 2022-08-23

## 2022-08-23 RX ORDER — EPINEPHRINE 1 MG/ML
0.3 INJECTION, SOLUTION, CONCENTRATE INTRAVENOUS PRN
Status: CANCELLED | OUTPATIENT
Start: 2022-08-24

## 2022-08-23 RX ORDER — EPINEPHRINE 1 MG/ML
0.3 INJECTION, SOLUTION, CONCENTRATE INTRAVENOUS PRN
OUTPATIENT
Start: 2022-08-23

## 2022-08-23 RX ORDER — SODIUM CHLORIDE 9 MG/ML
25 INJECTION, SOLUTION INTRAVENOUS PRN
Status: CANCELLED | OUTPATIENT
Start: 2022-08-23

## 2022-08-23 RX ORDER — ACETAMINOPHEN 325 MG/1
650 TABLET ORAL ONCE
Status: CANCELLED | OUTPATIENT
Start: 2022-08-23 | End: 2022-08-23

## 2022-08-23 RX ORDER — DIPHENHYDRAMINE HCL 25 MG
25 CAPSULE ORAL ONCE
Status: CANCELLED | OUTPATIENT
Start: 2022-08-23 | End: 2022-08-23

## 2022-08-23 RX ORDER — ALBUTEROL SULFATE 90 UG/1
4 AEROSOL, METERED RESPIRATORY (INHALATION) PRN
Status: CANCELLED | OUTPATIENT
Start: 2022-08-24

## 2022-08-24 ENCOUNTER — HOSPITAL ENCOUNTER (OUTPATIENT)
Dept: INFUSION THERAPY | Age: 57
Discharge: HOME | End: 2022-08-24
Payer: COMMERCIAL

## 2022-08-24 ENCOUNTER — PREP FOR PROCEDURE (OUTPATIENT)
Dept: SURGERY | Age: 57
End: 2022-08-24

## 2022-08-24 VITALS
HEART RATE: 72 BPM | TEMPERATURE: 97.9 F | OXYGEN SATURATION: 100 % | SYSTOLIC BLOOD PRESSURE: 116 MMHG | DIASTOLIC BLOOD PRESSURE: 69 MMHG | RESPIRATION RATE: 18 BRPM

## 2022-08-24 DIAGNOSIS — T45.1X5A ANEMIA DUE TO ANTINEOPLASTIC CHEMOTHERAPY: Primary | ICD-10-CM

## 2022-08-24 DIAGNOSIS — D64.81 ANEMIA DUE TO ANTINEOPLASTIC CHEMOTHERAPY: Primary | ICD-10-CM

## 2022-08-24 PROCEDURE — P9040 RBC LEUKOREDUCED IRRADIATED: HCPCS

## 2022-08-24 PROCEDURE — 6370000000 HC RX 637 (ALT 250 FOR IP): Performed by: INTERNAL MEDICINE

## 2022-08-24 PROCEDURE — 2580000003 HC RX 258: Performed by: INTERNAL MEDICINE

## 2022-08-24 PROCEDURE — 36430 TRANSFUSION BLD/BLD COMPNT: CPT

## 2022-08-24 RX ORDER — DIPHENHYDRAMINE HYDROCHLORIDE 50 MG/ML
50 INJECTION INTRAMUSCULAR; INTRAVENOUS
Status: ACTIVE | OUTPATIENT
Start: 2022-08-24 | End: 2022-08-24

## 2022-08-24 RX ORDER — SODIUM CHLORIDE 9 MG/ML
25 INJECTION, SOLUTION INTRAVENOUS PRN
Status: DISCONTINUED | OUTPATIENT
Start: 2022-08-24 | End: 2022-08-25 | Stop reason: HOSPADM

## 2022-08-24 RX ORDER — SODIUM CHLORIDE 0.9 % (FLUSH) 0.9 %
5-40 SYRINGE (ML) INJECTION PRN
Status: CANCELLED | OUTPATIENT
Start: 2022-08-24

## 2022-08-24 RX ORDER — SODIUM CHLORIDE 9 MG/ML
20 INJECTION, SOLUTION INTRAVENOUS CONTINUOUS
Status: DISCONTINUED | OUTPATIENT
Start: 2022-08-24 | End: 2022-08-25 | Stop reason: HOSPADM

## 2022-08-24 RX ORDER — SODIUM CHLORIDE 0.9 % (FLUSH) 0.9 %
5-40 SYRINGE (ML) INJECTION EVERY 12 HOURS SCHEDULED
Status: CANCELLED | OUTPATIENT
Start: 2022-08-24

## 2022-08-24 RX ORDER — SODIUM CHLORIDE 0.9 % (FLUSH) 0.9 %
5-40 SYRINGE (ML) INJECTION PRN
Status: DISCONTINUED | OUTPATIENT
Start: 2022-08-24 | End: 2022-08-25 | Stop reason: HOSPADM

## 2022-08-24 RX ORDER — ACETAMINOPHEN 325 MG/1
650 TABLET ORAL ONCE
Status: COMPLETED | OUTPATIENT
Start: 2022-08-24 | End: 2022-08-24

## 2022-08-24 RX ORDER — DIPHENHYDRAMINE HCL 25 MG
25 CAPSULE ORAL ONCE
Status: COMPLETED | OUTPATIENT
Start: 2022-08-24 | End: 2022-08-24

## 2022-08-24 RX ORDER — SODIUM CHLORIDE 9 MG/ML
INJECTION, SOLUTION INTRAVENOUS PRN
Status: CANCELLED | OUTPATIENT
Start: 2022-08-24

## 2022-08-24 RX ADMIN — DIPHENHYDRAMINE HYDROCHLORIDE 25 MG: 25 CAPSULE ORAL at 07:39

## 2022-08-24 RX ADMIN — ACETAMINOPHEN 650 MG: 325 TABLET ORAL at 07:39

## 2022-08-24 RX ADMIN — SODIUM CHLORIDE, PRESERVATIVE FREE 10 ML: 5 INJECTION INTRAVENOUS at 10:00

## 2022-08-24 RX ADMIN — SODIUM CHLORIDE 20 ML/HR: 9 INJECTION, SOLUTION INTRAVENOUS at 07:39

## 2022-08-24 NOTE — PROGRESS NOTES
Arrived to the [facility]. 1 unit PRBC completed. Patient tolerated without problems. Any issues or concerns during appointment: no.  Patient aware of next infusion appointment on 9/12/22 (date) at 1400 (time). Patient instructed to call provider with temperature of 100.4 or greater or nausea/vomiting/ diarrhea or pain not controlled by medications  Discharged ambulatory with family.

## 2022-08-25 ENCOUNTER — CLINICAL DOCUMENTATION (OUTPATIENT)
Dept: CASE MANAGEMENT | Age: 57
End: 2022-08-25

## 2022-09-12 ENCOUNTER — OFFICE VISIT (OUTPATIENT)
Dept: ONCOLOGY | Age: 57
End: 2022-09-12
Payer: COMMERCIAL

## 2022-09-12 ENCOUNTER — HOSPITAL ENCOUNTER (OUTPATIENT)
Dept: INFUSION THERAPY | Age: 57
Discharge: HOME | End: 2022-09-12
Payer: COMMERCIAL

## 2022-09-12 VITALS
HEIGHT: 61 IN | BODY MASS INDEX: 29.27 KG/M2 | DIASTOLIC BLOOD PRESSURE: 74 MMHG | WEIGHT: 155 LBS | SYSTOLIC BLOOD PRESSURE: 129 MMHG | RESPIRATION RATE: 14 BRPM | TEMPERATURE: 98.2 F | OXYGEN SATURATION: 98 % | HEART RATE: 98 BPM

## 2022-09-12 DIAGNOSIS — C50.919 TRIPLE NEGATIVE MALIGNANT NEOPLASM OF BREAST (HCC): Primary | ICD-10-CM

## 2022-09-12 DIAGNOSIS — D64.81 ANEMIA DUE TO ANTINEOPLASTIC CHEMOTHERAPY: ICD-10-CM

## 2022-09-12 DIAGNOSIS — R63.5 WEIGHT GAIN: ICD-10-CM

## 2022-09-12 DIAGNOSIS — T45.1X5A ANEMIA DUE TO ANTINEOPLASTIC CHEMOTHERAPY: ICD-10-CM

## 2022-09-12 DIAGNOSIS — D64.9 SEVERE ANEMIA: ICD-10-CM

## 2022-09-12 DIAGNOSIS — Z79.899 HIGH RISK MEDICATION USE: ICD-10-CM

## 2022-09-12 DIAGNOSIS — C50.919 TRIPLE NEGATIVE MALIGNANT NEOPLASM OF BREAST (HCC): ICD-10-CM

## 2022-09-12 DIAGNOSIS — R79.89 TSH ELEVATION: ICD-10-CM

## 2022-09-12 LAB
ALBUMIN SERPL-MCNC: 3.7 G/DL (ref 3.5–5)
ALBUMIN/GLOB SERPL: 0.9 {RATIO} (ref 1.2–3.5)
ALP SERPL-CCNC: 116 U/L (ref 50–136)
ALT SERPL-CCNC: 34 U/L (ref 12–65)
ANION GAP SERPL CALC-SCNC: 5 MMOL/L (ref 4–13)
AST SERPL-CCNC: 22 U/L (ref 15–37)
BASOPHILS # BLD: 0 K/UL (ref 0–0.2)
BASOPHILS NFR BLD: 1 % (ref 0–2)
BILIRUB SERPL-MCNC: 0.3 MG/DL (ref 0.2–1.1)
BUN SERPL-MCNC: 19 MG/DL (ref 6–23)
CALCIUM SERPL-MCNC: 9.5 MG/DL (ref 8.3–10.4)
CHLORIDE SERPL-SCNC: 106 MMOL/L (ref 101–110)
CO2 SERPL-SCNC: 25 MMOL/L (ref 21–32)
CREAT SERPL-MCNC: 1 MG/DL (ref 0.6–1)
DIFFERENTIAL METHOD BLD: ABNORMAL
EOSINOPHIL # BLD: 0.1 K/UL (ref 0–0.8)
EOSINOPHIL NFR BLD: 3 % (ref 0.5–7.8)
ERYTHROCYTE [DISTWIDTH] IN BLOOD BY AUTOMATED COUNT: 15.3 % (ref 11.9–14.6)
GLOBULIN SER CALC-MCNC: 4.3 G/DL (ref 2.3–3.5)
GLUCOSE SERPL-MCNC: 161 MG/DL (ref 65–100)
HCT VFR BLD AUTO: 33.9 % (ref 35.8–46.3)
HGB BLD-MCNC: 11 G/DL (ref 11.7–15.4)
IMM GRANULOCYTES # BLD AUTO: 0 K/UL (ref 0–0.5)
IMM GRANULOCYTES NFR BLD AUTO: 1 % (ref 0–5)
LYMPHOCYTES # BLD: 0.7 K/UL (ref 0.5–4.6)
LYMPHOCYTES NFR BLD: 16 % (ref 13–44)
MCH RBC QN AUTO: 32.4 PG (ref 26.1–32.9)
MCHC RBC AUTO-ENTMCNC: 32.4 G/DL (ref 31.4–35)
MCV RBC AUTO: 99.7 FL (ref 79.6–97.8)
MONOCYTES # BLD: 0.4 K/UL (ref 0.1–1.3)
MONOCYTES NFR BLD: 9 % (ref 4–12)
NEUTS SEG # BLD: 3.1 K/UL (ref 1.7–8.2)
NEUTS SEG NFR BLD: 70 % (ref 43–78)
NRBC # BLD: 0 K/UL (ref 0–0.2)
PLATELET # BLD AUTO: 147 K/UL (ref 150–450)
PMV BLD AUTO: 9 FL (ref 9.4–12.3)
POTASSIUM SERPL-SCNC: 4.3 MMOL/L (ref 3.5–5.1)
PROT SERPL-MCNC: 8 G/DL (ref 6.3–8.2)
RBC # BLD AUTO: 3.4 M/UL (ref 4.05–5.2)
SODIUM SERPL-SCNC: 136 MMOL/L (ref 136–145)
TSH, 3RD GENERATION: 3.69 UIU/ML (ref 0.36–3)
WBC # BLD AUTO: 4.4 K/UL (ref 4.3–11.1)

## 2022-09-12 PROCEDURE — 2580000003 HC RX 258: Performed by: INTERNAL MEDICINE

## 2022-09-12 PROCEDURE — 36591 DRAW BLOOD OFF VENOUS DEVICE: CPT

## 2022-09-12 PROCEDURE — 6360000002 HC RX W HCPCS: Performed by: INTERNAL MEDICINE

## 2022-09-12 PROCEDURE — 99215 OFFICE O/P EST HI 40 MIN: CPT | Performed by: INTERNAL MEDICINE

## 2022-09-12 PROCEDURE — 80053 COMPREHEN METABOLIC PANEL: CPT

## 2022-09-12 PROCEDURE — 85025 COMPLETE CBC W/AUTO DIFF WBC: CPT

## 2022-09-12 PROCEDURE — 84443 ASSAY THYROID STIM HORMONE: CPT

## 2022-09-12 PROCEDURE — 96413 CHEMO IV INFUSION 1 HR: CPT

## 2022-09-12 RX ORDER — TRAZODONE HYDROCHLORIDE 50 MG/1
TABLET ORAL
COMMUNITY
Start: 2022-08-03 | End: 2022-10-19

## 2022-09-12 RX ORDER — SODIUM CHLORIDE 9 MG/ML
5-250 INJECTION, SOLUTION INTRAVENOUS PRN
Status: DISCONTINUED | OUTPATIENT
Start: 2022-09-12 | End: 2022-09-13 | Stop reason: HOSPADM

## 2022-09-12 RX ORDER — SODIUM CHLORIDE 0.9 % (FLUSH) 0.9 %
10 SYRINGE (ML) INJECTION PRN
Status: DISCONTINUED | OUTPATIENT
Start: 2022-09-12 | End: 2022-09-13 | Stop reason: HOSPADM

## 2022-09-12 RX ORDER — SODIUM CHLORIDE 0.9 % (FLUSH) 0.9 %
5-40 SYRINGE (ML) INJECTION PRN
Status: DISCONTINUED | OUTPATIENT
Start: 2022-09-12 | End: 2022-09-13 | Stop reason: HOSPADM

## 2022-09-12 RX ADMIN — SODIUM CHLORIDE 200 MG: 9 INJECTION, SOLUTION INTRAVENOUS at 14:25

## 2022-09-12 RX ADMIN — SODIUM CHLORIDE, PRESERVATIVE FREE 10 ML: 5 INJECTION INTRAVENOUS at 14:00

## 2022-09-12 RX ADMIN — Medication 10 ML: at 12:47

## 2022-09-12 ASSESSMENT — PATIENT HEALTH QUESTIONNAIRE - PHQ9
SUM OF ALL RESPONSES TO PHQ QUESTIONS 1-9: 0
SUM OF ALL RESPONSES TO PHQ QUESTIONS 1-9: 0
2. FEELING DOWN, DEPRESSED OR HOPELESS: 0
SUM OF ALL RESPONSES TO PHQ QUESTIONS 1-9: 0
SUM OF ALL RESPONSES TO PHQ QUESTIONS 1-9: 0

## 2022-09-12 NOTE — PROGRESS NOTES
Arrived to the [facility]. Keytruda completed. Patient tolerated well. Any issues or concerns during appointment: none. Patient aware of next infusion appointment on 10/3/22 (date) at 56 (time). Patient aware of next lab and [facility] office visit on 10/3/22 (date) at 0930 (time). Patient instructed to call provider with temperature of 100.4 or greater or nausea/vomiting/ diarrhea or pain not controlled by medications  Discharged ambulatory.

## 2022-09-12 NOTE — PROGRESS NOTES
[facility] Hematology & Oncology: Office Visit Progress Note    Chief Complaint:    Triple negative left breast cancer    History of Present Illness:  Reason for Referral: Carcinoma of upper-inner  quadrant of left breast in female, estrogen receptor negative; Triple negative malignant neoplasm of breast       Referring Provider: Hubert Nash MD       Primary Care Provider: Arnold Cabrales MD       Family History of Cancer/Hematologic Disorders: None reported       Presenting Symptoms: Abnormal routine screening mammogram        Ms. Wendy Silva is a 56 y.o.  female with a history of heart murmur, HTN, mixed HLD, and irregular menses, who was recently diagnosed with breast  cancer. She initially presented on 11/22/21 for a routine bilateral digital screening mammogram which identified focal asymmetry in the medial inferior left breast at 6 cm from the nipple. Further evaluation with diagnostic left breast mammogram and left  breast ultrasound were completed on 12/6/21 confirming the presence of adjacent left breast masses with one at the 9:00 position, 6 cm from the nipple measuring approximately 1.5 x 0.8 x 0.8 cm and a 7 mm hypoechoic mass just adjacent to this. Recommended ultrasound-guided biopsy of the medial left breast mass was completed on 12/23/21 with pathology from the core biopsy of the left breast, 9:00 position, 6 cm from nipple, revealing ER 0.9%/NM 0.4%/HER-2 (0) negative, intermediate grade (moderately  differentiated), foci of at least microinvasive infiltrating ductal carcinoma in association with focally high grade ductal carcinoma in situ with no definite lymphovascular invasion identified. MRI of the bilateral breasts was performed on 12/28/21 demonstrating left medial breast cancer measuring about 4.5 cm in greatest dimension; no additional suspicious finding elsewhere in either breast; and no obvious lymphadenopathy.        Patient was referred to surgery and evaluated in consultation by Surgeon, Dr. Brock Phillips, on 1/10/22. She was assessed with signs and symptoms consistent with cT2N0 triple negative left breast IDC with DCIS. Treatment options were discussed, and  Dr. Tiffany Lazcano recommended genetic testing and preoperative evaluation by medical oncology to consider neoadjuvant chemotherapy. Dr. Tiffany Lazcano noted, \"We discussed the strong possibility that she could require left mastectomy/sentinel node should we  proceed with surgery upfront. At this time the enhancement on MRI looks too large, diffuse, and elongated to perform breast preservation surgery and expected good cosmetic result. If she has neoadjuvant treatment I will see her back in about 4 months  to regroup. \"       Patient is now referred to [facility] for oncology evaluation and consideration for neoadjuvant chemotherapy. Patient completed genetic counseling on 1/13/22. She agreed to pursue testing with BRCAPlus panel of 8 genes associated with hereditary cancer  (including BRCA1 and BRCA2 ). BILATERAL DIGITAL SCREENING MAMMOGRAM  11/22/2021   FINDINGS: There are scattered fibroglandular densities. A few typically benign-appearing calcifications in the left breast are similar in  appearance to the previous study. There is a focal asymmetry in the medial inferior left breast located 6 cm from the nipple. Recommend spot compression magnification views on the direct ML view and ultrasound, if indicated for further evaluation. There  is otherwise no suspicious mass, calcification, or architectural distortion in either breast.      IMPRESSION: Focal asymmetry in the medial inferior left breast at 6 cm from the nipple. for which further evaluation is recommended with ML  and compression magnification views, with possible ultrasound if indicated. BI-RADS Assessment Category 0: Incomplete: Needs additional imaging evaluation.        DIAGNOSTIC MAMMOGRAM LEFT BREAST, LEFT BREAST ULTRASOUND 12/6/21   FINDING: The area of concern does not persist on additional imaging. There is no architectural distortion. The area of concern persists with  additional imaging. This resides within the medial left breast at approximately 9:00 position. There are few associated calcifications which appear somewhat coarse. This measures approximately 1.3 cm in size. An additional smaller asymmetry also at the  9:00 position resides 7 cm from the nipple. A left breast ultrasound is recommended for further evaluation. LEFT BREAST ULTRASOUND: At the 9:00 position, 6 cm from the nipple is a lobulated hypoechoic but mildly heterogeneous appearing mass measuring approximately 1.5 x 0.8 x 0.8 cm. The margins are occasionally ill-defined with some components demonstrating  posterior acoustic shadowing. The long axis is parallel to the plane of the chest wall. There is a 7 mm hypoechoic mass just adjacent to this This is hypoechoic with a lobulated contour. The margins are occasionally ill-defined. IMPRESSION: Adjacent left breast masses. Recommend tissue sampling of the larger lesion. BI-RADS Assessment Category 4: Suspicious Finding- Biopsy should be considered. [facility] SURGICAL PATHOLOGY REPORT 12/23/21   DIAGNOSIS    A: \" LEFT BREAST, 9:00 POSITION, 6 CM FROM NIPPLE, CORE BIOPSY\": FOCI OF AT LEAST MICROINVASIVE INFILTRATING DUCTAL CARCINOMA, INTERMEDIATE GRADE (MODERATELY DIFFERENTIATED) IN ASSOCIATION WITH FOCALLY HIGH GRADE DUCTAL CARCINOMA IN SITU.  DEFINITE LYMPHOVASCULAR  INVASION IS NOT IDENTIFIED   [facility]- ER/OK/[id number] BY IHC    INTERPRETATION    [facility] [employer] IHC Quantitative Breast Panel    TEST NAME:                                      RESULTS:                  INTERNAL CONTROLS:    ESTROGEN RECEPTOR:                 Negative (0.9%)          Present, Positive    PROGESTERONE RECEPTOR:       Negative (0.4%)          Present, Positive    HER-2/CHAR:                                        Negative (0)                Percentage of Cells with Uniform Intense Complete Membrane    Stainin%   STF-IMMUNOHISTOCHEMISTRY   Immunohistochemical Stain Panel:    INTERPRETATION: Immunohistochemical findings consistent with breast primary. ANTIBODY/TEST       MARKER FOR                                               RESULT    Cytokeratin 7               Lung, breast, upper GE, serous ovary           Positive    Cytokeratin 20             Colon, mucinous ovary, urothelium                Negative    GATA3                        Urothelial, breast carcinoma                           Positive    GCDFP                       Breast, salivary gland, skin, adnexa               Positive    Mammaglobin                         Breast                                                              Positive       MRI OF THE BREASTS WITH AND WITHOUT CONTRAST 21   FINDINGS: The breasts demonstrate moderate glandularity and mild background enhancement. Left 9:00 biopsy clip within a lobulated heterogeneously  enhancing mass with angulated and irregular margins overall measuring about 1.8 x 0.7 x 1.2 cm. Posterior to this (about 1 cm away) the smaller mass measuring 0.9 cm is unchanged from recent mammogram and ultrasound. Additionally there is clumped and  reticular nonmass enhancement extending superiorly by about 2.7 cm and anteriorly by about 1.8 cm, in keeping with the DCIS and calcifications. Overall the expected area of disease measures about 4.5 cm AP by 1.2 cm transverse and is located at 9:00-9:30,  mid to posterior depth. There is no evidence of involvement of skin or chest wall structures. There is no other evidence of suspicious enhancing mass, and no other dominant or unique nonmass enhancement, to suggest additional malignancy in either breast.  There is no evidence of axillary or internal mammary lymphadenopathy.  Elsewhere, limited visualization of the partially included thorax and upper abdomen shows no acute abnormality, with note of a small benign cyst in the left liver. IMPRESSION   1. Left medial breast cancer measures about 4.5 cm in greatest dimension. 2. No additional suspicious finding elsewhere in either breast. No obvious lymphadenopathy. Recommend continued management as directed clinically. BI-RADS Assessment Category 6: Known Biopsy Proven Malignancy       Notes from Referring Provider: \"Age 49 with cT2N0 triple  negative left breast IDC and DCIS Evaluate    Speaks mandarin   She has been referred to genetics also\"       Interim history update in A/P. Review of Systems:      Constitutional  Denies fever or chills. Denies fatigue. Denies anorexia. HEENT   allergic conjunctivitis and sinusitis. Denies trauma, bluring vision, hearing loss, ear pain, nosebleeds, sore throat, neck pain and ear discharge. Skin  Denies lesions or rashes. Lungs  Denies shortness of breath, cough, sputum production or hemoptysis. Cardiovascular  Denies chest pain, palpitations, orthopnea, claudication and leg swelling. Gastrointestinal  Nausea. Denies vomiting. Denies bloody or black stools. Denies abdominal pain.   Denies dysuria, frequency or hesitancy of urination     Neuro  Denies headaches, visual changes or ataxia. Denies dizziness, tingling, tremors, sensory change, speech change, focal weakness and headaches. Hematology  Denies nasal/gum bleeding, denies easy bruise     Endo  Denies heat/cold intolerance, denies diabetes. MSK  Denies back pain, swollen legs, myalgias and falls. Psychiatric/Behavioral   insomnia. Denies depression and substance abuse. The patient is not nervous/anxious.               Allergies   Allergen Reactions    Paclitaxel Angioedema and Swelling    Lisinopril Other (See Comments)     Other reaction(s): Cough     Past Medical History:   Diagnosis Date    Breast cancer ([facility] [address]     Hypertension     Murmur, heart     echo 2016 EF 60-65% ; 1/31/22 states never has had symptoms    Poor historian difficulty with verifying medication list     Past Surgical History:   Procedure Laterality Date    BREAST SURGERY      bx of left breast     IR PORT PLACEMENT EQUAL OR GREATER THAN 5 YEARS  2/2/2022    IR PORT PLACEMENT EQUAL OR GREATER THAN 5 YEARS  2/2/2022    IR PORT PLACEMENT EQUAL OR GREATER THAN 5 YEARS 2/2/2022 [facility] RADIOLOGY SPECIALS    US BREAST NEEDLE BIOPSY LEFT Left 12/23/2021    US BREAST NEEDLE BIOPSY LEFT 12/23/2021 [facility] RADIOLOGY MAMMO     Family History   Problem Relation Age of Onset    Ovarian Cancer Neg Hx     No Known Problems Mother     Breast Cancer Neg Hx     No Known Problems Sister     Other Other         states no family history    Colon Cancer Neg Hx     Hypertension Father     No Known Problems Sister     No Known Problems Brother     Uterine Cancer Neg Hx      Social History     Socioeconomic History    Marital status:      Spouse name: Not on file    Number of children: Not on file    Years of education: Not on file    Highest education level: Not on file   Occupational History    Not on file   Tobacco Use    Smoking status: Never    Smokeless tobacco: Never   Substance and Sexual Activity    Alcohol use: No     Alcohol/week: 0.0 standard drinks    Drug use: No    Sexual activity: Not on file   Other Topics Concern    Not on file   Social History Narrative    Abuse: Feels safe at home, no history of physical abuse, no history of sexual abuse       Social Determinants of Health     Financial Resource Strain: Not on file   Food Insecurity: Not on file   Transportation Needs: Not on file   Physical Activity: Not on file   Stress: Not on file   Social Connections: Not on file   Intimate Partner Violence: Not on file   Housing Stability: Not on file     Current Outpatient Medications   Medication Sig Dispense Refill    traZODone (DESYREL) 50 MG tablet PRN      spironolactone (ALDACTONE) 25 MG tablet       dilTIAZem (TIAZAC) 240 MG extended release capsule 1 po qd lidocaine-prilocaine (EMLA) 2.5-2.5 % cream Apply to port about 45 minutes prior to access      diphenhydrAMINE (BENADRYL) 25 MG capsule Take 25 mg by mouth every 6 hours as needed (Patient not taking: Reported on 9/12/2022)      diphenhydrAMINE-zinc acetate 2-0.1 % cream Apply topically 2 times daily as needed (Patient not taking: Reported on 9/12/2022)      LORazepam (ATIVAN) 1 MG tablet Take 0.5-1 mg by mouth every 8 hours as needed. (Patient not taking: Reported on 9/12/2022)      montelukast (SINGULAIR) 10 MG tablet Take 10 mg by mouth daily (Patient not taking: Reported on 9/12/2022)      olmesartan (BENICAR) 20 MG tablet Take 20 mg by mouth daily (Patient not taking: Reported on 9/12/2022)      ondansetron (ZOFRAN) 8 MG tablet Take 8 mg by mouth every 8 hours as needed (Patient not taking: Reported on 9/12/2022)      potassium chloride (KLOR-CON M) 20 MEQ extended release tablet Take 20 mEq by mouth daily (Patient not taking: Reported on 9/12/2022)      predniSONE (DELTASONE) 20 MG tablet Take 40 mg by mouth daily as needed (Patient not taking: Reported on 9/12/2022)      prochlorperazine (COMPAZINE) 10 MG tablet Take 10 mg by mouth every 6 hours as needed (Patient not taking: Reported on 9/12/2022)      senna-docusate (George Larve) 8.6-50 MG per tablet Take 1 tablet by mouth daily as needed (Patient not taking: Reported on 9/12/2022)      triamcinolone (KENALOG) 0.1 % cream Apply topically 3 times daily (Patient not taking: Reported on 9/12/2022)       No current facility-administered medications for this visit.      Facility-Administered Medications Ordered in Other Visits   Medication Dose Route Frequency Provider Last Rate Last Admin    sodium chloride flush 0.9 % injection 10 mL  10 mL IntraVENous PRN Sofia Greenfield MD   10 mL at 09/12/22 1247       OBJECTIVE:  /74 (Site: Left Upper Arm, Position: Standing)   Pulse 98   Temp 98.2 °F (36.8 °C)   Resp 14   Ht 5' 0.5\" (1.537 m)   Wt 155 lb (70.3 kg)   SpO2 98%   BMI 29.77 kg/m²     Physical Exam:      Constitutional:  Oriented to person, place, and time. Well-developed and well-nourished. HEENT:  Normocephalic and atraumatic. Oropharynx is clear and moist.    Conjunctivae and EOM are normal. Pupils are equal, round, and reactive to light. No scleral icterus. Neck supple. No JVD present. No tracheal deviation present. No thyromegaly present. Lymph node  No palpable submandibular, cervical, supraclavicular, axillary and inguinal lymph nodes. Breasts  Left breast soft, nontender, popular nodular area and 9:00 about 2 cm at baseline, no longer palpable after chemotherapy. Right breast soft, nontender, no mass. Skin   rash. Warm and dry. No bruising noted. No erythema. No pallor. Respiratory  Effort normal and breath sounds normal.  No respiratory distress. No wheezes. No rales. No tenderness. CVS  Normal rate, regular rhythm and normal heart sounds. Exam reveals no gallop, no friction and no rub. No murmur heard. Abdomen  Soft. Bowel sounds are normal. Exhibits no distension. There is no tenderness. There is no rebound and no guarding. Neuro  Normal reflexes. No cranial nerve deficit. Exhibits normal muscle tone, 5 of 5 strength of all extremities. MSK  Normal range of motion in general.  No edema and no tenderness.         Psych  Normal mood, affect, behavior, judgment and thought content        Labs:  Recent Results (from the past 24 hour(s))   CBC with Auto Differential    Collection Time: 09/12/22 12:40 PM   Result Value Ref Range    WBC 4.4 4.3 - 11.1 K/uL    RBC 3.40 (L) 4.05 - 5.2 M/uL    Hemoglobin 11.0 (L) 11.7 - 15.4 g/dL    Hematocrit 33.9 (L) 35.8 - 46.3 %    MCV 99.7 (H) 79.6 - 97.8 FL    MCH 32.4 26.1 - 32.9 PG    MCHC 32.4 31.4 - 35.0 g/dL    RDW 15.3 (H) 11.9 - 14.6 %    Platelets 560 (L) 079 - 450 K/uL    MPV 9.0 (L) 9.4 - 12.3 FL    nRBC 0.00 0.0 - 0.2 K/uL    Seg Neutrophils 70 43 - 78 %    Lymphocytes 16 13 - 44 %    Monocytes 9 4.0 - 12.0 %    Eosinophils % 3 0.5 - 7.8 %    Basophils 1 0.0 - 2.0 %    Immature Granulocytes 1 0.0 - 5.0 %    Segs Absolute 3.1 1.7 - 8.2 K/UL    Absolute Lymph # 0.7 0.5 - 4.6 K/UL    Absolute Mono # 0.4 0.1 - 1.3 K/UL    Absolute Eos # 0.1 0.0 - 0.8 K/UL    Basophils Absolute 0.0 0.0 - 0.2 K/UL    Absolute Immature Granulocyte 0.0 0.0 - 0.5 K/UL    Differential Type AUTOMATED     Comprehensive Metabolic Panel    Collection Time: 09/12/22 12:40 PM   Result Value Ref Range    Sodium 136 136 - 145 mmol/L    Potassium 4.3 3.5 - 5.1 mmol/L    Chloride 106 101 - 110 mmol/L    CO2 25 21 - 32 mmol/L    Anion Gap 5 4 - 13 mmol/L    Glucose 161 (H) 65 - 100 mg/dL    BUN 19 6 - 23 MG/DL    Creatinine 1.00 0.6 - 1.0 MG/DL    GFR African American >60 >60 ml/min/1.73m2    GFR Non- >60 >60 ml/min/1.73m2    Calcium 9.5 8.3 - 10.4 MG/DL    Total Bilirubin 0.3 0.2 - 1.1 MG/DL    ALT 34 12 - 65 U/L    AST 22 15 - 37 U/L    Alk Phosphatase 116 50 - 136 U/L    Total Protein 8.0 6.3 - 8.2 g/dL    Albumin 3.7 3.5 - 5.0 g/dL    Globulin 4.3 (H) 2.3 - 3.5 g/dL    Albumin/Globulin Ratio 0.9 (L) 1.2 - 3.5     TSH    Collection Time: 09/12/22 12:40 PM   Result Value Ref Range    TSH, 3RD GENERATION 3.690 (H) 0.358 - 3 uIU/mL       Imaging:  No results found for this or any previous visit. ASSESSMENT/PLAN:   Diagnosis Orders   1. Triple negative malignant neoplasm of breast ([facility] [address]  CBC with Auto Differential    Comprehensive Metabolic Panel      2. High risk medication use        3. Anemia due to antineoplastic chemotherapy        4. Weight gain        5. TSH elevation                [unfilled]    56 y.o. F consulted for triple negative left breast cancer and presented to [facility] with  and daughter on 1/19/2022.    She has good baseline health and functions well, and annual screening mammogram showed left breast lesion, biopsy showed triple negative grade 2 IDC, cT2 N0 M0, and we discussed the high risks profile for triple negative breast cancer and the most recent  FDA approval of neoadjuvant chemoimmunotherapy, she appeared a good candidate and agreed to arrange [employer] for 4 cycles followed by Adriamycin Cytoxan Keytruda for 4 cycles, followed by surgery and adjuvant Keytruda for total systemic therapy  of 1 year, discussed toxicity and management, discussed logistics, arrange Zofran Compazine Ativan, baseline echocardiogram showed normal EF, hypertension better but still uncontrolled after adding spironolactone and educated risk of dehydration and hypotension  on chemotherapy when she is taking double diuretics, started cycle 1 2/7/22 and left breast tumor responded rapidly, lost 20 pounds after cycle 1 and BP much better controlled even after she runs out of diltiazem, again warned of monitoring dehydration/hypotension,  discussed increased protein/calorie intake, may add stimulant if continues to lose weight, occasional mild tingling in fingertips, episodes of constipation responded to laxative, proceed to cycle 1 day 8 carbotaxol Keytruda, educated to monitor for neuropathy,  follow next week but call as needed.       However after cycle 1 day 8 Taxol on 2/14 and developed acute onset of itching, rash, anasarca, short of breath, fever, hypotension, diarrhea, admitted with antibiotics, antihistamine,  IV fluid, most symptom much improved the second day although still lingering, discussed that the picture of allergic reaction but thorough discussion not able to identify new food or medicine, not typical for Taxol or Keytruda reaction given the timing  and the acuity, and she reported a similar episode a few years ago that she went to ER for treatment, but never followed up for allergen test and never had EpiPen, discussed the concern of allergy and will arrange allergist work-up, however proceeded  with day 15 Taxol with close observation to rule out atypical Taxol allergy, patient and family understand the risk and agreed to proceed, and reported reasonable tolerance except for mild itching through the infusion, however overnight RN was concerned  of tachycardia and tachypneic which patient reported similar to each infusion but received IV Benadryl and Solu-Medrol, LFT elevated for a day, give albumin with lasix and anasarca improved, DC with Benadryl/prednisone/EpiPen as needed, followed  on 2/28/2022, swelling resolved, mild rash of hands and arms, discussed to change Taxol to Taxotere for better management of reaction, return on 3/7/2022 reported much better tolerance for the allergic reaction, however more nausea/vomiting/weight loss  and neutropenic fever with temperature 101 and ANC 0.2, coughing and rule out Covid, typical seasonal allergic sinusitis symptoms and educated to start using Nasacort spray, admit to hospital for neutropenic fever, discharge 3/10/2022, had skin rash that  was attributed to cefepime and resolved, again discussed her sensitivity to multiple allergens and different manifestations need to be managed accordingly, Zaditor for allergic conjunctivitis, nasal steroid for sinusitis, Kenalog for skin rash, oral antihistamine,  prednisone 20 mg only as needed, added G-CSF since cycle 3, better controlled for allergy/hypertension/fluid retention, severe diarrhea responded to PRBC, severe thrombocytopenia recovered after delaying cycle 4 by a week, completed cycle 4 with G-CSF  and Emend.    She returned on 5/9/2022 to start AC/Keytruda, however platelet was low and chemo had to be deferred for 2 weeks actually and platelets finally recovered to 92 on 5/23/2022, discussed to proceed with caution and reduce AC dose by 20%, which she tolerated reasonably well and completed 4 cycles of AC/Keytruda without major complication, repeat MRI showed cCR and saw Dr. Ernesto Diaz plan surgery 9/21, anemia responded to transfusion, proceed to cycle 10 Keytruda, TSH slightly elevated and will monitor, return in 3 weeks to follow surgery result and further plan including radiation, discussed the weight gain is associated with unfavorable breast cancer outcome and she will try to exercise and diet, call as needed. All questions are answered to their satisfaction. They will call for further questions and concerns. ECOG PERFORMANCE STATUS - 1    Pain - 0 - No pain/10. Fatigue - No flowsheet data found. Distress - No flowsheet data found. Elements of this note have been dictated via voice recognition software. Text and phrases may be limited by the accuracy and autoconversion of the software. The chart has been reviewed, but errors may still be present. Marisela Kohli M.D.   [address]  Office : [phone]  Fax : [phone]

## 2022-09-12 NOTE — PATIENT INSTRUCTIONS
Patient Instructions from Today's Visit    Reason for Visit:  Follow-up visit for breast cancer    Diagnosis Information:  [email or web address]    Plan:  -Proceed with Keytruda infusion today.   -Surgery scheduled on 9/21.      The following are breast cancer risk-reduction lifestyle measures (they may lead to optimal breast cancer outcomes):   -Active lifestyle  -Healthy diet  -Limited or no alcohol intake   -Achieving/maintaining ideal body weight     Follow Up:  -3 weeks    Recent Lab Results:  Hospital Outpatient Visit on 09/12/2022   Component Date Value Ref Range Status    WBC 09/12/2022 4.4  4.3 - 11.1 K/uL Final    RBC 09/12/2022 3.40 (A) 4.05 - 5.2 M/uL Final    Hemoglobin 09/12/2022 11.0 (A) 11.7 - 15.4 g/dL Final    Hematocrit 09/12/2022 33.9 (A) 35.8 - 46.3 % Final    MCV 09/12/2022 99.7 (A) 79.6 - 97.8 FL Final    MCH 09/12/2022 32.4  26.1 - 32.9 PG Final    MCHC 09/12/2022 32.4  31.4 - 35.0 g/dL Final    RDW 09/12/2022 15.3 (A) 11.9 - 14.6 % Final    Platelets 47/50/0212 147 (A) 150 - 450 K/uL Final    MPV 09/12/2022 9.0 (A) 9.4 - 12.3 FL Final    nRBC 09/12/2022 0.00  0.0 - 0.2 K/uL Final    **Note: Absolute NRBC parameter is now reported with Hemogram**    Seg Neutrophils 09/12/2022 70  43 - 78 % Final    Lymphocytes 09/12/2022 16  13 - 44 % Final    Monocytes 09/12/2022 9  4.0 - 12.0 % Final    Eosinophils % 09/12/2022 3  0.5 - 7.8 % Final    Basophils 09/12/2022 1  0.0 - 2.0 % Final    Immature Granulocytes 09/12/2022 1  0.0 - 5.0 % Final    Segs Absolute 09/12/2022 3.1  1.7 - 8.2 K/UL Final    Absolute Lymph # 09/12/2022 0.7  0.5 - 4.6 K/UL Final    Absolute Mono # 09/12/2022 0.4  0.1 - 1.3 K/UL Final    Absolute Eos # 09/12/2022 0.1  0.0 - 0.8 K/UL Final    Basophils Absolute 09/12/2022 0.0  0.0 - 0.2 K/UL Final    Absolute Immature Granulocyte 09/12/2022 0.0  0.0 - 0.5 K/UL Final    Differential Type 09/12/2022 AUTOMATED    Final    Sodium 09/12/2022 136  136 - 145 mmol/L Final    Potassium 09/12/2022 4.3  3.5 - 5.1 mmol/L Final    Chloride 09/12/2022 106  101 - 110 mmol/L Final    CO2 09/12/2022 25  21 - 32 mmol/L Final    Anion Gap 09/12/2022 5  4 - 13 mmol/L Final    Glucose 09/12/2022 161 (A) 65 - 100 mg/dL Final    BUN 09/12/2022 19  6 - 23 MG/DL Final    Creatinine 09/12/2022 1.00  0.6 - 1.0 MG/DL Final    GFR  09/12/2022 >60  >60 ml/min/1.73m2 Final    GFR Non- 09/12/2022 >60  >60 ml/min/1.73m2 Final    Comment:      Estimated GFR is calculated using the Modification of Diet in Renal Disease (MDRD) Study equation, reported for both  Americans (GFRAA) and non- Americans (GFRNA), and normalized to 1.73m2 body surface area. The physician must decide which value applies to the patient. The MDRD study equation should only be used in individuals age 25 or older. It has not been validated for the following: pregnant women, patients with serious comorbid conditions,or on certain medications, or persons with extremes of body size, muscle mass, or nutritional status.       Calcium 09/12/2022 9.5  8.3 - 10.4 MG/DL Final    Total Bilirubin 09/12/2022 0.3  0.2 - 1.1 MG/DL Final    ALT 09/12/2022 34  12 - 65 U/L Final    AST 09/12/2022 22  15 - 37 U/L Final    Alk Phosphatase 09/12/2022 116  50 - 136 U/L Final    Total Protein 09/12/2022 8.0  6.3 - 8.2 g/dL Final    Albumin 09/12/2022 3.7  3.5 - 5.0 g/dL Final    Globulin 09/12/2022 4.3 (A) 2.3 - 3.5 g/dL Final    Albumin/Globulin Ratio 09/12/2022 0.9 (A) 1.2 - 3.5   Final    TSH, 3RD GENERATION 09/12/2022 3.690 (A) 0.358 - 3 uIU/mL Final       Treatment Summary has been discussed and given to patient: N/A      -------------------------------------------------------------------------------------------------------------------  Please call our office at [phone] if you have any  of the following symptoms:   Fever of 100.5 or greater  Chills  Shortness of breath  Swelling or pain in one leg    After office hours an answering service is available and will contact a provider for emergencies or if you are experiencing any of the above symptoms. Patient does express an interest in My Chart. My Chart log in information explained on the after visit summary printout at the [facility].     Bette Ledesma RN

## 2022-09-19 DIAGNOSIS — C50.212 MALIGNANT NEOPLASM OF UPPER-INNER QUADRANT OF LEFT FEMALE BREAST, UNSPECIFIED ESTROGEN RECEPTOR STATUS (HCC): Primary | ICD-10-CM

## 2022-09-19 NOTE — PERIOP NOTE
Patient verified name and . [facility]  #[id number] used for assessment  Order for consent was found in EHR and matches case posting; patient verifies procedure. for LEFT BREAST LUMPECTOMY WITH NEEDLE LOC & SENTINEL NODE EXCISION  Type 1b surgery, PAT phone assessment complete. Orders were received. Labs per surgeon: None  Labs per anesthesia protocol:     Patient answered medical/surgical history questions at their best of ability. All prior to admission medications documented in [facility]. Patient instructed to take the following medications the day of surgery according to anesthesia guidelines with a small sip of water: Diltiazem    Hold all vitamins 7 days prior to surgery and NSAIDS 5 days prior to surgery. Prescription meds to hold:None  Patient instructed on the following:    > Arrive at A Entrance, time of arrival to be called the day before by 1700  > NPO after midnight, unless otherwise indicated, including gum, mints, and ice chips  > Responsible adult must drive patient to the hospital, stay during surgery, and patient will need supervision 24 hours after anesthesia  > Use antibacterial soap in shower the night before surgery and on the morning of surgery  > All piercings must be removed prior to arrival.    > Leave all valuables (money and jewelry) at home but bring insurance card and ID on DOS.   > Do not wear make-up, nail polish, lotions, cologne, perfumes, powders, or oil on skin. Artificial nails are not permitted.

## 2022-09-20 NOTE — H&P
H&P/Consult Note/Progress Note/Office Note:   Cassius De La Cruz  MRN: [id number]  :1965  Age:57 y.o.    HPI: Cassius De La Cruz is a 56 y.o. female who is here for left NL lumpectomy and axillary sent node excision on 22 for a cT2N0, ycT0N0 triple negative left breast carcinoma. She originally presented with an abnormal screening mammogram of the left breast which led to diagnostic imaging, breast US, and ultimately US-guided left breast biopsy   which identified a small focus of infiltrating ductal carcinoma associated with DCIS in the left breast 9:00. MRI imaging estimated the overall size of disease of 4.5 cm. She had no adenopathy. This is all shown below in detail in the expanded problem list       She was treated with neoadjuvant chemotherapy by Dr. Vamsi Klein. Follow-up MRI has been performed with good response as shown below. 21 pre-op Breast MRI                    8/15/22 Breast MRI (post-neoadjuvant Rx)  Hx:  Triple negative breast cancer. Upper normal quadrant of left breast. Status post chemotherapy. No current breast complaints. FINDINGS: Artifact is now seen in the upper inner, posterior soft tissues of the right breast likely representing a port reservoir. The breasts are composed of heterogeneous fibroglandular elements. No enlarged axillary lymph nodes are seen. No enlarged internal mammary lymph nodes are seen. Evaluation of the lungs is limited by  MRI imaging. However, no obvious pulmonary masses are seen. No significant pleural effusions are seen. The liver is obscured by cardiac motion artifact and only partially visualized. However, no focal signal abnormalities are seen prior to, or following administration of contrast to suggest a possible hepatic lesion. Only benign-appearing hepatic cysts are seen the largest of which resides in the left lobe measuring 1.6 cm in size.    Following the administration of intravenous contrast, normal enhancement is seen of the heart and vascular structures of the breasts. Mild background physiologic enhancement is seen of the breasts. Prior abnormal enhancement extending through the medial left breast is no longer appreciated. No new suspicious dominant enhancing lesion are suspicious asymmetric nonmass like enhancement is otherwise seen. Only mild symmetric nodular enhancement is seen in the bilateral breasts most consistent with benign physiologic enhancement. No evidence of skin thickening, or nipple retraction is seen. No enhancing osseous lesion is seen. 1.  No clear persistent asymmetric enhancement in the medial left breast suggesting an excellent response to treatment. Only mild back on physiologic enhancement is seen. No new suspicious enhancement is seen in either breast.   No findings concerning for evolving metastatic lesions are seen in the visualized chest.   Further management of the patient's known left breast cancer as clinically indicated is recommended. BI-RADS Assessment Category 6: Known Biopsy Proven Malignancy           Past Medical History:   Diagnosis Date    Breast cancer ([facility] [address]     Hypertension     Murmur, heart     echo 2016 EF 60-65% ; 1/31/22 states never has had symptoms    Poor historian     difficulty with verifying medication list     Past Surgical History:   Procedure Laterality Date    BREAST SURGERY      bx of left breast     IR PORT PLACEMENT EQUAL OR GREATER THAN 5 YEARS  2/2/2022    IR PORT PLACEMENT EQUAL OR GREATER THAN 5 YEARS  2/2/2022    IR PORT PLACEMENT EQUAL OR GREATER THAN 5 YEARS 2/2/2022 [facility] RADIOLOGY SPECIALS    US BREAST NEEDLE BIOPSY LEFT Left 12/23/2021    US BREAST NEEDLE BIOPSY LEFT 12/23/2021 [facility] RADIOLOGY MAMMO     No current facility-administered medications for this encounter.      Current Outpatient Medications   Medication Sig    traZODone (DESYREL) 50 MG tablet PRN    spironolactone (ALDACTONE) 25 MG tablet 25 mg    dilTIAZem (TIAZAC) 240 MG extended release capsule 1 po qd    lidocaine-prilocaine (EMLA) 2.5-2.5 % cream Apply to port about 45 minutes prior to access    montelukast (SINGULAIR) 10 MG tablet Take 10 mg by mouth daily (Patient not taking: Reported on 9/12/2022)    triamcinolone (KENALOG) 0.1 % cream Apply topically 3 times daily (Patient not taking: No sig reported)     ALLERGIES:  Paclitaxel and Lisinopril    Social History     Socioeconomic History    Marital status:    Tobacco Use    Smoking status: Never    Smokeless tobacco: Never   Substance and Sexual Activity    Alcohol use: No     Alcohol/week: 0.0 standard drinks    Drug use: No   Social History Narrative    Abuse: Feels safe at home, no history of physical abuse, no history of sexual abuse       Social History     Tobacco Use   Smoking Status Never   Smokeless Tobacco Never     Family History   Problem Relation Age of Onset    Ovarian Cancer Neg Hx     No Known Problems Mother     Breast Cancer Neg Hx     No Known Problems Sister     Other Other         states no family history    Colon Cancer Neg Hx     Hypertension Father     No Known Problems Sister     No Known Problems Brother     Uterine Cancer Neg Hx      ROS: The patient has no difficulty with chest pain or shortness of breath. No fever or chills. Comprehensive review of systems was otherwise unremarkable except as noted above. Physical Exam:   Wt 153 lb (69.4 kg)   BMI 29.39 kg/m²   Vitals:    09/19/22 1528   Weight: 153 lb (69.4 kg)     [unfilled]  [unfilled]    Constitutional: Alert, oriented, cooperative patient in no acute distress; appears stated age    Eyes:Sclera are clear. EOMs intact  ENMT: no external lesions gross hearing normal; no obvious neck masses, no ear or lip lesions, nares normal  CV: RRR. Normal perfusion  Resp: No JVD. Breathing is  non-labored; no audible wheezing. No palpable breast masses on either side.   Small breasts   No regional adenopathy      GI: soft and non-distended Musculoskeletal: unremarkable with normal function. No embolic signs or cyanosis. Neuro:  Oriented; moves all 4; no focal deficits  Psychiatric: normal affect and mood, no memory impairment    Recent vitals (if inpt):  @IPVITALS(24:)@    Amount and/or Complexity of Data Reviewed and Analyzed:  I reviewed and analyzed all of the unique labs and radiologic studies that are shown below as well as any that are in the HPI, and any that are in the expanded problem list below  *Each unique test, order, or document contributes to the combination of 2 or combination of 3 in Category 1 below. For this visit I also reviewed old records and prior notes. No results for input(s): WBC, HGB, PLT, NA, K, CL, CO2, BUN, CREA, GLU, INR, APTT, ALT, AML, AML, LCAD, PCO2, PO2, HCO3 in the last 72 hours.     Invalid input(s): PTP, TBIL, TBILI, CBIL, SGOT, GPT, AP, LPSE, NH4, TROPT, TROIQ,  PH  Review of most recent CBC  Lab Results   Component Value Date    WBC 4.4 09/12/2022    HGB 11.0 (L) 09/12/2022    HCT 33.9 (L) 09/12/2022    MCV 99.7 (H) 09/12/2022     (L) 09/12/2022       Review of most recent BMP  Lab Results   Component Value Date/Time     09/12/2022 12:40 PM    K 4.3 09/12/2022 12:40 PM     09/12/2022 12:40 PM    CO2 25 09/12/2022 12:40 PM    BUN 19 09/12/2022 12:40 PM    CREATININE 1.00 09/12/2022 12:40 PM    GLUCOSE 161 09/12/2022 12:40 PM    CALCIUM 9.5 09/12/2022 12:40 PM        Review of most recent LFTs (and lipase if done)  Lab Results   Component Value Date    ALT 34 09/12/2022    AST 22 09/12/2022    ALKPHOS 116 09/12/2022    BILITOT 0.3 09/12/2022     No results found for: LIPASE    No results found for: INR, APTT, LCAD    Review of most recent HgbA1c  No results found for: LABA1C  No results found for: EAG    Nutritional assessment screen for wound healing issues:  Lab Results   Component Value Date    LABDENISSE 3.7 09/12/2022       @lastcovr@    Xray Result (most recent):  XR CHEST STANDARD TWO VW 03/07/2022    Narrative  EXAM: 2 view chest radiograph. INDICATION: Neutropenic fever. COMPARISON: Chest radiograph dated February 22, 2022. FINDINGS:  Limited crosstable lateral projection. No focal consolidation to suggest  pneumonia. No pneumothorax or pleural effusion. Unchanged cardiomegaly. No  evidence of acute osseous abnormality. Right-sided chest port terminates in the  cavoatrial junction. Impression  1. No focal consolidation to suggest pneumonia. CT Result (most recent):  No results found for this or any previous visit from the past 3650 days. US Result (most recent):  US BREAST NEEDLE BIOPSY LEFT 12/23/2021    Addendum 12/30/2021 11:36 AM  Addendum: Lorraine Castellano, accession number: [id number]  Date: 12/28/2021 Pathology was noted as A: Left breast, 9:00 position, 6 cm from  nipple, core biopsy: Foci of at least microinvasive infiltrating ductal  carcinoma, intermediate grade (moderately differentiated) in association with  focally high grade ductal carcinoma in situ. Definite lymphovascular invasion is  not identified. Results concordant with imaging. Pathology report was called to  Dr. Delma Landrum office on 12/27/2021 by RT Annie(TEENA)(UCHE). Patient was referred to  Dr. Colton Greenfield and has an appointment scheduled with him on 1/10/2022. Narrative  This is a summary report. The complete report is available in the patient's medical record. If you cannot access the medical record, please contact the sending organization for a detailed fax or copy. Ultrasound-guided left breast biopsy and Mammogram    INDICATION: Breast mass    After informed consent was obtained, the mass at 9:00 in the left breast was  localized with ultrasound. The overlying skin was prepped using sterile  technique. 1% lidocaine was used for superficial anesthesia. Using ultrasound guidance, 3 biopsy passes were made with a 14-gauge core biopsy  needle.  A clip was then left to fay the biopsy asymmetry also at the 9:00  position resides 7 cm from the nipple. A left breast ultrasound is   recommended for further evaluation. 12/6/21 left breast US  At the 9:00 position, 6 cm from the nipple is a lobulated hypoechoic but   mildly heterogeneous appearing mass measuring  approximately 1.5 x 0.8 x 0.8 cm. The margins are occasionally ill-defined   with some components demonstrating posterior acoustic shadowing. The long axis is  parallel to the plane of the chest wall. There is a 7 mm hypoechoic mass   just adjacent to this This is hypoechoic with a lobulated contour. The margins are occasionally ill-defined. IMPRESSION  Adjacent left breast masses. Recommend tissue sampling of the larger lesion. 12/23/21 US-guided left breast biopsy  A:  \" LEFT BREAST, 9:00 POSITION, 6 CM FROM NIPPLE, CORE BIOPSY\": FOCI OF   AT LEAST MICROINVASIVE INFILTRATING DUCTAL CARCINOMA,   INTERMEDIATE GRADE (MODERATELY DIFFERENTIATED) IN ASSOCIATION WITH FOCALLY   HIGH GRADE DUCTAL CARCINOMA IN SITU. DEFINITE LYMPHOVASCULAR INVASION IS NOT IDENTIFIED   Sign Out Date: 12/24/2021  ABDIRASHID Moy M.D.   ER:  Negative (0.9%);  HI: Negative (0.4%); HER-2/CHAR: Negative (0)   Post biopsy mammogram shows the biopsy clip in good position. 12/28/21 Breast MRI  The breasts demonstrate moderate glandularity and mild background enhancement. Left 9:00 biopsy clip within a lobulated heterogeneously enhancing mass   with angulated and irregular margins overall measuring about 1.8 x 0.7 x 1.2 cm. Posterior to this (about 1 cm away) the smaller mass measuring 0.9 cm is   unchanged from recent mammogram and ultrasound. Additionally there is clumped  and reticular nonmass enhancement extending superiorly by about 2.7 cm and   anteriorly by about 1.8 cm, in keeping with the DCIS and calcifications.    Overall the expected area of disease measures about 4.5 cm AP by 1.2 cm   transverse and is located at 9:00-9:30, mid to mastectomy/sentinel node versus lumpectomy/sentinel node/XRT. We discussed risk/benefits and advantages/disadvantages of each. In particular we discussed risk of a positive margin should lumpectomy be pursued which could require reexcision lumpectomy or completion mastectomy. Risks inherent to the either surgery were discussed including bleeding, infection, recurrence, blood clots, risk of anesthesia, etc.. All her questions were answered. She is in favor of breast preservation surgery. Level of MDM (2/3 elements below)  Number and Complexity of Problems Addressed Amount and/or Complexity of Data to be Reviewed and Analyzed  *Each unique test, order, or document contributes to the combination of 2 or combination of 3 in Category 1 below. Risk of Complications and/or Morbidity or Mortality of pt Management     49781  79020 SF Minimal  ?1self-limited or minor problem Minimal or none Minimal risk of morbidity from additional diagnostic testing or Rx   58668  92956 Low Low  ? 2or more self-limited or minor problems;    or  ? 1stable chronic illness;    or  ?1acute, uncomplicated illness or injury   Limited  (Must meet the requirements of at least 1 of the 2 categories)  Category 1: Tests and documents   ? Any combination of 2 from the following:  ?Review of prior external note(s) from each unique source*;  ?review of the result(s) of each unique test*;   ?ordering of each unique test*    or   Category 2: Assessment requiring an independent historian(s)  (For the categories of independent interpretation of tests and discussion of management or test interpretation, see moderate or high) Low risk of morbidity from additional diagnostic testing or treatment     73403  52884 Mod Moderate  ? 1or more chronic illnesses with exacerbation, progression, or side effects of treatment;    or  ?2or more stable chronic illnesses;    or  ?1undiagnosed new problem with uncertain prognosis;    or  ?1acute illness with systemic symptoms;    or  ?1acute complicated injury   Moderate  (Must meet the requirements of at least 1 out of 3 categories)  Category 1: Tests, documents, or independent historian(s)  ? Any combination of 3 from the following:   ?Review of prior external note(s) from each unique source*;  ?Review of the result(s) of each unique test*;  ?Ordering of each unique test*;  ?Assessment requiring an independent historian(s)    or  Category 2: Independent interpretation of tests   ? Independent interpretation of a test performed by another physician/other qualified health care professional (not separately reported);     or  Category 3: Discussion of management or test interpretation  ? Discussion of management or test interpretation with external physician/other qualified health care professional/appropriate source (not separately reported)   Moderate risk of morbidity from additional diagnostic testing or treatment  Examples only:  ?Prescription drug management   ? Decision regarding minor surgery with identified patient or procedure risk factors  ? Decision regarding elective major surgery without identified patient or procedure risk factors   ? Diagnosis or treatment significantly limited by social determinants of health       [id number] High High  ? 1or more chronic illnesses with severe exacerbation, progression, or side effects of treatment;    or  ?1 acute or chronic illness or injury that poses a threat to life or bodily function   Extensive  (Must meet the requirements of at least 2 out of 3 categories)  Category 1: Tests, documents, or independent historian(s)  ? Any combination of 3 from the following:   ?Review of prior external note(s) from each unique source*;  ?Review of the result(s) of each unique test*;   ?Ordering of each unique test*;   ?Assessment requiring an independent historian(s)    or   Category 2: Independent interpretation of tests   ? Independent interpretation of a test performed by another

## 2022-09-21 ENCOUNTER — HOSPITAL ENCOUNTER (OUTPATIENT)
Age: 57
Setting detail: OUTPATIENT SURGERY
Discharge: HOME | End: 2022-09-21
Attending: SURGERY | Admitting: SURGERY
Payer: COMMERCIAL

## 2022-09-21 ENCOUNTER — HOSPITAL ENCOUNTER (OUTPATIENT)
Dept: NUCLEAR MEDICINE | Age: 57
Discharge: HOME | End: 2022-09-24
Payer: COMMERCIAL

## 2022-09-21 ENCOUNTER — HOSPITAL ENCOUNTER (OUTPATIENT)
Dept: MAMMOGRAPHY | Age: 57
Discharge: HOME | End: 2022-09-24
Payer: COMMERCIAL

## 2022-09-21 ENCOUNTER — ANESTHESIA EVENT (OUTPATIENT)
Dept: SURGERY | Age: 57
End: 2022-09-21
Payer: COMMERCIAL

## 2022-09-21 ENCOUNTER — APPOINTMENT (OUTPATIENT)
Dept: MAMMOGRAPHY | Age: 57
End: 2022-09-21
Payer: COMMERCIAL

## 2022-09-21 ENCOUNTER — ANESTHESIA (OUTPATIENT)
Dept: SURGERY | Age: 57
End: 2022-09-21
Payer: COMMERCIAL

## 2022-09-21 VITALS
SYSTOLIC BLOOD PRESSURE: 176 MMHG | HEIGHT: 61 IN | WEIGHT: 154.2 LBS | OXYGEN SATURATION: 99 % | BODY MASS INDEX: 29.11 KG/M2 | TEMPERATURE: 97.9 F | RESPIRATION RATE: 16 BRPM | DIASTOLIC BLOOD PRESSURE: 81 MMHG | HEART RATE: 80 BPM

## 2022-09-21 DIAGNOSIS — C50.919 TRIPLE NEGATIVE MALIGNANT NEOPLASM OF BREAST (HCC): Primary | ICD-10-CM

## 2022-09-21 DIAGNOSIS — R92.8 ABNORMAL MAMMOGRAM: ICD-10-CM

## 2022-09-21 DIAGNOSIS — C50.212 MALIGNANT NEOPLASM OF UPPER-INNER QUADRANT OF LEFT FEMALE BREAST, UNSPECIFIED ESTROGEN RECEPTOR STATUS (HCC): ICD-10-CM

## 2022-09-21 PROBLEM — Z17.421 TRIPLE NEGATIVE MALIGNANT NEOPLASM OF BREAST (HCC): Status: ACTIVE | Noted: 2022-01-10

## 2022-09-21 LAB
HCG UR QL: NEGATIVE
POTASSIUM BLD-SCNC: 4.8 MMOL/L (ref 3.5–5.1)

## 2022-09-21 PROCEDURE — 7100000000 HC PACU RECOVERY - FIRST 15 MIN: Performed by: SURGERY

## 2022-09-21 PROCEDURE — 76098 X-RAY EXAM SURGICAL SPECIMEN: CPT

## 2022-09-21 PROCEDURE — 2709999900 HC NON-CHARGEABLE SUPPLY: Performed by: SURGERY

## 2022-09-21 PROCEDURE — 19281 PERQ DEVICE BREAST 1ST IMAG: CPT

## 2022-09-21 PROCEDURE — 38525 BIOPSY/REMOVAL LYMPH NODES: CPT | Performed by: SURGERY

## 2022-09-21 PROCEDURE — 99024 POSTOP FOLLOW-UP VISIT: CPT | Performed by: SURGERY

## 2022-09-21 PROCEDURE — 3700000000 HC ANESTHESIA ATTENDED CARE: Performed by: SURGERY

## 2022-09-21 PROCEDURE — 2500000003 HC RX 250 WO HCPCS: Performed by: SURGERY

## 2022-09-21 PROCEDURE — 19301 PARTIAL MASTECTOMY: CPT | Performed by: SURGERY

## 2022-09-21 PROCEDURE — 84132 ASSAY OF SERUM POTASSIUM: CPT

## 2022-09-21 PROCEDURE — 2500000003 HC RX 250 WO HCPCS: Performed by: STUDENT IN AN ORGANIZED HEALTH CARE EDUCATION/TRAINING PROGRAM

## 2022-09-21 PROCEDURE — 78195 LYMPH SYSTEM IMAGING: CPT

## 2022-09-21 PROCEDURE — 6360000002 HC RX W HCPCS: Performed by: ANESTHESIOLOGY

## 2022-09-21 PROCEDURE — 3600000004 HC SURGERY LEVEL 4 BASE: Performed by: SURGERY

## 2022-09-21 PROCEDURE — 6360000002 HC RX W HCPCS: Performed by: NURSE ANESTHETIST, CERTIFIED REGISTERED

## 2022-09-21 PROCEDURE — 81025 URINE PREGNANCY TEST: CPT

## 2022-09-21 PROCEDURE — 6360000002 HC RX W HCPCS: Performed by: SURGERY

## 2022-09-21 PROCEDURE — 3430000000 HC RX DIAGNOSTIC RADIOPHARMACEUTICAL: Performed by: SURGERY

## 2022-09-21 PROCEDURE — 6370000000 HC RX 637 (ALT 250 FOR IP): Performed by: ANESTHESIOLOGY

## 2022-09-21 PROCEDURE — 7100000001 HC PACU RECOVERY - ADDTL 15 MIN: Performed by: SURGERY

## 2022-09-21 PROCEDURE — 3600000014 HC SURGERY LEVEL 4 ADDTL 15MIN: Performed by: SURGERY

## 2022-09-21 PROCEDURE — 88307 TISSUE EXAM BY PATHOLOGIST: CPT

## 2022-09-21 PROCEDURE — 38900 IO MAP OF SENT LYMPH NODE: CPT | Performed by: SURGERY

## 2022-09-21 PROCEDURE — 2500000003 HC RX 250 WO HCPCS: Performed by: NURSE ANESTHETIST, CERTIFIED REGISTERED

## 2022-09-21 PROCEDURE — 2580000003 HC RX 258: Performed by: ANESTHESIOLOGY

## 2022-09-21 PROCEDURE — A9541 TC99M SULFUR COLLOID: HCPCS | Performed by: SURGERY

## 2022-09-21 PROCEDURE — 3700000001 HC ADD 15 MINUTES (ANESTHESIA): Performed by: SURGERY

## 2022-09-21 RX ORDER — HALOPERIDOL 5 MG/ML
1 INJECTION INTRAMUSCULAR
Status: DISCONTINUED | OUTPATIENT
Start: 2022-09-21 | End: 2022-09-21 | Stop reason: HOSPADM

## 2022-09-21 RX ORDER — SODIUM CHLORIDE 9 MG/ML
INJECTION, SOLUTION INTRAVENOUS PRN
Status: DISCONTINUED | OUTPATIENT
Start: 2022-09-21 | End: 2022-09-21 | Stop reason: SDUPTHER

## 2022-09-21 RX ORDER — DEXAMETHASONE SODIUM PHOSPHATE 10 MG/ML
INJECTION INTRAMUSCULAR; INTRAVENOUS PRN
Status: DISCONTINUED | OUTPATIENT
Start: 2022-09-21 | End: 2022-09-21 | Stop reason: SDUPTHER

## 2022-09-21 RX ORDER — HYDROCODONE BITARTRATE AND ACETAMINOPHEN 5; 325 MG/1; MG/1
1-2 TABLET ORAL EVERY 8 HOURS PRN
Qty: 28 TABLET | Refills: 0 | Status: SHIPPED | OUTPATIENT
Start: 2022-09-21 | End: 2022-09-28

## 2022-09-21 RX ORDER — SODIUM CHLORIDE, SODIUM LACTATE, POTASSIUM CHLORIDE, CALCIUM CHLORIDE 600; 310; 30; 20 MG/100ML; MG/100ML; MG/100ML; MG/100ML
INJECTION, SOLUTION INTRAVENOUS CONTINUOUS
Status: DISCONTINUED | OUTPATIENT
Start: 2022-09-21 | End: 2022-09-21 | Stop reason: HOSPADM

## 2022-09-21 RX ORDER — FENTANYL CITRATE 50 UG/ML
100 INJECTION, SOLUTION INTRAMUSCULAR; INTRAVENOUS
Status: DISCONTINUED | OUTPATIENT
Start: 2022-09-21 | End: 2022-09-21 | Stop reason: HOSPADM

## 2022-09-21 RX ORDER — SODIUM CHLORIDE 9 MG/ML
INJECTION, SOLUTION INTRAVENOUS PRN
Status: DISCONTINUED | OUTPATIENT
Start: 2022-09-21 | End: 2022-09-21 | Stop reason: HOSPADM

## 2022-09-21 RX ORDER — SODIUM CHLORIDE 0.9 % (FLUSH) 0.9 %
5-40 SYRINGE (ML) INJECTION PRN
Status: DISCONTINUED | OUTPATIENT
Start: 2022-09-21 | End: 2022-09-21 | Stop reason: HOSPADM

## 2022-09-21 RX ORDER — LIDOCAINE HYDROCHLORIDE 10 MG/ML
1 INJECTION, SOLUTION INFILTRATION; PERINEURAL
Status: DISCONTINUED | OUTPATIENT
Start: 2022-09-21 | End: 2022-09-21 | Stop reason: HOSPADM

## 2022-09-21 RX ORDER — HYDROMORPHONE HYDROCHLORIDE 1 MG/ML
0.5 INJECTION, SOLUTION INTRAMUSCULAR; INTRAVENOUS; SUBCUTANEOUS EVERY 5 MIN PRN
Status: DISCONTINUED | OUTPATIENT
Start: 2022-09-21 | End: 2022-09-21 | Stop reason: HOSPADM

## 2022-09-21 RX ORDER — ROCURONIUM BROMIDE 10 MG/ML
INJECTION, SOLUTION INTRAVENOUS PRN
Status: DISCONTINUED | OUTPATIENT
Start: 2022-09-21 | End: 2022-09-21 | Stop reason: SDUPTHER

## 2022-09-21 RX ORDER — PROCHLORPERAZINE EDISYLATE 5 MG/ML
5 INJECTION INTRAMUSCULAR; INTRAVENOUS
Status: DISCONTINUED | OUTPATIENT
Start: 2022-09-21 | End: 2022-09-21 | Stop reason: HOSPADM

## 2022-09-21 RX ORDER — IPRATROPIUM BROMIDE AND ALBUTEROL SULFATE 2.5; .5 MG/3ML; MG/3ML
1 SOLUTION RESPIRATORY (INHALATION)
Status: DISCONTINUED | OUTPATIENT
Start: 2022-09-21 | End: 2022-09-21 | Stop reason: HOSPADM

## 2022-09-21 RX ORDER — FENTANYL CITRATE 50 UG/ML
INJECTION, SOLUTION INTRAMUSCULAR; INTRAVENOUS PRN
Status: DISCONTINUED | OUTPATIENT
Start: 2022-09-21 | End: 2022-09-21 | Stop reason: SDUPTHER

## 2022-09-21 RX ORDER — OXYCODONE HYDROCHLORIDE 5 MG/1
5 TABLET ORAL
Status: COMPLETED | OUTPATIENT
Start: 2022-09-21 | End: 2022-09-21

## 2022-09-21 RX ORDER — SODIUM CHLORIDE 0.9 % (FLUSH) 0.9 %
5-40 SYRINGE (ML) INJECTION EVERY 12 HOURS SCHEDULED
Status: DISCONTINUED | OUTPATIENT
Start: 2022-09-21 | End: 2022-09-21 | Stop reason: SDUPTHER

## 2022-09-21 RX ORDER — LIDOCAINE HYDROCHLORIDE 20 MG/ML
INJECTION, SOLUTION EPIDURAL; INFILTRATION; INTRACAUDAL; PERINEURAL PRN
Status: DISCONTINUED | OUTPATIENT
Start: 2022-09-21 | End: 2022-09-21 | Stop reason: SDUPTHER

## 2022-09-21 RX ORDER — SODIUM CHLORIDE 0.9 % (FLUSH) 0.9 %
5-40 SYRINGE (ML) INJECTION PRN
Status: DISCONTINUED | OUTPATIENT
Start: 2022-09-21 | End: 2022-09-21 | Stop reason: SDUPTHER

## 2022-09-21 RX ORDER — LIDOCAINE HYDROCHLORIDE 10 MG/ML
5 INJECTION, SOLUTION INFILTRATION; PERINEURAL ONCE
Status: COMPLETED | OUTPATIENT
Start: 2022-09-21 | End: 2022-09-21

## 2022-09-21 RX ORDER — MIDAZOLAM HYDROCHLORIDE 2 MG/2ML
2 INJECTION, SOLUTION INTRAMUSCULAR; INTRAVENOUS
Status: COMPLETED | OUTPATIENT
Start: 2022-09-21 | End: 2022-09-21

## 2022-09-21 RX ORDER — PROPOFOL 10 MG/ML
INJECTION, EMULSION INTRAVENOUS PRN
Status: DISCONTINUED | OUTPATIENT
Start: 2022-09-21 | End: 2022-09-21 | Stop reason: SDUPTHER

## 2022-09-21 RX ORDER — BUPIVACAINE HYDROCHLORIDE 2.5 MG/ML
INJECTION, SOLUTION EPIDURAL; INFILTRATION; INTRACAUDAL PRN
Status: DISCONTINUED | OUTPATIENT
Start: 2022-09-21 | End: 2022-09-21 | Stop reason: HOSPADM

## 2022-09-21 RX ORDER — SODIUM CHLORIDE 0.9 % (FLUSH) 0.9 %
5-40 SYRINGE (ML) INJECTION EVERY 12 HOURS SCHEDULED
Status: DISCONTINUED | OUTPATIENT
Start: 2022-09-21 | End: 2022-09-21 | Stop reason: HOSPADM

## 2022-09-21 RX ORDER — ONDANSETRON 2 MG/ML
INJECTION INTRAMUSCULAR; INTRAVENOUS PRN
Status: DISCONTINUED | OUTPATIENT
Start: 2022-09-21 | End: 2022-09-21 | Stop reason: SDUPTHER

## 2022-09-21 RX ORDER — ACETAMINOPHEN 500 MG
1000 TABLET ORAL ONCE
Status: COMPLETED | OUTPATIENT
Start: 2022-09-21 | End: 2022-09-21

## 2022-09-21 RX ADMIN — Medication 228 MICRO CURIE: at 12:46

## 2022-09-21 RX ADMIN — ONDANSETRON 4 MG: 2 INJECTION INTRAMUSCULAR; INTRAVENOUS at 18:01

## 2022-09-21 RX ADMIN — ROCURONIUM BROMIDE 35 MG: 50 INJECTION, SOLUTION INTRAVENOUS at 17:51

## 2022-09-21 RX ADMIN — Medication 2000 MG: at 17:44

## 2022-09-21 RX ADMIN — LIDOCAINE HYDROCHLORIDE 80 MG: 20 INJECTION, SOLUTION EPIDURAL; INFILTRATION; INTRACAUDAL; PERINEURAL at 17:51

## 2022-09-21 RX ADMIN — ACETAMINOPHEN 1000 MG: 500 TABLET, FILM COATED ORAL at 12:32

## 2022-09-21 RX ADMIN — PROPOFOL 200 MG: 10 INJECTION, EMULSION INTRAVENOUS at 17:51

## 2022-09-21 RX ADMIN — FENTANYL CITRATE 100 MCG: 50 INJECTION, SOLUTION INTRAMUSCULAR; INTRAVENOUS at 17:51

## 2022-09-21 RX ADMIN — DEXAMETHASONE SODIUM PHOSPHATE 8 MG: 10 INJECTION INTRAMUSCULAR; INTRAVENOUS at 18:01

## 2022-09-21 RX ADMIN — Medication 224 MICRO CURIE: at 12:46

## 2022-09-21 RX ADMIN — LIDOCAINE HYDROCHLORIDE 4 ML: 10 INJECTION, SOLUTION INFILTRATION; PERINEURAL at 14:49

## 2022-09-21 RX ADMIN — OXYCODONE HYDROCHLORIDE 5 MG: 5 TABLET ORAL at 19:28

## 2022-09-21 RX ADMIN — SODIUM CHLORIDE, POTASSIUM CHLORIDE, SODIUM LACTATE AND CALCIUM CHLORIDE 125 ML/HR: 600; 310; 30; 20 INJECTION, SOLUTION INTRAVENOUS at 12:27

## 2022-09-21 RX ADMIN — MIDAZOLAM HYDROCHLORIDE 2 MG: 1 INJECTION, SOLUTION INTRAMUSCULAR; INTRAVENOUS at 16:20

## 2022-09-21 RX ADMIN — SUGAMMADEX 200 MG: 100 INJECTION, SOLUTION INTRAVENOUS at 18:48

## 2022-09-21 ASSESSMENT — PAIN SCALES - GENERAL
PAINLEVEL_OUTOF10: 5
PAINLEVEL_OUTOF10: 5
PAINLEVEL_OUTOF10: 3

## 2022-09-21 ASSESSMENT — PAIN - FUNCTIONAL ASSESSMENT: PAIN_FUNCTIONAL_ASSESSMENT: 0-10

## 2022-09-21 ASSESSMENT — PAIN DESCRIPTION - ORIENTATION: ORIENTATION: LEFT

## 2022-09-21 ASSESSMENT — PAIN DESCRIPTION - LOCATION: LOCATION: BREAST

## 2022-09-21 NOTE — OP NOTE
expected outcomes were discussed with the patient pre-operatively. The patient voiced understanding and gave informed consent preoperatively. The patient was taken to the Operating Room, and the [address] [place] [address] time-out protocol checklist was followed. After the induction of adequate anesthesia, the breast and axillae were prepped and draped in the usual sterile fashion. Preoperative antibiotics were given. A breast incision was made around the exit site of the guidewire. We performed a partial mastectomy dissecting the tissue around the distal aspect of the guidewire. We marked the specimen with a short suture at the superior margins and a long suture at the lateral margin  It was imaged in 2 views, contained the guidewire, the biopsy clip, the radiographic target, was approved by radiology, and sent to pathology for review. Irrigation was used. Hemostasis was confirmed. The partial mastectomy site was inspected. There was no evidence of gross or palpable disease remaining. The shaved final margins were obtained from the medial, lateral, superior, inferior, anterior, and posterior margins. Each final shaved margins were sent separately marked in formalin to Pathology for review. The lumpectomy site was irrigated once again. Hemostasis was confirmed once again. Local anesthetic was given. The incision was closed with interrupted deep dermal 3-0 Vicryl sutures. The skin was closed with clips. Sterile dressing was placed at the end of the case. Attention was then turned towards the axilla. The patient was injected with technetium labelled sulfur colloid for lymphoscintigraphy in nuclear medicine the day of surgery. We used the Neoprobe map and identify the axillary sent node(s) and also initially to pan our incision.   We made an oblique incision in the axilla and then dissected into the deep left axillary space using the sentinel Neoprobe to guide our dissection and intraoperatively

## 2022-09-21 NOTE — ANESTHESIA PRE PROCEDURE
Department of Anesthesiology  Preprocedure Note       Name:  Lina Romero   Age:  56 y.o.  :  1965                                          MRN:  [id number]         Date:  2022      Surgeon: Tia Limaer):  Benita Lowry MD    Procedure: Procedure(s):  BREAST LESION BIOPSY EXCISION NEEDLE LOCALIZATION/ CHINESE/MANDARIN   LEFT BREAST BIOPSY SENTINEL NODE DISSECTION Pre-op : 11:00am  Lympho:12:00  LOC: 2:00pm     Surgery:  4:30pm  Dinh SEGURA to assist    Medications prior to admission:   Prior to Admission medications    Medication Sig Start Date End Date Taking? Authorizing Provider   HYDROcodone-acetaminophen (NORCO) 5-325 MG per tablet Take 1-2 tablets by mouth every 8 hours as needed for Pain for up to 7 days.  22 Yes Benita Lowry MD   traZODone (DESYREL) 50 MG tablet PRN 8/3/22   Historical Provider, MD   spironolactone (ALDACTONE) 25 MG tablet 25 mg 22   Historical Provider, MD   dilTIAZem [facility]) 240 MG extended release capsule 1 po qd 18   Ar Automatic Reconciliation   lidocaine-prilocaine (EMLA) 2.5-2.5 % cream Apply to port about 45 minutes prior to access 22   Ar Automatic Reconciliation   montelukast (SINGULAIR) 10 MG tablet Take 10 mg by mouth daily  Patient not taking: No sig reported  22  Ar Automatic Reconciliation       Current medications:    Current Facility-Administered Medications   Medication Dose Route Frequency Provider Last Rate Last Admin    lidocaine 1 % injection 1 mL  1 mL IntraDERmal Once PRN Iliana De Souza MD        fentaNYL (SUBLIMAZE) injection 100 mcg  100 mcg IntraVENous Once PRN Iliana De Souza MD        lactated ringers infusion   IntraVENous Continuous Iliana De Souza  mL/hr at 22 1227 125 mL/hr at 22 1227    sodium chloride flush 0.9 % injection 5-40 mL  5-40 mL IntraVENous 2 times per day Iliana De Souza MD        sodium chloride flush 0.9 % injection 5-40 mL  5-40 mL IntraVENous PRN Iliana De Souza MD        0.9 % sodium chloride infusion   IntraVENous PRN Ines Real MD        midazolam PF (VERSED) injection 2 mg  2 mg IntraVENous Once PRN Ines Real MD        ceFAZolin (ANCEF) 2000 mg in sterile water 20 mL IV syringe  2,000 mg IntraVENous Once Awa Begum MD           Allergies:     Allergies   Allergen Reactions    Paclitaxel Angioedema and Swelling    Lisinopril Other (See Comments)     Other reaction(s): Cough       Problem List:    Patient Active Problem List   Diagnosis Code    Mixed hyperlipidemia E78.2    Neutropenic fever ([address] D70.9, R50.81    Screening mammogram, encounter for Z12.31    Triple negative malignant neoplasm of breast ([address] C50.919    Seasonal allergic rhinitis J30.2    Frequent headaches R51.9    Carcinoma of upper-inner quadrant of left breast in female, estrogen receptor negative ([facility] C50.212, Z17.1    Irregular menses N92.6    Women's annual routine gynecological examination Z01.419    Severe sepsis ([address] A41.9, R65.20    Encephalomalacia on imaging study G93.89    Benign essential HTN I10    Other specified anemias D64.89    Anemia due to antineoplastic chemotherapy D64.81, T45.1X5A       Past Medical History:        Diagnosis Date    Breast cancer ([facility]     Hypertension     Murmur, heart     echo 2016 EF 60-65% ; 1/31/22 states never has had symptoms    Poor historian     difficulty with verifying medication list       Past Surgical History:        Procedure Laterality Date    BREAST SURGERY      bx of left breast     IR PORT PLACEMENT EQUAL OR GREATER THAN 5 YEARS  2/2/2022    IR PORT PLACEMENT EQUAL OR GREATER THAN 5 YEARS  2/2/2022    IR PORT PLACEMENT EQUAL OR GREATER THAN 5 YEARS 2/2/2022 [facility]    US BREAST NEEDLE BIOPSY LEFT Left 12/23/2021    US BREAST NEEDLE BIOPSY LEFT 12/23/2021 [facility]       Social History:    Social History     Tobacco Use    Smoking status: Never    Smokeless tobacco: Never Substance Use Topics    Alcohol use: No     Alcohol/week: 0.0 standard drinks                                Counseling given: Not Answered      Vital Signs (Current):   Vitals:    09/19/22 1528 09/21/22 1145 09/21/22 1152   BP:  (!) 156/94    Pulse:  83    Resp:  18    Temp:  97.8 °F (36.6 °C)    TempSrc:  Temporal    SpO2:  99%    Weight: 153 lb (69.4 kg) 154 lb 3.2 oz (69.9 kg) 154 lb 3.2 oz (69.9 kg)   Height:   5' 0.5\" (1.537 m)                                              BP Readings from Last 3 Encounters:   09/21/22 (!) 156/94   09/12/22 129/74   08/24/22 116/69       NPO Status: Time of last liquid consumption: 2300                        Time of last solid consumption: 1930                        Date of last liquid consumption: 09/21/22                        Date of last solid food consumption: 09/21/22    BMI:   Wt Readings from Last 3 Encounters:   09/21/22 154 lb 3.2 oz (69.9 kg)   09/12/22 155 lb (70.3 kg)   08/22/22 151 lb 3.2 oz (68.6 kg)     Body mass index is 29.62 kg/m².     CBC:   Lab Results   Component Value Date/Time    WBC 4.4 09/12/2022 12:40 PM    RBC 3.40 09/12/2022 12:40 PM    HGB 11.0 09/12/2022 12:40 PM    HCT 33.9 09/12/2022 12:40 PM    MCV 99.7 09/12/2022 12:40 PM    RDW 15.3 09/12/2022 12:40 PM     09/12/2022 12:40 PM       CMP:   Lab Results   Component Value Date/Time     09/12/2022 12:40 PM    K 4.3 09/12/2022 12:40 PM     09/12/2022 12:40 PM    CO2 25 09/12/2022 12:40 PM    BUN 19 09/12/2022 12:40 PM    CREATININE 1.00 09/12/2022 12:40 PM    GFRAA >60 09/12/2022 12:40 PM    AGRATIO 0.8 05/16/2022 08:55 AM    LABGLOM >60 09/12/2022 12:40 PM    GLUCOSE 161 09/12/2022 12:40 PM    PROT 8.0 09/12/2022 12:40 PM    CALCIUM 9.5 09/12/2022 12:40 PM    BILITOT 0.3 09/12/2022 12:40 PM    ALKPHOS 116 09/12/2022 12:40 PM    ALKPHOS 94 05/16/2022 08:55 AM    AST 22 09/12/2022 12:40 PM    ALT 34 09/12/2022 12:40 PM       POC Tests:   Recent Labs     09/21/22  1210   POCK 4. 8       Coags: No results found for: PROTIME, INR, APTT    HCG (If Applicable):   Lab Results   Component Value Date    PREGTESTUR Negative 09/21/2022        ABGs: No results found for: PHART, PO2ART, KZC6UBE, TGA8AIO, BEART, Y4SGKYRU     Type & Screen (If Applicable):  No results found for: LABABO, LABRH    Drug/Infectious Status (If Applicable):  No results found for: HIV, HEPCAB    COVID-19 Screening (If Applicable):   Lab Results   Component Value Date/Time    COVID19 Not detected 03/07/2022 10:35 AM    COVID19 Not detected 03/07/2022 10:35 AM           Anesthesia Evaluation  Patient summary reviewed and Nursing notes reviewed no history of anesthetic complications:   Airway: Mallampati: II  TM distance: >3 FB     Mouth opening: > = 3 FB   Dental:          Pulmonary:Negative Pulmonary ROS and normal exam                               Cardiovascular:  Exercise tolerance: good (>4 METS),   (+) hypertension (On Aldactone and Cardizem):,         Rhythm: regular  Rate: normal                    Neuro/Psych:   (+) headaches:,             GI/Hepatic/Renal: Neg GI/Hepatic/Renal ROS            Endo/Other:    (+) malignancy/cancer. Abdominal:             Vascular: negative vascular ROS. Other Findings:           Anesthesia Plan      general     ASA 2           MIPS: Postoperative opioids intended and Prophylactic antiemetics administered. Anesthetic plan and risks discussed with patient. Use of blood products discussed with patient whom.                      Drea Silver MD   9/21/2022

## 2022-09-21 NOTE — PROGRESS NOTES
Patient speaks Mandarin as their preferred language for their healthcare communication. If there are technical problems using the [employer] mobil unit, please contact Language Services for interpretation at:    [facility] via phone # [phone]  General phone: [phone] ( [phone])  Email: [email or web address]    Please always document the use of interpreting services (name and/or 's ID number) in your clinical notes. Our interpreters are available for team members working with limited  English proficient (LEP) patients remotely, in person (if needed for special cases), as phone or video interpreters on the [employer] units.       Jasvir Kelly [address] [facility] [employer] [facility]  [phone] (phone)

## 2022-09-21 NOTE — DISCHARGE INSTRUCTIONS
Dressings/Wound Care  Leave dressings alone until follow-up   Try to keep incisions as dry as possible to lower risk of infection. Activity  No heavy lifting (>5lbs) for 6 weeks to reduce risk of swelling. No driving until you are off pain meds for 24hrs and have no pain with movements associated with driving. Pain prescription (Norco) electronically sent to your pharmacy  Follow-up with Dr Ac Rojas in 7 days on a Wednesday in the office at:  [address], [address]  (Call for an appt time unless one is already made for you by discharge nurse which is preferred->[phone])    Diet  Resume regular diet     MEDICATION INTERACTION:  During your procedure you potentially received a medication or medications which may reduce the effectiveness of oral contraceptives. Please consider other forms of contraception for 1 month following your procedure if you are currently using oral contraceptives as your primary form of birth control. In addition to this, we recommend continuing your oral contraceptive as prescribed, unless otherwise instructed by your physician, during this time    After general anesthesia or intravenous sedation, for 24 hours or while taking prescription Narcotics:  Limit your activities  A responsible adult needs to be with you for the next 24 hours  Do not drive and operate hazardous machinery  Do not make important personal or business decisions  Do not drink alcoholic beverages  If you have not urinated within 8 hours after discharge, and you are experiencing discomfort from urinary retention, please go to the nearest ED. If you have sleep apnea and have a CPAP machine, please use it for all naps and sleeping. Please use caution when taking narcotics and any of your home medications that may cause drowsiness. *  Please give a list of your current medications to your Primary Care Provider.   *  Please update this list whenever your medications are discontinued, doses are      changed, or new medications (including over-the-counter products) are added. *  Please carry medication information at all times in case of emergency situations. These are general instructions for a healthy lifestyle:  No smoking/ No tobacco products/ Avoid exposure to second hand smoke  Surgeon General's Warning:  Quitting smoking now greatly reduces serious risk to your health. Obesity, smoking, and sedentary lifestyle greatly increases your risk for illness  A healthy diet, regular physical exercise & weight monitoring are important for maintaining a healthy lifestyle    You may be retaining fluid if you have a history of heart failure or if you experience any of the following symptoms:  Weight gain of 3 pounds or more overnight or 5 pounds in a week, increased swelling in our hands or feet or shortness of breath while lying flat in bed. Please call your doctor as soon as you notice any of these symptoms; do not wait until your next office visit.

## 2022-09-22 NOTE — ANESTHESIA POSTPROCEDURE EVALUATION
Department of Anesthesiology  Postprocedure Note    Patient: Jud Davis  MRN: [id number]  YOB: 1965  Date of evaluation: 9/21/2022      Procedure Summary     Date: 09/21/22 [place] [facility] [place] [facility] [place]    Anesthesia Start: 1744 Anesthesia Stop: 1900    Procedures:       BREAST LESION BIOPSY EXCISION NEEDLE LOCALIZATION/ CHINESE/MANDARIN  (Left: Breast)      LEFT BREAST BIOPSY SENTINEL NODE DISSECTION. (Left: Breast) Diagnosis:       Malignant neoplasm of right breast ([address]      (Malignant neoplasm of right breast ([address] [C50.911])    Surgeons: Alma Murcia MD Responsible Provider: Adrian Meza MD    Anesthesia Type: general ASA Status: 2          Anesthesia Type: No value filed.     Zaira Phase I: Zaira Score: 8    Zaira Phase II:        Anesthesia Post Evaluation    Patient location during evaluation: PACU  Patient participation: complete - patient participated  Level of consciousness: awake and alert  Airway patency: patent  Nausea & Vomiting: no nausea and no vomiting  Complications: no  Cardiovascular status: hemodynamically stable  Respiratory status: acceptable, nonlabored ventilation and spontaneous ventilation  Hydration status: euvolemic  Comments: BP (!) 167/78   Pulse 81   Temp 97.9 °F (36.6 °C)   Resp 16   Ht 5' 0.5\" (1.537 m)   Wt 154 lb 3.2 oz (69.9 kg)   SpO2 99%   BMI 29.62 kg/m²     Multimodal analgesia pain management approach

## 2022-09-28 ENCOUNTER — OFFICE VISIT (OUTPATIENT)
Dept: SURGERY | Age: 57
End: 2022-09-28

## 2022-09-28 VITALS
SYSTOLIC BLOOD PRESSURE: 130 MMHG | WEIGHT: 155.1 LBS | HEART RATE: 89 BPM | BODY MASS INDEX: 25.84 KG/M2 | DIASTOLIC BLOOD PRESSURE: 90 MMHG | HEIGHT: 65 IN | OXYGEN SATURATION: 100 % | TEMPERATURE: 97.9 F

## 2022-09-28 DIAGNOSIS — C50.212 CARCINOMA OF UPPER-INNER QUADRANT OF LEFT BREAST IN FEMALE, ESTROGEN RECEPTOR NEGATIVE (HCC): ICD-10-CM

## 2022-09-28 DIAGNOSIS — Z12.31 SCREENING MAMMOGRAM FOR HIGH-RISK PATIENT: ICD-10-CM

## 2022-09-28 DIAGNOSIS — C50.919 TRIPLE NEGATIVE MALIGNANT NEOPLASM OF BREAST (HCC): Primary | ICD-10-CM

## 2022-09-28 DIAGNOSIS — Z17.1 CARCINOMA OF UPPER-INNER QUADRANT OF LEFT BREAST IN FEMALE, ESTROGEN RECEPTOR NEGATIVE (HCC): ICD-10-CM

## 2022-09-28 PROCEDURE — 99024 POSTOP FOLLOW-UP VISIT: CPT | Performed by: SURGERY

## 2022-09-28 NOTE — PROGRESS NOTES
H&P/Consult Note/Progress Note/Office Note:   Kandi Jorgensen  MRN: [id number]  :1965  Age:57 y.o.    HPI: Kandi Jorgensen is a 56 y.o. female who is s/p left NL lumpectomy and axillary sent node excision on 22 for a cT2N0, ycT0N0, ypTisN0 triple negative left breast carcinoma. She had only residual DCIS. Her surgical margins and 3 sentinel nodes were negative for malignancy. She originally presented with an abnormal screening mammogram of the left breast which led to diagnostic imaging, breast US, and ultimately US-guided left breast biopsy   which identified a small focus of infiltrating ductal carcinoma associated with DCIS in the left breast 9:00. MRI imaging estimated the overall size of disease of 4.5 cm. She had no adenopathy. This is all shown below in detail in the expanded problem list       She was treated with neoadjuvant chemotherapy by Dr. Ernestina Lane. Follow-up MRI has been performed with good response as shown below. 21 pre-op Breast MRI                    8/15/22 Breast MRI (post-neoadjuvant Rx)  Hx:  Triple negative breast cancer. Upper normal quadrant of left breast. Status post chemotherapy. No current breast complaints. FINDINGS: Artifact is now seen in the upper inner, posterior soft tissues of the right breast likely representing a port reservoir. The breasts are composed of heterogeneous fibroglandular elements. No enlarged axillary lymph nodes are seen. No enlarged internal mammary lymph nodes are seen. Evaluation of the lungs is limited by  MRI imaging. However, no obvious pulmonary masses are seen. No significant pleural effusions are seen. The liver is obscured by cardiac motion artifact and only partially visualized. However, no focal signal abnormalities are seen prior to, or following administration of contrast to suggest a possible hepatic lesion.   Only benign-appearing hepatic cysts are seen the largest of which resides in the left lobe measuring 1.6 cm in size. Following the administration of intravenous contrast, normal enhancement is seen of the heart and vascular structures of the breasts. Mild background physiologic enhancement is seen of the breasts. Prior abnormal enhancement extending through the medial left breast is no longer appreciated. No new suspicious dominant enhancing lesion are suspicious asymmetric nonmass like enhancement is otherwise seen. Only mild symmetric nodular enhancement is seen in the bilateral breasts most consistent with benign physiologic enhancement. No evidence of skin thickening, or nipple retraction is seen. No enhancing osseous lesion is seen. 1.  No clear persistent asymmetric enhancement in the medial left breast suggesting an excellent response to treatment. Only mild back on physiologic enhancement is seen. No new suspicious enhancement is seen in either breast.   No findings concerning for evolving metastatic lesions are seen in the visualized chest.   Further management of the patient's known left breast cancer as clinically indicated is recommended. BI-RADS Assessment Category 6: Known Biopsy Proven Malignancy                 Past Medical History:   Diagnosis Date    Breast cancer ([facility] [address]     Hypertension     Murmur, heart     echo 2016 EF 60-65% ; 1/31/22 states never has had symptoms    Poor historian     difficulty with verifying medication list     Past Surgical History:   Procedure Laterality Date    BREAST BIOPSY Left 9/21/2022    BREAST LESION BIOPSY EXCISION NEEDLE LOCALIZATION/ CHINESE/MANDARIN  performed by Paxton Vuong MD at [address] [facility] Left 9/21/2022    LEFT BREAST BIOPSY SENTINEL NODE DISSECTION.  performed by Paxton Vuong MD at [place] [address]      bx of left breast     IR PORT PLACEMENT EQUAL OR GREATER THAN 5 YEARS  2/2/2022    IR PORT PLACEMENT EQUAL OR GREATER THAN 5 YEARS  2/2/2022    IR PORT PLACEMENT EQUAL OR GREATER THAN 5 YEARS 2/2/2022 D RADIOLOGY SPECIALS    US BREAST NEEDLE BIOPSY LEFT Left 12/23/2021    US BREAST NEEDLE BIOPSY LEFT 12/23/2021 [facility] RADIOLOGY MAMMO     Current Outpatient Medications   Medication Sig    HYDROcodone-acetaminophen (NORCO) 5-325 MG per tablet Take 1-2 tablets by mouth every 8 hours as needed for Pain for up to 7 days. traZODone (DESYREL) 50 MG tablet PRN    spironolactone (ALDACTONE) 25 MG tablet 25 mg    dilTIAZem (TIAZAC) 240 MG extended release capsule 1 po qd    lidocaine-prilocaine (EMLA) 2.5-2.5 % cream Apply to port about 45 minutes prior to access     No current facility-administered medications for this visit. ALLERGIES:  Paclitaxel and Lisinopril    Social History     Socioeconomic History    Marital status:    Tobacco Use    Smoking status: Never    Smokeless tobacco: Never   Substance and Sexual Activity    Alcohol use: No     Alcohol/week: 0.0 standard drinks    Drug use: No   Social History Narrative    Abuse: Feels safe at home, no history of physical abuse, no history of sexual abuse       Social History     Tobacco Use   Smoking Status Never   Smokeless Tobacco Never     Family History   Problem Relation Age of Onset    Ovarian Cancer Neg Hx     No Known Problems Mother     Breast Cancer Neg Hx     No Known Problems Sister     Other Other         states no family history    Colon Cancer Neg Hx     Hypertension Father     No Known Problems Sister     No Known Problems Brother     Uterine Cancer Neg Hx      ROS: The patient has no difficulty with chest pain or shortness of breath. No fever or chills. Comprehensive review of systems was otherwise unremarkable except as noted above. Physical Exam:   There were no vitals taken for this visit. There were no vitals filed for this visit. [unfilled]  [unfilled]    Constitutional: Alert, oriented, cooperative patient in no acute distress; appears stated age    Eyes:Sclera are clear.  EOMs intact  ENMT: no external lesions gross hearing normal; no obvious neck masses, no ear or lip lesions, nares normal  CV: RRR. Normal perfusion  Resp: No JVD. Breathing is  non-labored; no audible wheezing. Healed left lumpectomy left axillary incisions. No palpable breast masses on either side. Small breasts   No regional adenopathy      GI: soft and non-distended     Musculoskeletal: unremarkable with normal function. No embolic signs or cyanosis. Neuro:  Oriented; moves all 4; no focal deficits  Psychiatric: normal affect and mood, no memory impairment    Recent vitals (if inpt):  @IPVITALS(24:)@    Amount and/or Complexity of Data Reviewed and Analyzed:  I reviewed and analyzed all of the unique labs and radiologic studies that are shown below as well as any that are in the HPI, and any that are in the expanded problem list below  *Each unique test, order, or document contributes to the combination of 2 or combination of 3 in Category 1 below. For this visit I also reviewed old records and prior notes. No results for input(s): WBC, HGB, PLT, NA, K, CL, CO2, BUN, CREA, GLU, INR, APTT, ALT, AML, AML, LCAD, PCO2, PO2, HCO3 in the last 72 hours.     Invalid input(s): PTP, TBIL, TBILI, CBIL, SGOT, GPT, AP, LPSE, NH4, TROPT, TROIQ,  PH  Review of most recent CBC  Lab Results   Component Value Date    WBC 4.4 09/12/2022    HGB 11.0 (L) 09/12/2022    HCT 33.9 (L) 09/12/2022    MCV 99.7 (H) 09/12/2022     (L) 09/12/2022       Review of most recent BMP  Lab Results   Component Value Date/Time     09/12/2022 12:40 PM    K 4.3 09/12/2022 12:40 PM     09/12/2022 12:40 PM    CO2 25 09/12/2022 12:40 PM    BUN 19 09/12/2022 12:40 PM    CREATININE 1.00 09/12/2022 12:40 PM    GLUCOSE 161 09/12/2022 12:40 PM    CALCIUM 9.5 09/12/2022 12:40 PM        Review of most recent LFTs (and lipase if done)  Lab Results   Component Value Date    ALT 34 09/12/2022    AST 22 09/12/2022    ALKPHOS 116 09/12/2022    BILITOT 0.3 09/12/2022     No results found for: LIPASE    No results found for: INR, APTT, LCAD    Review of most recent HgbA1c  No results found for: LABA1C  No results found for: EAG    Nutritional assessment screen for wound healing issues:  Lab Results   Component Value Date    LABALBU 3.7 09/12/2022       @lastcovr@    Xray Result (most recent):  XR CHEST STANDARD TWO VW 03/07/2022    Narrative  EXAM: 2 view chest radiograph. INDICATION: Neutropenic fever. COMPARISON: Chest radiograph dated February 22, 2022. FINDINGS:  Limited crosstable lateral projection. No focal consolidation to suggest  pneumonia. No pneumothorax or pleural effusion. Unchanged cardiomegaly. No  evidence of acute osseous abnormality. Right-sided chest port terminates in the  cavoatrial junction. Impression  1. No focal consolidation to suggest pneumonia. CT Result (most recent):  No results found for this or any previous visit from the past 3650 days. US Result (most recent):  US BREAST NEEDLE BIOPSY LEFT 12/23/2021    Addendum 12/30/2021 11:36 AM  Addendum: Kashmir Varma, accession number: [id number]  Date: 12/28/2021 Pathology was noted as A: Left breast, 9:00 position, 6 cm from  nipple, core biopsy: Foci of at least microinvasive infiltrating ductal  carcinoma, intermediate grade (moderately differentiated) in association with  focally high grade ductal carcinoma in situ. Definite lymphovascular invasion is  not identified. Results concordant with imaging. Pathology report was called to  Dr. Kailyn Isabel office on 12/27/2021 by RT Amanuel(R)(M). Patient was referred to  Dr. Ashlyn Castro and has an appointment scheduled with him on 1/10/2022. Narrative  This is a summary report. The complete report is available in the patient's medical record. If you cannot access the medical record, please contact the sending organization for a detailed fax or copy.     Ultrasound-guided left breast biopsy and Mammogram    INDICATION: Breast mass    After informed consent was obtained, the mass at 9:00 in the left breast was  localized with ultrasound. The overlying skin was prepped using sterile  technique. 1% lidocaine was used for superficial anesthesia. Using ultrasound guidance, 3 biopsy passes were made with a 14-gauge core biopsy  needle. A clip was then left to fay the biopsy site. The patient tolerated the  procedure well. Left Mammogram:  Post biopsy mammogram shows the biopsy clip in good position. Impression  Successful ultrasound-guided biopsy of the medial left breast mass. Admission date (for inpatients): (Not on file)   * No surgery found *  * No surgery found *        ASSESSMENT/PLAN:  [unfilled]  Active Problems:    * No active hospital problems. *  Resolved Problems:    * No resolved hospital problems. *     Patient Active Problem List    Diagnosis Date Noted    Anemia due to antineoplastic chemotherapy 06/01/2022     Priority: Medium    Other specified anemias 04/11/2022    Neutropenic fever ([facility] [address] 03/07/2022    Severe sepsis ([facility] [address] 02/18/2022    Triple negative malignant neoplasm of breast ([facility] [address] 01/10/2022    Carcinoma of upper-inner quadrant of left breast in female, estrogen receptor negative ([facility] [address] 01/10/2022     cT2N0 left breast IDC with DCIS, triple negative   No previous personal malignancy, prior breast surgery, or other symptoms reported. No known family breast cancer. 11/22/21 Bilat screening mammo  Scattered fibroglandular densities. A few typically benign-appearing calcifications in the left breast are similar in appearance to the previous study. Focal asymmetry in the medial inferior left breast located 6 cm from the nipple. Otherwise no suspicious mass, calcification, or architectural distortion in either breast.       IMPRESSION  Focal asymmetry in the medial inferior left breast at 6 cm from the nipple.      12/6/21 Left breast dx mammo and US  The area of concern persists with additional imaging. This resides within   the medial left breast at approximately 9:00 position. There  are few associated calcifications which appear somewhat coarse. This   measures approximately 1.3 cm in size. An additional smaller asymmetry also at the 9:00  position resides 7 cm from the nipple. A left breast ultrasound is   recommended for further evaluation. 12/6/21 left breast US  At the 9:00 position, 6 cm from the nipple is a lobulated hypoechoic but   mildly heterogeneous appearing mass measuring  approximately 1.5 x 0.8 x 0.8 cm. The margins are occasionally ill-defined   with some components demonstrating posterior acoustic shadowing. The long axis is  parallel to the plane of the chest wall. There is a 7 mm hypoechoic mass   just adjacent to this This is hypoechoic with a lobulated contour. The margins are occasionally ill-defined. IMPRESSION  Adjacent left breast masses. Recommend tissue sampling of the larger lesion. 12/23/21 US-guided left breast biopsy  A:  \" LEFT BREAST, 9:00 POSITION, 6 CM FROM NIPPLE, CORE BIOPSY\": FOCI OF   AT LEAST MICROINVASIVE INFILTRATING DUCTAL CARCINOMA,   INTERMEDIATE GRADE (MODERATELY DIFFERENTIATED) IN ASSOCIATION WITH FOCALLY   HIGH GRADE DUCTAL CARCINOMA IN SITU. DEFINITE LYMPHOVASCULAR INVASION IS NOT IDENTIFIED   Sign Out Date: 12/24/2021  ABDIRASHID Carmona M.D.   ER:  Negative (0.9%);  CT: Negative (0.4%); HER-2/CHAR: Negative (0)   Post biopsy mammogram shows the biopsy clip in good position. 12/28/21 Breast MRI  The breasts demonstrate moderate glandularity and mild background enhancement. Left 9:00 biopsy clip within a lobulated heterogeneously enhancing mass   with angulated and irregular margins overall measuring about 1.8 x 0.7 x 1.2 cm. Posterior to this (about 1 cm away) the smaller mass measuring 0.9 cm is   unchanged from recent mammogram and ultrasound.  Additionally there is clumped  and reticular nonmass enhancement extending superiorly by about 2.7 cm and   anteriorly by about 1.8 cm, in keeping with the DCIS and calcifications. Overall the expected area of disease measures about 4.5 cm AP by 1.2 cm   transverse and is located at 9:00-9:30, mid to posterior depth. There is no evidence of involvement of skin or chest wall structures. No other evidence of suspicious enhancing mass, and no other dominant or   unique nonmass enhancement,  to suggest additional malignancy in either breast.  No evidence of axillary or internal mammary lymphadenopathy. Elsewhere, limited visualization of the partially included thorax and upper abdomen   shows no acute abnormality, with note of a small benign cyst in the left liver. IMPRESSION  1. Left medial breast cancer measures about 4.5 cm in greatest dimension. 2. No additional suspicious finding elsewhere in either breast. No obvious lymphadenopathy. 1/13/22 presented at [facility]; genetic testing and refer to med oncology pre-op    1/13/22 BRCAPlus panel of 8 genes associated with hereditary cancer (including BRCA1 and BRCA2)   No pathogenic variants identified that increase risk of developing breast Cancer      8/25/22 presented at [facility]; plan left NL lumpectomy/sent node excision next    9/21/22 s/p left NL lumpectomy and sent node excision; Dr Peralta Parrot:  \" LEFT BREAST LUMPECTOMY\":  RESIDUAL DUCTAL CARCINOMA IN SITU, INTERMEDIATE GRADE (CRIBRIFORM   TYPE) IN ASSOCIATION WITH CHANGES CONSISTENT WITH PRIOR BIOPSY SITE.    MARGINS OF RESECTION ARE GREATER THAN 1 CM FROM TUMOR (SEE ALSO B, C, D, E, F, AND G)          B:  \" LEFT BREAST, FINAL MEDIAL MARGIN\":  TISSUE CONSISTENT WITH BREAST NEGATIVE FOR MALIGNANT TUMOR   C:  \" LEFT BREAST, FINAL LATERAL MARGIN\":  TISSUE CONSISTENT WITH BREAST NEGATIVE FOR MALIGNANT TUMOR   D:  \" LEFT BREAST, FINAL SUPERIOR MARGIN\":  TISSUE CONSISTENT WITH BREAST NEGATIVE FOR MALIGNANT TUMOR   E:  \" LEFT BREAST, FINAL INFERIOR MARGIN\":  TISSUE CONSISTENT WITH BREAST NEGATIVE FOR MALIGNANT TUMOR   F:  \"LEFT BREAST, FINAL POSTERIOR MARGIN\":  SKELETAL MUSCLE NEGATIVE FOR MALIGNANT TUMOR   G:  \" LEFT BREAST, FINAL ANTERIOR MARGIN\":  TISSUE CONSISTENT WITH BREAST NEGATIVE FOR MALIGNANT TUMOR     H:  \" LEFT AXILLARY SENTINEL NODE NUMBER ONE\": LYMPH NODE NEGATIVE FOR METASTATIC TUMOR   I:  \" LEFT AXILLARY SENTINEL NODE NUMBER TWO\":  LYMPH NODE NEGATIVE FOR METASTATIC TUMOR   J:  \" LEFT AXILLARY SENTINEL NODE NUMBER THREE\":  LYMPH NODE NEGATIVE FOR METASTATIC TUMOR   Sign Out Date: 9/23/2022  ABDIRASHID Moy M.D.       Screening mammogram, encounter for 03/18/2019     Ordered for this Oct on 3/18/19        Irregular menses 07/24/2017     noted        Women's annual routine gynecological examination 07/24/2017     Pap with HPV done 7/24/17        Mixed hyperlipidemia 04/13/2016    Seasonal allergic rhinitis 04/13/2016    Encephalomalacia on imaging study 04/13/2016    Benign essential HTN 04/13/2016    Frequent headaches 02/25/2016          Number and Complexity of Problems addressed and   Risks of complications and/or morbidity of management        cT2N0, ycT0N0, ypTisN0  triple negative left breast IDC with DCIS  She is s/p left NL lumpectomy and axillary sent node excision on 9/21/22 for a cT2N0, ycT0N0, ypTisN0 triple negative left breast carcinoma. She had only residual DCIS at the time of surgery. Her surgical margins and 3 sentinel nodes were negative for malignancy. I will present her case postoperatively at multidisciplinary breast cancer conference on 9/29/2022. She will continue to follow-up with medical oncology. She will need to see radiation oncology, I will defer to Dr. Jamie Cole as far as timing for the referral.    I ordered her next bilateral mammogram on 11/23/22   and will see her back in follow-up on 11/28/22.               Level of MDM (2/3 elements below)  Number and Complexity of Problems Addressed Amount and/or Complexity of Data to be Reviewed and Analyzed  *Each unique test, order, or document contributes to the combination of 2 or combination of 3 in Category 1 below. Risk of Complications and/or Morbidity or Mortality of pt Management     12941  10199 SF Minimal  ?1self-limited or minor problem Minimal or none Minimal risk of morbidity from additional diagnostic testing or Rx   06859  41496 Low Low  ? 2or more self-limited or minor problems;    or  ? 1stable chronic illness;    or  ?1acute, uncomplicated illness or injury   Limited  (Must meet the requirements of at least 1 of the 2 categories)  Category 1: Tests and documents   ? Any combination of 2 from the following:  ?Review of prior external note(s) from each unique source*;  ?review of the result(s) of each unique test*;   ?ordering of each unique test*    or   Category 2: Assessment requiring an independent historian(s)  (For the categories of independent interpretation of tests and discussion of management or test interpretation, see moderate or high) Low risk of morbidity from additional diagnostic testing or treatment     59570  77851 Mod Moderate  ? 1or more chronic illnesses with exacerbation, progression, or side effects of treatment;    or  ?2or more stable chronic illnesses;    or  ?1undiagnosed new problem with uncertain prognosis;    or  ?1acute illness with systemic symptoms;    or  ?1acute complicated injury   Moderate  (Must meet the requirements of at least 1 out of 3 categories)  Category 1: Tests, documents, or independent historian(s)  ? Any combination of 3 from the following:   ?Review of prior external note(s) from each unique source*;  ?Review of the result(s) of each unique test*;  ?Ordering of each unique test*;  ?Assessment requiring an independent historian(s)    or  Category 2: Independent interpretation of tests   ? Independent interpretation of a test performed by another physician/other qualified health care professional (not separately reported);     or  Category 3: Discussion of management or test interpretation  ? Discussion of management or test interpretation with external physician/other qualified health care professional/appropriate source (not separately reported)   Moderate risk of morbidity from additional diagnostic testing or treatment  Examples only:  ?Prescription drug management   ? Decision regarding minor surgery with identified patient or procedure risk factors  ? Decision regarding elective major surgery without identified patient or procedure risk factors   ? Diagnosis or treatment significantly limited by social determinants of health       55853  79972 High High  ? 1or more chronic illnesses with severe exacerbation, progression, or side effects of treatment;    or  ?1 acute or chronic illness or injury that poses a threat to life or bodily function   Extensive  (Must meet the requirements of at least 2 out of 3 categories)  Category 1: Tests, documents, or independent historian(s)  ? Any combination of 3 from the following:   ?Review of prior external note(s) from each unique source*;  ?Review of the result(s) of each unique test*;   ?Ordering of each unique test*;   ?Assessment requiring an independent historian(s)    or   Category 2: Independent interpretation of tests   ? Independent interpretation of a test performed by another physician/other qualified health care professional (not separately reported);     or  Category 3: Discussion of management or test interpretation  ? Discussion of management or test interpretation with external physician/other qualified health care professional/appropriate source (not separately reported)   High risk of morbidity from additional diagnostic testing or treatment  Examples only:  ?Drug therapy requiring intensive monitoring for toxicity  ? Decision regarding elective major surgery with identified patient or procedure risk factors  ? Decision regarding emergency major surgery  ? Decision regarding hospitalization  ? Decision not to resuscitate or to de-escalate care because of poor prognosis             I have personally performed a face-to-face diagnostic evaluation and management  service on this patient. I have independently seen the patient. I have independently obtained the above history from the patient/family. I have independently examined the patient with above findings. I have independently reviewed data/labs for this patient and developed the above plan of care (MDM).       Signed: Talon Orozco MD  9/28/2022    10:58 AM

## 2022-09-29 ENCOUNTER — CLINICAL DOCUMENTATION (OUTPATIENT)
Dept: CASE MANAGEMENT | Age: 57
End: 2022-09-29

## 2022-10-03 ENCOUNTER — HOSPITAL ENCOUNTER (OUTPATIENT)
Dept: INFUSION THERAPY | Age: 57
Discharge: HOME | End: 2022-10-03
Payer: COMMERCIAL

## 2022-10-03 ENCOUNTER — OFFICE VISIT (OUTPATIENT)
Dept: ONCOLOGY | Age: 57
End: 2022-10-03
Payer: COMMERCIAL

## 2022-10-03 VITALS
SYSTOLIC BLOOD PRESSURE: 134 MMHG | DIASTOLIC BLOOD PRESSURE: 80 MMHG | WEIGHT: 156.3 LBS | BODY MASS INDEX: 30.69 KG/M2 | RESPIRATION RATE: 16 BRPM | TEMPERATURE: 98.7 F | OXYGEN SATURATION: 99 % | HEART RATE: 85 BPM | HEIGHT: 60 IN

## 2022-10-03 DIAGNOSIS — M25.512 ACUTE PAIN OF LEFT SHOULDER: ICD-10-CM

## 2022-10-03 DIAGNOSIS — R79.89 TSH ELEVATION: ICD-10-CM

## 2022-10-03 DIAGNOSIS — Z79.899 HIGH RISK MEDICATION USE: ICD-10-CM

## 2022-10-03 DIAGNOSIS — C50.919 TRIPLE NEGATIVE MALIGNANT NEOPLASM OF BREAST (HCC): Primary | ICD-10-CM

## 2022-10-03 DIAGNOSIS — C50.919 TRIPLE NEGATIVE MALIGNANT NEOPLASM OF BREAST (HCC): ICD-10-CM

## 2022-10-03 LAB
ALBUMIN SERPL-MCNC: 3.8 G/DL (ref 3.5–5)
ALBUMIN/GLOB SERPL: 0.9 {RATIO} (ref 1.2–3.5)
ALP SERPL-CCNC: 128 U/L (ref 50–136)
ALT SERPL-CCNC: 45 U/L (ref 12–65)
ANION GAP SERPL CALC-SCNC: 5 MMOL/L (ref 4–13)
AST SERPL-CCNC: 31 U/L (ref 15–37)
BASOPHILS # BLD: 0 K/UL (ref 0–0.2)
BASOPHILS NFR BLD: 0 % (ref 0–2)
BILIRUB SERPL-MCNC: 0.4 MG/DL (ref 0.2–1.1)
BUN SERPL-MCNC: 21 MG/DL (ref 6–23)
CALCIUM SERPL-MCNC: 9.5 MG/DL (ref 8.3–10.4)
CHLORIDE SERPL-SCNC: 105 MMOL/L (ref 101–110)
CO2 SERPL-SCNC: 26 MMOL/L (ref 21–32)
CREAT SERPL-MCNC: 1 MG/DL (ref 0.6–1)
DIFFERENTIAL METHOD BLD: ABNORMAL
EOSINOPHIL # BLD: 0.1 K/UL (ref 0–0.8)
EOSINOPHIL NFR BLD: 2 % (ref 0.5–7.8)
ERYTHROCYTE [DISTWIDTH] IN BLOOD BY AUTOMATED COUNT: 13.6 % (ref 11.9–14.6)
GLOBULIN SER CALC-MCNC: 4.3 G/DL (ref 2.3–3.5)
GLUCOSE SERPL-MCNC: 108 MG/DL (ref 65–100)
HCT VFR BLD AUTO: 35.5 % (ref 35.8–46.3)
HGB BLD-MCNC: 11.7 G/DL (ref 11.7–15.4)
IMM GRANULOCYTES # BLD AUTO: 0 K/UL (ref 0–0.5)
IMM GRANULOCYTES NFR BLD AUTO: 0 % (ref 0–5)
LYMPHOCYTES # BLD: 1 K/UL (ref 0.5–4.6)
LYMPHOCYTES NFR BLD: 20 % (ref 13–44)
MCH RBC QN AUTO: 32.5 PG (ref 26.1–32.9)
MCHC RBC AUTO-ENTMCNC: 33 G/DL (ref 31.4–35)
MCV RBC AUTO: 98.6 FL (ref 79.6–97.8)
MONOCYTES # BLD: 0.4 K/UL (ref 0.1–1.3)
MONOCYTES NFR BLD: 7 % (ref 4–12)
NEUTS SEG # BLD: 3.8 K/UL (ref 1.7–8.2)
NEUTS SEG NFR BLD: 71 % (ref 43–78)
NRBC # BLD: 0 K/UL (ref 0–0.2)
PLATELET # BLD AUTO: 119 K/UL (ref 150–450)
PMV BLD AUTO: 8.6 FL (ref 9.4–12.3)
POTASSIUM SERPL-SCNC: 4.3 MMOL/L (ref 3.5–5.1)
PROT SERPL-MCNC: 8.1 G/DL (ref 6.3–8.2)
RBC # BLD AUTO: 3.6 M/UL (ref 4.05–5.2)
SODIUM SERPL-SCNC: 136 MMOL/L (ref 136–145)
WBC # BLD AUTO: 5.3 K/UL (ref 4.3–11.1)

## 2022-10-03 PROCEDURE — 99215 OFFICE O/P EST HI 40 MIN: CPT | Performed by: INTERNAL MEDICINE

## 2022-10-03 PROCEDURE — 80053 COMPREHEN METABOLIC PANEL: CPT

## 2022-10-03 PROCEDURE — 2580000003 HC RX 258: Performed by: INTERNAL MEDICINE

## 2022-10-03 PROCEDURE — 6360000002 HC RX W HCPCS: Performed by: INTERNAL MEDICINE

## 2022-10-03 PROCEDURE — 36591 DRAW BLOOD OFF VENOUS DEVICE: CPT

## 2022-10-03 PROCEDURE — 96413 CHEMO IV INFUSION 1 HR: CPT

## 2022-10-03 PROCEDURE — 85025 COMPLETE CBC W/AUTO DIFF WBC: CPT

## 2022-10-03 RX ORDER — SODIUM CHLORIDE 0.9 % (FLUSH) 0.9 %
10 SYRINGE (ML) INJECTION PRN
Status: DISCONTINUED | OUTPATIENT
Start: 2022-10-03 | End: 2022-10-04 | Stop reason: HOSPADM

## 2022-10-03 RX ORDER — SODIUM CHLORIDE 9 MG/ML
5-250 INJECTION, SOLUTION INTRAVENOUS PRN
Status: DISCONTINUED | OUTPATIENT
Start: 2022-10-03 | End: 2022-10-04 | Stop reason: HOSPADM

## 2022-10-03 RX ORDER — SODIUM CHLORIDE 0.9 % (FLUSH) 0.9 %
10 SYRINGE (ML) INJECTION PRN
Status: DISCONTINUED | OUTPATIENT
Start: 2022-10-03 | End: 2022-10-03 | Stop reason: HOSPADM

## 2022-10-03 RX ORDER — SODIUM CHLORIDE 0.9 % (FLUSH) 0.9 %
5-40 SYRINGE (ML) INJECTION PRN
Status: DISCONTINUED | OUTPATIENT
Start: 2022-10-03 | End: 2022-10-04 | Stop reason: HOSPADM

## 2022-10-03 RX ADMIN — SODIUM CHLORIDE, PRESERVATIVE FREE 10 ML: 5 INJECTION INTRAVENOUS at 10:43

## 2022-10-03 RX ADMIN — SODIUM CHLORIDE 200 MG: 9 INJECTION, SOLUTION INTRAVENOUS at 11:20

## 2022-10-03 RX ADMIN — Medication 10 ML: at 09:50

## 2022-10-03 RX ADMIN — SODIUM CHLORIDE 50 ML/HR: 9 INJECTION, SOLUTION INTRAVENOUS at 10:43

## 2022-10-03 RX ADMIN — SODIUM CHLORIDE, PRESERVATIVE FREE 10 ML: 5 INJECTION INTRAVENOUS at 11:56

## 2022-10-03 ASSESSMENT — PATIENT HEALTH QUESTIONNAIRE - PHQ9
SUM OF ALL RESPONSES TO PHQ QUESTIONS 1-9: 0
1. LITTLE INTEREST OR PLEASURE IN DOING THINGS: 0
2. FEELING DOWN, DEPRESSED OR HOPELESS: 0
SUM OF ALL RESPONSES TO PHQ9 QUESTIONS 1 & 2: 0
SUM OF ALL RESPONSES TO PHQ QUESTIONS 1-9: 0

## 2022-10-03 NOTE — PATIENT INSTRUCTIONS
Patient Instructions from Today's Visit    Reason for Visit:  Follow-up visit - breast cancer     Diagnosis Information:  [email or web address]    Plan:  -Referral to physical therapy for left shoulder pain.   -We will send a referral to radiation oncology for next steps in treatment. -Regina Oneal today.      Follow Up:  -3 weeks    Recent Lab Results:  Hospital Outpatient Visit on 10/03/2022   Component Date Value Ref Range Status    WBC 10/03/2022 5.3  4.3 - 11.1 K/uL Final    RBC 10/03/2022 3.60 (A)  4.05 - 5.2 M/uL Final    Hemoglobin 10/03/2022 11.7  11.7 - 15.4 g/dL Final    Hematocrit 10/03/2022 35.5 (A)  35.8 - 46.3 % Final    MCV 10/03/2022 98.6 (A)  79.6 - 97.8 FL Final    MCH 10/03/2022 32.5  26.1 - 32.9 PG Final    MCHC 10/03/2022 33.0  31.4 - 35.0 g/dL Final    RDW 10/03/2022 13.6  11.9 - 14.6 % Final    Platelets 11/18/5911 119 (A)  150 - 450 K/uL Final    MPV 10/03/2022 8.6 (A)  9.4 - 12.3 FL Final    nRBC 10/03/2022 0.00  0.0 - 0.2 K/uL Final    **Note: Absolute NRBC parameter is now reported with Hemogram**    Seg Neutrophils 10/03/2022 71  43 - 78 % Final    Lymphocytes 10/03/2022 20  13 - 44 % Final    Monocytes 10/03/2022 7  4.0 - 12.0 % Final    Eosinophils % 10/03/2022 2  0.5 - 7.8 % Final    Basophils 10/03/2022 0  0.0 - 2.0 % Final    Immature Granulocytes 10/03/2022 0  0.0 - 5.0 % Final    Segs Absolute 10/03/2022 3.8  1.7 - 8.2 K/UL Final    Absolute Lymph # 10/03/2022 1.0  0.5 - 4.6 K/UL Final    Absolute Mono # 10/03/2022 0.4  0.1 - 1.3 K/UL Final    Absolute Eos # 10/03/2022 0.1  0.0 - 0.8 K/UL Final    Basophils Absolute 10/03/2022 0.0  0.0 - 0.2 K/UL Final    Absolute Immature Granulocyte 10/03/2022 0.0  0.0 - 0.5 K/UL Final    Differential Type 10/03/2022 AUTOMATED    Final       Treatment Summary has been discussed and given to patient: N/A        -------------------------------------------------------------------------------------------------------------------  Please call our office at [phone] if you have any  of the following symptoms:   Fever of 100.5 or greater  Chills  Shortness of breath  Swelling or pain in one leg    After office hours an answering service is available and will contact a provider for emergencies or if you are experiencing any of the above symptoms. Patient does express an interest in My Chart. My Chart log in information explained on the after visit summary printout at the [address] desk.     Arnav Landrum RN

## 2022-10-03 NOTE — PROGRESS NOTES
Patient arrived to port lab for port access and lab draw   Mci[facility] Brown [address] accessed and labs drawn per protocol   *Port remains accessed   Patient discharged from port lab ambulatory*

## 2022-10-03 NOTE — PROGRESS NOTES
[facility] Hematology & Oncology: Office Visit Progress Note    Chief Complaint:    Triple negative left breast cancer    History of Present Illness:  Reason for Referral: Carcinoma of upper-inner  quadrant of left breast in female, estrogen receptor negative; Triple negative malignant neoplasm of breast       Referring Provider: Darnell Em MD       Primary Care Provider: Roya Lowry MD       Family History of Cancer/Hematologic Disorders: None reported       Presenting Symptoms: Abnormal routine screening mammogram        Ms. Fede Oconnell is a 56 y.o.  female with a history of heart murmur, HTN, mixed HLD, and irregular menses, who was recently diagnosed with breast  cancer. She initially presented on 11/22/21 for a routine bilateral digital screening mammogram which identified focal asymmetry in the medial inferior left breast at 6 cm from the nipple. Further evaluation with diagnostic left breast mammogram and left  breast ultrasound were completed on 12/6/21 confirming the presence of adjacent left breast masses with one at the 9:00 position, 6 cm from the nipple measuring approximately 1.5 x 0.8 x 0.8 cm and a 7 mm hypoechoic mass just adjacent to this. Recommended ultrasound-guided biopsy of the medial left breast mass was completed on 12/23/21 with pathology from the core biopsy of the left breast, 9:00 position, 6 cm from nipple, revealing ER 0.9%/IA 0.4%/HER-2 (0) negative, intermediate grade (moderately  differentiated), foci of at least microinvasive infiltrating ductal carcinoma in association with focally high grade ductal carcinoma in situ with no definite lymphovascular invasion identified. MRI of the bilateral breasts was performed on 12/28/21 demonstrating left medial breast cancer measuring about 4.5 cm in greatest dimension; no additional suspicious finding elsewhere in either breast; and no obvious lymphadenopathy.        Patient was referred to surgery and evaluated in consultation by Surgeon, Dr. Hector Randolph, on 1/10/22. She was assessed with signs and symptoms consistent with cT2N0 triple negative left breast IDC with DCIS. Treatment options were discussed, and  Dr. Joey Chanel recommended genetic testing and preoperative evaluation by medical oncology to consider neoadjuvant chemotherapy. Dr. Joey Chanel noted, \"We discussed the strong possibility that she could require left mastectomy/sentinel node should we  proceed with surgery upfront. At this time the enhancement on MRI looks too large, diffuse, and elongated to perform breast preservation surgery and expected good cosmetic result. If she has neoadjuvant treatment I will see her back in about 4 months  to regroup. \"       Patient is now referred to [facility] for oncology evaluation and consideration for neoadjuvant chemotherapy. Patient completed genetic counseling on 1/13/22. She agreed to pursue testing with BRCAPlus panel of 8 genes associated with hereditary cancer  (including BRCA1 and BRCA2 ). BILATERAL DIGITAL SCREENING MAMMOGRAM  11/22/2021   FINDINGS: There are scattered fibroglandular densities. A few typically benign-appearing calcifications in the left breast are similar in  appearance to the previous study. There is a focal asymmetry in the medial inferior left breast located 6 cm from the nipple. Recommend spot compression magnification views on the direct ML view and ultrasound, if indicated for further evaluation. There  is otherwise no suspicious mass, calcification, or architectural distortion in either breast.      IMPRESSION: Focal asymmetry in the medial inferior left breast at 6 cm from the nipple. for which further evaluation is recommended with ML  and compression magnification views, with possible ultrasound if indicated. BI-RADS Assessment Category 0: Incomplete: Needs additional imaging evaluation.        DIAGNOSTIC MAMMOGRAM LEFT BREAST, LEFT BREAST ULTRASOUND 12/6/21   FINDING: The area of concern does not persist on additional imaging. There is no architectural distortion. The area of concern persists with  additional imaging. This resides within the medial left breast at approximately 9:00 position. There are few associated calcifications which appear somewhat coarse. This measures approximately 1.3 cm in size. An additional smaller asymmetry also at the  9:00 position resides 7 cm from the nipple. A left breast ultrasound is recommended for further evaluation. LEFT BREAST ULTRASOUND: At the 9:00 position, 6 cm from the nipple is a lobulated hypoechoic but mildly heterogeneous appearing mass measuring approximately 1.5 x 0.8 x 0.8 cm. The margins are occasionally ill-defined with some components demonstrating  posterior acoustic shadowing. The long axis is parallel to the plane of the chest wall. There is a 7 mm hypoechoic mass just adjacent to this This is hypoechoic with a lobulated contour. The margins are occasionally ill-defined. IMPRESSION: Adjacent left breast masses. Recommend tissue sampling of the larger lesion. BI-RADS Assessment Category 4: Suspicious Finding- Biopsy should be considered. [facility] SURGICAL PATHOLOGY REPORT 12/23/21   DIAGNOSIS    A: \" LEFT BREAST, 9:00 POSITION, 6 CM FROM NIPPLE, CORE BIOPSY\": FOCI OF AT LEAST MICROINVASIVE INFILTRATING DUCTAL CARCINOMA, INTERMEDIATE GRADE (MODERATELY DIFFERENTIATED) IN ASSOCIATION WITH FOCALLY HIGH GRADE DUCTAL CARCINOMA IN SITU.  DEFINITE LYMPHOVASCULAR  INVASION IS NOT IDENTIFIED   [facility]- ER/NM/[id number] BY IHC    INTERPRETATION    [employer] IHC Quantitative Breast Panel    TEST NAME:                                      RESULTS:                  INTERNAL CONTROLS:    ESTROGEN RECEPTOR:                 Negative (0.9%)          Present, Positive    PROGESTERONE RECEPTOR:       Negative (0.4%)          Present, Positive    HER-2/CHAR:                                        Negative (0)                Percentage of Cells with Uniform Intense Complete Membrane    Stainin%   STF-IMMUNOHISTOCHEMISTRY   Immunohistochemical Stain Panel:    INTERPRETATION: Immunohistochemical findings consistent with breast primary. ANTIBODY/TEST       MARKER FOR                                               RESULT    Cytokeratin 7               Lung, breast, upper GE, serous ovary           Positive    Cytokeratin 20             Colon, mucinous ovary, urothelium                Negative    GATA3                        Urothelial, breast carcinoma                           Positive    GCDFP                       Breast, salivary gland, skin, adnexa               Positive    Mammaglobin                         Breast                                                              Positive       MRI OF THE BREASTS WITH AND WITHOUT CONTRAST 21   FINDINGS: The breasts demonstrate moderate glandularity and mild background enhancement. Left 9:00 biopsy clip within a lobulated heterogeneously  enhancing mass with angulated and irregular margins overall measuring about 1.8 x 0.7 x 1.2 cm. Posterior to this (about 1 cm away) the smaller mass measuring 0.9 cm is unchanged from recent mammogram and ultrasound. Additionally there is clumped and  reticular nonmass enhancement extending superiorly by about 2.7 cm and anteriorly by about 1.8 cm, in keeping with the DCIS and calcifications. Overall the expected area of disease measures about 4.5 cm AP by 1.2 cm transverse and is located at 9:00-9:30,  mid to posterior depth. There is no evidence of involvement of skin or chest wall structures. There is no other evidence of suspicious enhancing mass, and no other dominant or unique nonmass enhancement, to suggest additional malignancy in either breast.  There is no evidence of axillary or internal mammary lymphadenopathy.  Elsewhere, limited visualization of the partially included thorax and upper abdomen shows no acute abnormality, with note of a small benign cyst in the left liver. IMPRESSION   1. Left medial breast cancer measures about 4.5 cm in greatest dimension. 2. No additional suspicious finding elsewhere in either breast. No obvious lymphadenopathy. Recommend continued management as directed clinically. BI-RADS Assessment Category 6: Known Biopsy Proven Malignancy       Notes from Referring Provider: \"Age 49 with cT2N0 triple  negative left breast IDC and DCIS Evaluate    Speaks mandarin   She has been referred to genetics also\"       Interim history update in A/P. Review of Systems:      Constitutional  Denies fever or chills. Denies fatigue. Denies anorexia. HEENT   allergic conjunctivitis and sinusitis. Denies trauma, bluring vision, hearing loss, ear pain, nosebleeds, sore throat, neck pain and ear discharge. Skin  Denies lesions or rashes. Lungs  Denies shortness of breath, cough, sputum production or hemoptysis. Cardiovascular  Denies chest pain, palpitations, orthopnea, claudication and leg swelling. Gastrointestinal  Nausea. Denies vomiting. Denies bloody or black stools. Denies abdominal pain.   Denies dysuria, frequency or hesitancy of urination     Neuro  Denies headaches, visual changes or ataxia. Denies dizziness, tingling, tremors, sensory change, speech change, focal weakness and headaches. Hematology  Denies nasal/gum bleeding, denies easy bruise     Endo  Denies heat/cold intolerance, denies diabetes. MSK  Denies back pain, swollen legs, myalgias and falls. Psychiatric/Behavioral   insomnia. Denies depression and substance abuse. The patient is not nervous/anxious.               Allergies   Allergen Reactions    Paclitaxel Angioedema and Swelling    Lisinopril Other (See Comments)     Other reaction(s): Cough     Past Medical History:   Diagnosis Date    Breast cancer ([facility] [address]     Hypertension     Murmur, heart     echo 2016 EF 60-65% ; 1/31/22 states never has had symptoms    Poor historian Partner Violence: Not on file   Housing Stability: Not on file     Current Outpatient Medications   Medication Sig Dispense Refill    spironolactone (ALDACTONE) 25 MG tablet 25 mg      dilTIAZem (TIAZAC) 240 MG extended release capsule 1 po qd      lidocaine-prilocaine (EMLA) 2.5-2.5 % cream Apply to port about 45 minutes prior to access      traZODone (DESYREL) 50 MG tablet PRN (Patient not taking: No sig reported)       No current facility-administered medications for this visit. Facility-Administered Medications Ordered in Other Visits   Medication Dose Route Frequency Provider Last Rate Last Admin    sodium chloride flush 0.9 % injection 10 mL  10 mL IntraVENous PRN Priscilla Sidhu MD   10 mL at 10/03/22 0950       OBJECTIVE:  /80 Comment: standing  Pulse 85   Temp 98.7 °F (37.1 °C) (Oral)   Resp 16   Ht 5' 0.05\" (1.525 m)   Wt 156 lb 4.8 oz (70.9 kg)   SpO2 99%   BMI 30.47 kg/m²     Physical Exam:      Constitutional:  Oriented to person, place, and time. Well-developed and well-nourished. HEENT:  Normocephalic and atraumatic. Oropharynx is clear and moist.    Conjunctivae and EOM are normal. Pupils are equal, round, and reactive to light. No scleral icterus. Neck supple. No JVD present. No tracheal deviation present. No thyromegaly present. Lymph node  No palpable submandibular, cervical, supraclavicular, axillary and inguinal lymph nodes. Breasts  Left breast soft, nontender, popular nodular area and 9:00 about 2 cm at baseline, no longer palpable after chemotherapy. Right breast soft, nontender, no mass. Skin   rash. Warm and dry. No bruising noted. No erythema. No pallor. Respiratory  Effort normal and breath sounds normal.  No respiratory distress. No wheezes. No rales. No tenderness. CVS  Normal rate, regular rhythm and normal heart sounds. Exam reveals no gallop, no friction and no rub. No murmur heard. Abdomen  Soft.  Bowel sounds are normal. Exhibits no distension. There is no tenderness. There is no rebound and no guarding. Neuro  Normal reflexes. No cranial nerve deficit. Exhibits normal muscle tone, 5 of 5 strength of all extremities. MSK  Normal range of motion in general.  No edema and no tenderness. Psych  Normal mood, affect, behavior, judgment and thought content        Labs:  Recent Results (from the past 24 hour(s))   CBC with Auto Differential    Collection Time: 10/03/22  9:42 AM   Result Value Ref Range    WBC 5.3 4.3 - 11.1 K/uL    RBC 3.60 (L) 4.05 - 5.2 M/uL    Hemoglobin 11.7 11.7 - 15.4 g/dL    Hematocrit 35.5 (L) 35.8 - 46.3 %    MCV 98.6 (H) 79.6 - 97.8 FL    MCH 32.5 26.1 - 32.9 PG    MCHC 33.0 31.4 - 35.0 g/dL    RDW 13.6 11.9 - 14.6 %    Platelets 843 (L) 559 - 450 K/uL    MPV 8.6 (L) 9.4 - 12.3 FL    nRBC 0.00 0.0 - 0.2 K/uL    Seg Neutrophils 71 43 - 78 %    Lymphocytes 20 13 - 44 %    Monocytes 7 4.0 - 12.0 %    Eosinophils % 2 0.5 - 7.8 %    Basophils 0 0.0 - 2.0 %    Immature Granulocytes 0 0.0 - 5.0 %    Segs Absolute 3.8 1.7 - 8.2 K/UL    Absolute Lymph # 1.0 0.5 - 4.6 K/UL    Absolute Mono # 0.4 0.1 - 1.3 K/UL    Absolute Eos # 0.1 0.0 - 0.8 K/UL    Basophils Absolute 0.0 0.0 - 0.2 K/UL    Absolute Immature Granulocyte 0.0 0.0 - 0.5 K/UL    Differential Type AUTOMATED         Imaging:  No results found for this or any previous visit. ASSESSMENT/PLAN:   Diagnosis Orders   1. Triple negative malignant neoplasm of breast [facility] - Elias Mosley MD, Radiation Oncology, Su      2. High risk medication use        3. TSH elevation        4. Acute pain of left shoulder  [facility]                [unfilled]    56 y.o. F consulted for triple negative left breast cancer and presented to [facility] with  and daughter on 1/19/2022.    She has good baseline health and functions well, and annual screening mammogram showed left breast lesion, biopsy showed triple negative grade 2 IDC, cT2 N0 M0, and we discussed the high risks profile for triple negative breast cancer and the most recent  FDA approval of neoadjuvant chemoimmunotherapy, she appeared a good candidate and agreed to arrange Jamal Lamar for 4 cycles followed by Adriamycin Cytoxan Keytruda for 4 cycles, followed by surgery and adjuvant Keytruda for total systemic therapy  of 1 year, discussed toxicity and management, discussed logistics, arrange Zofran Compazine Ativan, baseline echocardiogram showed normal EF, hypertension better but still uncontrolled after adding spironolactone and educated risk of dehydration and hypotension  on chemotherapy when she is taking double diuretics, started cycle 1 2/7/22 and left breast tumor responded rapidly, lost 20 pounds after cycle 1 and BP much better controlled even after she runs out of diltiazem, again warned of monitoring dehydration/hypotension,  discussed increased protein/calorie intake, may add stimulant if continues to lose weight, occasional mild tingling in fingertips, episodes of constipation responded to laxative, proceed to cycle 1 day 8 carbotaxol Keytruda, educated to monitor for neuropathy,  follow next week but call as needed.       However after cycle 1 day 8 Taxol on 2/14 and developed acute onset of itching, rash, anasarca, short of breath, fever, hypotension, diarrhea, admitted with antibiotics, antihistamine,  IV fluid, most symptom much improved the second day although still lingering, discussed that the picture of allergic reaction but thorough discussion not able to identify new food or medicine, not typical for Taxol or Keytruda reaction given the timing  and the acuity, and she reported a similar episode a few years ago that she went to ER for treatment, but never followed up for allergen test and never had EpiPen, discussed the concern of allergy and will arrange allergist work-up, however proceeded  with day 15 Taxol with close observation to rule out atypical Taxol allergy, patient and family understand the risk and agreed to proceed, and reported reasonable tolerance except for mild itching through the infusion, however overnight RN was concerned  of tachycardia and tachypneic which patient reported similar to each infusion but received IV Benadryl and Solu-Medrol, LFT elevated for a day, give albumin with lasix and anasarca improved, DC with Benadryl/prednisone/EpiPen as needed, followed  on 2/28/2022, swelling resolved, mild rash of hands and arms, discussed to change Taxol to Taxotere for better management of reaction, return on 3/7/2022 reported much better tolerance for the allergic reaction, however more nausea/vomiting/weight loss  and neutropenic fever with temperature 101 and ANC 0.2, coughing and rule out Covid, typical seasonal allergic sinusitis symptoms and educated to start using Nasacort spray, admit to hospital for neutropenic fever, discharge 3/10/2022, had skin rash that  was attributed to cefepime and resolved, again discussed her sensitivity to multiple allergens and different manifestations need to be managed accordingly, Zaditor for allergic conjunctivitis, nasal steroid for sinusitis, Kenalog for skin rash, oral antihistamine,  prednisone 20 mg only as needed, added G-CSF since cycle 3, better controlled for allergy/hypertension/fluid retention, severe diarrhea responded to PRBC, severe thrombocytopenia recovered after delaying cycle 4 by a week, completed cycle 4 with G-CSF  and Emend.    She returned on 5/9/2022 to start AC/Keytruda, however platelet was low and chemo had to be deferred for 2 weeks actually and platelets finally recovered to 92 on 5/23/2022, discussed to proceed with caution and reduce AC dose by 20%, which she tolerated reasonably well and completed 4 cycles of AC/Keytruda without major complication, repeat MRI showed cCR and Dr. Rebeca Vazquez performed left lumpectomy and axillary SLND on 9/21/2022, pathology showed ypTis N0 triple negative left breast carcinoma, discussed that the complete response of the invasive triple negative cancer is a favorable prognostic marker, continued adjuvant treatment and proceed to cycle 11 Keytruda, monitor TSH, return in 3 weeks to follow for next cycle, continue work on weight control, refer to radiation oncology, call as needed. All questions are answered to their satisfaction. They will call for further questions and concerns. ECOG PERFORMANCE STATUS - 1    Pain - 1/10. Fatigue - No flowsheet data found. Distress - No flowsheet data found. Elements of this note have been dictated via voice recognition software. Text and phrases may be limited by the accuracy and autoconversion of the software. The chart has been reviewed, but errors may still be present. Tian Schumacher M.D.   [address]  Office : [phone]  Fax : [phone]

## 2022-10-03 NOTE — PROGRESS NOTES
10/3/2022  Pt to Infusion without complaints. Labs reviewed and pt received treatment per order, tolerated well. Patient instructed to call provider with temperature of 100.4 or greater, nausea/vomiting/diarrhea/pain not controlled by medications, or any other issues/concerns. Aware of next Infusion appt on  [email or web address] . Discharged home with family.

## 2022-10-19 ENCOUNTER — HOSPITAL ENCOUNTER (OUTPATIENT)
Dept: RADIATION ONCOLOGY | Age: 57
Setting detail: RECURRING SERIES
Discharge: HOME | End: 2022-10-22
Payer: COMMERCIAL

## 2022-10-19 VITALS
TEMPERATURE: 98.1 F | DIASTOLIC BLOOD PRESSURE: 90 MMHG | BODY MASS INDEX: 30.63 KG/M2 | RESPIRATION RATE: 16 BRPM | HEART RATE: 88 BPM | OXYGEN SATURATION: 99 % | WEIGHT: 157.1 LBS | SYSTOLIC BLOOD PRESSURE: 142 MMHG

## 2022-10-19 PROCEDURE — 99211 OFF/OP EST MAY X REQ PHY/QHP: CPT

## 2022-10-19 NOTE — CONSULTS
Patient: Laly Thapa MRN: [id number]  SSN: [id number]    YOB: 1965  Age: 56 y.o. Sex: female      Other Providers:  Dr. Jonathon Marcos, Dr. Lillian Nathan: Abnormal screening mammogram    DIAGNOSIS: L breast cT2N0 invasive ductal carcinoma, intermediate grade, triple negative, ypTis    PREVIOUS RADIATION TREATMENT:  None    HISTORY OF PRESENT ILLNESS:  Laly Thapa is a 56 y.o. female who I am seeing at the request of Dr. Jonathon Marcos. Ms. Sedrick Curry was in her usual state of health until 11/22/21 at which time she underwent a routine screening mammogram which demonstrated a focal asymmetry in the medial inferior L breast at 6cm from the nipple. On 12/6/21 she underwent diagnostic mammogram and ultrasound which confirmed a 1.5 x 0.8 x 0.8cm L breast mass at the 9:00 position, 6cm from the nipple. In addition there was a 7mm hypoechoic mass adjacent to this. On 12/23/21 she underwent biopsy of the 9:00 position lesion at 6cm from the nipple which demonstrated a foci of at least microinvasive infiltrating ductal carcinoma, intermediate grade, in association with focally high grade DCIS. LVI was not identified. The lesion was ER negative (0.9%), IA negative (0.4%), HER2/ mitzy negative (0). An MRI of the bilateral breasts 12/28/21 confirmed a L medial breast cancer measuring about 4.5cm in greatest dimension without additional suspicious findings elsewhere in either breast and no obvious lymphadenopathy. She was evaluated by Dr. Jonathon Marcos and Dr. Ping Gates and elected neoadjuvant chemotherapy with carbo/ taxol/ Keytruda followed by ming/ cytoxan/ [place] She completed this with complications including a taxol reaction requiring hospitalization and required dose reduction of AC due to platelet count. On 9/21/22 she underwent L lumpectomy which demonstrated residual DCIS, intermediate grade, margins >1cm, with 3 negative sentinel lymph nodes. She will be continuing adjuvant Keytruda, next infusion 10/24/22.  She has intermittent breast pains but is not requiring any pain medication. She has had chronic pain in the arm and wrist but no worsening range of motion in the shoulder. PAST MEDICAL HISTORY:    Past Medical History:   Diagnosis Date    Breast cancer ([address]     Hypertension     Murmur, heart     echo 2016 EF 60-65% ; 1/31/22 states never has had symptoms    Poor historian     difficulty with verifying medication list     The patient denies history of collagen vascular diseases, pacemaker insertion, prior radiation. See HPI for details of prior chemotherapy. PAST SURGICAL HISTORY:   Past Surgical History:   Procedure Laterality Date    BREAST BIOPSY Left 9/21/2022    BREAST LESION BIOPSY EXCISION NEEDLE LOCALIZATION/ CHINESE/MANDARIN  performed by Marie Gilman MD at [facility] Left 9/21/2022    LEFT BREAST BIOPSY SENTINEL NODE DISSECTION.  performed by Marie Gilman MD at [place] [address]      bx of left breast     IR PORT PLACEMENT EQUAL OR GREATER THAN 5 YEARS  2/2/2022    IR PORT PLACEMENT EQUAL OR GREATER THAN 5 YEARS  2/2/2022    IR PORT PLACEMENT EQUAL OR GREATER THAN 5 YEARS 2/2/2022 [facility] SPECIALS    US BREAST NEEDLE BIOPSY LEFT Left 12/23/2021    US BREAST NEEDLE BIOPSY LEFT 12/23/2021 [facility]       MEDICATIONS:     Current Outpatient Medications:     traZODone (DESYREL) 50 MG tablet, PRN (Patient not taking: No sig reported), Disp: , Rfl:     spironolactone (ALDACTONE) 25 MG tablet, 25 mg, Disp: , Rfl:     dilTIAZem (TIAZAC) 240 MG extended release capsule, 1 po qd, Disp: , Rfl:     lidocaine-prilocaine (EMLA) 2.5-2.5 % cream, Apply to port about 45 minutes prior to access, Disp: , Rfl:     ALLERGIES:   Allergies   Allergen Reactions    Paclitaxel Angioedema and Swelling    Lisinopril Other (See Comments)     Other reaction(s): Cough       SOCIAL HISTORY:   Social History     Socioeconomic History    Marital status:      Spouse name: Not on file    Number of children: Not on file    Years of education: Not on file    Highest education level: Not on file   Occupational History    Not on file   Tobacco Use    Smoking status: Never    Smokeless tobacco: Never   Substance and Sexual Activity    Alcohol use: No     Alcohol/week: 0.0 standard drinks    Drug use: No    Sexual activity: Not on file   Other Topics Concern    Not on file   Social History Narrative    Abuse: Feels safe at home, no history of physical abuse, no history of sexual abuse       Social Determinants of Health     Financial Resource Strain: Not on file   Food Insecurity: Not on file   Transportation Needs: Not on file   Physical Activity: Not on file   Stress: Not on file   Social Connections: Not on file   Intimate Partner Violence: Not on file   Housing Stability: Not on file       FAMILY HISTORY:   Family History   Problem Relation Age of Onset    Ovarian Cancer Neg Hx     No Known Problems Mother     Breast Cancer Neg Hx     No Known Problems Sister     Other Other         states no family history    Colon Cancer Neg Hx     Hypertension Father     No Known Problems Sister     No Known Problems Brother     Uterine Cancer Neg Hx        REVIEW OF SYSTEMS:   A full 12-point review of systems was completed and was negative unless noted in the history of present illness.     PHYSICAL EXAMINATION:   ECOG Performance status 0  VITAL SIGNS:   Vitals:    10/19/22 0949   BP: (!) 142/90   Pulse: 88   Resp: 16   Temp: 98.1 °F (36.7 °C)   SpO2: 99%      General: well developed/nourished adult Female in no acute distress; appears stated age  [de-identified]: normocephalic, atraumatic; EOMI  Neck: supple with full ROM; no cervical lymphadenopathy  Respiratory: normal inspiratory effort, no audible wheezes  Extremities: no cyanosis, clubbing, or edema  Musculoskeletal: mobility intact x4; normal ROM in all joints  Neuro: AOx3; sensation intact x 4; CNII-XII grossly intact  Psych: appropriate affect, insight, and judgement  GI: abdomen soft, non-distended  Breast: Breasts symmetric bilaterally without skin changes or nipple discharge. L axillary incision well healed, L breast incision with steri strips in place, clean/ dry/ intact, no palpable masses or tenderness to palpation. R breast without palpable masses or tenderness to palpation. PATHOLOGY:    I personally reviewed the pathology reports as documented in the HPI. LABORATORY:   Lab Results   Component Value Date/Time     10/03/2022 09:42 AM    K 4.3 10/03/2022 09:42 AM     10/03/2022 09:42 AM    CO2 26 10/03/2022 09:42 AM    BUN 21 10/03/2022 09:42 AM    GFRAA >60 10/03/2022 09:42 AM    MG 2.0 08/01/2022 10:58 AM    GLOB 4.3 10/03/2022 09:42 AM    ALT 45 10/03/2022 09:42 AM     Lab Results   Component Value Date/Time    WBC 5.3 10/03/2022 09:42 AM    HGB 11.7 10/03/2022 09:42 AM    HCT 35.5 10/03/2022 09:42 AM     10/03/2022 09:42 AM     RADIOLOGY:    I personally reviewed the images and agree with the findings as documented in the HPI. IMPRESSION:  Angela Valencia is a 56 y.o. female with L breast cT2N0 invasive ductal carcinoma, intermediate grade, triple negative, ypTisN0. I had a long discussion with Angela Valencia and her family regarding the rationale and logistics of adjuvant radiation in the presence of an . I recommended treatment given breast conservation surgery in order to minimize the risk of locoregional progression and improve overall survival. I discussed radiation options including standard fractionation or hypofractionation with and without a boost, ultra hypofractionation, and accelerated partial breast irradiation. I recommended against ultra hypofractionation and ABPI given neoadjuvant chemotherapy. I believe she would be an excellent candidate for hypofractionated whole breast radiation and recommended a boost given her age and triple negative disease.  I reviewed in detail the anticipated acute and late toxicities of radiation therapy as well as  the logistics of treatment including breath hold to minimize risk to the lungs and heart as well as the expected disease control. In regards to her breast incisions, she was instructed by Dr. Seymour Zamarripa to remove the steri strips 2 weeks after surgery but she has been nervous to do so herself, I will ask our MA to do this today in clinic. Linda Agarwal and her family had the opportunity to ask questions which appeared to be answered to their satisfaction and have elected to proceed. PLAN:    Steri strip removal today in clinic. Consented patient for treatment with external beam radiation after discussing risk, benefits, and side effects from treatment. CT simulation to be scheduled and treatment to start shortly thereafter.     Tai Plummer MD   October 19, 2022

## 2022-10-19 NOTE — PROGRESS NOTES
Consult Left Breast Cancer. Spouse and daughter present for consult. Mandarin interpretor used. S/p Neoadjuvant chemo. Lumpectomy 9-21-22. Linda Iglesias scheduled 10-24-22  Started menses around age 15-12. Pt is post menopausal.  Contraceptive patch x 2 weeks. Pt is unsure about HRT. Radiation teaching completed. Skin care sheet explained. CONSENTS SIGNED FOR RT.  CT SIM SCHEDULED. APT GIVEN TO PT.     Misty Jesus.  Yumiko Peguero

## 2022-10-24 ENCOUNTER — HOSPITAL ENCOUNTER (OUTPATIENT)
Dept: INFUSION THERAPY | Age: 57
Discharge: HOME | End: 2022-10-24
Payer: COMMERCIAL

## 2022-10-24 ENCOUNTER — OFFICE VISIT (OUTPATIENT)
Dept: ONCOLOGY | Age: 57
End: 2022-10-24
Payer: COMMERCIAL

## 2022-10-24 VITALS
WEIGHT: 157.6 LBS | TEMPERATURE: 98.7 F | BODY MASS INDEX: 30.94 KG/M2 | SYSTOLIC BLOOD PRESSURE: 130 MMHG | HEIGHT: 60 IN | RESPIRATION RATE: 16 BRPM | DIASTOLIC BLOOD PRESSURE: 80 MMHG | HEART RATE: 94 BPM | OXYGEN SATURATION: 97 %

## 2022-10-24 DIAGNOSIS — R52 PAIN: ICD-10-CM

## 2022-10-24 DIAGNOSIS — R79.89 TSH ELEVATION: ICD-10-CM

## 2022-10-24 DIAGNOSIS — C50.919 TRIPLE NEGATIVE MALIGNANT NEOPLASM OF BREAST (HCC): Primary | ICD-10-CM

## 2022-10-24 DIAGNOSIS — Z79.899 HIGH RISK MEDICATION USE: ICD-10-CM

## 2022-10-24 DIAGNOSIS — C50.919 TRIPLE NEGATIVE MALIGNANT NEOPLASM OF BREAST (HCC): ICD-10-CM

## 2022-10-24 DIAGNOSIS — M25.512 ACUTE PAIN OF LEFT SHOULDER: ICD-10-CM

## 2022-10-24 LAB
ALBUMIN SERPL-MCNC: 4 G/DL (ref 3.5–5)
ALBUMIN/GLOB SERPL: 1 {RATIO} (ref 0.4–1.6)
ALP SERPL-CCNC: 121 U/L (ref 50–136)
ALT SERPL-CCNC: 39 U/L (ref 12–65)
ANION GAP SERPL CALC-SCNC: 6 MMOL/L (ref 2–11)
AST SERPL-CCNC: 26 U/L (ref 15–37)
BASOPHILS # BLD: 0 K/UL (ref 0–0.2)
BASOPHILS NFR BLD: 0 % (ref 0–2)
BILIRUB SERPL-MCNC: 0.5 MG/DL (ref 0.2–1.1)
BUN SERPL-MCNC: 27 MG/DL (ref 6–23)
CALCIUM SERPL-MCNC: 9.6 MG/DL (ref 8.3–10.4)
CHLORIDE SERPL-SCNC: 106 MMOL/L (ref 101–110)
CO2 SERPL-SCNC: 25 MMOL/L (ref 21–32)
CREAT SERPL-MCNC: 1 MG/DL (ref 0.6–1)
DIFFERENTIAL METHOD BLD: ABNORMAL
EOSINOPHIL # BLD: 0.1 K/UL (ref 0–0.8)
EOSINOPHIL NFR BLD: 2 % (ref 0.5–7.8)
ERYTHROCYTE [DISTWIDTH] IN BLOOD BY AUTOMATED COUNT: 13.1 % (ref 11.9–14.6)
GLOBULIN SER CALC-MCNC: 4.2 G/DL (ref 2.8–4.5)
GLUCOSE SERPL-MCNC: 155 MG/DL (ref 65–100)
HCT VFR BLD AUTO: 37.3 % (ref 35.8–46.3)
HGB BLD-MCNC: 12.1 G/DL (ref 11.7–15.4)
IMM GRANULOCYTES # BLD AUTO: 0 K/UL (ref 0–0.5)
IMM GRANULOCYTES NFR BLD AUTO: 0 % (ref 0–5)
LYMPHOCYTES # BLD: 1.2 K/UL (ref 0.5–4.6)
LYMPHOCYTES NFR BLD: 31 % (ref 13–44)
MCH RBC QN AUTO: 32 PG (ref 26.1–32.9)
MCHC RBC AUTO-ENTMCNC: 32.4 G/DL (ref 31.4–35)
MCV RBC AUTO: 98.7 FL (ref 82–102)
MONOCYTES # BLD: 0.3 K/UL (ref 0.1–1.3)
MONOCYTES NFR BLD: 8 % (ref 4–12)
NEUTS SEG # BLD: 2.3 K/UL (ref 1.7–8.2)
NEUTS SEG NFR BLD: 58 % (ref 43–78)
NRBC # BLD: 0 K/UL (ref 0–0.2)
PLATELET # BLD AUTO: 132 K/UL (ref 150–450)
PMV BLD AUTO: 8.9 FL (ref 9.4–12.3)
POTASSIUM SERPL-SCNC: 4 MMOL/L (ref 3.5–5.1)
PROT SERPL-MCNC: 8.2 G/DL (ref 6.3–8.2)
RBC # BLD AUTO: 3.78 M/UL (ref 4.05–5.2)
SODIUM SERPL-SCNC: 137 MMOL/L (ref 133–143)
TSH, 3RD GENERATION: 37 UIU/ML (ref 0.36–3)
WBC # BLD AUTO: 4 K/UL (ref 4.3–11.1)

## 2022-10-24 PROCEDURE — 84443 ASSAY THYROID STIM HORMONE: CPT

## 2022-10-24 PROCEDURE — 6360000002 HC RX W HCPCS: Performed by: INTERNAL MEDICINE

## 2022-10-24 PROCEDURE — 2580000003 HC RX 258: Performed by: INTERNAL MEDICINE

## 2022-10-24 PROCEDURE — 99215 OFFICE O/P EST HI 40 MIN: CPT | Performed by: INTERNAL MEDICINE

## 2022-10-24 PROCEDURE — 96413 CHEMO IV INFUSION 1 HR: CPT

## 2022-10-24 PROCEDURE — 36591 DRAW BLOOD OFF VENOUS DEVICE: CPT

## 2022-10-24 PROCEDURE — 80053 COMPREHEN METABOLIC PANEL: CPT

## 2022-10-24 PROCEDURE — 85025 COMPLETE CBC W/AUTO DIFF WBC: CPT

## 2022-10-24 RX ORDER — SODIUM CHLORIDE 0.9 % (FLUSH) 0.9 %
5-40 SYRINGE (ML) INJECTION PRN
Status: DISCONTINUED | OUTPATIENT
Start: 2022-10-24 | End: 2022-10-25 | Stop reason: HOSPADM

## 2022-10-24 RX ORDER — LIDOCAINE AND PRILOCAINE 25; 25 MG/G; MG/G
CREAM TOPICAL
Qty: 2.5 G | Refills: 1 | Status: SHIPPED | OUTPATIENT
Start: 2022-10-24

## 2022-10-24 RX ORDER — SODIUM CHLORIDE 0.9 % (FLUSH) 0.9 %
10 SYRINGE (ML) INJECTION PRN
Status: DISCONTINUED | OUTPATIENT
Start: 2022-10-24 | End: 2022-10-25 | Stop reason: HOSPADM

## 2022-10-24 RX ORDER — SODIUM CHLORIDE 9 MG/ML
5-250 INJECTION, SOLUTION INTRAVENOUS PRN
Status: DISCONTINUED | OUTPATIENT
Start: 2022-10-24 | End: 2022-10-25 | Stop reason: HOSPADM

## 2022-10-24 RX ADMIN — SODIUM CHLORIDE, PRESERVATIVE FREE 10 ML: 5 INJECTION INTRAVENOUS at 11:16

## 2022-10-24 RX ADMIN — SODIUM CHLORIDE, PRESERVATIVE FREE 10 ML: 5 INJECTION INTRAVENOUS at 09:53

## 2022-10-24 RX ADMIN — SODIUM CHLORIDE 200 MG: 9 INJECTION, SOLUTION INTRAVENOUS at 11:21

## 2022-10-24 RX ADMIN — SODIUM CHLORIDE 125 ML/HR: 9 INJECTION, SOLUTION INTRAVENOUS at 11:18

## 2022-10-24 ASSESSMENT — PATIENT HEALTH QUESTIONNAIRE - PHQ9
SUM OF ALL RESPONSES TO PHQ QUESTIONS 1-9: 0
2. FEELING DOWN, DEPRESSED OR HOPELESS: 0
SUM OF ALL RESPONSES TO PHQ QUESTIONS 1-9: 0
1. LITTLE INTEREST OR PLEASURE IN DOING THINGS: 0
SUM OF ALL RESPONSES TO PHQ9 QUESTIONS 1 & 2: 0
SUM OF ALL RESPONSES TO PHQ QUESTIONS 1-9: 0
SUM OF ALL RESPONSES TO PHQ QUESTIONS 1-9: 0

## 2022-10-24 NOTE — PATIENT INSTRUCTIONS
Patient Instructions from Today's Visit    Reason for Visit:  Follow Up    Diagnosis Information:  [email or web address]      Plan:  Lab work looks stable today  We will refer you to [facility] Rehab so physical therapy can help with your left shoulder pain    Follow Up: We will bring you back in November for a follow up with  and lab work prior.     Recent Lab Results:  Hospital Outpatient Visit on 10/24/2022   Component Date Value Ref Range Status    WBC 10/24/2022 4.0 (A)  4.3 - 11.1 K/uL Final    RBC 10/24/2022 3.78 (A)  4.05 - 5.2 M/uL Final    Hemoglobin 10/24/2022 12.1  11.7 - 15.4 g/dL Final    Hematocrit 10/24/2022 37.3  35.8 - 46.3 % Final    MCV 10/24/2022 98.7  82.0 - 102.0 FL Final    MCH 10/24/2022 32.0  26.1 - 32.9 PG Final    MCHC 10/24/2022 32.4  31.4 - 35.0 g/dL Final    RDW 10/24/2022 13.1  11.9 - 14.6 % Final    Platelets 78/65/3577 132 (A)  150 - 450 K/uL Final    MPV 10/24/2022 8.9 (A)  9.4 - 12.3 FL Final    nRBC 10/24/2022 0.00  0.0 - 0.2 K/uL Final    **Note: Absolute NRBC parameter is now reported with Hemogram**    Differential Type 10/24/2022 AUTOMATED    Final    Seg Neutrophils 10/24/2022 58  43 - 78 % Final    Lymphocytes 10/24/2022 31  13 - 44 % Final    Monocytes 10/24/2022 8  4.0 - 12.0 % Final    Eosinophils % 10/24/2022 2  0.5 - 7.8 % Final    Basophils 10/24/2022 0  0.0 - 2.0 % Final    Immature Granulocytes 10/24/2022 0  0.0 - 5.0 % Final    Segs Absolute 10/24/2022 2.3  1.7 - 8.2 K/UL Final    Absolute Lymph # 10/24/2022 1.2  0.5 - 4.6 K/UL Final    Absolute Mono # 10/24/2022 0.3  0.1 - 1.3 K/UL Final    Absolute Eos # 10/24/2022 0.1  0.0 - 0.8 K/UL Final    Basophils Absolute 10/24/2022 0.0  0.0 - 0.2 K/UL Final    Absolute Immature Granulocyte 10/24/2022 0.0  0.0 - 0.5 K/UL Final         Treatment Summary has been discussed and given to patient: n/a        -------------------------------------------------------------------------------------------------------------------  Please call our office at [phone] if you have any  of the following symptoms:   Fever of 100.5 or greater  Chills  Shortness of breath  Swelling or pain in one leg    After office hours an answering service is available and will contact a provider for emergencies or if you are experiencing any of the above symptoms. Patient has My Chart. My Chart log in information explained on the after visit summary printout at the [address] desk.     Kavin Jacobo MA

## 2022-10-24 NOTE — PROGRESS NOTES
[facility]: Office Visit Progress Note    Chief Complaint:    Triple negative left breast cancer    History of Present Illness:  Reason for Referral: Carcinoma of upper-inner  quadrant of left breast in female, estrogen receptor negative; Triple negative malignant neoplasm of breast       Referring Provider: Jhonathan Umaan MD       Primary Care Provider: Keeley Sanchez MD       Family History of Cancer/Hematologic Disorders: None reported       Presenting Symptoms: Abnormal routine screening mammogram        Ms. Leonlea Dwyer is a 56 y.o.  female with a history of heart murmur, HTN, mixed HLD, and irregular menses, who was recently diagnosed with breast  cancer. She initially presented on 11/22/21 for a routine bilateral digital screening mammogram which identified focal asymmetry in the medial inferior left breast at 6 cm from the nipple. Further evaluation with diagnostic left breast mammogram and left  breast ultrasound were completed on 12/6/21 confirming the presence of adjacent left breast masses with one at the 9:00 position, 6 cm from the nipple measuring approximately 1.5 x 0.8 x 0.8 cm and a 7 mm hypoechoic mass just adjacent to this. Recommended ultrasound-guided biopsy of the medial left breast mass was completed on 12/23/21 with pathology from the core biopsy of the left breast, 9:00 position, 6 cm from nipple, revealing ER 0.9%/PA 0.4%/HER-2 (0) negative, intermediate grade (moderately  differentiated), foci of at least microinvasive infiltrating ductal carcinoma in association with focally high grade ductal carcinoma in situ with no definite lymphovascular invasion identified. MRI of the bilateral breasts was performed on 12/28/21 demonstrating left medial breast cancer measuring about 4.5 cm in greatest dimension; no additional suspicious finding elsewhere in either breast; and no obvious lymphadenopathy.        Patient was referred to surgery and evaluated in consultation by Surgeon, Dr. Stella Houston, on 1/10/22. She was assessed with signs and symptoms consistent with cT2N0 triple negative left breast IDC with DCIS. Treatment options were discussed, and  Dr. Tanisha Byrd recommended genetic testing and preoperative evaluation by medical oncology to consider neoadjuvant chemotherapy. Dr. Tanisha Byrd noted, \"We discussed the strong possibility that she could require left mastectomy/sentinel node should we  proceed with surgery upfront. At this time the enhancement on MRI looks too large, diffuse, and elongated to perform breast preservation surgery and expected good cosmetic result. If she has neoadjuvant treatment I will see her back in about 4 months  to regroup. \"       Patient is now referred to [facility] for oncology evaluation and consideration for neoadjuvant chemotherapy. Patient completed genetic counseling on 1/13/22. She agreed to pursue testing with BRCAPlus panel of 8 genes associated with hereditary cancer  (including BRCA1 and BRCA2 ). BILATERAL DIGITAL SCREENING MAMMOGRAM  11/22/2021   FINDINGS: There are scattered fibroglandular densities. A few typically benign-appearing calcifications in the left breast are similar in  appearance to the previous study. There is a focal asymmetry in the medial inferior left breast located 6 cm from the nipple. Recommend spot compression magnification views on the direct ML view and ultrasound, if indicated for further evaluation. There  is otherwise no suspicious mass, calcification, or architectural distortion in either breast.      IMPRESSION: Focal asymmetry in the medial inferior left breast at 6 cm from the nipple. for which further evaluation is recommended with ML  and compression magnification views, with possible ultrasound if indicated. BI-RADS Assessment Category 0: Incomplete: Needs additional imaging evaluation.        DIAGNOSTIC MAMMOGRAM LEFT BREAST, LEFT BREAST ULTRASOUND 12/6/21   FINDING: The area of concern does not persist on additional imaging. There is no architectural distortion. The area of concern persists with  additional imaging. This resides within the medial left breast at approximately 9:00 position. There are few associated calcifications which appear somewhat coarse. This measures approximately 1.3 cm in size. An additional smaller asymmetry also at the  9:00 position resides 7 cm from the nipple. A left breast ultrasound is recommended for further evaluation. LEFT BREAST ULTRASOUND: At the 9:00 position, 6 cm from the nipple is a lobulated hypoechoic but mildly heterogeneous appearing mass measuring approximately 1.5 x 0.8 x 0.8 cm. The margins are occasionally ill-defined with some components demonstrating  posterior acoustic shadowing. The long axis is parallel to the plane of the chest wall. There is a 7 mm hypoechoic mass just adjacent to this This is hypoechoic with a lobulated contour. The margins are occasionally ill-defined. IMPRESSION: Adjacent left breast masses. Recommend tissue sampling of the larger lesion. BI-RADS Assessment Category 4: Suspicious Finding- Biopsy should be considered. [facility] SURGICAL PATHOLOGY REPORT 12/23/21   DIAGNOSIS    A: \" LEFT BREAST, 9:00 POSITION, 6 CM FROM NIPPLE, CORE BIOPSY\": FOCI OF AT LEAST MICROINVASIVE INFILTRATING DUCTAL CARCINOMA, INTERMEDIATE GRADE (MODERATELY DIFFERENTIATED) IN ASSOCIATION WITH FOCALLY HIGH GRADE DUCTAL CARCINOMA IN SITU.  DEFINITE LYMPHOVASCULAR  INVASION IS NOT IDENTIFIED   [facility]- ER/RI/LHW7ART BY IHC    INTERPRETATION    [employer] IHC Quantitative Breast Panel    TEST NAME:                                      RESULTS:                  INTERNAL CONTROLS:    ESTROGEN RECEPTOR:                 Negative (0.9%)          Present, Positive    PROGESTERONE RECEPTOR:       Negative (0.4%)          Present, Positive    HER-2/CHAR:                                        Negative (0)                Percentage of Cells with Uniform Intense Complete Membrane    Stainin%   STF-IMMUNOHISTOCHEMISTRY   Immunohistochemical Stain Panel:    INTERPRETATION: Immunohistochemical findings consistent with breast primary. ANTIBODY/TEST       MARKER FOR                                               RESULT    Cytokeratin 7               Lung, breast, upper GE, serous ovary           Positive    Cytokeratin 20             Colon, mucinous ovary, urothelium                Negative    GATA3                        Urothelial, breast carcinoma                           Positive    GCDFP                       Breast, salivary gland, skin, adnexa               Positive    Mammaglobin                         Breast                                                              Positive       MRI OF THE BREASTS WITH AND WITHOUT CONTRAST 21   FINDINGS: The breasts demonstrate moderate glandularity and mild background enhancement. Left 9:00 biopsy clip within a lobulated heterogeneously  enhancing mass with angulated and irregular margins overall measuring about 1.8 x 0.7 x 1.2 cm. Posterior to this (about 1 cm away) the smaller mass measuring 0.9 cm is unchanged from recent mammogram and ultrasound. Additionally there is clumped and  reticular nonmass enhancement extending superiorly by about 2.7 cm and anteriorly by about 1.8 cm, in keeping with the DCIS and calcifications. Overall the expected area of disease measures about 4.5 cm AP by 1.2 cm transverse and is located at 9:00-9:30,  mid to posterior depth. There is no evidence of involvement of skin or chest wall structures. There is no other evidence of suspicious enhancing mass, and no other dominant or unique nonmass enhancement, to suggest additional malignancy in either breast.  There is no evidence of axillary or internal mammary lymphadenopathy.  Elsewhere, limited visualization of the partially included thorax and upper abdomen shows no acute abnormality, with note of a small benign cyst in the left liver. IMPRESSION   1. Left medial breast cancer measures about 4.5 cm in greatest dimension. 2. No additional suspicious finding elsewhere in either breast. No obvious lymphadenopathy. Recommend continued management as directed clinically. BI-RADS Assessment Category 6: Known Biopsy Proven Malignancy       Notes from Referring Provider: \"Age 49 with cT2N0 triple  negative left breast IDC and DCIS Evaluate    Speaks mandarin   She has been referred to genetics also\"       Interim history update in A/P. Review of Systems:      Constitutional  Denies fever or chills. Denies fatigue. Denies anorexia. HEENT   allergic conjunctivitis and sinusitis. Denies trauma, bluring vision, hearing loss, ear pain, nosebleeds, sore throat, neck pain and ear discharge. Skin  Denies lesions or rashes. Lungs  Denies shortness of breath, cough, sputum production or hemoptysis. Cardiovascular  Denies chest pain, palpitations, orthopnea, claudication and leg swelling. Gastrointestinal  Nausea. Denies vomiting. Denies bloody or black stools. Denies abdominal pain.   Denies dysuria, frequency or hesitancy of urination     Neuro  Denies headaches, visual changes or ataxia. Denies dizziness, tingling, tremors, sensory change, speech change, focal weakness and headaches. Hematology  Denies nasal/gum bleeding, denies easy bruise     Endo  Denies heat/cold intolerance, denies diabetes. MSK  Denies back pain, swollen legs, myalgias and falls. Psychiatric/Behavioral   insomnia. Denies depression and substance abuse. The patient is not nervous/anxious.               Allergies   Allergen Reactions    Paclitaxel Angioedema and Swelling    Lisinopril Other (See Comments)     Other reaction(s): Cough     Past Medical History:   Diagnosis Date    Breast cancer ([facility] [address]     Hypertension     Murmur, heart     echo 2016 EF 60-65% ; 1/31/22 states never has had symptoms    Poor historian difficulty with verifying medication list     Past Surgical History:   Procedure Laterality Date    BREAST BIOPSY Left 9/21/2022    BREAST LESION BIOPSY EXCISION NEEDLE LOCALIZATION/ CHINESE/MANDARIN  performed by Veronica Adam MD at [address] [place] [address] Left 9/21/2022    LEFT BREAST BIOPSY SENTINEL NODE DISSECTION.  performed by Veronica Adam MD at [place] [address]      bx of left breast     IR PORT PLACEMENT EQUAL OR GREATER THAN 5 YEARS  2/2/2022    IR PORT PLACEMENT EQUAL OR GREATER THAN 5 YEARS  2/2/2022    IR PORT PLACEMENT EQUAL OR GREATER THAN 5 YEARS 2/2/2022 [facility] RADIOLOGY SPECIALS    US BREAST NEEDLE BIOPSY LEFT Left 12/23/2021    US BREAST NEEDLE BIOPSY LEFT 12/23/2021 [facility] RADIOLOGY MAMMO     Family History   Problem Relation Age of Onset    Ovarian Cancer Neg Hx     No Known Problems Mother     Breast Cancer Neg Hx     No Known Problems Sister     Other Other         states no family history    Colon Cancer Neg Hx     Hypertension Father     No Known Problems Sister     No Known Problems Brother     Uterine Cancer Neg Hx      Social History     Socioeconomic History    Marital status:      Spouse name: Not on file    Number of children: Not on file    Years of education: Not on file    Highest education level: Not on file   Occupational History    Not on file   Tobacco Use    Smoking status: Never    Smokeless tobacco: Never   Substance and Sexual Activity    Alcohol use: No     Alcohol/week: 0.0 standard drinks    Drug use: No    Sexual activity: Not on file   Other Topics Concern    Not on file   Social History Narrative    Abuse: Feels safe at home, no history of physical abuse, no history of sexual abuse       Social Determinants of Health     Financial Resource Strain: Not on file   Food Insecurity: Not on file   Transportation Needs: Not on file   Physical Activity: Not on file   Stress: Not on file   Social Connections: Not on file   Intimate Partner Violence: Not on file   Housing Stability: Not on file     Current Outpatient Medications   Medication Sig Dispense Refill    lidocaine-prilocaine (EMLA) 2.5-2.5 % cream Apply to port about 45 minutes prior to access 2.5 g 1    spironolactone (ALDACTONE) 25 MG tablet 25 mg      dilTIAZem (TIAZAC) 240 MG extended release capsule 1 po qd       No current facility-administered medications for this visit. Facility-Administered Medications Ordered in Other Visits   Medication Dose Route Frequency Provider Last Rate Last Admin    sodium chloride flush 0.9 % injection 10 mL  10 mL IntraVENous PRN Anneliese Hoover MD   10 mL at 10/24/22 0953    0.9 % sodium chloride infusion  5-250 mL/hr IntraVENous PRN Anneliese Hoover  mL/hr at 10/24/22 1118 125 mL/hr at 10/24/22 1118    pembrolizumab (KEYTRUDA) 200 mg in sodium chloride 0.9 % 100 mL chemo IVPB  200 mg IntraVENous Once Anneliese Hoover MD        sodium chloride flush 0.9 % injection 5-40 mL  5-40 mL IntraVENous PRN Anneliese Hoover MD   10 mL at 10/24/22 1116       OBJECTIVE:  /80 Comment: standing  Pulse 94   Temp 98.7 °F (37.1 °C) (Oral)   Resp 16   Ht 5' 0.05\" (1.525 m)   Wt 157 lb 9.6 oz (71.5 kg)   SpO2 97%   BMI 30.73 kg/m²     Physical Exam:      Constitutional:  Oriented to person, place, and time. Well-developed and well-nourished. HEENT:  Normocephalic and atraumatic. Oropharynx is clear and moist.    Conjunctivae and EOM are normal. Pupils are equal, round, and reactive to light. No scleral icterus. Neck supple. No JVD present. No tracheal deviation present. No thyromegaly present. Lymph node  No palpable submandibular, cervical, supraclavicular, axillary and inguinal lymph nodes. Breasts  Left breast soft, nontender, popular nodular area and 9:00 about 2 cm at baseline, no longer palpable after chemotherapy. Right breast soft, nontender, no mass. Skin   rash. Warm and dry. No bruising noted.   No erythema. No pallor. Respiratory  Effort normal and breath sounds normal.  No respiratory distress. No wheezes. No rales. No tenderness. CVS  Normal rate, regular rhythm and normal heart sounds. Exam reveals no gallop, no friction and no rub. No murmur heard. Abdomen  Soft. Bowel sounds are normal. Exhibits no distension. There is no tenderness. There is no rebound and no guarding. Neuro  Normal reflexes. No cranial nerve deficit. Exhibits normal muscle tone, 5 of 5 strength of all extremities. MSK  Normal range of motion in general.  No edema and no tenderness.         Psych  Normal mood, affect, behavior, judgment and thought content        Labs:  Recent Results (from the past 24 hour(s))   CBC With Auto Differential    Collection Time: 10/24/22  9:53 AM   Result Value Ref Range    WBC 4.0 (L) 4.3 - 11.1 K/uL    RBC 3.78 (L) 4.05 - 5.2 M/uL    Hemoglobin 12.1 11.7 - 15.4 g/dL    Hematocrit 37.3 35.8 - 46.3 %    MCV 98.7 82.0 - 102.0 FL    MCH 32.0 26.1 - 32.9 PG    MCHC 32.4 31.4 - 35.0 g/dL    RDW 13.1 11.9 - 14.6 %    Platelets 848 (L) 421 - 450 K/uL    MPV 8.9 (L) 9.4 - 12.3 FL    nRBC 0.00 0.0 - 0.2 K/uL    Differential Type AUTOMATED      Seg Neutrophils 58 43 - 78 %    Lymphocytes 31 13 - 44 %    Monocytes 8 4.0 - 12.0 %    Eosinophils % 2 0.5 - 7.8 %    Basophils 0 0.0 - 2.0 %    Immature Granulocytes 0 0.0 - 5.0 %    Segs Absolute 2.3 1.7 - 8.2 K/UL    Absolute Lymph # 1.2 0.5 - 4.6 K/UL    Absolute Mono # 0.3 0.1 - 1.3 K/UL    Absolute Eos # 0.1 0.0 - 0.8 K/UL    Basophils Absolute 0.0 0.0 - 0.2 K/UL    Absolute Immature Granulocyte 0.0 0.0 - 0.5 K/UL   Comprehensive metabolic panel    Collection Time: 10/24/22  9:53 AM   Result Value Ref Range    Sodium 137 133 - 143 mmol/L    Potassium 4.0 3.5 - 5.1 mmol/L    Chloride 106 101 - 110 mmol/L    CO2 25 21 - 32 mmol/L    Anion Gap 6 2 - 11 mmol/L    Glucose 155 (H) 65 - 100 mg/dL    BUN 27 (H) 6 - 23 MG/DL    Creatinine 1.00 0.6 - 1.0 MG/DL    Est, Glom Filt Rate >60 >60 ml/min/1.73m2    Calcium 9.6 8.3 - 10.4 MG/DL    Total Bilirubin 0.5 0.2 - 1.1 MG/DL    ALT 39 12 - 65 U/L    AST 26 15 - 37 U/L    Alk Phosphatase 121 50 - 136 U/L    Total Protein 8.2 6.3 - 8.2 g/dL    Albumin 4.0 3.5 - 5.0 g/dL    Globulin 4.2 2.8 - 4.5 g/dL    Albumin/Globulin Ratio 1.0 0.4 - 1.6     TSH    Collection Time: 10/24/22  9:53 AM   Result Value Ref Range    TSH, 3RD GENERATION 37.000 (H) 0.358 - 3 uIU/mL         Imaging:  No results found for this or any previous visit. ASSESSMENT/PLAN:   Diagnosis Orders   1. Triple negative malignant neoplasm of breast ([address]  [facility] - Oncology [facility]    lidocaine-prilocaine (EMLA) 2.5-2.5 % cream      2. High risk medication use        3. TSH elevation        4. Acute pain of left shoulder        5. Pain                    [unfilled]    56 y.o. F consulted for triple negative left breast cancer and presented to [facility] with  and daughter on 1/19/2022.    She has good baseline health and functions well, and annual screening mammogram showed left breast lesion, biopsy showed triple negative grade 2 IDC, cT2 N0 M0, and we discussed the high risks profile for triple negative breast cancer and the most recent  FDA approval of neoadjuvant chemoimmunotherapy, she appeared a good candidate and agreed to arrange Daniela Romance for 4 cycles followed by Adriamycin Cytoxan Keytruda for 4 cycles, followed by surgery and adjuvant Keytruda for total systemic therapy  of 1 year, discussed toxicity and management, discussed logistics, arrange Zofran Compazine Ativan, baseline echocardiogram showed normal EF, hypertension better but still uncontrolled after adding spironolactone and educated risk of dehydration and hypotension  on chemotherapy when she is taking double diuretics, started cycle 1 2/7/22 and left breast tumor responded rapidly, lost 20 pounds after cycle 1 and BP much better controlled even after she runs out of diltiazem, again warned of monitoring dehydration/hypotension,  discussed increased protein/calorie intake, may add stimulant if continues to lose weight, occasional mild tingling in fingertips, episodes of constipation responded to laxative, proceed to cycle 1 day 8 carbotaxol Keytruda, educated to monitor for neuropathy,  follow next week but call as needed.       However after cycle 1 day 8 Taxol on 2/14 and developed acute onset of itching, rash, anasarca, short of breath, fever, hypotension, diarrhea, admitted with antibiotics, antihistamine,  IV fluid, most symptom much improved the second day although still lingering, discussed that the picture of allergic reaction but thorough discussion not able to identify new food or medicine, not typical for Taxol or Keytruda reaction given the timing  and the acuity, and she reported a similar episode a few years ago that she went to ER for treatment, but never followed up for allergen test and never had EpiPen, discussed the concern of allergy and will arrange allergist work-up, however proceeded  with day 15 Taxol with close observation to rule out atypical Taxol allergy, patient and family understand the risk and agreed to proceed, and reported reasonable tolerance except for mild itching through the infusion, however overnight RN was concerned  of tachycardia and tachypneic which patient reported similar to each infusion but received IV Benadryl and Solu-Medrol, LFT elevated for a day, give albumin with lasix and anasarca improved, DC with Benadryl/prednisone/EpiPen as needed, followed  on 2/28/2022, swelling resolved, mild rash of hands and arms, discussed to change Taxol to Taxotere for better management of reaction, return on 3/7/2022 reported much better tolerance for the allergic reaction, however more nausea/vomiting/weight loss  and neutropenic fever with temperature 101 and ANC 0.2, coughing and rule out Covid, typical seasonal allergic sinusitis symptoms and educated to start using Nasacort spray, admit to hospital for neutropenic fever, discharge 3/10/2022, had skin rash that  was attributed to cefepime and resolved, again discussed her sensitivity to multiple allergens and different manifestations need to be managed accordingly, Zaditor for allergic conjunctivitis, nasal steroid for sinusitis, Kenalog for skin rash, oral antihistamine,  prednisone 20 mg only as needed, added G-CSF since cycle 3, better controlled for allergy/hypertension/fluid retention, severe diarrhea responded to PRBC, severe thrombocytopenia recovered after delaying cycle 4 by a week, completed cycle 4 with G-CSF  and Emend. She returned on 5/9/2022 to start AC/Keytruda, however platelet was low and chemo had to be deferred for 2 weeks actually and platelets finally recovered to 92 on 5/23/2022, discussed to proceed with caution and reduce AC dose by 20%, which she tolerated reasonably well and completed 4 cycles of AC/Keytruda without major complication, repeat MRI showed cCR and Dr. Thelma Monroy performed left lumpectomy and axillary SLND on 9/21/2022, pathology showed ypTis N0 triple negative left breast carcinoma, discussed that the complete response of the invasive triple negative cancer is a favorable prognostic marker, continued adjuvant treatment, followed on 10/24/2022, weight stable, labs reviewed and proceed to cycle 12 Keytruda, monitor TSH, complaining of left shoulder pain since the surgery and consult [facility] for physical therapy, refill Emla for pain, start radiation pending simulation, return in 3 weeks for next cycle but call as needed. All questions are answered to their satisfaction. They will call for further questions and concerns. ECOG PERFORMANCE STATUS - 1    Pain - 0 - No pain/10. Fatigue - No flowsheet data found. Distress - No flowsheet data found. Elements of this note have been dictated via voice recognition software.   Text and phrases may be limited by the accuracy and autoconversion of the software. The chart has been reviewed, but errors may still be present. Cristiana Garcia M.D.   [address] [place], [address] [place] [address]  Office : [phone]  Fax : [phone]

## 2022-10-24 NOTE — PROGRESS NOTES
Patient arrived to [facility] for [place]. Assessment completed. No needs voiced at this time. Patient tolerated infusion well and is aware of next appointment on 11/14/2022 @1045. Patient discharged ambulatory.

## 2022-10-25 ENCOUNTER — HOSPITAL ENCOUNTER (OUTPATIENT)
Dept: RADIATION ONCOLOGY | Age: 57
Setting detail: RECURRING SERIES
Discharge: HOME | End: 2022-10-28
Payer: COMMERCIAL

## 2022-10-25 PROCEDURE — 77290 THER RAD SIMULAJ FIELD CPLX: CPT

## 2022-10-25 PROCEDURE — 77332 RADIATION TREATMENT AID(S): CPT

## 2022-10-26 ENCOUNTER — HOSPITAL ENCOUNTER (OUTPATIENT)
Dept: PHYSICAL THERAPY | Age: 57
Setting detail: RECURRING SERIES
Discharge: HOME | End: 2022-10-29
Payer: COMMERCIAL

## 2022-10-26 PROCEDURE — 97110 THERAPEUTIC EXERCISES: CPT

## 2022-10-26 PROCEDURE — 97161 PT EVAL LOW COMPLEX 20 MIN: CPT

## 2022-10-26 ASSESSMENT — PAIN SCALES - GENERAL: PAINLEVEL_OUTOF10: 5

## 2022-10-26 NOTE — PROGRESS NOTES
Royal Perkins  : 1965  Primary: [email or web address]  [place]: [place] [employer] PATIENT ASSISTANCE O [facility]  [address]  [address]  [place] [address]  Phone: [phone]  Fax: [phone] Plan Frequency: 1x/week for 90 days  Plan of Care/Certification Expiration Date: 23    PT Visit Info:  No data recorded   Visit Count:  1   OUTPATIENT PHYSICAL THERAPY:OP NOTE TYPE: Treatment Note 10/26/2022       Episode  }Appt Desk             Treatment Diagnosis:  Pain in Left Shoulder (M25.512)  Stiffness of Left Shoulder, Not elsewhere classified (M25.612)  Medical/Referring Diagnosis:  Pain in left shoulder [M25.512]  Referring Physician:  Gaston Edgar MD MD Orders:  PT Eval and Treat oncology rehab  Date of Onset:  Onset Date: 21   Allergies:   Paclitaxel and Lisinopril  Restrictions/Precautions:  Restrictions/Precautions: None  No data recorded   Interventions Planned (Treatment may consist of any combination of the following):    Current Treatment Recommendations: Strengthening; ROM; Manual Therapy - Soft Tissue Mobilization; Home exercise program     Subjective Comments:  the patient reports she is having pain in her shoulder when reaching overhead  Initial:}    5/10Post Session:       5/10  Medications Last Reviewed:  10/26/2022  Updated Objective Findings:  See evaluation note from today  Treatment   THERAPEUTIC EXERCISE: (30 minutes):    Exercises per grid below to improve mobility. Required minimal verbal cues to promote proper body alignment. Progressed range as indicated. MANUAL THERAPY: (0 minutes): Soft tissue mobilization was utilized and necessary because of the patient's restricted joint motion. No grids  The patient performed trunk rotation, wand flexion and wand external rotation in supine. Chicken wing, wall slides and scapular retraction in standing. She reported pain increased with the 2 wand exercises so they were deleted.   She will gradually increase to 10 reps with each exercise. We started with 5 reps with a 5 count hold. She will begin radiation in 2 weeks and will need to hold exercises at that point. We will monitor girth with radiation. She may benefit from Kinesio tape. Treatment/Session Summary:    Treatment Assessment:  The patient verbalized understanding her HEP. Communication/Consultation:   HEP  Equipment provided today:  None  Recommendations/Intent for next treatment session: Next visit will focus on ROM, strength, posture and possibly taping.     Total Treatment Billable Duration:  68 minutes, evaluation 38 minutes, therex 30 minutes  Time In: 0900  Time Out: 2440    ESTER LAZARO, PT       Charge Capture  }Post Session Pain  PT Visit Info  [employer] Portal  MD Guidelines  Scanned Media  Benefits  MyChart    Future Appointments   Date Time Provider Yue Cid   11/1/2022  9:30 AM Ap Blair, PT [facility]   11/7/2022  9:00 AM Ester Lazaro, PT [facility]   11/14/2022 10:15 AM LAB CK [facility]   11/14/2022 10:45 AM Neil Coulter MD [facility] AMB   11/14/2022 11:30 AM BMT2 [facility]   11/28/2022 10:20 AM Kirk Lindsey MD CSA GVL AMB

## 2022-10-26 NOTE — THERAPY EVALUATION
Ninoska Rausch  : 1965  Primary: [employer]  Secondary: [place] PATIENT ASSISTANCE [facility]  [address] [address]  Phone: [phone]  Fax: [phone] Plan Frequency: 1x/week for 90 days  Plan of Care/Certification Expiration Date: 23    PT Visit Info:         Visit Count:  1                OUTPATIENT PHYSICAL THERAPY:             OP NOTE TYPE: Initial Assessment 10/26/2022               Episode  Appt Desk         Treatment Diagnosis:  Pain in Left Shoulder (M25.512)  Stiffness of Left Shoulder, Not elsewhere classified (M25.612)  Medical/Referring Diagnosis:  Pain in left shoulder [M25.512]  Referring Physician:  Pratibha Barfield MD MD Orders:  PT Eval and Treat oncology rehab  Return MD Appt:  22  Date of Onset:  Onset Date: 21   Allergies:  Paclitaxel and Lisinopril  Restrictions/Precautions:    Restrictions/Precautions: None      Medications Last Reviewed:  10/26/2022     SUBJECTIVE   History of Injury/Illness (Reason for Referral): The patient was diagnosed with breast cancer 21. She began chemo 22. She underwent a lumpectomy with SNB (3 nodes) 22. She was marked for radiation yesterday. She will begin in 2 weeks. She has developed left shoulder pain and limitations in ROM. Patient Stated Goal(s):  \".  Reach overhead without pain \"  Initial:     5/10 Post Session:     5/10  Past Medical History/Comorbidities:   Ms. Kat Monahan  has a past medical history of Breast cancer ([facility] [address] Hypertension, Murmur, heart, and Poor historian. Ms. Kat Monahan  has a past surgical history that includes IR PORT PLACEMENT > 5 YEARS (2022); Breast surgery; US BREAST BIOPSY W LOC DEVICE 1ST LESION LEFT (Left, 2021); IR PORT PLACEMENT > 5 YEARS (2022); Breast biopsy (Left, 2022); and Breast biopsy (Left, 2022).   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           Learning:   Does the patient/guardian have any barriers to learning?: Language  Will there be a co-learner?: No  What is the preferred language of the patient/guardian?: Chinese  Is an  required?: Yes  Is an  present?: Yes  How does the patient/guardian prefer to learn new concepts?: Demonstration; Pictures/Videos; Listening; Reading     Fall Risk Scale: Starr Total Score: 0  Starr Fall Risk: Low (0-24)     Dominant Side:  right handed      OBJECTIVE   Shoulder:  AROM Shoulder (Degrees)  R Shoulder Flexion 0-180: 175  R Shoulder ABduction 0-180: 180  R Shoulder Ext Rotation 0-90: 90  L Shoulder Flexion 0-180: 164  L Shoulder ABduction 0-180: 168  L Shoulder Ext Rotation  0-90: 90  Lymphedema:  girth  DATE 10/26/22 10/26/22      RIGHT LEFT RIGHT LEFT   MCP 17.6 17.2     WRIST 14.5 14.2     10 cm 20.2 20.4     20 cm 22.8 23     30 cm 27.2 27.1     40 cm 33.2 33.9     50 cm                  ASSESSMENT   Initial Assessment:  the patient presents with mild pain with household activities such as reaching into cabinets. She is limited in ROM. She will be receiving radiation in 2 weeks. Her pain is located along the superior and lateral region of the shoulder along the deltoid insertion. She will benefit from therapeutic exercises, manual therapy and possibly taping. Problem List: (Impacting functional limitations): Body Structures, Functions, Activity Limitations Requiring Skilled Therapeutic Intervention: Decreased functional mobility ; Decreased ROM;  Increased pain     Therapy Prognosis:   Therapy Prognosis: Good     Assessment Complexity:   Medium Complexity  PLAN   Effective Dates: 10/26/22 TO Plan of Care/Certification Expiration Date: 01/24/23   Frequency/Duration: Plan Frequency: 1x/week for 90 days   Interventions Planned (Treatment may consist of any combination of the following):    Current Treatment Recommendations: Strengthening; ROM; Manual Therapy - Soft Tissue Mobilization; Home exercise program     Goals: (Goals have been discussed and agreed upon with patient.)  Short-Term Functional Goals: Time Frame: 4 weeks  The patient will be independent with Hep for ROM and strength within 4 weeks. The patient will report a pain level of 3 or less within 4 weeks. The patient will gain 10 degrees flexion and abduction within 4 weeks. Discharge Goals: Time Frame: 8 weeks  The patient will improve her DASH by 5 within 8 weeks. The patient will report independence within house hold activity including reaching overhead within 8 weeks. The patient will have knowledge of signs and symptoms of lymphedema and how to manage within 8 weeks. Outcome Measure: Tool Used: Disabilities of the Arm, Shoulder and Hand (DASH) Questionnaire - Quick Version  Score:  Initial: 18/55  Most Recent: X/55 (Date: -- )   Interpretation of Score: The DASH is designed to measure the activities of daily living in person's with upper extremity dysfunction or pain. Each section is scored on a 1-5 scale, 5 representing the greatest disability. The scores of each section are added together for a total score of 55. Tool Used: ECOG Performance Survey Score  Score:  Initial: 1 Most Recent:     Interpretation of Score:   0 Fully active, able to carry on all pre-disease performance without restriction   1 Restricted in physically strenuous activity but ambulatory and able to carry out work of a light or sedentary nature, e.g., light house work, office work   2 Ambulatory and capable of all selfcare but unable to carry out any work activities. Up and about more than 50% of waking hours   3 Capable of only limited selfcare, confined to bed or chair more than 50% of waking hours   4 Completely disabled. Cannot carry on any selfcare.  Totally confined to bed or chair   5 Dead       Medical Necessity: > Patient is expected to demonstrate progress in strength and range of motion to increase independence with household activity. Reason For Services/Other Comments:  > Patient continues to demonstrate capacity to improve strength and ROM which will increase independence. Total Duration:  Time In: 0900    Regarding [facility]'s therapy, I certify that the treatment plan above will be carried out by a therapist or under their direction. Thank you for this referral,  John Aaron, PT     Referring Physician Signature:  Sima Mcardle, MD                    Post Session Pain  Charge Capture  PT Visit Info MD Guidelines  [facility] [place]

## 2022-10-31 ENCOUNTER — HOSPITAL ENCOUNTER (OUTPATIENT)
Dept: RADIATION ONCOLOGY | Age: 57
Setting detail: RECURRING SERIES
Discharge: HOME | End: 2022-11-03
Payer: COMMERCIAL

## 2022-10-31 PROCEDURE — 77300 RADIATION THERAPY DOSE PLAN: CPT

## 2022-10-31 PROCEDURE — 77295 3-D RADIOTHERAPY PLAN: CPT

## 2022-10-31 PROCEDURE — 77334 RADIATION TREATMENT AID(S): CPT

## 2022-11-01 ENCOUNTER — HOSPITAL ENCOUNTER (OUTPATIENT)
Dept: PHYSICAL THERAPY | Age: 57
Setting detail: RECURRING SERIES
Discharge: HOME | End: 2022-11-04
Payer: COMMERCIAL

## 2022-11-01 PROCEDURE — 97110 THERAPEUTIC EXERCISES: CPT

## 2022-11-01 ASSESSMENT — PAIN SCALES - GENERAL: PAINLEVEL_OUTOF10: 5

## 2022-11-01 NOTE — PROGRESS NOTES
Gwenevere Carina  : 1965  Primary: [employer]  [place] [employer] PATIENT ASSISTANCE [address],   [address] [address]  Phone: [phone]  Fax: [phone] Plan Frequency: 1x/week for 90 days  Plan of Care/Certification Expiration Date: 23      PT Visit Info: Total # of Visits to Date: 2   Visit Count:  2   OUTPATIENT PHYSICAL THERAPY:OP NOTE TYPE: Treatment Note 2022       Episode  }Appt Desk             Treatment Diagnosis:  Pain in Left Shoulder (M25.512)  Stiffness of Left Shoulder, Not elsewhere classified (M25.612)  Medical/Referring Diagnosis:  No admission diagnoses are documented for this encounter. Referring Physician:  Fabio Brar MD MD Orders:  PT Eval and Treat oncology rehab  Date of Onset:  Onset Date: 21     Allergies:   Paclitaxel and Lisinopril  Restrictions/Precautions:  Restrictions/Precautions: None  No data recorded   Interventions Planned (Treatment may consist of any combination of the following):    Current Treatment Recommendations: Strengthening; ROM; Manual Therapy - Soft Tissue Mobilization; Home exercise program     Subjective Comments:  Patient reports L shoulder/arm soreness continues. She does report compliance with her HEP. Initial:}    5/10Post Session:       5/10  Medications Last Reviewed:  2022  Updated Objective Findings:  None Today  Treatment   THERAPEUTIC EXERCISE: (60 minutes):    Exercises per grid below to improve mobility. Required minimal verbal cues to promote proper body alignment. Progressed range as indicated. MANUAL THERAPY: (0 minutes): Soft tissue mobilization was utilized and necessary because of the patient's restricted joint motion.       Date:  22 Date:   Date:     Activity/Exercise Parameters Parameters Parameters   Wall slides flex/Scapt 10x     Wall snow angels Attempted; 3 w/ A     Horizontal ABD at wall 10x     Seated Scapular retraction 10x     Chicken wings 10x     Open book 10x B     LTR w/goal post arms 10x B     Trunk rotation w/B UE extended into scaption 10x B     Supine wand flex 2#; 10x     Supine wand press-ups 2#; 10x     Seated wand ABD 1#; 10x L     Seated wand extension 1#; 10x                 UBE L1; 6min     Kinesiotaping to unload L shoulder Done           The patient performed trunk rotation, wand flexion and wand external rotation in supine. Chicken wing, wall slides and scapular retraction in standing. She reported pain increased with the 2 wand exercises so they were deleted. She will gradually increase to 10 reps with each exercise. We started with 5 reps with a 5 count hold. She will begin radiation in 2 weeks and will need to hold exercises at that point. We will monitor girth with radiation. She may benefit from Kinesio tape. (NOT TODAY)    Treatment/Session Summary:    Treatment Assessment:  Patient did well with new exercises with no increase in pain. She was challenged by snow angels at the wall due to L UE weakness rather than pain. No additional exercises added to current HEP as she received a call from the [facility] during PT that she was to begin radiation on 11/8/22. Patient was educated on benefits of kinesiotape and how to properly remove it.  services were used. Communication/Consultation:   HEP  Equipment provided today:  None  Recommendations/Intent for next treatment session: Next visit will focus on ROM, strength, posture and  taping.  Assess effectiveness of kinesiotape    Total Treatment Billable Duration:  60 minutes therex   Time In: 0930  Time Out: 1035    Shayna Willis, PT       Charge Capture  }Post Session Pain  PT Visit Info  [employer] Portal  MD Guidelines  Scanned Media  Benefits  MyChart    Future Appointments   Date Time Provider Yue Cid   11/7/2022  9:00 AM Ester Lazaro, PT [facility]   11/8/2022  9:00 AM [facility] RAD ONC [facility]   11/9/2022  8:45 AM [facility] RAD ONC [facility] Christianne [facility]   11/10/2022  8:30 AM [facility] RAD ONC [facility]   11/11/2022  9:15 AM [facility] RAD ONC [facility]   11/14/2022  8:30 AM [facility] RAD ONC [facility]   11/14/2022 10:15 AM LAB CK [facility]   11/14/2022 10:45 AM Debi Herron MD [facility] AMB   11/14/2022 11:30 AM BMT2 [facility]   11/15/2022  9:30 AM [facility] RAD ONC [facility]   11/16/2022  8:30 AM [facility] RAD ONC [facility]   11/17/2022 10:00 AM [facility] RAD ONC [facility]   11/18/2022 10:15 AM [facility] RAD ONC [facility]   11/21/2022 10:00 AM [facility] RAD ONC [facility]   11/22/2022  9:30 AM [facility] RAD ONC [facility]   11/23/2022  9:30 AM [facility] RAD ONC [facility]   11/28/2022  9:30 AM [facility] RAD ONC [facility]   11/28/2022 10:20 AM Shoshana Gonzalez MD [facility] AMB   11/29/2022  9:30 AM [facility] RAD ONC [facility]   11/30/2022  9:30 AM [facility] RAD ONC [facility]   12/1/2022  9:30 AM [facility] RAD ONC [facility]   12/2/2022  9:30 AM [facility] RAD ONC [facility]   12/5/2022  9:30 AM [facility] RAD ONC [facility]   12/6/2022  9:30 AM [facility] RAD ONC [facility]   12/7/2022  9:30 AM [facility] RAD ONC [facility]

## 2022-11-07 ENCOUNTER — HOSPITAL ENCOUNTER (OUTPATIENT)
Dept: PHYSICAL THERAPY | Age: 57
Setting detail: RECURRING SERIES
Discharge: HOME | End: 2022-11-10
Payer: COMMERCIAL

## 2022-11-07 PROCEDURE — 97140 MANUAL THERAPY 1/> REGIONS: CPT

## 2022-11-07 ASSESSMENT — PAIN SCALES - GENERAL: PAINLEVEL_OUTOF10: 2

## 2022-11-07 NOTE — PROGRESS NOTES
Chad Acosta  : 1965  Primary: [employer]  [place] [employer] PATIENT ASSISTANCE [address],   [address] [address]  Phone: [phone]  Fax: [phone] Plan Frequency: 1x/week for 90 days  Plan of Care/Certification Expiration Date: 23      PT Visit Info: Total # of Visits to Date: 3     Visit Count:  3   OUTPATIENT PHYSICAL THERAPY:OP NOTE TYPE: Treatment Note 2022       Episode  }Appt Desk             Treatment Diagnosis:  Pain in Left Shoulder (M25.512)  Stiffness of Left Shoulder, Not elsewhere classified (M25.612)  Medical/Referring Diagnosis:  No admission diagnoses are documented for this encounter. Referring Physician:  Ander Harmon MD MD Orders:  PT Eval and Treat oncology rehab  Date of Onset:  Onset Date: 21     Allergies:   Paclitaxel and Lisinopril  Restrictions/Precautions:  Restrictions/Precautions: None  No data recorded   Interventions Planned (Treatment may consist of any combination of the following):    Current Treatment Recommendations: Strengthening; ROM; Manual Therapy - Soft Tissue Mobilization; Home exercise program     Subjective Comments:  the patient reports the tape made her feel stiff. She reports she still has the same amount of pain. Initial:}    2/10Post Session:       2/10  Medications Last Reviewed:  2022  Updated Objective Findings:  None Today  DATE  22      RIGHT LEFT RIGHT LEFT   MCP  17.3     WRIST  14.4     10 cm  20.1     20 cm  23.1     30 cm  26.9     40 cm  34     50 cm                  Treatment   THERAPEUTIC EXERCISE: ( minutes):    Exercises per grid below to improve mobility. Required minimal verbal cues to promote proper body alignment. Progressed range as indicated. MANUAL THERAPY: 49 minutes): Soft tissue mobilization was utilized and necessary because of the patient's restricted joint motion and risk for lymphedema .       Date:  22 Date:   Date: Activity/Exercise Parameters Parameters Parameters   Wall slides flex/Scapt 10x     Wall snow angels Attempted; 3 w/ A     Horizontal ABD at wall 10x     Seated Scapular retraction 10x     Chicken wings 10x     Open book 10x B     LTR w/goal post arms 10x B     Trunk rotation w/B UE extended into scaption 10x B     Supine wand flex 2#; 10x     Supine wand press-ups 2#; 10x     Seated wand ABD 1#; 10x L     Seated wand extension 1#; 10x                 UBE L1; 6min     Kinesiotaping to unload L shoulder Done       Above not performed    The patient was educated in lymphedema and risk reduction via   services. The Kinesio Tape was removed. Girth was assessed. The patient was advised to hold all exercises with the left upper extremity until radiation has ended and she has healed. She will limit aggressive activity with the arm. She will return in 3 weeks for a recheck of her girth. Treatment/Session Summary:    Treatment Assessment:  the patient will need to hold exercises with the left upper extremity until radiation has ended. Communication/Consultation:   Hold HEP, lymphedema precautions  Equipment provided today:  None  Recommendations/Intent for next treatment session: Next visit will focus on assessing girth.     Total Treatment Billable Duration:  49 minutes therex   Time In: 5114  Time Out: 5063    TERRY ALVARENGA, PT       Charge Capture  }Post Session Pain  PT Visit Info  [employer] Portal  MD Guidelines  Scanned Media  Benefits  MyChart    Future Appointments   Date Time Provider Yue Cid   11/8/2022  9:00 AM [facility] RAD [facility]   11/9/2022  8:45 AM [facility] RAD ONC [facility]   11/10/2022  8:30 AM [facility] RAD ONC [facility]   11/11/2022  9:15 AM [facility] RAD ONC [facility]   11/14/2022  8:30 AM [facility] RAD ONC [facility]   11/14/2022 10:15 AM LAB CK [facility]   11/14/2022 10:45 AM Myla Marmolejo MD [facility] AMB   11/14/2022 11:30 AM BMT2 [address] [facility]   11/15/2022  9:30 AM [facility] RAD ONC [facility]   11/16/2022  8:30 AM [facility] RAD ONC [facility]   11/17/2022 10:00 AM [facility] RAD ONC [facility]   11/18/2022 10:15 AM [facility] RAD ONC [facility]   11/21/2022 10:00 AM [facility] RAD ONC [facility]   11/22/2022  9:30 AM [facility] RAD ONC [facility]   11/23/2022  9:30 AM [facility] RAD ONC [facility]   11/28/2022  9:30 AM [facility] RAD ONC [facility]   11/28/2022 10:20 AM Efrain Johnson MD [facility] AMB   11/29/2022  9:30 AM [facility] RAD ONC [facility]   11/29/2022 10:00 AM Ester Lazaro PT [facility]   11/30/2022  9:30 AM [facility] RAD ONC [facility]   12/1/2022  9:30 AM [facility] RAD ONC [facility]   12/2/2022  9:30 AM [facility] RAD ONC [facility]   12/5/2022  9:30 AM [facility] RAD ONC [facility]   12/6/2022  9:30 AM [facility] RAD ONC [facility]   12/7/2022  9:30 AM [facility] RAD ONC [facility]

## 2022-11-08 ENCOUNTER — HOSPITAL ENCOUNTER (OUTPATIENT)
Dept: RADIATION ONCOLOGY | Age: 57
Setting detail: RECURRING SERIES
Discharge: HOME | End: 2022-11-11
Payer: COMMERCIAL

## 2022-11-08 PROCEDURE — 77387 GUIDANCE FOR RADJ TX DLVR: CPT

## 2022-11-08 PROCEDURE — 77280 THER RAD SIMULAJ FIELD SMPL: CPT

## 2022-11-08 PROCEDURE — 77412 RADIATION TX DELIVERY LVL 3: CPT

## 2022-11-09 ENCOUNTER — HOSPITAL ENCOUNTER (OUTPATIENT)
Dept: RADIATION ONCOLOGY | Age: 57
Setting detail: RECURRING SERIES
Discharge: HOME | End: 2022-11-12
Payer: COMMERCIAL

## 2022-11-09 PROCEDURE — 77412 RADIATION TX DELIVERY LVL 3: CPT

## 2022-11-09 PROCEDURE — 77387 GUIDANCE FOR RADJ TX DLVR: CPT

## 2022-11-10 ENCOUNTER — HOSPITAL ENCOUNTER (OUTPATIENT)
Dept: RADIATION ONCOLOGY | Age: 57
Setting detail: RECURRING SERIES
Discharge: HOME | End: 2022-11-13
Payer: COMMERCIAL

## 2022-11-10 DIAGNOSIS — E03.2 HYPOTHYROIDISM DUE TO MEDICATION: Primary | ICD-10-CM

## 2022-11-10 PROCEDURE — 77387 GUIDANCE FOR RADJ TX DLVR: CPT

## 2022-11-10 PROCEDURE — 77412 RADIATION TX DELIVERY LVL 3: CPT

## 2022-11-11 ENCOUNTER — HOSPITAL ENCOUNTER (OUTPATIENT)
Dept: RADIATION ONCOLOGY | Age: 57
Setting detail: RECURRING SERIES
Discharge: HOME | End: 2022-11-14
Payer: COMMERCIAL

## 2022-11-11 PROCEDURE — 77412 RADIATION TX DELIVERY LVL 3: CPT

## 2022-11-11 PROCEDURE — 77387 GUIDANCE FOR RADJ TX DLVR: CPT

## 2022-11-11 RX ORDER — SODIUM CHLORIDE 0.9 % (FLUSH) 0.9 %
10 SYRINGE (ML) INJECTION PRN
Status: CANCELLED | OUTPATIENT
Start: 2022-11-11

## 2022-11-14 ENCOUNTER — HOSPITAL ENCOUNTER (OUTPATIENT)
Dept: INFUSION THERAPY | Age: 57
Discharge: HOME | End: 2022-11-14
Payer: COMMERCIAL

## 2022-11-14 ENCOUNTER — OFFICE VISIT (OUTPATIENT)
Dept: ONCOLOGY | Age: 57
End: 2022-11-14
Payer: COMMERCIAL

## 2022-11-14 ENCOUNTER — HOSPITAL ENCOUNTER (OUTPATIENT)
Dept: RADIATION ONCOLOGY | Age: 57
Setting detail: RECURRING SERIES
Discharge: HOME | End: 2022-11-17
Payer: COMMERCIAL

## 2022-11-14 VITALS
OXYGEN SATURATION: 98 % | RESPIRATION RATE: 16 BRPM | HEART RATE: 92 BPM | BODY MASS INDEX: 31.29 KG/M2 | SYSTOLIC BLOOD PRESSURE: 130 MMHG | HEIGHT: 60 IN | WEIGHT: 159.4 LBS | TEMPERATURE: 98.6 F | DIASTOLIC BLOOD PRESSURE: 78 MMHG

## 2022-11-14 VITALS
HEART RATE: 85 BPM | OXYGEN SATURATION: 96 % | DIASTOLIC BLOOD PRESSURE: 95 MMHG | TEMPERATURE: 97.9 F | SYSTOLIC BLOOD PRESSURE: 140 MMHG

## 2022-11-14 DIAGNOSIS — E03.2 HYPOTHYROIDISM DUE TO MEDICATION: ICD-10-CM

## 2022-11-14 DIAGNOSIS — C50.919 TRIPLE NEGATIVE MALIGNANT NEOPLASM OF BREAST (HCC): Primary | ICD-10-CM

## 2022-11-14 DIAGNOSIS — R52 PAIN: ICD-10-CM

## 2022-11-14 DIAGNOSIS — C50.919 TRIPLE NEGATIVE MALIGNANT NEOPLASM OF BREAST (HCC): ICD-10-CM

## 2022-11-14 DIAGNOSIS — Z79.899 HIGH RISK MEDICATION USE: ICD-10-CM

## 2022-11-14 LAB
ALBUMIN SERPL-MCNC: 3.9 G/DL (ref 3.5–5)
ALBUMIN/GLOB SERPL: 1 {RATIO} (ref 0.4–1.6)
ALP SERPL-CCNC: 116 U/L (ref 50–136)
ALT SERPL-CCNC: 35 U/L (ref 12–65)
ANION GAP SERPL CALC-SCNC: 7 MMOL/L (ref 2–11)
AST SERPL-CCNC: 19 U/L (ref 15–37)
BASOPHILS # BLD: 0 K/UL (ref 0–0.2)
BASOPHILS NFR BLD: 1 % (ref 0–2)
BILIRUB SERPL-MCNC: 0.3 MG/DL (ref 0.2–1.1)
BUN SERPL-MCNC: 24 MG/DL (ref 6–23)
CALCIUM SERPL-MCNC: 9.4 MG/DL (ref 8.3–10.4)
CHLORIDE SERPL-SCNC: 107 MMOL/L (ref 101–110)
CO2 SERPL-SCNC: 25 MMOL/L (ref 21–32)
CREAT SERPL-MCNC: 0.9 MG/DL (ref 0.6–1)
DIFFERENTIAL METHOD BLD: ABNORMAL
EOSINOPHIL # BLD: 0.1 K/UL (ref 0–0.8)
EOSINOPHIL NFR BLD: 2 % (ref 0.5–7.8)
ERYTHROCYTE [DISTWIDTH] IN BLOOD BY AUTOMATED COUNT: 12.9 % (ref 11.9–14.6)
GLOBULIN SER CALC-MCNC: 4 G/DL (ref 2.8–4.5)
GLUCOSE SERPL-MCNC: 107 MG/DL (ref 65–100)
HCT VFR BLD AUTO: 36.7 % (ref 35.8–46.3)
HGB BLD-MCNC: 11.9 G/DL (ref 11.7–15.4)
IMM GRANULOCYTES # BLD AUTO: 0 K/UL (ref 0–0.5)
IMM GRANULOCYTES NFR BLD AUTO: 0 % (ref 0–5)
LYMPHOCYTES # BLD: 1 K/UL (ref 0.5–4.6)
LYMPHOCYTES NFR BLD: 24 % (ref 13–44)
MCH RBC QN AUTO: 31.9 PG (ref 26.1–32.9)
MCHC RBC AUTO-ENTMCNC: 32.4 G/DL (ref 31.4–35)
MCV RBC AUTO: 98.4 FL (ref 82–102)
MONOCYTES # BLD: 0.3 K/UL (ref 0.1–1.3)
MONOCYTES NFR BLD: 8 % (ref 4–12)
NEUTS SEG # BLD: 2.6 K/UL (ref 1.7–8.2)
NEUTS SEG NFR BLD: 65 % (ref 43–78)
NRBC # BLD: 0 K/UL (ref 0–0.2)
PLATELET # BLD AUTO: 145 K/UL (ref 150–450)
PMV BLD AUTO: 8.4 FL (ref 9.4–12.3)
POTASSIUM SERPL-SCNC: 4.3 MMOL/L (ref 3.5–5.1)
PROT SERPL-MCNC: 7.9 G/DL (ref 6.3–8.2)
RBC # BLD AUTO: 3.73 M/UL (ref 4.05–5.2)
SODIUM SERPL-SCNC: 139 MMOL/L (ref 133–143)
TSH, 3RD GENERATION: 7.58 UIU/ML (ref 0.36–3.74)
WBC # BLD AUTO: 4 K/UL (ref 4.3–11.1)

## 2022-11-14 PROCEDURE — 99215 OFFICE O/P EST HI 40 MIN: CPT | Performed by: INTERNAL MEDICINE

## 2022-11-14 PROCEDURE — 77412 RADIATION TX DELIVERY LVL 3: CPT

## 2022-11-14 PROCEDURE — 80053 COMPREHEN METABOLIC PANEL: CPT

## 2022-11-14 PROCEDURE — 77387 GUIDANCE FOR RADJ TX DLVR: CPT

## 2022-11-14 PROCEDURE — 3078F DIAST BP <80 MM HG: CPT | Performed by: INTERNAL MEDICINE

## 2022-11-14 PROCEDURE — 3074F SYST BP LT 130 MM HG: CPT | Performed by: INTERNAL MEDICINE

## 2022-11-14 PROCEDURE — 84443 ASSAY THYROID STIM HORMONE: CPT

## 2022-11-14 PROCEDURE — 6360000002 HC RX W HCPCS: Performed by: INTERNAL MEDICINE

## 2022-11-14 PROCEDURE — 36591 DRAW BLOOD OFF VENOUS DEVICE: CPT

## 2022-11-14 PROCEDURE — 96413 CHEMO IV INFUSION 1 HR: CPT

## 2022-11-14 PROCEDURE — 85025 COMPLETE CBC W/AUTO DIFF WBC: CPT

## 2022-11-14 PROCEDURE — 2580000003 HC RX 258: Performed by: INTERNAL MEDICINE

## 2022-11-14 PROCEDURE — 77336 RADIATION PHYSICS CONSULT: CPT

## 2022-11-14 RX ORDER — SODIUM CHLORIDE 0.9 % (FLUSH) 0.9 %
5-40 SYRINGE (ML) INJECTION PRN
Status: DISCONTINUED | OUTPATIENT
Start: 2022-11-14 | End: 2022-11-15 | Stop reason: HOSPADM

## 2022-11-14 RX ORDER — SODIUM CHLORIDE 9 MG/ML
5-250 INJECTION, SOLUTION INTRAVENOUS PRN
Status: DISCONTINUED | OUTPATIENT
Start: 2022-11-14 | End: 2022-11-15 | Stop reason: HOSPADM

## 2022-11-14 RX ORDER — SODIUM CHLORIDE 0.9 % (FLUSH) 0.9 %
10 SYRINGE (ML) INJECTION PRN
Status: DISCONTINUED | OUTPATIENT
Start: 2022-11-14 | End: 2022-11-15 | Stop reason: HOSPADM

## 2022-11-14 RX ADMIN — SODIUM CHLORIDE, PRESERVATIVE FREE 10 ML: 5 INJECTION INTRAVENOUS at 11:39

## 2022-11-14 RX ADMIN — SODIUM CHLORIDE 25 ML/HR: 9 INJECTION, SOLUTION INTRAVENOUS at 11:51

## 2022-11-14 RX ADMIN — SODIUM CHLORIDE, PRESERVATIVE FREE 10 ML: 5 INJECTION INTRAVENOUS at 10:31

## 2022-11-14 RX ADMIN — SODIUM CHLORIDE 200 MG: 9 INJECTION, SOLUTION INTRAVENOUS at 11:52

## 2022-11-14 ASSESSMENT — PATIENT HEALTH QUESTIONNAIRE - PHQ9
2. FEELING DOWN, DEPRESSED OR HOPELESS: 0
SUM OF ALL RESPONSES TO PHQ QUESTIONS 1-9: 0
1. LITTLE INTEREST OR PLEASURE IN DOING THINGS: 0
SUM OF ALL RESPONSES TO PHQ QUESTIONS 1-9: 0
SUM OF ALL RESPONSES TO PHQ QUESTIONS 1-9: 0
SUM OF ALL RESPONSES TO PHQ9 QUESTIONS 1 & 2: 0
SUM OF ALL RESPONSES TO PHQ QUESTIONS 1-9: 0

## 2022-11-14 NOTE — PROGRESS NOTES
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 female who is currently receiving adjuvant RT to the left breast.  I am seeing this patient today in Dr. Deja Templeton [facility]. Visit today was conducted in [address] [place] [address] via video . Current RT dose: 13.35/40.05 Gy in 5/15 fractions. Boost of 10 Gy in 5 fractions to follow. Concurrent systemic therapy: Keytruda    Subjective: Today, the patient reports doing well with RT thus far. No skin changes or breast pain. She has noticed a cough for the last few days, although this is happened before with cold weather. No fevers. Objective:  Vitals:    11/14/22 1307   BP: (!) 140/95   Pulse: 85   Temp: 97.9 °F (36.6 °C)   TempSrc: Oral   SpO2: 96%     Pain 0/10    General: Alert and conversant, in NAD    Assessment:  Patient is tolerating radiation therapy well. Plan:  -Cough very unlikely related to RT, likely viral.  Advised her to see PCP if it worsens.   -Continue RT as planned. -Treatment images reviewed. -The patient has a documented plan of care to address pain. Pain is not present.       Harjinder Juarez MD  11/14/22

## 2022-11-14 NOTE — PROGRESS NOTES
Patient arrived to port lab for port access and lab draw   Michel Brown 45 accessed and labs drawn per protocol   Port remains accessed   Patient discharged from port lab ambulatory

## 2022-11-14 NOTE — PROGRESS NOTES
Arrived to the [facility]. C13D1 Keytruda infusion completed. Patient tolerated well. Any issues or concerns during appointment: none. Patient aware of next infusion appointment on 12/5/22 (date) at 56 (time). Patient aware of next lab and [facility] office visit on 12/5/22 (date) at 0900 (time). Patient instructed to call provider with temperature of 100.4 or greater or nausea/vomiting/ diarrhea or pain not controlled by medications  Discharged home with daughter ambulatory.

## 2022-11-14 NOTE — PATIENT INSTRUCTIONS
Patient Instructions from Today's Visit    Reason for Visit:  Follow up breast     Plan:   You look good today     Follow Up:  3 weeks    Recent Lab Results:  Hospital Outpatient Visit on 11/14/2022   Component Date Value Ref Range Status    WBC 11/14/2022 4.0 (A)  4.3 - 11.1 K/uL Final    RBC 11/14/2022 3.73 (A)  4.05 - 5.2 M/uL Final    Hemoglobin 11/14/2022 11.9  11.7 - 15.4 g/dL Final    Hematocrit 11/14/2022 36.7  35.8 - 46.3 % Final    MCV 11/14/2022 98.4  82.0 - 102.0 FL Final    MCH 11/14/2022 31.9  26.1 - 32.9 PG Final    MCHC 11/14/2022 32.4  31.4 - 35.0 g/dL Final    RDW 11/14/2022 12.9  11.9 - 14.6 % Final    Platelets 68/35/6927 145 (A)  150 - 450 K/uL Final    MPV 11/14/2022 8.4 (A)  9.4 - 12.3 FL Final    nRBC 11/14/2022 0.00  0.0 - 0.2 K/uL Final    **Note: Absolute NRBC parameter is now reported with Hemogram**    Seg Neutrophils 11/14/2022 65  43 - 78 % Final    Lymphocytes 11/14/2022 24  13 - 44 % Final    Monocytes 11/14/2022 8  4.0 - 12.0 % Final    Eosinophils % 11/14/2022 2  0.5 - 7.8 % Final    Basophils 11/14/2022 1  0.0 - 2.0 % Final    Immature Granulocytes 11/14/2022 0  0.0 - 5.0 % Final    Segs Absolute 11/14/2022 2.6  1.7 - 8.2 K/UL Final    Absolute Lymph # 11/14/2022 1.0  0.5 - 4.6 K/UL Final    Absolute Mono # 11/14/2022 0.3  0.1 - 1.3 K/UL Final    Absolute Eos # 11/14/2022 0.1  0.0 - 0.8 K/UL Final    Basophils Absolute 11/14/2022 0.0  0.0 - 0.2 K/UL Final    Absolute Immature Granulocyte 11/14/2022 0.0  0.0 - 0.5 K/UL Final    Differential Type 11/14/2022 AUTOMATED    Final            -------------------------------------------------------------------------------------------------------------------  Please call our office at [phone] if you have any  of the following symptoms:   Fever of 100.5 or greater  Chills  Shortness of breath  Swelling or pain in one leg    After office hours an answering service is available and will contact a provider for emergencies or if you are experiencing any of the above symptoms. Patient has express an interest in My Chart. My Chart log in information explained on the after visit summary printout at the [facility] desk.     GALEN VAZ MA

## 2022-11-14 NOTE — PROGRESS NOTES
[facility] Hematology & Oncology: Office Visit Progress Note    Chief Complaint:    Triple negative left breast cancer    History of Present Illness:  Reason for Referral: Carcinoma of upper-inner  quadrant of left breast in female, estrogen receptor negative; Triple negative malignant neoplasm of breast       Referring Provider: Arlyss Schirmer, MD       Primary Care Provider: Ildefonso Davey MD       Family History of Cancer/Hematologic Disorders: None reported       Presenting Symptoms: Abnormal routine screening mammogram        Ms. Iliana Valente is a 56 y.o.  female with a history of heart murmur, HTN, mixed HLD, and irregular menses, who was recently diagnosed with breast  cancer. She initially presented on 11/22/21 for a routine bilateral digital screening mammogram which identified focal asymmetry in the medial inferior left breast at 6 cm from the nipple. Further evaluation with diagnostic left breast mammogram and left  breast ultrasound were completed on 12/6/21 confirming the presence of adjacent left breast masses with one at the 9:00 position, 6 cm from the nipple measuring approximately 1.5 x 0.8 x 0.8 cm and a 7 mm hypoechoic mass just adjacent to this. Recommended ultrasound-guided biopsy of the medial left breast mass was completed on 12/23/21 with pathology from the core biopsy of the left breast, 9:00 position, 6 cm from nipple, revealing ER 0.9%/GA 0.4%/HER-2 (0) negative, intermediate grade (moderately  differentiated), foci of at least microinvasive infiltrating ductal carcinoma in association with focally high grade ductal carcinoma in situ with no definite lymphovascular invasion identified. MRI of the bilateral breasts was performed on 12/28/21 demonstrating left medial breast cancer measuring about 4.5 cm in greatest dimension; no additional suspicious finding elsewhere in either breast; and no obvious lymphadenopathy.        Patient was referred to surgery and evaluated in consultation by Surgeon, Dr. Adenike La, on 1/10/22. She was assessed with signs and symptoms consistent with cT2N0 triple negative left breast IDC with DCIS. Treatment options were discussed, and  Dr. Trina Quijano recommended genetic testing and preoperative evaluation by medical oncology to consider neoadjuvant chemotherapy. Dr. Trina Quijano noted, \"We discussed the strong possibility that she could require left mastectomy/sentinel node should we  proceed with surgery upfront. At this time the enhancement on MRI looks too large, diffuse, and elongated to perform breast preservation surgery and expected good cosmetic result. If she has neoadjuvant treatment I will see her back in about 4 months  to regroup. \"       Patient is now referred to [facility] for oncology evaluation and consideration for neoadjuvant chemotherapy. Patient completed genetic counseling on 1/13/22. She agreed to pursue testing with BRCAPlus panel of 8 genes associated with hereditary cancer  (including BRCA1 and BRCA2 ). BILATERAL DIGITAL SCREENING MAMMOGRAM  11/22/2021   FINDINGS: There are scattered fibroglandular densities. A few typically benign-appearing calcifications in the left breast are similar in  appearance to the previous study. There is a focal asymmetry in the medial inferior left breast located 6 cm from the nipple. Recommend spot compression magnification views on the direct ML view and ultrasound, if indicated for further evaluation. There  is otherwise no suspicious mass, calcification, or architectural distortion in either breast.      IMPRESSION: Focal asymmetry in the medial inferior left breast at 6 cm from the nipple. for which further evaluation is recommended with ML  and compression magnification views, with possible ultrasound if indicated. BI-RADS Assessment Category 0: Incomplete: Needs additional imaging evaluation.        DIAGNOSTIC MAMMOGRAM LEFT BREAST, LEFT BREAST ULTRASOUND 12/6/21   FINDING: The area of concern does not persist on additional imaging. There is no architectural distortion. The area of concern persists with  additional imaging. This resides within the medial left breast at approximately 9:00 position. There are few associated calcifications which appear somewhat coarse. This measures approximately 1.3 cm in size. An additional smaller asymmetry also at the  9:00 position resides 7 cm from the nipple. A left breast ultrasound is recommended for further evaluation. LEFT BREAST ULTRASOUND: At the 9:00 position, 6 cm from the nipple is a lobulated hypoechoic but mildly heterogeneous appearing mass measuring approximately 1.5 x 0.8 x 0.8 cm. The margins are occasionally ill-defined with some components demonstrating  posterior acoustic shadowing. The long axis is parallel to the plane of the chest wall. There is a 7 mm hypoechoic mass just adjacent to this This is hypoechoic with a lobulated contour. The margins are occasionally ill-defined. IMPRESSION: Adjacent left breast masses. Recommend tissue sampling of the larger lesion. BI-RADS Assessment Category 4: Suspicious Finding- Biopsy should be considered. [facility] SURGICAL PATHOLOGY REPORT 12/23/21   DIAGNOSIS    A: \" LEFT BREAST, 9:00 POSITION, 6 CM FROM NIPPLE, CORE BIOPSY\": FOCI OF AT LEAST MICROINVASIVE INFILTRATING DUCTAL CARCINOMA, INTERMEDIATE GRADE (MODERATELY DIFFERENTIATED) IN ASSOCIATION WITH FOCALLY HIGH GRADE DUCTAL CARCINOMA IN SITU.  DEFINITE LYMPHOVASCULAR  INVASION IS NOT IDENTIFIED   [facility]- ER/MO/MCL1DAA BY IHC    INTERPRETATION    [email or web address] IHC Quantitative Breast Panel    TEST NAME:                                      RESULTS:                  INTERNAL CONTROLS:    ESTROGEN RECEPTOR:                 Negative (0.9%)          Present, Positive    PROGESTERONE RECEPTOR:       Negative (0.4%)          Present, Positive    HER-2/CHAR:                                        Negative (0)                Percentage of Cells with Uniform Intense Complete Membrane    Stainin%   STF-IMMUNOHISTOCHEMISTRY   Immunohistochemical Stain Panel:    INTERPRETATION: Immunohistochemical findings consistent with breast primary. ANTIBODY/TEST       MARKER FOR                                               RESULT    Cytokeratin 7               Lung, breast, upper GE, serous ovary           Positive    Cytokeratin 20             Colon, mucinous ovary, urothelium                Negative    GATA3                        Urothelial, breast carcinoma                           Positive    GCDFP                       Breast, salivary gland, skin, adnexa               Positive    Mammaglobin                         Breast                                                              Positive       MRI OF THE BREASTS WITH AND WITHOUT CONTRAST 21   FINDINGS: The breasts demonstrate moderate glandularity and mild background enhancement. Left 9:00 biopsy clip within a lobulated heterogeneously  enhancing mass with angulated and irregular margins overall measuring about 1.8 x 0.7 x 1.2 cm. Posterior to this (about 1 cm away) the smaller mass measuring 0.9 cm is unchanged from recent mammogram and ultrasound. Additionally there is clumped and  reticular nonmass enhancement extending superiorly by about 2.7 cm and anteriorly by about 1.8 cm, in keeping with the DCIS and calcifications. Overall the expected area of disease measures about 4.5 cm AP by 1.2 cm transverse and is located at 9:00-9:30,  mid to posterior depth. There is no evidence of involvement of skin or chest wall structures. There is no other evidence of suspicious enhancing mass, and no other dominant or unique nonmass enhancement, to suggest additional malignancy in either breast.  There is no evidence of axillary or internal mammary lymphadenopathy.  Elsewhere, limited visualization of the partially included thorax and upper abdomen shows no acute abnormality, with note of a small benign cyst in the left liver. IMPRESSION   1. Left medial breast cancer measures about 4.5 cm in greatest dimension. 2. No additional suspicious finding elsewhere in either breast. No obvious lymphadenopathy. Recommend continued management as directed clinically. BI-RADS Assessment Category 6: Known Biopsy Proven Malignancy       Notes from Referring Provider: \"Age 49 with cT2N0 triple  negative left breast IDC and DCIS Evaluate    Speaks mandarin   She has been referred to genetics also\"       Interim history update in A/P. Review of Systems:      Constitutional  Denies fever or chills. Denies fatigue. Denies anorexia. HEENT   allergic conjunctivitis and sinusitis. Denies trauma, bluring vision, hearing loss, ear pain, nosebleeds, sore throat, neck pain and ear discharge. Skin  Denies lesions or rashes. Lungs  Denies shortness of breath, cough, sputum production or hemoptysis. Cardiovascular  Denies chest pain, palpitations, orthopnea, claudication and leg swelling. Gastrointestinal  Nausea. Denies vomiting. Denies bloody or black stools. Denies abdominal pain.   Denies dysuria, frequency or hesitancy of urination     Neuro  Denies headaches, visual changes or ataxia. Denies dizziness, tingling, tremors, sensory change, speech change, focal weakness and headaches. Hematology  Denies nasal/gum bleeding, denies easy bruise     Endo  Denies heat/cold intolerance, denies diabetes. MSK  Denies back pain, swollen legs, myalgias and falls. Psychiatric/Behavioral   insomnia. Denies depression and substance abuse. The patient is not nervous/anxious.               Allergies   Allergen Reactions    Paclitaxel Angioedema and Swelling    Lisinopril Other (See Comments)     Other reaction(s): Cough     Past Medical History:   Diagnosis Date    Breast cancer ([facility] [address]     Hypertension     Murmur, heart     echo 2016 EF 60-65% ; 1/31/22 states never has had symptoms    Poor historian difficulty with verifying medication list     Past Surgical History:   Procedure Laterality Date    BREAST BIOPSY Left 9/21/2022    BREAST LESION BIOPSY EXCISION NEEDLE LOCALIZATION/ CHINESE/MANDARIN  performed by Javier Stover MD at [address] [place] [address] Left 9/21/2022    LEFT BREAST BIOPSY SENTINEL NODE DISSECTION.  performed by Javier Stover MD at [place] [address]      bx of left breast     IR PORT PLACEMENT EQUAL OR GREATER THAN 5 YEARS  2/2/2022    IR PORT PLACEMENT EQUAL OR GREATER THAN 5 YEARS  2/2/2022    IR PORT PLACEMENT EQUAL OR GREATER THAN 5 YEARS 2/2/2022 [facility] RADIOLOGY SPECIALS    US BREAST NEEDLE BIOPSY LEFT Left 12/23/2021    US BREAST NEEDLE BIOPSY LEFT 12/23/2021 [facility] RADIOLOGY MAMMO     Family History   Problem Relation Age of Onset    Ovarian Cancer Neg Hx     No Known Problems Mother     Breast Cancer Neg Hx     No Known Problems Sister     Other Other         states no family history    Colon Cancer Neg Hx     Hypertension Father     No Known Problems Sister     No Known Problems Brother     Uterine Cancer Neg Hx      Social History     Socioeconomic History    Marital status:      Spouse name: Not on file    Number of children: Not on file    Years of education: Not on file    Highest education level: Not on file   Occupational History    Not on file   Tobacco Use    Smoking status: Never    Smokeless tobacco: Never   Substance and Sexual Activity    Alcohol use: No     Alcohol/week: 0.0 standard drinks    Drug use: No    Sexual activity: Not on file   Other Topics Concern    Not on file   Social History Narrative    Abuse: Feels safe at home, no history of physical abuse, no history of sexual abuse       Social Determinants of Health     Financial Resource Strain: Not on file   Food Insecurity: Not on file   Transportation Needs: Not on file   Physical Activity: Not on file   Stress: Not on file   Social Connections: Not on file   Intimate Partner Violence: Not on file   Housing Stability: Not on file     Current Outpatient Medications   Medication Sig Dispense Refill    lidocaine-prilocaine (EMLA) 2.5-2.5 % cream Apply to port about 45 minutes prior to access 2.5 g 1    spironolactone (ALDACTONE) 25 MG tablet 25 mg      dilTIAZem (TIAZAC) 240 MG extended release capsule 1 po qd       No current facility-administered medications for this visit. Facility-Administered Medications Ordered in Other Visits   Medication Dose Route Frequency Provider Last Rate Last Admin    sodium chloride flush 0.9 % injection 10 mL  10 mL IntraVENous PRN Haylee Durand MD   10 mL at 11/14/22 1031       OBJECTIVE:  /78 (Site: Left Upper Arm, Position: Standing, Cuff Size: Medium Adult)   Pulse 92   Temp 98.6 °F (37 °C) (Oral)   Resp 16   Ht 5' 0.05\" (1.525 m)   Wt 159 lb 6.4 oz (72.3 kg)   SpO2 98%   BMI 31.08 kg/m²     Physical Exam:      Constitutional:  Oriented to person, place, and time. Well-developed and well-nourished. HEENT:  Normocephalic and atraumatic. Oropharynx is clear and moist.    Conjunctivae and EOM are normal. Pupils are equal, round, and reactive to light. No scleral icterus. Neck supple. No JVD present. No tracheal deviation present. No thyromegaly present. Lymph node  No palpable submandibular, cervical, supraclavicular, axillary and inguinal lymph nodes. Breasts  Left breast soft, nontender, popular nodular area and 9:00 about 2 cm at baseline, no longer palpable after chemotherapy. Right breast soft, nontender, no mass. Skin   rash. Warm and dry. No bruising noted. No erythema. No pallor. Respiratory  Effort normal and breath sounds normal.  No respiratory distress. No wheezes. No rales. No tenderness. CVS  Normal rate, regular rhythm and normal heart sounds. Exam reveals no gallop, no friction and no rub. No murmur heard. Abdomen  Soft.  Bowel sounds are normal. Exhibits no distension. There is no tenderness. There is no rebound and no guarding. Neuro  Normal reflexes. No cranial nerve deficit. Exhibits normal muscle tone, 5 of 5 strength of all extremities. MSK  Normal range of motion in general.  No edema and no tenderness.         Psych  Normal mood, affect, behavior, judgment and thought content        Labs:  Recent Results (from the past 24 hour(s))   CBC With Auto Differential    Collection Time: 11/14/22 10:22 AM   Result Value Ref Range    WBC 4.0 (L) 4.3 - 11.1 K/uL    RBC 3.73 (L) 4.05 - 5.2 M/uL    Hemoglobin 11.9 11.7 - 15.4 g/dL    Hematocrit 36.7 35.8 - 46.3 %    MCV 98.4 82.0 - 102.0 FL    MCH 31.9 26.1 - 32.9 PG    MCHC 32.4 31.4 - 35.0 g/dL    RDW 12.9 11.9 - 14.6 %    Platelets 260 (L) 455 - 450 K/uL    MPV 8.4 (L) 9.4 - 12.3 FL    nRBC 0.00 0.0 - 0.2 K/uL    Seg Neutrophils 65 43 - 78 %    Lymphocytes 24 13 - 44 %    Monocytes 8 4.0 - 12.0 %    Eosinophils % 2 0.5 - 7.8 %    Basophils 1 0.0 - 2.0 %    Immature Granulocytes 0 0.0 - 5.0 %    Segs Absolute 2.6 1.7 - 8.2 K/UL    Absolute Lymph # 1.0 0.5 - 4.6 K/UL    Absolute Mono # 0.3 0.1 - 1.3 K/UL    Absolute Eos # 0.1 0.0 - 0.8 K/UL    Basophils Absolute 0.0 0.0 - 0.2 K/UL    Absolute Immature Granulocyte 0.0 0.0 - 0.5 K/UL    Differential Type AUTOMATED     Comprehensive metabolic panel    Collection Time: 11/14/22 10:22 AM   Result Value Ref Range    Sodium 139 133 - 143 mmol/L    Potassium 4.3 3.5 - 5.1 mmol/L    Chloride 107 101 - 110 mmol/L    CO2 25 21 - 32 mmol/L    Anion Gap 7 2 - 11 mmol/L    Glucose 107 (H) 65 - 100 mg/dL    BUN 24 (H) 6 - 23 MG/DL    Creatinine 0.90 0.6 - 1.0 MG/DL    Est, Glom Filt Rate >60 >60 ml/min/1.73m2    Calcium 9.4 8.3 - 10.4 MG/DL    Total Bilirubin 0.3 0.2 - 1.1 MG/DL    ALT 35 12 - 65 U/L    AST 19 15 - 37 U/L    Alk Phosphatase 116 50 - 136 U/L    Total Protein 7.9 6.3 - 8.2 g/dL    Albumin 3.9 3.5 - 5.0 g/dL    Globulin 4.0 2.8 - 4.5 g/dL    Albumin/Globulin Ratio 1.0 0.4 - 1.6     TSH    Collection Time: 11/14/22 10:22 AM   Result Value Ref Range    TSH, 3RD GENERATION 7.580 (H) 0.358 - 3.740 uIU/mL         Imaging:  No results found for this or any previous visit. ASSESSMENT/PLAN:   Diagnosis Orders   1. Triple negative malignant neoplasm of breast ([address]        2. Hypothyroidism due to medication        3. Pain        4. High risk medication use                      [unfilled]    56 y.o. F consulted for triple negative left breast cancer and presented to [facility] with  and daughter on 1/19/2022. She has good baseline health and functions well, and annual screening mammogram showed left breast lesion, biopsy showed triple negative grade 2 IDC, cT2 N0 M0, and we discussed the high risks profile for triple negative breast cancer and the most recent  FDA approval of neoadjuvant chemoimmunotherapy, she appeared a good candidate and agreed to arrange Viviana Ray for 4 cycles followed by Adriamycin Cytoxan Keytruda for 4 cycles, followed by surgery and adjuvant Keytruda for total systemic therapy  of 1 year, discussed toxicity and management, discussed logistics, arrange Zofran Compazine Ativan, baseline echocardiogram showed normal EF, hypertension better but still uncontrolled after adding spironolactone and educated risk of dehydration and hypotension  on chemotherapy when she is taking double diuretics, started cycle 1 2/7/22 and left breast tumor responded rapidly, lost 20 pounds after cycle 1 and BP much better controlled even after she runs out of diltiazem, again warned of monitoring dehydration/hypotension,  discussed increased protein/calorie intake, may add stimulant if continues to lose weight, occasional mild tingling in fingertips, episodes of constipation responded to laxative, proceed to cycle 1 day 8 carbotaxol Keytruda, educated to monitor for neuropathy,  follow next week but call as needed.       However after cycle 1 day 8 Taxol on 2/14 and developed acute onset of itching, rash, anasarca, short of breath, fever, hypotension, diarrhea, admitted with antibiotics, antihistamine,  IV fluid, most symptom much improved the second day although still lingering, discussed that the picture of allergic reaction but thorough discussion not able to identify new food or medicine, not typical for Taxol or Keytruda reaction given the timing  and the acuity, and she reported a similar episode a few years ago that she went to ER for treatment, but never followed up for allergen test and never had EpiPen, discussed the concern of allergy and will arrange allergist work-up, however proceeded  with day 15 Taxol with close observation to rule out atypical Taxol allergy, patient and family understand the risk and agreed to proceed, and reported reasonable tolerance except for mild itching through the infusion, however overnight RN was concerned  of tachycardia and tachypneic which patient reported similar to each infusion but received IV Benadryl and Solu-Medrol, LFT elevated for a day, give albumin with lasix and anasarca improved, DC with Benadryl/prednisone/EpiPen as needed, followed  on 2/28/2022, swelling resolved, mild rash of hands and arms, discussed to change Taxol to Taxotere for better management of reaction, return on 3/7/2022 reported much better tolerance for the allergic reaction, however more nausea/vomiting/weight loss  and neutropenic fever with temperature 101 and ANC 0.2, coughing and rule out Covid, typical seasonal allergic sinusitis symptoms and educated to start using Nasacort spray, admit to hospital for neutropenic fever, discharge 3/10/2022, had skin rash that  was attributed to cefepime and resolved, again discussed her sensitivity to multiple allergens and different manifestations need to be managed accordingly, Zaditor for allergic conjunctivitis, nasal steroid for sinusitis, Kenalog for skin rash, oral antihistamine,  prednisone 20 mg only as needed, added G-CSF since cycle 3, better controlled for allergy/hypertension/fluid retention, severe diarrhea responded to PRBC, severe thrombocytopenia recovered after delaying cycle 4 by a week, completed cycle 4 with G-CSF  and Emend. She returned on 5/9/2022 to start AC/Keytruda, however platelet was low and chemo had to be deferred for 2 weeks actually and platelets finally recovered to 92 on 5/23/2022, discussed to proceed with caution and reduce AC dose by 20%, which she tolerated reasonably well and completed 4 cycles of AC/Keytruda without major complication, repeat MRI showed cCR and Dr. Deloris Mayers performed left lumpectomy and axillary SLND on 9/21/2022, pathology showed ypTis N0 triple negative left breast carcinoma, discussed that the complete response of the invasive triple negative cancer is a favorable prognostic marker, continued adjuvant treatment, complaining of left shoulder pain since the surgery and consult [facility] for physical therapy, may consider orthopedic evaluation/cervical spine evaluation if no improvement, followed on 10/24/2022, weight stable, labs reviewed and proceed to cycle 12 Keytruda, monitor TSH down to 7.5, pending radiation simulation, Emla for pain, proceed to next [place] and return in 3 weeks but call as needed. All questions are answered to their satisfaction. They will call for further questions and concerns. ECOG PERFORMANCE STATUS - 1    Pain - 0 - No pain/10. Fatigue - No flowsheet data found. Distress - No flowsheet data found. Elements of this note have been dictated via voice recognition software. Text and phrases may be limited by the accuracy and autoconversion of the software. The chart has been reviewed, but errors may still be present. Shruti Richards M.D.   [facility] [address]  Office : [phone]  Fax : [phone]

## 2022-11-15 ENCOUNTER — HOSPITAL ENCOUNTER (OUTPATIENT)
Dept: RADIATION ONCOLOGY | Age: 57
Setting detail: RECURRING SERIES
Discharge: HOME | End: 2022-11-18
Payer: COMMERCIAL

## 2022-11-15 PROCEDURE — 77387 GUIDANCE FOR RADJ TX DLVR: CPT

## 2022-11-15 PROCEDURE — 77412 RADIATION TX DELIVERY LVL 3: CPT

## 2022-11-16 ENCOUNTER — HOSPITAL ENCOUNTER (OUTPATIENT)
Dept: RADIATION ONCOLOGY | Age: 57
Setting detail: RECURRING SERIES
Discharge: HOME | End: 2022-11-19
Payer: COMMERCIAL

## 2022-11-16 PROCEDURE — 77412 RADIATION TX DELIVERY LVL 3: CPT

## 2022-11-16 PROCEDURE — 77321 SPECIAL TELETX PORT PLAN: CPT

## 2022-11-16 PROCEDURE — 77334 RADIATION TREATMENT AID(S): CPT

## 2022-11-16 PROCEDURE — 77417 THER RADIOLOGY PORT IMAGE(S): CPT

## 2022-11-17 ENCOUNTER — HOSPITAL ENCOUNTER (OUTPATIENT)
Dept: RADIATION ONCOLOGY | Age: 57
Setting detail: RECURRING SERIES
Discharge: HOME | End: 2022-11-20
Payer: COMMERCIAL

## 2022-11-17 PROCEDURE — 77387 GUIDANCE FOR RADJ TX DLVR: CPT

## 2022-11-17 PROCEDURE — 77412 RADIATION TX DELIVERY LVL 3: CPT

## 2022-11-18 ENCOUNTER — HOSPITAL ENCOUNTER (OUTPATIENT)
Dept: RADIATION ONCOLOGY | Age: 57
Setting detail: RECURRING SERIES
Discharge: HOME | End: 2022-11-21
Payer: COMMERCIAL

## 2022-11-18 PROCEDURE — 77387 GUIDANCE FOR RADJ TX DLVR: CPT

## 2022-11-18 PROCEDURE — 77412 RADIATION TX DELIVERY LVL 3: CPT

## 2022-11-20 ENCOUNTER — APPOINTMENT (OUTPATIENT)
Dept: RADIATION ONCOLOGY | Age: 57
End: 2022-11-20
Payer: COMMERCIAL

## 2022-11-20 ENCOUNTER — HOSPITAL ENCOUNTER (OUTPATIENT)
Dept: RADIATION ONCOLOGY | Age: 57
Setting detail: RECURRING SERIES
Discharge: HOME | End: 2022-11-23
Payer: COMMERCIAL

## 2022-11-20 PROCEDURE — 77336 RADIATION PHYSICS CONSULT: CPT

## 2022-11-20 PROCEDURE — 77412 RADIATION TX DELIVERY LVL 3: CPT

## 2022-11-20 PROCEDURE — 77387 GUIDANCE FOR RADJ TX DLVR: CPT

## 2022-11-21 ENCOUNTER — HOSPITAL ENCOUNTER (OUTPATIENT)
Dept: RADIATION ONCOLOGY | Age: 57
Setting detail: RECURRING SERIES
Discharge: HOME | End: 2022-11-24
Payer: COMMERCIAL

## 2022-11-21 PROCEDURE — 77387 GUIDANCE FOR RADJ TX DLVR: CPT

## 2022-11-21 PROCEDURE — 77412 RADIATION TX DELIVERY LVL 3: CPT

## 2022-11-22 ENCOUNTER — HOSPITAL ENCOUNTER (OUTPATIENT)
Dept: RADIATION ONCOLOGY | Age: 57
Setting detail: RECURRING SERIES
Discharge: HOME | End: 2022-11-25
Payer: COMMERCIAL

## 2022-11-22 PROCEDURE — 77387 GUIDANCE FOR RADJ TX DLVR: CPT

## 2022-11-22 PROCEDURE — 77412 RADIATION TX DELIVERY LVL 3: CPT

## 2022-11-23 ENCOUNTER — HOSPITAL ENCOUNTER (OUTPATIENT)
Dept: RADIATION ONCOLOGY | Age: 57
Setting detail: RECURRING SERIES
Discharge: HOME | End: 2022-11-26
Payer: COMMERCIAL

## 2022-11-23 PROCEDURE — 77412 RADIATION TX DELIVERY LVL 3: CPT

## 2022-11-23 PROCEDURE — 77387 GUIDANCE FOR RADJ TX DLVR: CPT

## 2022-11-23 NOTE — PROGRESS NOTES
RADIATION ONCOLOGY ON-TREATMENT VISIT  11/23/22    Diagnosis:  Cancer Staging  Triple negative malignant neoplasm of breast [facility])  Staging form: Breast, AJCC 8th Edition  - Clinical: cT2, cN0, cM0 - Signed by Maxime Sheldon MD on 6/13/2022      This is a 56 y.o. female who is currently receiving adjuvant RT to the left breast.  I am seeing this patient today in Dr. Jossie Bloom [facility]. Visit today was conducted in [address] [place] [address] via video . Current RT dose: 34.71/40.05 Gy in 13/15 fractions. Boost of 10 Gy in 5 fractions to follow. Concurrent systemic therapy: Keytruda    Subjective:  She reports some skin redness from the radiation. Applying Aquaphor. No pain. Objective: There were no vitals filed for this visit. Pain 0/10    General: Alert and conversant, in NAD  Skin: Mild erythema    Assessment:  Patient is tolerating radiation therapy well. Plan:  -Continue Aquaphor for daily skin care. -Continue RT as planned. -Treatment images reviewed. -The patient has a documented plan of care to address pain. Pain is not present.       Neal Chicas MD  11/23/22

## 2022-11-28 ENCOUNTER — HOSPITAL ENCOUNTER (OUTPATIENT)
Dept: RADIATION ONCOLOGY | Age: 57
Setting detail: RECURRING SERIES
Discharge: HOME | End: 2022-12-01
Payer: COMMERCIAL

## 2022-11-28 ENCOUNTER — OFFICE VISIT (OUTPATIENT)
Dept: SURGERY | Age: 57
End: 2022-11-28

## 2022-11-28 VITALS
BODY MASS INDEX: 31.02 KG/M2 | WEIGHT: 159.1 LBS | DIASTOLIC BLOOD PRESSURE: 99 MMHG | SYSTOLIC BLOOD PRESSURE: 156 MMHG | HEART RATE: 83 BPM

## 2022-11-28 DIAGNOSIS — C50.919 TRIPLE NEGATIVE MALIGNANT NEOPLASM OF BREAST (HCC): Primary | ICD-10-CM

## 2022-11-28 DIAGNOSIS — Z17.1 CARCINOMA OF UPPER-INNER QUADRANT OF LEFT BREAST IN FEMALE, ESTROGEN RECEPTOR NEGATIVE (HCC): ICD-10-CM

## 2022-11-28 DIAGNOSIS — Z12.31 SCREENING MAMMOGRAM FOR HIGH-RISK PATIENT: ICD-10-CM

## 2022-11-28 DIAGNOSIS — C50.212 CARCINOMA OF UPPER-INNER QUADRANT OF LEFT BREAST IN FEMALE, ESTROGEN RECEPTOR NEGATIVE (HCC): ICD-10-CM

## 2022-11-28 PROCEDURE — 99024 POSTOP FOLLOW-UP VISIT: CPT | Performed by: SURGERY

## 2022-11-28 NOTE — PROGRESS NOTES
Only benign-appearing hepatic cysts are seen the largest of which resides in the left lobe measuring 1.6 cm in size. Following the administration of intravenous contrast, normal enhancement is seen of the heart and vascular structures of the breasts. Mild background physiologic enhancement is seen of the breasts. Prior abnormal enhancement extending through the medial left breast is no longer appreciated. No new suspicious dominant enhancing lesion are suspicious asymmetric nonmass like enhancement is otherwise seen. Only mild symmetric nodular enhancement is seen in the bilateral breasts most consistent with benign physiologic enhancement. No evidence of skin thickening, or nipple retraction is seen. No enhancing osseous lesion is seen. 1.  No clear persistent asymmetric enhancement in the medial left breast suggesting an excellent response to treatment. Only mild back on physiologic enhancement is seen. No new suspicious enhancement is seen in either breast.   No findings concerning for evolving metastatic lesions are seen in the visualized chest.   Further management of the patient's known left breast cancer as clinically indicated is recommended. BI-RADS Assessment Category 6: Known Biopsy Proven Malignancy                 Past Medical History:   Diagnosis Date    Breast cancer ([facility] [address]     Hypertension     Murmur, heart     echo 2016 EF 60-65% ; 1/31/22 states never has had symptoms    Poor historian     difficulty with verifying medication list     Past Surgical History:   Procedure Laterality Date    BREAST BIOPSY Left 9/21/2022    BREAST LESION BIOPSY EXCISION NEEDLE LOCALIZATION/ CHINESE/MANDARIN  performed by Kirk Lindsey MD at [facility] Left 9/21/2022    LEFT BREAST BIOPSY SENTINEL NODE DISSECTION.  performed by Kirk Lindsey MD at [place] [address]      bx of left breast     IR PORT PLACEMENT EQUAL OR GREATER THAN 5 YEARS  2/2/2022    IR PORT PLACEMENT EQUAL OR GREATER THAN 5 YEARS  2/2/2022    IR PORT PLACEMENT EQUAL OR GREATER THAN 5 YEARS 2/2/2022 [facility] RADIOLOGY SPECIALS    US BREAST NEEDLE BIOPSY LEFT Left 12/23/2021    US BREAST NEEDLE BIOPSY LEFT 12/23/2021 [facility] RADIOLOGY MAMMO     Current Outpatient Medications   Medication Sig    lidocaine-prilocaine (EMLA) 2.5-2.5 % cream Apply to port about 45 minutes prior to access    spironolactone (ALDACTONE) 25 MG tablet 25 mg    dilTIAZem (TIAZAC) 240 MG extended release capsule 1 po qd     No current facility-administered medications for this visit. ALLERGIES:  Paclitaxel and Lisinopril    Social History     Socioeconomic History    Marital status:    Tobacco Use    Smoking status: Never    Smokeless tobacco: Never   Substance and Sexual Activity    Alcohol use: No     Alcohol/week: 0.0 standard drinks    Drug use: No   Social History Narrative    Abuse: Feels safe at home, no history of physical abuse, no history of sexual abuse       Social History     Tobacco Use   Smoking Status Never   Smokeless Tobacco Never     Family History   Problem Relation Age of Onset    Ovarian Cancer Neg Hx     No Known Problems Mother     Breast Cancer Neg Hx     No Known Problems Sister     Other Other         states no family history    Colon Cancer Neg Hx     Hypertension Father     No Known Problems Sister     No Known Problems Brother     Uterine Cancer Neg Hx      ROS: The patient has no difficulty with chest pain or shortness of breath. No fever or chills. Comprehensive review of systems was otherwise unremarkable except as noted above. Physical Exam:   There were no vitals taken for this visit. There were no vitals filed for this visit. [unfilled]  [unfilled]    Constitutional: Alert, oriented, cooperative patient in no acute distress; appears stated age    Eyes:Sclera are clear.  EOMs intact  ENMT: no external lesions gross hearing normal; no obvious neck masses, no ear or lip lesions, nares normal  CV: RRR. Normal perfusion  Resp: No JVD. Breathing is  non-labored; no audible wheezing. Healed left lumpectomy left axillary incisions. No palpable breast masses on either side. Small breasts   No regional adenopathy      GI: soft and non-distended     Musculoskeletal: unremarkable with normal function. No embolic signs or cyanosis. Neuro:  Oriented; moves all 4; no focal deficits  Psychiatric: normal affect and mood, no memory impairment    Recent vitals (if inpt):  @IPVITALS(24:)@    Amount and/or Complexity of Data Reviewed and Analyzed:  I reviewed and analyzed all of the unique labs and radiologic studies that are shown below as well as any that are in the HPI, and any that are in the expanded problem list below  *Each unique test, order, or document contributes to the combination of 2 or combination of 3 in Category 1 below. For this visit I also reviewed old records and prior notes. No results for input(s): WBC, HGB, PLT, NA, K, CL, CO2, BUN, CREA, GLU, INR, APTT, ALT, AML, AML, LCAD, PCO2, PO2, HCO3 in the last 72 hours.     Invalid input(s): PTP, TBIL, TBILI, CBIL, SGOT, GPT, AP, LPSE, NH4, TROPT, TROIQ,  PH  Review of most recent CBC  Lab Results   Component Value Date    WBC 4.0 (L) 11/14/2022    HGB 11.9 11/14/2022    HCT 36.7 11/14/2022    MCV 98.4 11/14/2022     (L) 11/14/2022       Review of most recent BMP  Lab Results   Component Value Date/Time     11/14/2022 10:22 AM    K 4.3 11/14/2022 10:22 AM     11/14/2022 10:22 AM    CO2 25 11/14/2022 10:22 AM    BUN 24 11/14/2022 10:22 AM    CREATININE 0.90 11/14/2022 10:22 AM    GLUCOSE 107 11/14/2022 10:22 AM    CALCIUM 9.4 11/14/2022 10:22 AM        Review of most recent LFTs (and lipase if done)  Lab Results   Component Value Date    ALT 35 11/14/2022    AST 19 11/14/2022    ALKPHOS 116 11/14/2022    BILITOT 0.3 11/14/2022     No results found for: LIPASE    No results found for: INR, APTT, LCAD    Review of most recent HgbA1c  No results found for: LABA1C  No results found for: EAG    Nutritional assessment screen for wound healing issues:  Lab Results   Component Value Date    LABALBU 3.9 11/14/2022       @lastcovr@    Xray Result (most recent):  XR CHEST STANDARD TWO VW 03/07/2022    Narrative  EXAM: 2 view chest radiograph. INDICATION: Neutropenic fever. COMPARISON: Chest radiograph dated February 22, 2022. FINDINGS:  Limited crosstable lateral projection. No focal consolidation to suggest  pneumonia. No pneumothorax or pleural effusion. Unchanged cardiomegaly. No  evidence of acute osseous abnormality. Right-sided chest port terminates in the  cavoatrial junction. Impression  1. No focal consolidation to suggest pneumonia. CT Result (most recent):  No results found for this or any previous visit from the past 3650 days. US Result (most recent):  US BREAST NEEDLE BIOPSY LEFT 12/23/2021    Addendum 12/30/2021 11:36 AM  Addendum: Angela Valencia, accession number: [id number]  Date: 12/28/2021 Pathology was noted as A: Left breast, 9:00 position, 6 cm from  nipple, core biopsy: Foci of at least microinvasive infiltrating ductal  carcinoma, intermediate grade (moderately differentiated) in association with  focally high grade ductal carcinoma in situ. Definite lymphovascular invasion is  not identified. Results concordant with imaging. Pathology report was called to  Dr. Jaime Persaud office on 12/27/2021 by RT Roosevelt(TEENA)(M). Patient was referred to  Dr. Rory Burgos and has an appointment scheduled with him on 1/10/2022. Narrative  This is a summary report. The complete report is available in the patient's medical record. If you cannot access the medical record, please contact the sending organization for a detailed fax or copy.     Ultrasound-guided left breast biopsy and Mammogram    INDICATION: Breast mass    After informed consent was obtained, the mass at 9:00 in the left breast was  localized with ultrasound. The overlying skin was prepped using sterile  technique. 1% lidocaine was used for superficial anesthesia. Using ultrasound guidance, 3 biopsy passes were made with a 14-gauge core biopsy  needle. A clip was then left to fay the biopsy site. The patient tolerated the  procedure well. Left Mammogram:  Post biopsy mammogram shows the biopsy clip in good position. Impression  Successful ultrasound-guided biopsy of the medial left breast mass. Admission date (for inpatients): (Not on file)   * No surgery found *  * No surgery found *        ASSESSMENT/PLAN:  [unfilled]  Active Problems:    * No active hospital problems. *  Resolved Problems:    * No resolved hospital problems. *     Patient Active Problem List    Diagnosis Date Noted    Anemia due to antineoplastic chemotherapy 06/01/2022     Priority: Medium    Other specified anemias 04/11/2022    Neutropenic fever ([facility] [address] 03/07/2022    Severe sepsis ([facility] [address] 02/18/2022    Triple negative malignant neoplasm of breast ([facility] [address] 01/10/2022    Carcinoma of upper-inner quadrant of left breast in female, estrogen receptor negative ([facility] [address] 01/10/2022     cT2N0 left breast IDC with DCIS, triple negative, ypTis N0   No previous personal malignancy, prior breast surgery, or other symptoms reported. No known family breast cancer. 11/22/21 Bilat screening mammo  Scattered fibroglandular densities. A few typically benign-appearing calcifications in the left breast are similar in appearance to the previous study. Focal asymmetry in the medial inferior left breast located 6 cm from the nipple. Otherwise no suspicious mass, calcification, or architectural distortion in either breast.       IMPRESSION  Focal asymmetry in the medial inferior left breast at 6 cm from the nipple. 12/6/21 Left breast dx mammo and US  The area of concern persists with additional imaging.  This resides within   the medial left breast at approximately 9:00 position. There  are few associated calcifications which appear somewhat coarse. This   measures approximately 1.3 cm in size. An additional smaller asymmetry also at the 9:00  position resides 7 cm from the nipple. A left breast ultrasound is   recommended for further evaluation. 12/6/21 left breast US  At the 9:00 position, 6 cm from the nipple is a lobulated hypoechoic but   mildly heterogeneous appearing mass measuring  approximately 1.5 x 0.8 x 0.8 cm. The margins are occasionally ill-defined   with some components demonstrating posterior acoustic shadowing. The long axis is  parallel to the plane of the chest wall. There is a 7 mm hypoechoic mass   just adjacent to this This is hypoechoic with a lobulated contour. The margins are occasionally ill-defined. IMPRESSION  Adjacent left breast masses. Recommend tissue sampling of the larger lesion. 12/23/21 US-guided left breast biopsy  A:  \" LEFT BREAST, 9:00 POSITION, 6 CM FROM NIPPLE, CORE BIOPSY\": FOCI OF   AT LEAST MICROINVASIVE INFILTRATING DUCTAL CARCINOMA,   INTERMEDIATE GRADE (MODERATELY DIFFERENTIATED) IN ASSOCIATION WITH FOCALLY   HIGH GRADE DUCTAL CARCINOMA IN SITU. DEFINITE LYMPHOVASCULAR INVASION IS NOT IDENTIFIED   Sign Out Date: 12/24/2021  ABDIRASHID Evans M.D.   ER:  Negative (0.9%);  AZ: Negative (0.4%); HER-2/CHAR: Negative (0)   Post biopsy mammogram shows the biopsy clip in good position. 12/28/21 Breast MRI  The breasts demonstrate moderate glandularity and mild background enhancement. Left 9:00 biopsy clip within a lobulated heterogeneously enhancing mass   with angulated and irregular margins overall measuring about 1.8 x 0.7 x 1.2 cm. Posterior to this (about 1 cm away) the smaller mass measuring 0.9 cm is   unchanged from recent mammogram and ultrasound.  Additionally there is clumped  and reticular nonmass enhancement extending superiorly by about 2.7 cm and   anteriorly by about 1.8 cm, in keeping with the DCIS and calcifications. Overall the expected area of disease measures about 4.5 cm AP by 1.2 cm   transverse and is located at 9:00-9:30, mid to posterior depth. There is no evidence of involvement of skin or chest wall structures. No other evidence of suspicious enhancing mass, and no other dominant or   unique nonmass enhancement,  to suggest additional malignancy in either breast.  No evidence of axillary or internal mammary lymphadenopathy. Elsewhere, limited visualization of the partially included thorax and upper abdomen   shows no acute abnormality, with note of a small benign cyst in the left liver. IMPRESSION  1. Left medial breast cancer measures about 4.5 cm in greatest dimension. 2. No additional suspicious finding elsewhere in either breast. No obvious lymphadenopathy. 1/13/22 presented at [facility]; genetic testing and refer to med oncology pre-op    1/13/22 BRCAPlus panel of 8 genes associated with hereditary cancer (including BRCA1 and BRCA2)   No pathogenic variants identified that increase risk of developing breast Cancer      8/25/22 presented at [facility]; plan left NL lumpectomy/sent node excision next    9/21/22 s/p left NL lumpectomy and sent node excision; Dr Sathish Brand:  \" LEFT BREAST LUMPECTOMY\":  RESIDUAL DUCTAL CARCINOMA IN SITU, INTERMEDIATE GRADE (CRIBRIFORM   TYPE) IN ASSOCIATION WITH CHANGES CONSISTENT WITH PRIOR BIOPSY SITE.    MARGINS OF RESECTION ARE GREATER THAN 1 CM FROM TUMOR (SEE ALSO B, C, D, E, F, AND G)          B:  \" LEFT BREAST, FINAL MEDIAL MARGIN\":  TISSUE CONSISTENT WITH BREAST NEGATIVE FOR MALIGNANT TUMOR   C:  \" LEFT BREAST, FINAL LATERAL MARGIN\":  TISSUE CONSISTENT WITH BREAST NEGATIVE FOR MALIGNANT TUMOR   D:  \" LEFT BREAST, FINAL SUPERIOR MARGIN\":  TISSUE CONSISTENT WITH BREAST NEGATIVE FOR MALIGNANT TUMOR   E:  \" LEFT BREAST, FINAL INFERIOR MARGIN\":  TISSUE CONSISTENT WITH BREAST NEGATIVE FOR MALIGNANT TUMOR   F:  \"LEFT BREAST, FINAL POSTERIOR MARGIN\":  SKELETAL MUSCLE NEGATIVE FOR MALIGNANT TUMOR   G:  \" LEFT BREAST, FINAL ANTERIOR MARGIN\":  TISSUE CONSISTENT WITH BREAST NEGATIVE FOR MALIGNANT TUMOR     H:  \" LEFT AXILLARY SENTINEL NODE NUMBER ONE\": LYMPH NODE NEGATIVE FOR METASTATIC TUMOR   I:  \" LEFT AXILLARY SENTINEL NODE NUMBER TWO\":  LYMPH NODE NEGATIVE FOR METASTATIC TUMOR   J:  \" LEFT AXILLARY SENTINEL NODE NUMBER THREE\":  LYMPH NODE NEGATIVE FOR METASTATIC TUMOR   Sign Out Date: 9/23/2022  ABDIRASHID Mendez M.D.     9/29/22 presented at [facility]; plan continued follow-up with medical oncology. Refer to XRT when Dr Julia Myles is ready      Screening mammogram, encounter for 03/18/2019     Ordered for this Oct on 3/18/19        Irregular menses 07/24/2017     noted        Women's annual routine gynecological examination 07/24/2017     Pap with HPV done 7/24/17        Mixed hyperlipidemia 04/13/2016    Seasonal allergic rhinitis 04/13/2016    Encephalomalacia on imaging study 04/13/2016    Benign essential HTN 04/13/2016    Frequent headaches 02/25/2016          Number and Complexity of Problems addressed and   Risks of complications and/or morbidity of management        cT2N0, ycT0N0, ypTisN0  triple negative left breast IDC with DCIS  She is s/p left NL lumpectomy and axillary sent node excision on 9/21/22 for a cT2N0, ycT0N0, ypTisN0 triple negative left breast carcinoma. She had only residual DCIS at the time of surgery. Her surgical margins and 3 sentinel nodes were negative for malignancy. I presented her case postoperatively at multidisciplinary breast cancer conference on 9/29/22. She will continue to follow-up with medical oncology. She started XRT with Dr. Ramírez Samaniego in mid November 2022. She missed her recent mammogram appointment and we called Katherine Dupree to set this up for her and got her scheduled for bilateral mammography on December 7 at 5 PM.    This information was given to the patient in writing and also through the tele-video certified [facility]  used during our visit. I also ordered her next annual bilateral mammography for late November 2023 and we will see her back after those images assuming that her current images are normal.  I have asked her to return to see me sooner if her current mammography has any abnormalities. Level of MDM (2/3 elements below)  Number and Complexity of Problems Addressed Amount and/or Complexity of Data to be Reviewed and Analyzed  *Each unique test, order, or document contributes to the combination of 2 or combination of 3 in Category 1 below. Risk of Complications and/or Morbidity or Mortality of pt Management     18933  [id number] SF Minimal  ?1self-limited or minor problem Minimal or none Minimal risk of morbidity from additional diagnostic testing or Rx   25115  [id number] Low Low  ? 2or more self-limited or minor problems;    or  ? 1stable chronic illness;    or  ?1acute, uncomplicated illness or injury   Limited  (Must meet the requirements of at least 1 of the 2 categories)  Category 1: Tests and documents   ? Any combination of 2 from the following:  ?Review of prior external note(s) from each unique source*;  ?review of the result(s) of each unique test*;   ?ordering of each unique test*    or   Category 2: Assessment requiring an independent historian(s)  (For the categories of independent interpretation of tests and discussion of management or test interpretation, see moderate or high) Low risk of morbidity from additional diagnostic testing or treatment     66890  29658 Mod Moderate  ? 1or more chronic illnesses with exacerbation, progression, or side effects of treatment;    or  ?2or more stable chronic illnesses;    or  ?1undiagnosed new problem with uncertain prognosis;    or  ?1acute illness with systemic symptoms;    or  ?1acute complicated injury   Moderate  (Must meet the requirements of at least 1 out of 3 categories)  Category 1: Tests, documents, or independent historian(s)  ? Any combination of 3 from the following:   ?Review of prior external note(s) from each unique source*;  ?Review of the result(s) of each unique test*;  ?Ordering of each unique test*;  ?Assessment requiring an independent historian(s)    or  Category 2: Independent interpretation of tests   ? Independent interpretation of a test performed by another physician/other qualified health care professional (not separately reported);     or  Category 3: Discussion of management or test interpretation  ? Discussion of management or test interpretation with external physician/other qualified health care professional/appropriate source (not separately reported)   Moderate risk of morbidity from additional diagnostic testing or treatment  Examples only:  ?Prescription drug management   ? Decision regarding minor surgery with identified patient or procedure risk factors  ? Decision regarding elective major surgery without identified patient or procedure risk factors   ? Diagnosis or treatment significantly limited by social determinants of health       [id number] High High  ? 1or more chronic illnesses with severe exacerbation, progression, or side effects of treatment;    or  ?1 acute or chronic illness or injury that poses a threat to life or bodily function   Extensive  (Must meet the requirements of at least 2 out of 3 categories)  Category 1: Tests, documents, or independent historian(s)  ? Any combination of 3 from the following:   ?Review of prior external note(s) from each unique source*;  ?Review of the result(s) of each unique test*;   ?Ordering of each unique test*;   ?Assessment requiring an independent historian(s)    or   Category 2: Independent interpretation of tests   ? Independent interpretation of a test performed by another physician/other qualified health care professional (not separately reported);     or  Category 3: Discussion of management or test interpretation  ? Discussion of management or test interpretation with external physician/other qualified health care professional/appropriate source (not separately reported)   High risk of morbidity from additional diagnostic testing or treatment  Examples only:  ?Drug therapy requiring intensive monitoring for toxicity  ? Decision regarding elective major surgery with identified patient or procedure risk factors  ? Decision regarding emergency major surgery  ? Decision regarding hospitalization  ? Decision not to resuscitate or to de-escalate care because of poor prognosis             I have personally performed a face-to-face diagnostic evaluation and management  service on this patient. I have independently seen the patient. I have independently obtained the above history from the patient/family. I have independently examined the patient with above findings. I have independently reviewed data/labs for this patient and developed the above plan of care (MDM).       Signed: Reji Barker MD  11/28/2022    8:44 AM

## 2022-11-29 ENCOUNTER — HOSPITAL ENCOUNTER (OUTPATIENT)
Dept: PHYSICAL THERAPY | Age: 57
Setting detail: RECURRING SERIES
Discharge: HOME | End: 2022-12-02
Payer: COMMERCIAL

## 2022-11-29 ENCOUNTER — HOSPITAL ENCOUNTER (OUTPATIENT)
Dept: RADIATION ONCOLOGY | Age: 57
Setting detail: RECURRING SERIES
Discharge: HOME | End: 2022-12-02
Payer: COMMERCIAL

## 2022-11-29 PROCEDURE — 97140 MANUAL THERAPY 1/> REGIONS: CPT

## 2022-11-29 PROCEDURE — 77412 RADIATION TX DELIVERY LVL 3: CPT

## 2022-11-29 PROCEDURE — 77387 GUIDANCE FOR RADJ TX DLVR: CPT

## 2022-11-29 PROCEDURE — 77336 RADIATION PHYSICS CONSULT: CPT

## 2022-11-29 ASSESSMENT — PAIN SCALES - GENERAL: PAINLEVEL_OUTOF10: 3

## 2022-11-29 NOTE — PROGRESS NOTES
Chino Hoover  : 1965  Primary: [employer]  Secondary: [place] [facility] [address],   [address] [address]  Phone: [phone]  Fax: [phone] Plan Frequency: 1x/week for 90 days  Plan of Care/Certification Expiration Date: 23      PT Visit Info: Total # of Visits to Date: 4     Visit Count:  4   OUTPATIENT PHYSICAL THERAPY:OP NOTE TYPE: Treatment Note 2022       Episode  }Appt Desk             Treatment Diagnosis:  Pain in Left Shoulder (M25.512)  Stiffness of Left Shoulder, Not elsewhere classified (P97.091)  Medical/Referring Diagnosis:  Malignant neoplasm of unspecified site of unspecified female breast [C50.919]  Referring Physician:  Slick Davey MD MD Orders:  PT Eval and Treat oncology rehab  Date of Onset:  Onset Date: 21     Allergies:   Paclitaxel and Lisinopril  Restrictions/Precautions:  Restrictions/Precautions: None  No data recorded   Interventions Planned (Treatment may consist of any combination of the following):    Current Treatment Recommendations: Strengthening; ROM; Manual Therapy - Soft Tissue Mobilization; Home exercise program     Subjective Comments:  the patient reports she has not been exercising as suggested while going through radiation. She reports she is still having shoulder pain. It has not gotten worse or better. Initial:}    3/10Post Session:       3/10  Medications Last Reviewed:  2022  Updated Objective Findings:  None Today  DATE  22     RIGHT LEFT LEFT LEFT   MCP  17.3 16.8    WRIST  14.4 14.3    10 cm  20.1 20.2    20 cm  23.1 23.3    30 cm  26.9 27.1    40 cm  34 34    50 cm                  Treatment   THERAPEUTIC EXERCISE: ( minutes):    Exercises per grid below to improve mobility. Required minimal verbal cues to promote proper body alignment. Progressed range as indicated. MANUAL THERAPY: 30 minutes):    Soft tissue mobilization was utilized and necessary because of the patient's restricted joint motion and risk for lymphedema . Date:  11.1.22 Date:   Date:     Activity/Exercise Parameters Parameters Parameters   Wall slides flex/Scapt 10x     Wall snow angels Attempted; 3 w/ A     Horizontal ABD at wall 10x     Seated Scapular retraction 10x     Chicken wings 10x     Open book 10x B     LTR w/goal post arms 10x B     Trunk rotation w/B UE extended into scaption 10x B     Supine wand flex 2#; 10x     Supine wand press-ups 2#; 10x     Seated wand ABD 1#; 10x L     Seated wand extension 1#; 10x                 UBE L1; 6min     Kinesiotaping to unload L shoulder Done       Above not performed    The patient was seen with  services present. Girth was assessed. The patient was advised to continuehold all exercises with the left upper extremity until radiation has ended and she has healed. She will limit aggressive activity with the arm. She will return in 3 weeks for a final recheck of her girth following radiation. Treatment/Session Summary:    Treatment Assessment:  The patient does not have signs of lymphedema at this point. She still complains of shoulder pain. She may need to be referred back to her physician. Communication/Consultation:   Hold HEP, lymphedema precautions  Equipment provided today:  None  Recommendations/Intent for next treatment session: Next visit will focus on assessing girth.     Total Treatment Billable Duration:  30 minutes manual  Time In: 1988  Time Out: 1020    TERRY ALVARENGA, PT       Charge Capture  }Post Session Pain  PT Visit Info  [facility]  MD Guidelines  Scanned Media  Benefits  MyChart    Future Appointments   Date Time Provider Yue Cid   11/30/2022  9:30 AM [address] [place] [address] AMARIS [facility] [address] [place] [address]   12/1/2022  9:30 AM [facility] ON[facility] [address] [place] [address]   12/2/2022  9:30 AM [facility] [address] [place] [address]   12/5/2022  9:00 AM LAB CK [facility]   12/5/2022  9:30 AM GCC RAD ONC Felipe Sinclair [address] [facility]   12/5/2022  9:45 AM Martha Mckeon MD [facility] G[facility] AMB   12/5/2022 10:30 AM POD2C GCCOP[facility] GCC   12/6/2022  9:30 AM [facility]   12/7/2022  5:30 PM [facility] ROOM 2 SFERMAM E   12/21/2022 10:45 AM Ester Lazaro, PT SFOORPT O

## 2022-11-30 ENCOUNTER — HOSPITAL ENCOUNTER (OUTPATIENT)
Dept: RADIATION ONCOLOGY | Age: 57
Setting detail: RECURRING SERIES
Discharge: HOME | End: 2022-12-03
Payer: COMMERCIAL

## 2022-11-30 ENCOUNTER — APPOINTMENT (OUTPATIENT)
Dept: RADIATION ONCOLOGY | Age: 57
End: 2022-11-30
Payer: COMMERCIAL

## 2022-11-30 DIAGNOSIS — E03.2 HYPOTHYROIDISM DUE TO MEDICATION: ICD-10-CM

## 2022-11-30 DIAGNOSIS — C50.919 TRIPLE NEGATIVE MALIGNANT NEOPLASM OF BREAST (HCC): Primary | ICD-10-CM

## 2022-11-30 PROCEDURE — 77412 RADIATION TX DELIVERY LVL 3: CPT

## 2022-11-30 PROCEDURE — 77280 THER RAD SIMULAJ FIELD SMPL: CPT

## 2022-12-01 ENCOUNTER — APPOINTMENT (OUTPATIENT)
Dept: RADIATION ONCOLOGY | Age: 57
End: 2022-12-01
Payer: COMMERCIAL

## 2022-12-01 ENCOUNTER — HOSPITAL ENCOUNTER (OUTPATIENT)
Dept: RADIATION ONCOLOGY | Age: 57
Setting detail: RECURRING SERIES
Discharge: HOME | End: 2022-12-04
Payer: COMMERCIAL

## 2022-12-01 PROCEDURE — 77412 RADIATION TX DELIVERY LVL 3: CPT

## 2022-12-02 ENCOUNTER — HOSPITAL ENCOUNTER (OUTPATIENT)
Dept: RADIATION ONCOLOGY | Age: 57
Setting detail: RECURRING SERIES
Discharge: HOME | End: 2022-12-05
Payer: COMMERCIAL

## 2022-12-02 VITALS
BODY MASS INDEX: 30.86 KG/M2 | OXYGEN SATURATION: 98 % | DIASTOLIC BLOOD PRESSURE: 82 MMHG | HEART RATE: 76 BPM | TEMPERATURE: 98 F | SYSTOLIC BLOOD PRESSURE: 140 MMHG | WEIGHT: 158.3 LBS

## 2022-12-02 PROCEDURE — 77412 RADIATION TX DELIVERY LVL 3: CPT

## 2022-12-03 NOTE — PROGRESS NOTES
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 female who is currently receiving adjuvant RT to the left breast.  I am seeing this patient today in Dr. Gera [facility]. Visit today was conducted in [address] [place] [address] via video . Current RT dose: 40.05/40.05 Gy in 15/15 fractions. 6/10 Gy in 3/5 fx of boost.    Concurrent systemic therapy: Keytruda    Subjective:  More skin darkening left breast, applying Aquaphor. No pain. Chronic cough. Objective:  Vitals:    12/02/22 0950   BP: (!) 140/82   Pulse: 76   Temp: 98 °F (36.7 °C)   TempSrc: Oral   SpO2: 98%   Weight: 158 lb 4.8 oz (71.8 kg)     Pain 0/10    General: Alert and conversant, in NAD  Skin: Mild erythema/hyperpigmentation, no desquamation     Assessment:  Patient is tolerating radiation therapy well with grade 1 dermatitis. Plan:  -Continue Aquaphor for daily skin care. -Continue RT as planned. -Treatment images reviewed. -Follow up here in 1 month. -The patient has a documented plan of care to address pain. Pain is not present.       Wallace Younger MD  12/02/22

## 2022-12-05 ENCOUNTER — HOSPITAL ENCOUNTER (OUTPATIENT)
Dept: INFUSION THERAPY | Age: 57
Discharge: HOME | End: 2022-12-05
Payer: COMMERCIAL

## 2022-12-05 ENCOUNTER — OFFICE VISIT (OUTPATIENT)
Dept: ONCOLOGY | Age: 57
End: 2022-12-05
Payer: COMMERCIAL

## 2022-12-05 ENCOUNTER — HOSPITAL ENCOUNTER (OUTPATIENT)
Dept: RADIATION ONCOLOGY | Age: 57
Setting detail: RECURRING SERIES
Discharge: HOME | End: 2022-12-08
Payer: COMMERCIAL

## 2022-12-05 VITALS
HEART RATE: 87 BPM | BODY MASS INDEX: 31.12 KG/M2 | DIASTOLIC BLOOD PRESSURE: 82 MMHG | WEIGHT: 158.5 LBS | TEMPERATURE: 98 F | SYSTOLIC BLOOD PRESSURE: 125 MMHG | RESPIRATION RATE: 14 BRPM | OXYGEN SATURATION: 97 % | HEIGHT: 60 IN

## 2022-12-05 DIAGNOSIS — Z79.899 HIGH RISK MEDICATION USE: ICD-10-CM

## 2022-12-05 DIAGNOSIS — E03.2 HYPOTHYROIDISM DUE TO MEDICATION: ICD-10-CM

## 2022-12-05 DIAGNOSIS — C50.919 TRIPLE NEGATIVE MALIGNANT NEOPLASM OF BREAST (HCC): Primary | ICD-10-CM

## 2022-12-05 DIAGNOSIS — C50.919 TRIPLE NEGATIVE MALIGNANT NEOPLASM OF BREAST (HCC): ICD-10-CM

## 2022-12-05 DIAGNOSIS — R52 PAIN: ICD-10-CM

## 2022-12-05 LAB
ALBUMIN SERPL-MCNC: 4 G/DL (ref 3.5–5)
ALBUMIN/GLOB SERPL: 1 {RATIO} (ref 0.4–1.6)
ALP SERPL-CCNC: 117 U/L (ref 50–136)
ALT SERPL-CCNC: 38 U/L (ref 12–65)
ANION GAP SERPL CALC-SCNC: 6 MMOL/L (ref 2–11)
AST SERPL-CCNC: 24 U/L (ref 15–37)
BASOPHILS # BLD: 0 K/UL (ref 0–0.2)
BASOPHILS NFR BLD: 0 % (ref 0–2)
BILIRUB SERPL-MCNC: 0.4 MG/DL (ref 0.2–1.1)
BUN SERPL-MCNC: 23 MG/DL (ref 6–23)
CALCIUM SERPL-MCNC: 9.7 MG/DL (ref 8.3–10.4)
CHLORIDE SERPL-SCNC: 105 MMOL/L (ref 101–110)
CO2 SERPL-SCNC: 26 MMOL/L (ref 21–32)
CREAT SERPL-MCNC: 1 MG/DL (ref 0.6–1)
DIFFERENTIAL METHOD BLD: ABNORMAL
EOSINOPHIL # BLD: 0.1 K/UL (ref 0–0.8)
EOSINOPHIL NFR BLD: 2 % (ref 0.5–7.8)
ERYTHROCYTE [DISTWIDTH] IN BLOOD BY AUTOMATED COUNT: 12.8 % (ref 11.9–14.6)
GLOBULIN SER CALC-MCNC: 4.2 G/DL (ref 2.8–4.5)
GLUCOSE SERPL-MCNC: 167 MG/DL (ref 65–100)
HCT VFR BLD AUTO: 38 % (ref 35.8–46.3)
HGB BLD-MCNC: 12.5 G/DL (ref 11.7–15.4)
IMM GRANULOCYTES # BLD AUTO: 0 K/UL (ref 0–0.5)
IMM GRANULOCYTES NFR BLD AUTO: 0 % (ref 0–5)
LYMPHOCYTES # BLD: 0.7 K/UL (ref 0.5–4.6)
LYMPHOCYTES NFR BLD: 18 % (ref 13–44)
MCH RBC QN AUTO: 32.1 PG (ref 26.1–32.9)
MCHC RBC AUTO-ENTMCNC: 32.9 G/DL (ref 31.4–35)
MCV RBC AUTO: 97.4 FL (ref 82–102)
MONOCYTES # BLD: 0.4 K/UL (ref 0.1–1.3)
MONOCYTES NFR BLD: 9 % (ref 4–12)
NEUTS SEG # BLD: 2.6 K/UL (ref 1.7–8.2)
NEUTS SEG NFR BLD: 71 % (ref 43–78)
NRBC # BLD: 0 K/UL (ref 0–0.2)
PLATELET # BLD AUTO: 148 K/UL (ref 150–450)
PMV BLD AUTO: 8.2 FL (ref 9.4–12.3)
POTASSIUM SERPL-SCNC: 4.2 MMOL/L (ref 3.5–5.1)
PROT SERPL-MCNC: 8.2 G/DL (ref 6.3–8.2)
RBC # BLD AUTO: 3.9 M/UL (ref 4.05–5.2)
SODIUM SERPL-SCNC: 137 MMOL/L (ref 133–143)
TSH, 3RD GENERATION: 7.22 UIU/ML (ref 0.36–3)
WBC # BLD AUTO: 3.7 K/UL (ref 4.3–11.1)

## 2022-12-05 PROCEDURE — 3078F DIAST BP <80 MM HG: CPT | Performed by: INTERNAL MEDICINE

## 2022-12-05 PROCEDURE — 3074F SYST BP LT 130 MM HG: CPT | Performed by: INTERNAL MEDICINE

## 2022-12-05 PROCEDURE — 2580000003 HC RX 258: Performed by: INTERNAL MEDICINE

## 2022-12-05 PROCEDURE — 80053 COMPREHEN METABOLIC PANEL: CPT

## 2022-12-05 PROCEDURE — 96413 CHEMO IV INFUSION 1 HR: CPT

## 2022-12-05 PROCEDURE — 36591 DRAW BLOOD OFF VENOUS DEVICE: CPT

## 2022-12-05 PROCEDURE — 6360000002 HC RX W HCPCS: Performed by: INTERNAL MEDICINE

## 2022-12-05 PROCEDURE — 85025 COMPLETE CBC W/AUTO DIFF WBC: CPT

## 2022-12-05 PROCEDURE — 84443 ASSAY THYROID STIM HORMONE: CPT

## 2022-12-05 PROCEDURE — 99215 OFFICE O/P EST HI 40 MIN: CPT | Performed by: INTERNAL MEDICINE

## 2022-12-05 RX ORDER — SODIUM CHLORIDE 0.9 % (FLUSH) 0.9 %
5-40 SYRINGE (ML) INJECTION 2 TIMES DAILY
Status: DISCONTINUED | OUTPATIENT
Start: 2022-12-05 | End: 2022-12-06 | Stop reason: HOSPADM

## 2022-12-05 RX ORDER — TRIAMCINOLONE ACETONIDE 55 UG/1
2 SPRAY, METERED NASAL DAILY
Qty: 1 EACH | Refills: 1 | Status: SHIPPED | OUTPATIENT
Start: 2022-12-05

## 2022-12-05 RX ORDER — SODIUM CHLORIDE 9 MG/ML
5-250 INJECTION, SOLUTION INTRAVENOUS PRN
Status: DISCONTINUED | OUTPATIENT
Start: 2022-12-05 | End: 2022-12-06 | Stop reason: HOSPADM

## 2022-12-05 RX ORDER — SODIUM CHLORIDE 0.9 % (FLUSH) 0.9 %
10 SYRINGE (ML) INJECTION PRN
Status: DISCONTINUED | OUTPATIENT
Start: 2022-12-05 | End: 2022-12-06 | Stop reason: HOSPADM

## 2022-12-05 RX ORDER — LEVOTHYROXINE SODIUM 0.03 MG/1
25 TABLET ORAL DAILY
Qty: 30 TABLET | Refills: 1 | Status: SHIPPED | OUTPATIENT
Start: 2022-12-05

## 2022-12-05 RX ADMIN — SODIUM CHLORIDE, PRESERVATIVE FREE 10 ML: 5 INJECTION INTRAVENOUS at 12:00

## 2022-12-05 RX ADMIN — SODIUM CHLORIDE, PRESERVATIVE FREE 10 ML: 5 INJECTION INTRAVENOUS at 10:53

## 2022-12-05 RX ADMIN — SODIUM CHLORIDE, PRESERVATIVE FREE 10 ML: 5 INJECTION INTRAVENOUS at 09:05

## 2022-12-05 RX ADMIN — SODIUM CHLORIDE 200 MG: 9 INJECTION, SOLUTION INTRAVENOUS at 11:25

## 2022-12-05 RX ADMIN — SODIUM CHLORIDE 20 ML/HR: 9 INJECTION, SOLUTION INTRAVENOUS at 10:53

## 2022-12-05 ASSESSMENT — PATIENT HEALTH QUESTIONNAIRE - PHQ9
SUM OF ALL RESPONSES TO PHQ QUESTIONS 1-9: 0
2. FEELING DOWN, DEPRESSED OR HOPELESS: 0

## 2022-12-05 NOTE — PATIENT INSTRUCTIONS
Patient Instructions from Today's Visit    Reason for Visit:  Follow up visit for breast cancer      Plan:    Madeline Atkinson today. Can take Claritin. You can get this over the counter. Will send in prescription for Nasacort. Start 25 mcg Synthroid daily. This is for your thyroid function.     Follow Up:  - 3 weeks    Recent Lab Results:  Hospital Outpatient Visit on 12/05/2022   Component Date Value Ref Range Status    WBC 12/05/2022 3.7 (A)  4.3 - 11.1 K/uL Final    RBC 12/05/2022 3.90 (A)  4.05 - 5.2 M/uL Final    Hemoglobin 12/05/2022 12.5  11.7 - 15.4 g/dL Final    Hematocrit 12/05/2022 38.0  35.8 - 46.3 % Final    MCV 12/05/2022 97.4  82.0 - 102.0 FL Final    MCH 12/05/2022 32.1  26.1 - 32.9 PG Final    MCHC 12/05/2022 32.9  31.4 - 35.0 g/dL Final    RDW 12/05/2022 12.8  11.9 - 14.6 % Final    Platelets 24/94/2098 148 (A)  150 - 450 K/uL Final    MPV 12/05/2022 8.2 (A)  9.4 - 12.3 FL Final    nRBC 12/05/2022 0.00  0.0 - 0.2 K/uL Final    **Note: Absolute NRBC parameter is now reported with Hemogram**    Seg Neutrophils 12/05/2022 71  43 - 78 % Final    Lymphocytes 12/05/2022 18  13 - 44 % Final    Monocytes 12/05/2022 9  4.0 - 12.0 % Final    Eosinophils % 12/05/2022 2  0.5 - 7.8 % Final    Basophils 12/05/2022 0  0.0 - 2.0 % Final    Immature Granulocytes 12/05/2022 0  0.0 - 5.0 % Final    Segs Absolute 12/05/2022 2.6  1.7 - 8.2 K/UL Final    Absolute Lymph # 12/05/2022 0.7  0.5 - 4.6 K/UL Final    Absolute Mono # 12/05/2022 0.4  0.1 - 1.3 K/UL Final    Absolute Eos # 12/05/2022 0.1  0.0 - 0.8 K/UL Final    Basophils Absolute 12/05/2022 0.0  0.0 - 0.2 K/UL Final    Absolute Immature Granulocyte 12/05/2022 0.0  0.0 - 0.5 K/UL Final    Differential Type 12/05/2022 AUTOMATED    Final    Sodium 12/05/2022 137  133 - 143 mmol/L Final    Potassium 12/05/2022 4.2  3.5 - 5.1 mmol/L Final    Chloride 12/05/2022 105  101 - 110 mmol/L Final    CO2 12/05/2022 26  21 - 32 mmol/L Final    Anion Gap 12/05/2022 6  2 - 11 mmol/L Final    Glucose 12/05/2022 167 (A)  65 - 100 mg/dL Final    BUN 12/05/2022 23  6 - 23 MG/DL Final    Creatinine 12/05/2022 1.00  0.6 - 1.0 MG/DL Final    Est, Glom Filt Rate 12/05/2022 >60  >60 ml/min/1.73m2 Final    Comment:      Pediatric calculator link: Kitty.at. org/professionals/kdoqi/gfr_calculatorped       Effective Oct 3, 2022       These results are not intended for use in patients <25years of age. eGFR results are calculated without a race factor using  the 2021 CKD-EPI equation. Careful clinical correlation is recommended, particularly when comparing to results calculated using previous equations. The CKD-EPI equation is less accurate in patients with extremes of muscle mass, extra-renal metabolism of creatinine, excessive creatine ingestion, or following therapy that affects renal tubular secretion. Calcium 12/05/2022 9.7  8.3 - 10.4 MG/DL Final    Total Bilirubin 12/05/2022 0.4  0.2 - 1.1 MG/DL Final    ALT 12/05/2022 38  12 - 65 U/L Final    AST 12/05/2022 24  15 - 37 U/L Final    Alk Phosphatase 12/05/2022 117  50 - 136 U/L Final    Total Protein 12/05/2022 8.2  6.3 - 8.2 g/dL Final    Albumin 12/05/2022 4.0  3.5 - 5.0 g/dL Final    Globulin 12/05/2022 4.2  2.8 - 4.5 g/dL Final    Albumin/Globulin Ratio 12/05/2022 1.0  0.4 - 1.6   Final    TSH, 3RD GENERATION 12/05/2022 7.220 (A)  0.358 - 3 uIU/mL Final        Treatment Summary has been discussed and given to patient: n/a        -------------------------------------------------------------------------------------------------------------------  Please call our office at [phone] if you have any  of the following symptoms:   Fever of 100.5 or greater  Chills  Shortness of breath  Swelling or pain in one leg    After office hours an answering service is available and will contact a provider for emergencies or if you are experiencing any of the above symptoms. Patient did express an interest in My Chart.   My Chart log in information explained on the after visit summary printout at the [facility] desk.     Courtney Lopez RN

## 2022-12-05 NOTE — PROGRESS NOTES
Arrived to the [facility]. Assessment completed, labs reviewed. Keytruda completed. Patient tolerated without problems. Any issues or concerns during appointment: None  Patient instructed to call provider with temperature of 100.4 or greater or nausea/vomiting/ diarrhea or pain not controlled by medications  Instructed to call Dr Mike Hudson with any side effects or other concerns  Patient aware of next infusion appointment on 12/27/22(date) at 4 PM (time).   Discharged ambulatory with family

## 2022-12-05 NOTE — PROGRESS NOTES
[facility]: Office Visit Progress Note    Chief Complaint:    Triple negative left breast cancer    History of Present Illness:  Reason for Referral: Carcinoma of upper-inner  quadrant of left breast in female, estrogen receptor negative; Triple negative malignant neoplasm of breast       Referring Provider: Angelina Blue MD       Primary Care Provider: Raji Ang MD       Family History of Cancer/Hematologic Disorders: None reported       Presenting Symptoms: Abnormal routine screening mammogram        Ms. Marifer Welch is a 56 y.o.  female with a history of heart murmur, HTN, mixed HLD, and irregular menses, who was recently diagnosed with breast  cancer. She initially presented on 11/22/21 for a routine bilateral digital screening mammogram which identified focal asymmetry in the medial inferior left breast at 6 cm from the nipple. Further evaluation with diagnostic left breast mammogram and left  breast ultrasound were completed on 12/6/21 confirming the presence of adjacent left breast masses with one at the 9:00 position, 6 cm from the nipple measuring approximately 1.5 x 0.8 x 0.8 cm and a 7 mm hypoechoic mass just adjacent to this. Recommended ultrasound-guided biopsy of the medial left breast mass was completed on 12/23/21 with pathology from the core biopsy of the left breast, 9:00 position, 6 cm from nipple, revealing ER 0.9%/WI 0.4%/HER-2 (0) negative, intermediate grade (moderately  differentiated), foci of at least microinvasive infiltrating ductal carcinoma in association with focally high grade ductal carcinoma in situ with no definite lymphovascular invasion identified. MRI of the bilateral breasts was performed on 12/28/21 demonstrating left medial breast cancer measuring about 4.5 cm in greatest dimension; no additional suspicious finding elsewhere in either breast; and no obvious lymphadenopathy.        Patient was referred to surgery and evaluated in consultation by Surgeon, Dr. Nolan Jefferson, on 1/10/22. She was assessed with signs and symptoms consistent with cT2N0 triple negative left breast IDC with DCIS. Treatment options were discussed, and  Dr. Lary Jones recommended genetic testing and preoperative evaluation by medical oncology to consider neoadjuvant chemotherapy. Dr. Lary Jones noted, \"We discussed the strong possibility that she could require left mastectomy/sentinel node should we  proceed with surgery upfront. At this time the enhancement on MRI looks too large, diffuse, and elongated to perform breast preservation surgery and expected good cosmetic result. If she has neoadjuvant treatment I will see her back in about 4 months  to regroup. \"       Patient is now referred to [facility] for oncology evaluation and consideration for neoadjuvant chemotherapy. Patient completed genetic counseling on 1/13/22. She agreed to pursue testing with BRCAPlus panel of 8 genes associated with hereditary cancer  (including BRCA1 and BRCA2 ). BILATERAL DIGITAL SCREENING MAMMOGRAM  11/22/2021   FINDINGS: There are scattered fibroglandular densities. A few typically benign-appearing calcifications in the left breast are similar in  appearance to the previous study. There is a focal asymmetry in the medial inferior left breast located 6 cm from the nipple. Recommend spot compression magnification views on the direct ML view and ultrasound, if indicated for further evaluation. There  is otherwise no suspicious mass, calcification, or architectural distortion in either breast.      IMPRESSION: Focal asymmetry in the medial inferior left breast at 6 cm from the nipple. for which further evaluation is recommended with ML  and compression magnification views, with possible ultrasound if indicated. BI-RADS Assessment Category 0: Incomplete: Needs additional imaging evaluation.        DIAGNOSTIC MAMMOGRAM LEFT BREAST, LEFT BREAST ULTRASOUND 12/6/21   FINDING: The area of concern does not persist on additional imaging. There is no architectural distortion. The area of concern persists with  additional imaging. This resides within the medial left breast at approximately 9:00 position. There are few associated calcifications which appear somewhat coarse. This measures approximately 1.3 cm in size. An additional smaller asymmetry also at the  9:00 position resides 7 cm from the nipple. A left breast ultrasound is recommended for further evaluation. LEFT BREAST ULTRASOUND: At the 9:00 position, 6 cm from the nipple is a lobulated hypoechoic but mildly heterogeneous appearing mass measuring approximately 1.5 x 0.8 x 0.8 cm. The margins are occasionally ill-defined with some components demonstrating  posterior acoustic shadowing. The long axis is parallel to the plane of the chest wall. There is a 7 mm hypoechoic mass just adjacent to this This is hypoechoic with a lobulated contour. The margins are occasionally ill-defined. IMPRESSION: Adjacent left breast masses. Recommend tissue sampling of the larger lesion. BI-RADS Assessment Category 4: Suspicious Finding- Biopsy should be considered. [facility] SURGICAL PATHOLOGY REPORT 12/23/21   DIAGNOSIS    A: \" LEFT BREAST, 9:00 POSITION, 6 CM FROM NIPPLE, CORE BIOPSY\": FOCI OF AT LEAST MICROINVASIVE INFILTRATING DUCTAL CARCINOMA, INTERMEDIATE GRADE (MODERATELY DIFFERENTIATED) IN ASSOCIATION WITH FOCALLY HIGH GRADE DUCTAL CARCINOMA IN SITU.  DEFINITE LYMPHOVASCULAR  INVASION IS NOT IDENTIFIED   [facility]- ER/UT/[id number] BY IHC    INTERPRETATION    [employer] IHC Quantitative Breast Panel    TEST NAME:                                      RESULTS:                  INTERNAL CONTROLS:    ESTROGEN RECEPTOR:                 Negative (0.9%)          Present, Positive    PROGESTERONE RECEPTOR:       Negative (0.4%)          Present, Positive    HER-2/CHAR:                                        Negative (0)                Percentage of Cells with Uniform Intense Complete Membrane    Stainin%   STF-IMMUNOHISTOCHEMISTRY   Immunohistochemical Stain Panel:    INTERPRETATION: Immunohistochemical findings consistent with breast primary. ANTIBODY/TEST       MARKER FOR                                               RESULT    Cytokeratin 7               Lung, breast, upper GE, serous ovary           Positive    Cytokeratin 20             Colon, mucinous ovary, urothelium                Negative    GATA3                        Urothelial, breast carcinoma                           Positive    GCDFP                       Breast, salivary gland, skin, adnexa               Positive    Mammaglobin                         Breast                                                              Positive       MRI OF THE BREASTS WITH AND WITHOUT CONTRAST 21   FINDINGS: The breasts demonstrate moderate glandularity and mild background enhancement. Left 9:00 biopsy clip within a lobulated heterogeneously  enhancing mass with angulated and irregular margins overall measuring about 1.8 x 0.7 x 1.2 cm. Posterior to this (about 1 cm away) the smaller mass measuring 0.9 cm is unchanged from recent mammogram and ultrasound. Additionally there is clumped and  reticular nonmass enhancement extending superiorly by about 2.7 cm and anteriorly by about 1.8 cm, in keeping with the DCIS and calcifications. Overall the expected area of disease measures about 4.5 cm AP by 1.2 cm transverse and is located at 9:00-9:30,  mid to posterior depth. There is no evidence of involvement of skin or chest wall structures. There is no other evidence of suspicious enhancing mass, and no other dominant or unique nonmass enhancement, to suggest additional malignancy in either breast.  There is no evidence of axillary or internal mammary lymphadenopathy.  Elsewhere, limited visualization of the partially included thorax and upper abdomen shows no acute abnormality, with note of a small benign cyst in the left liver. IMPRESSION   1. Left medial breast cancer measures about 4.5 cm in greatest dimension. 2. No additional suspicious finding elsewhere in either breast. No obvious lymphadenopathy. Recommend continued management as directed clinically. BI-RADS Assessment Category 6: Known Biopsy Proven Malignancy       Notes from Referring Provider: \"Age 49 with cT2N0 triple  negative left breast IDC and DCIS Evaluate    Speaks mandarin   She has been referred to genetics also\"       Interim history update in A/P. Review of Systems:      Constitutional  Denies fever or chills. Denies fatigue. Denies anorexia. HEENT   allergic conjunctivitis and sinusitis. Denies trauma, bluring vision, hearing loss, ear pain, nosebleeds, sore throat, neck pain and ear discharge. Skin  Denies lesions or rashes. Lungs  Denies shortness of breath, cough, sputum production or hemoptysis. Cardiovascular  Denies chest pain, palpitations, orthopnea, claudication and leg swelling. Gastrointestinal  Nausea. Denies vomiting. Denies bloody or black stools. Denies abdominal pain.   Denies dysuria, frequency or hesitancy of urination     Neuro  Denies headaches, visual changes or ataxia. Denies dizziness, tingling, tremors, sensory change, speech change, focal weakness and headaches. Hematology  Denies nasal/gum bleeding, denies easy bruise     Endo  Denies heat/cold intolerance, denies diabetes. MSK  Denies back pain, swollen legs, myalgias and falls. Psychiatric/Behavioral   insomnia. Denies depression and substance abuse. The patient is not nervous/anxious.               Allergies   Allergen Reactions    Paclitaxel Angioedema and Swelling    Lisinopril Other (See Comments)     Other reaction(s): Cough     Past Medical History:   Diagnosis Date    Breast cancer ([facility] [address]     Hypertension     Murmur, heart     echo 2016 EF 60-65% ; 1/31/22 states never has had symptoms    Poor historian difficulty with verifying medication list     Past Surgical History:   Procedure Laterality Date    BREAST BIOPSY Left 9/21/2022    BREAST LESION BIOPSY EXCISION NEEDLE LOCALIZATION/ CHINESE/MANDARIN  performed by Gray Arcos MD at [address] [place] [address] Left 9/21/2022    LEFT BREAST BIOPSY SENTINEL NODE DISSECTION.  performed by Gray Arcos MD at [place] [address]      bx of left breast     IR PORT PLACEMENT EQUAL OR GREATER THAN 5 YEARS  2/2/2022    IR PORT PLACEMENT EQUAL OR GREATER THAN 5 YEARS  2/2/2022    IR PORT PLACEMENT EQUAL OR GREATER THAN 5 YEARS 2/2/2022 [facility] RADIOLOGY SPECIALS    US BREAST NEEDLE BIOPSY LEFT Left 12/23/2021    US BREAST NEEDLE BIOPSY LEFT 12/23/2021 [facility] RADIOLOGY MAMMO     Family History   Problem Relation Age of Onset    Ovarian Cancer Neg Hx     No Known Problems Mother     Breast Cancer Neg Hx     No Known Problems Sister     Other Other         states no family history    Colon Cancer Neg Hx     Hypertension Father     No Known Problems Sister     No Known Problems Brother     Uterine Cancer Neg Hx      Social History     Socioeconomic History    Marital status:      Spouse name: Not on file    Number of children: Not on file    Years of education: Not on file    Highest education level: Not on file   Occupational History    Not on file   Tobacco Use    Smoking status: Never    Smokeless tobacco: Never   Substance and Sexual Activity    Alcohol use: No     Alcohol/week: 0.0 standard drinks    Drug use: No    Sexual activity: Not on file   Other Topics Concern    Not on file   Social History Narrative    Abuse: Feels safe at home, no history of physical abuse, no history of sexual abuse       Social Determinants of Health     Financial Resource Strain: Not on file   Food Insecurity: Not on file   Transportation Needs: Not on file   Physical Activity: Not on file   Stress: Not on file   Social Connections: Not on file   Intimate Partner Violence: Not on file   Housing Stability: Not on file     Current Outpatient Medications   Medication Sig Dispense Refill    triamcinolone (NASACORT ALLERGY 24HR) 55 MCG/ACT nasal inhaler 2 sprays by Each Nostril route daily 1 each 1    levothyroxine (SYNTHROID) 25 MCG tablet Take 1 tablet by mouth daily 30 tablet 1    lidocaine-prilocaine (EMLA) 2.5-2.5 % cream Apply to port about 45 minutes prior to access 2.5 g 1    spironolactone (ALDACTONE) 25 MG tablet 25 mg      dilTIAZem (TIAZAC) 240 MG extended release capsule 1 po qd       No current facility-administered medications for this visit. Facility-Administered Medications Ordered in Other Visits   Medication Dose Route Frequency Provider Last Rate Last Admin    sodium chloride flush 0.9 % injection 10 mL  10 mL IntraVENous PRN Connie Yang MD   10 mL at 12/05/22 0905       OBJECTIVE:  /82 (Site: Left Upper Arm, Position: Standing)   Pulse 87   Temp 98 °F (36.7 °C)   Resp 14   Ht 5' 0.05\" (1.525 m)   Wt 158 lb 8 oz (71.9 kg)   SpO2 97%   BMI 30.90 kg/m²     Physical Exam:      Constitutional:  Oriented to person, place, and time. Well-developed and well-nourished. HEENT:  Normocephalic and atraumatic. Oropharynx is clear and moist.    Conjunctivae and EOM are normal. Pupils are equal, round, and reactive to light. No scleral icterus. Neck supple. No JVD present. No tracheal deviation present. No thyromegaly present. Lymph node  No palpable submandibular, cervical, supraclavicular, axillary and inguinal lymph nodes. Breasts  Left breast soft, nontender, popular nodular area and 9:00 about 2 cm at baseline, no longer palpable after chemotherapy. Right breast soft, nontender, no mass. Skin   rash. Warm and dry. No bruising noted. No erythema. No pallor. Respiratory  Effort normal and breath sounds normal.  No respiratory distress. No wheezes. No rales. No tenderness.       CVS  Normal rate, regular rhythm and normal heart sounds. Exam reveals no gallop, no friction and no rub. No murmur heard. Abdomen  Soft. Bowel sounds are normal. Exhibits no distension. There is no tenderness. There is no rebound and no guarding. Neuro  Normal reflexes. No cranial nerve deficit. Exhibits normal muscle tone, 5 of 5 strength of all extremities. MSK  Normal range of motion in general.  No edema and no tenderness.         Psych  Normal mood, affect, behavior, judgment and thought content        Labs:  Recent Results (from the past 24 hour(s))   CBC with Auto Differential    Collection Time: 12/05/22  8:58 AM   Result Value Ref Range    WBC 3.7 (L) 4.3 - 11.1 K/uL    RBC 3.90 (L) 4.05 - 5.2 M/uL    Hemoglobin 12.5 11.7 - 15.4 g/dL    Hematocrit 38.0 35.8 - 46.3 %    MCV 97.4 82.0 - 102.0 FL    MCH 32.1 26.1 - 32.9 PG    MCHC 32.9 31.4 - 35.0 g/dL    RDW 12.8 11.9 - 14.6 %    Platelets 667 (L) 943 - 450 K/uL    MPV 8.2 (L) 9.4 - 12.3 FL    nRBC 0.00 0.0 - 0.2 K/uL    Seg Neutrophils 71 43 - 78 %    Lymphocytes 18 13 - 44 %    Monocytes 9 4.0 - 12.0 %    Eosinophils % 2 0.5 - 7.8 %    Basophils 0 0.0 - 2.0 %    Immature Granulocytes 0 0.0 - 5.0 %    Segs Absolute 2.6 1.7 - 8.2 K/UL    Absolute Lymph # 0.7 0.5 - 4.6 K/UL    Absolute Mono # 0.4 0.1 - 1.3 K/UL    Absolute Eos # 0.1 0.0 - 0.8 K/UL    Basophils Absolute 0.0 0.0 - 0.2 K/UL    Absolute Immature Granulocyte 0.0 0.0 - 0.5 K/UL    Differential Type AUTOMATED     Comprehensive Metabolic Panel    Collection Time: 12/05/22  8:58 AM   Result Value Ref Range    Sodium 137 133 - 143 mmol/L    Potassium 4.2 3.5 - 5.1 mmol/L    Chloride 105 101 - 110 mmol/L    CO2 26 21 - 32 mmol/L    Anion Gap 6 2 - 11 mmol/L    Glucose 167 (H) 65 - 100 mg/dL    BUN 23 6 - 23 MG/DL    Creatinine 1.00 0.6 - 1.0 MG/DL    Est, Glom Filt Rate >60 >60 ml/min/1.73m2    Calcium 9.7 8.3 - 10.4 MG/DL    Total Bilirubin 0.4 0.2 - 1.1 MG/DL    ALT 38 12 - 65 U/L    AST 24 15 - 37 U/L    Alk Phosphatase 117 50 - 136 U/L    Total Protein 8.2 6.3 - 8.2 g/dL    Albumin 4.0 3.5 - 5.0 g/dL    Globulin 4.2 2.8 - 4.5 g/dL    Albumin/Globulin Ratio 1.0 0.4 - 1.6     TSH    Collection Time: 12/05/22  8:58 AM   Result Value Ref Range    TSH, 3RD GENERATION 7.220 (H) 0.358 - 3 uIU/mL         Imaging:  No results found for this or any previous visit. ASSESSMENT/PLAN:   Diagnosis Orders   1. Triple negative malignant neoplasm of breast ([facility] [address]  CBC with Auto Differential    Comprehensive Metabolic Panel    TSH      2. High risk medication use  CBC with Auto Differential    Comprehensive Metabolic Panel    TSH      3. Hypothyroidism due to medication  levothyroxine (SYNTHROID) 25 MCG tablet      4. Pain                        [unfilled]    56 y.o. F consulted for triple negative left breast cancer and presented to [facility] with  and daughter on 1/19/2022.    She has good baseline health and functions well, and annual screening mammogram showed left breast lesion, biopsy showed triple negative grade 2 IDC, cT2 N0 M0, and we discussed the high risks profile for triple negative breast cancer and the most recent  FDA approval of neoadjuvant chemoimmunotherapy, she appeared a good candidate and agreed to arrange Shivani Mattes for 4 cycles followed by Adriamycin Cytoxan Keytruda for 4 cycles, followed by surgery and adjuvant Keytruda for total systemic therapy  of 1 year, discussed toxicity and management, discussed logistics, arrange Zofran Compazine Ativan, baseline echocardiogram showed normal EF, hypertension better but still uncontrolled after adding spironolactone and educated risk of dehydration and hypotension  on chemotherapy when she is taking double diuretics, started cycle 1 2/7/22 and left breast tumor responded rapidly, lost 20 pounds after cycle 1 and BP much better controlled even after she runs out of diltiazem, again warned of monitoring dehydration/hypotension,  discussed increased discharge 3/10/2022, had skin rash that  was attributed to cefepime and resolved, again discussed her sensitivity to multiple allergens and different manifestations need to be managed accordingly, Zaditor for allergic conjunctivitis, nasal steroid for sinusitis, Kenalog for skin rash, oral antihistamine,  prednisone 20 mg only as needed, added G-CSF since cycle 3, better controlled for allergy/hypertension/fluid retention, severe diarrhea responded to PRBC, severe thrombocytopenia recovered after delaying cycle 4 by a week, completed cycle 4 with G-CSF  and Emend. She returned on 5/9/2022 to start AC/Keytruda, however platelet was low and chemo had to be deferred for 2 weeks actually and platelets finally recovered to 92 on 5/23/2022, discussed to proceed with caution and reduce AC dose by 20%, which she tolerated reasonably well and completed 4 cycles of AC/Keytruda without major complication, repeat MRI showed cCR and Dr. Ricardo Bernstein performed left lumpectomy and axillary SLND on 9/21/2022, pathology showed ypTis N0 triple negative left breast carcinoma, discussed that the complete response of the invasive triple negative cancer is a favorable prognostic marker, continued adjuvant treatment, complaining of left shoulder pain since the surgery and consult [facility] for physical therapy, may consider orthopedic evaluation/cervical spine evaluation if no improvement, return on 12/5/2022, with stable, labs reviewed and okay to proceed to cycle 14 Keytruda, TSH remains elevated and start Synthroid 25 mcg daily, discussed okay for activity/work as tolerated and exercise/weight control indeed associated with favorable breast cancer outcome, finished radiation and continue to follow PT to decide whether orthopedic evaluation is needed, return in 3 weeks but call as needed. All questions are answered to their satisfaction. They will call for further questions and concerns.       ECOG PERFORMANCE STATUS - 1    Pain - 0 - No pain/10. Fatigue - No flowsheet data found. Distress - No flowsheet data found. Elements of this note have been dictated via voice recognition software. Text and phrases may be limited by the accuracy and autoconversion of the software. The chart has been reviewed, but errors may still be present. Hortensia Fields M.D.   [address]  Office : [phone]  Fax : [phone]

## 2022-12-06 ENCOUNTER — HOSPITAL ENCOUNTER (OUTPATIENT)
Dept: RADIATION ONCOLOGY | Age: 57
Setting detail: RECURRING SERIES
Discharge: HOME | End: 2022-12-09
Payer: COMMERCIAL

## 2022-12-06 ENCOUNTER — APPOINTMENT (OUTPATIENT)
Dept: RADIATION ONCOLOGY | Age: 57
End: 2022-12-06
Payer: COMMERCIAL

## 2022-12-06 PROCEDURE — 77412 RADIATION TX DELIVERY LVL 3: CPT

## 2022-12-06 PROCEDURE — 77336 RADIATION PHYSICS CONSULT: CPT

## 2022-12-07 ENCOUNTER — HOSPITAL ENCOUNTER (OUTPATIENT)
Dept: MAMMOGRAPHY | Age: 57
Discharge: HOME | End: 2022-12-10
Payer: COMMERCIAL

## 2022-12-07 ENCOUNTER — APPOINTMENT (OUTPATIENT)
Dept: RADIATION ONCOLOGY | Age: 57
End: 2022-12-07
Payer: COMMERCIAL

## 2022-12-07 DIAGNOSIS — Z12.31 SCREENING MAMMOGRAM FOR HIGH-RISK PATIENT: ICD-10-CM

## 2022-12-07 PROCEDURE — 77067 SCR MAMMO BI INCL CAD: CPT

## 2022-12-21 ENCOUNTER — HOSPITAL ENCOUNTER (OUTPATIENT)
Dept: PHYSICAL THERAPY | Age: 57
Setting detail: RECURRING SERIES
Discharge: HOME | End: 2022-12-24
Payer: COMMERCIAL

## 2022-12-21 DIAGNOSIS — C50.919 TRIPLE NEGATIVE MALIGNANT NEOPLASM OF BREAST (HCC): ICD-10-CM

## 2022-12-21 DIAGNOSIS — M25.519 SHOULDER PAIN, UNSPECIFIED CHRONICITY, UNSPECIFIED LATERALITY: Primary | ICD-10-CM

## 2022-12-21 PROCEDURE — 97140 MANUAL THERAPY 1/> REGIONS: CPT

## 2022-12-21 ASSESSMENT — PAIN SCALES - GENERAL: PAINLEVEL_OUTOF10: 3

## 2022-12-21 NOTE — PROGRESS NOTES
Date:   Date:     Activity/Exercise Parameters Parameters Parameters   Wall slides flex/Scapt 10x     Wall snow angels Attempted; 3 w/ A     Horizontal ABD at wall 10x     Seated Scapular retraction 10x     Chicken wings 10x     Open book 10x B     LTR w/goal post arms 10x B     Trunk rotation w/B UE extended into scaption 10x B     Supine wand flex 2#; 10x     Supine wand press-ups 2#; 10x     Seated wand ABD 1#; 10x L     Seated wand extension 1#; 10x                 UBE L1; 6min     Kinesiotaping to unload L shoulder Done       Above not performed    The patient was seen with  services present. Girth was assessed and is stable. She has ROM WNL. She continues to have pain in the shoulder most noticeable with being supine at night. She will benefit from an orthopedic referral.  We will contact her physician. Treatment/Session Summary:    Treatment Assessment:    ROM WNL, girth stable, tissue healed. She may need to see an orthopedist for her shoulder pain. Communication/Consultation:   HEP, lymphedema precautions  Equipment provided today:  None  Recommendations/Intent for next treatment session: No further visits scheduled.     Total Treatment Billable Duration:  33 minutes manual  Time In: 8398  Time Out: 1120    TERRY ALVARENGA, PT       Charge Capture  }Post Session Pain  PT Visit Info  [facility] Portal  MD Guidelines  Scanned Media  Benefits  MyChart    Future Appointments   Date Time Provider Yue Cid   12/27/2022  2:40 PM [facility]   12/27/2022  3:15 PM Jocelin Rasheed MD [facility]   12/27/2022  4:00 PM [id number]   1/4/2023  9:30 AM Fercho Abbott  [address]

## 2022-12-27 ENCOUNTER — HOSPITAL ENCOUNTER (OUTPATIENT)
Dept: LAB | Age: 57
Discharge: HOME | End: 2022-12-30
Payer: COMMERCIAL

## 2022-12-27 ENCOUNTER — OFFICE VISIT (OUTPATIENT)
Dept: ONCOLOGY | Age: 57
End: 2022-12-27
Payer: COMMERCIAL

## 2022-12-27 ENCOUNTER — HOSPITAL ENCOUNTER (OUTPATIENT)
Dept: INFUSION THERAPY | Age: 57
Discharge: HOME | End: 2022-12-27
Payer: COMMERCIAL

## 2022-12-27 VITALS
WEIGHT: 160.7 LBS | HEART RATE: 79 BPM | TEMPERATURE: 98.1 F | BODY MASS INDEX: 31.55 KG/M2 | HEIGHT: 60 IN | SYSTOLIC BLOOD PRESSURE: 135 MMHG | RESPIRATION RATE: 15 BRPM | OXYGEN SATURATION: 96 % | DIASTOLIC BLOOD PRESSURE: 85 MMHG

## 2022-12-27 DIAGNOSIS — C50.919 TRIPLE NEGATIVE MALIGNANT NEOPLASM OF BREAST (HCC): ICD-10-CM

## 2022-12-27 DIAGNOSIS — Z79.899 HIGH RISK MEDICATION USE: ICD-10-CM

## 2022-12-27 DIAGNOSIS — M25.519 SHOULDER PAIN, UNSPECIFIED CHRONICITY, UNSPECIFIED LATERALITY: ICD-10-CM

## 2022-12-27 DIAGNOSIS — R05.9 COUGH, UNSPECIFIED TYPE: ICD-10-CM

## 2022-12-27 DIAGNOSIS — C50.919 TRIPLE NEGATIVE MALIGNANT NEOPLASM OF BREAST (HCC): Primary | ICD-10-CM

## 2022-12-27 DIAGNOSIS — E03.2 HYPOTHYROIDISM DUE TO MEDICATION: ICD-10-CM

## 2022-12-27 LAB
ALBUMIN SERPL-MCNC: 3.8 G/DL (ref 3.5–5)
ALBUMIN/GLOB SERPL: 0.9 {RATIO} (ref 0.4–1.6)
ALP SERPL-CCNC: 114 U/L (ref 50–136)
ALT SERPL-CCNC: 39 U/L (ref 12–65)
ANION GAP SERPL CALC-SCNC: 5 MMOL/L (ref 2–11)
AST SERPL-CCNC: 22 U/L (ref 15–37)
BASOPHILS # BLD: 0 K/UL (ref 0–0.2)
BASOPHILS NFR BLD: 0 % (ref 0–2)
BILIRUB SERPL-MCNC: 0.3 MG/DL (ref 0.2–1.1)
BUN SERPL-MCNC: 27 MG/DL (ref 6–23)
CALCIUM SERPL-MCNC: 9.5 MG/DL (ref 8.3–10.4)
CHLORIDE SERPL-SCNC: 105 MMOL/L (ref 101–110)
CO2 SERPL-SCNC: 26 MMOL/L (ref 21–32)
CREAT SERPL-MCNC: 0.9 MG/DL (ref 0.6–1)
DIFFERENTIAL METHOD BLD: ABNORMAL
EOSINOPHIL # BLD: 0 K/UL (ref 0–0.8)
EOSINOPHIL NFR BLD: 1 % (ref 0.5–7.8)
ERYTHROCYTE [DISTWIDTH] IN BLOOD BY AUTOMATED COUNT: 12.8 % (ref 11.9–14.6)
GLOBULIN SER CALC-MCNC: 4.2 G/DL (ref 2.8–4.5)
GLUCOSE SERPL-MCNC: 116 MG/DL (ref 65–100)
HCT VFR BLD AUTO: 36.8 % (ref 35.8–46.3)
HGB BLD-MCNC: 12 G/DL (ref 11.7–15.4)
IMM GRANULOCYTES # BLD AUTO: 0 K/UL (ref 0–0.5)
IMM GRANULOCYTES NFR BLD AUTO: 0 % (ref 0–5)
LYMPHOCYTES # BLD: 0.9 K/UL (ref 0.5–4.6)
LYMPHOCYTES NFR BLD: 18 % (ref 13–44)
MCH RBC QN AUTO: 31.8 PG (ref 26.1–32.9)
MCHC RBC AUTO-ENTMCNC: 32.6 G/DL (ref 31.4–35)
MCV RBC AUTO: 97.6 FL (ref 82–102)
MONOCYTES # BLD: 0.4 K/UL (ref 0.1–1.3)
MONOCYTES NFR BLD: 9 % (ref 4–12)
NEUTS SEG # BLD: 3.4 K/UL (ref 1.7–8.2)
NEUTS SEG NFR BLD: 72 % (ref 43–78)
NRBC # BLD: 0 K/UL (ref 0–0.2)
PLATELET # BLD AUTO: 155 K/UL (ref 150–450)
PMV BLD AUTO: 8.7 FL (ref 9.4–12.3)
POTASSIUM SERPL-SCNC: 4.3 MMOL/L (ref 3.5–5.1)
PROT SERPL-MCNC: 8 G/DL (ref 6.3–8.2)
RBC # BLD AUTO: 3.77 M/UL (ref 4.05–5.2)
SODIUM SERPL-SCNC: 136 MMOL/L (ref 133–143)
TSH, 3RD GENERATION: 2.98 UIU/ML (ref 0.36–3)
WBC # BLD AUTO: 4.7 K/UL (ref 4.3–11.1)

## 2022-12-27 PROCEDURE — 2580000003 HC RX 258: Performed by: INTERNAL MEDICINE

## 2022-12-27 PROCEDURE — 36591 DRAW BLOOD OFF VENOUS DEVICE: CPT

## 2022-12-27 PROCEDURE — 99215 OFFICE O/P EST HI 40 MIN: CPT | Performed by: INTERNAL MEDICINE

## 2022-12-27 PROCEDURE — 96413 CHEMO IV INFUSION 1 HR: CPT

## 2022-12-27 PROCEDURE — 6360000002 HC RX W HCPCS: Performed by: INTERNAL MEDICINE

## 2022-12-27 PROCEDURE — 3078F DIAST BP <80 MM HG: CPT | Performed by: INTERNAL MEDICINE

## 2022-12-27 PROCEDURE — 85025 COMPLETE CBC W/AUTO DIFF WBC: CPT

## 2022-12-27 PROCEDURE — 3074F SYST BP LT 130 MM HG: CPT | Performed by: INTERNAL MEDICINE

## 2022-12-27 PROCEDURE — 80053 COMPREHEN METABOLIC PANEL: CPT

## 2022-12-27 PROCEDURE — 84443 ASSAY THYROID STIM HORMONE: CPT

## 2022-12-27 RX ORDER — DIPHENHYDRAMINE HYDROCHLORIDE 50 MG/ML
50 INJECTION INTRAMUSCULAR; INTRAVENOUS
Status: DISCONTINUED | OUTPATIENT
Start: 2022-12-27 | End: 2022-12-28 | Stop reason: HOSPADM

## 2022-12-27 RX ORDER — ACETAMINOPHEN 325 MG/1
650 TABLET ORAL
Status: DISCONTINUED | OUTPATIENT
Start: 2022-12-27 | End: 2022-12-28 | Stop reason: HOSPADM

## 2022-12-27 RX ORDER — SODIUM CHLORIDE 0.9 % (FLUSH) 0.9 %
5-40 SYRINGE (ML) INJECTION PRN
Status: ACTIVE | OUTPATIENT
Start: 2022-12-27 | End: 2022-12-27

## 2022-12-27 RX ORDER — ONDANSETRON 2 MG/ML
8 INJECTION INTRAMUSCULAR; INTRAVENOUS
Status: DISCONTINUED | OUTPATIENT
Start: 2022-12-27 | End: 2022-12-28 | Stop reason: HOSPADM

## 2022-12-27 RX ORDER — SODIUM CHLORIDE 9 MG/ML
5-250 INJECTION, SOLUTION INTRAVENOUS PRN
Status: DISCONTINUED | OUTPATIENT
Start: 2022-12-27 | End: 2022-12-28 | Stop reason: HOSPADM

## 2022-12-27 RX ORDER — LEVOTHYROXINE SODIUM 0.03 MG/1
25 TABLET ORAL DAILY
Qty: 30 TABLET | Refills: 2 | Status: SHIPPED | OUTPATIENT
Start: 2022-12-27

## 2022-12-27 RX ORDER — SODIUM CHLORIDE 0.9 % (FLUSH) 0.9 %
5-40 SYRINGE (ML) INJECTION PRN
Status: DISCONTINUED | OUTPATIENT
Start: 2022-12-27 | End: 2022-12-28 | Stop reason: HOSPADM

## 2022-12-27 RX ADMIN — SODIUM CHLORIDE, PRESERVATIVE FREE 10 ML: 5 INJECTION INTRAVENOUS at 15:04

## 2022-12-27 RX ADMIN — SODIUM CHLORIDE, PRESERVATIVE FREE 10 ML: 5 INJECTION INTRAVENOUS at 16:40

## 2022-12-27 RX ADMIN — SODIUM CHLORIDE 200 MG: 9 INJECTION, SOLUTION INTRAVENOUS at 16:45

## 2022-12-27 ASSESSMENT — PATIENT HEALTH QUESTIONNAIRE - PHQ9
1. LITTLE INTEREST OR PLEASURE IN DOING THINGS: 0
SUM OF ALL RESPONSES TO PHQ QUESTIONS 1-9: 0
2. FEELING DOWN, DEPRESSED OR HOPELESS: 0
SUM OF ALL RESPONSES TO PHQ9 QUESTIONS 1 & 2: 0

## 2022-12-27 NOTE — PROGRESS NOTES
[facility] Hematology & Oncology: Office Visit Progress Note    Chief Complaint:    Triple negative left breast cancer    History of Present Illness:  Reason for Referral: Carcinoma of upper-inner  quadrant of left breast in female, estrogen receptor negative; Triple negative malignant neoplasm of breast       Referring Provider: Domenica Lopez MD       Primary Care Provider: Chai Castro MD       Family History of Cancer/Hematologic Disorders: None reported       Presenting Symptoms: Abnormal routine screening mammogram        Ms. Yadira Wells is a 56 y.o.  female with a history of heart murmur, HTN, mixed HLD, and irregular menses, who was recently diagnosed with breast  cancer. She initially presented on 11/22/21 for a routine bilateral digital screening mammogram which identified focal asymmetry in the medial inferior left breast at 6 cm from the nipple. Further evaluation with diagnostic left breast mammogram and left  breast ultrasound were completed on 12/6/21 confirming the presence of adjacent left breast masses with one at the 9:00 position, 6 cm from the nipple measuring approximately 1.5 x 0.8 x 0.8 cm and a 7 mm hypoechoic mass just adjacent to this. Recommended ultrasound-guided biopsy of the medial left breast mass was completed on 12/23/21 with pathology from the core biopsy of the left breast, 9:00 position, 6 cm from nipple, revealing ER 0.9%/WI 0.4%/HER-2 (0) negative, intermediate grade (moderately  differentiated), foci of at least microinvasive infiltrating ductal carcinoma in association with focally high grade ductal carcinoma in situ with no definite lymphovascular invasion identified. MRI of the bilateral breasts was performed on 12/28/21 demonstrating left medial breast cancer measuring about 4.5 cm in greatest dimension; no additional suspicious finding elsewhere in either breast; and no obvious lymphadenopathy.        Patient was referred to surgery and evaluated in consultation by Surgeon, Dr. Cailin Elizabeth, on 1/10/22. She was assessed with signs and symptoms consistent with cT2N0 triple negative left breast IDC with DCIS. Treatment options were discussed, and  Dr. Clinton Sanders recommended genetic testing and preoperative evaluation by medical oncology to consider neoadjuvant chemotherapy. Dr. Clinton Sanders noted, \"We discussed the strong possibility that she could require left mastectomy/sentinel node should we  proceed with surgery upfront. At this time the enhancement on MRI looks too large, diffuse, and elongated to perform breast preservation surgery and expected good cosmetic result. If she has neoadjuvant treatment I will see her back in about 4 months  to regroup. \"       Patient is now referred to [facility] for oncology evaluation and consideration for neoadjuvant chemotherapy. Patient completed genetic counseling on 1/13/22. She agreed to pursue testing with BRCAPlus panel of 8 genes associated with hereditary cancer  (including BRCA1 and BRCA2 ). BILATERAL DIGITAL SCREENING MAMMOGRAM  11/22/2021   FINDINGS: There are scattered fibroglandular densities. A few typically benign-appearing calcifications in the left breast are similar in  appearance to the previous study. There is a focal asymmetry in the medial inferior left breast located 6 cm from the nipple. Recommend spot compression magnification views on the direct ML view and ultrasound, if indicated for further evaluation. There  is otherwise no suspicious mass, calcification, or architectural distortion in either breast.      IMPRESSION: Focal asymmetry in the medial inferior left breast at 6 cm from the nipple. for which further evaluation is recommended with ML  and compression magnification views, with possible ultrasound if indicated. BI-RADS Assessment Category 0: Incomplete: Needs additional imaging evaluation.        DIAGNOSTIC MAMMOGRAM LEFT BREAST, LEFT BREAST ULTRASOUND 12/6/21   FINDING: The area of concern does not persist on additional imaging. There is no architectural distortion. The area of concern persists with  additional imaging. This resides within the medial left breast at approximately 9:00 position. There are few associated calcifications which appear somewhat coarse. This measures approximately 1.3 cm in size. An additional smaller asymmetry also at the  9:00 position resides 7 cm from the nipple. A left breast ultrasound is recommended for further evaluation. LEFT BREAST ULTRASOUND: At the 9:00 position, 6 cm from the nipple is a lobulated hypoechoic but mildly heterogeneous appearing mass measuring approximately 1.5 x 0.8 x 0.8 cm. The margins are occasionally ill-defined with some components demonstrating  posterior acoustic shadowing. The long axis is parallel to the plane of the chest wall. There is a 7 mm hypoechoic mass just adjacent to this This is hypoechoic with a lobulated contour. The margins are occasionally ill-defined. IMPRESSION: Adjacent left breast masses. Recommend tissue sampling of the larger lesion. BI-RADS Assessment Category 4: Suspicious Finding- Biopsy should be considered. [facility] SURGICAL PATHOLOGY REPORT 12/23/21   DIAGNOSIS    A: \" LEFT BREAST, 9:00 POSITION, 6 CM FROM NIPPLE, CORE BIOPSY\": FOCI OF AT LEAST MICROINVASIVE INFILTRATING DUCTAL CARCINOMA, INTERMEDIATE GRADE (MODERATELY DIFFERENTIATED) IN ASSOCIATION WITH FOCALLY HIGH GRADE DUCTAL CARCINOMA IN SITU.  DEFINITE LYMPHOVASCULAR  INVASION IS NOT IDENTIFIED   [facility]- ER/LA/[id number] BY IHC    INTERPRETATION    [facility] IHC Quantitative Breast Panel    TEST NAME:                                      RESULTS:                  INTERNAL CONTROLS:    ESTROGEN RECEPTOR:                 Negative (0.9%)          Present, Positive    PROGESTERONE RECEPTOR:       Negative (0.4%)          Present, Positive    HER-2/CHAR:                                        Negative (0)                Percentage of Cells with Uniform Intense Complete Membrane    Stainin%   STF-IMMUNOHISTOCHEMISTRY   Immunohistochemical Stain Panel:    INTERPRETATION: Immunohistochemical findings consistent with breast primary. ANTIBODY/TEST       MARKER FOR                                               RESULT    Cytokeratin 7               Lung, breast, upper GE, serous ovary           Positive    Cytokeratin 20             Colon, mucinous ovary, urothelium                Negative    GATA3                        Urothelial, breast carcinoma                           Positive    GCDFP                       Breast, salivary gland, skin, adnexa               Positive    Mammaglobin                         Breast                                                              Positive       MRI OF THE BREASTS WITH AND WITHOUT CONTRAST 21   FINDINGS: The breasts demonstrate moderate glandularity and mild background enhancement. Left 9:00 biopsy clip within a lobulated heterogeneously  enhancing mass with angulated and irregular margins overall measuring about 1.8 x 0.7 x 1.2 cm. Posterior to this (about 1 cm away) the smaller mass measuring 0.9 cm is unchanged from recent mammogram and ultrasound. Additionally there is clumped and  reticular nonmass enhancement extending superiorly by about 2.7 cm and anteriorly by about 1.8 cm, in keeping with the DCIS and calcifications. Overall the expected area of disease measures about 4.5 cm AP by 1.2 cm transverse and is located at 9:00-9:30,  mid to posterior depth. There is no evidence of involvement of skin or chest wall structures. There is no other evidence of suspicious enhancing mass, and no other dominant or unique nonmass enhancement, to suggest additional malignancy in either breast.  There is no evidence of axillary or internal mammary lymphadenopathy.  Elsewhere, limited visualization of the partially included thorax and upper abdomen shows no acute abnormality, with note of a small benign cyst in the left liver. IMPRESSION   1. Left medial breast cancer measures about 4.5 cm in greatest dimension. 2. No additional suspicious finding elsewhere in either breast. No obvious lymphadenopathy. Recommend continued management as directed clinically. BI-RADS Assessment Category 6: Known Biopsy Proven Malignancy       Notes from Referring Provider: \"Age 49 with cT2N0 triple  negative left breast IDC and DCIS Evaluate    Speaks mandarin   She has been referred to genetics also\"       Interim history update in A/P. Review of Systems:      Constitutional  Denies fever or chills. Denies fatigue. Denies anorexia. HEENT   allergic conjunctivitis and sinusitis. Denies trauma, bluring vision, hearing loss, ear pain, nosebleeds, sore throat, neck pain and ear discharge. Skin  Denies lesions or rashes. Lungs  Denies shortness of breath, cough, sputum production or hemoptysis. Cardiovascular  Denies chest pain, palpitations, orthopnea, claudication and leg swelling. Gastrointestinal  Nausea. Denies vomiting. Denies bloody or black stools. Denies abdominal pain.   Denies dysuria, frequency or hesitancy of urination     Neuro  Denies headaches, visual changes or ataxia. Denies dizziness, tingling, tremors, sensory change, speech change, focal weakness and headaches. Hematology  Denies nasal/gum bleeding, denies easy bruise     Endo  Denies heat/cold intolerance, denies diabetes. MSK  Denies back pain, swollen legs, myalgias and falls. Psychiatric/Behavioral   insomnia. Denies depression and substance abuse. The patient is not nervous/anxious.               Allergies   Allergen Reactions    Paclitaxel Angioedema and Swelling    Lisinopril Other (See Comments)     Other reaction(s): Cough     Past Medical History:   Diagnosis Date    Breast cancer ([facility] [address]     History of therapeutic radiation     Hx antineoplastic chemo     Hypertension     Murmur, heart     echo 2016 EF 60-65% ; 1/31/22 states never has had symptoms    Poor historian     difficulty with verifying medication list     Past Surgical History:   Procedure Laterality Date    BREAST BIOPSY Left 09/21/2022    BREAST LESION BIOPSY EXCISION NEEDLE LOCALIZATION/ CHINESE/MANDARIN  performed by Donya Martin MD at [facility] Left 09/21/2022    LEFT BREAST BIOPSY SENTINEL NODE DISSECTION.  performed by Donya Martin MD at [id number] [facility] LUMPECTOMY Left 09/21/2022    BREAST SURGERY      bx of left breast     IR PORT PLACEMENT EQUAL OR GREATER THAN 5 YEARS  2/2/2022    IR PORT PLACEMENT EQUAL OR GREATER THAN 5 YEARS  02/02/2022    IR PORT PLACEMENT EQUAL OR GREATER THAN 5 YEARS 2/2/2022 [facility] RADIOLOGY SPECIALS    US BREAST NEEDLE BIOPSY LEFT Left 12/23/2021    US BREAST NEEDLE BIOPSY LEFT 12/23/2021 [facility] RADIOLOGY MAMMO     Family History   Problem Relation Age of Onset    Ovarian Cancer Neg Hx     No Known Problems Mother     Breast Cancer Neg Hx     No Known Problems Sister     Other Other         states no family history    Colon Cancer Neg Hx     Hypertension Father     No Known Problems Sister     No Known Problems Brother     Uterine Cancer Neg Hx      Social History     Socioeconomic History    Marital status:      Spouse name: Not on file    Number of children: Not on file    Years of education: Not on file    Highest education level: Not on file   Occupational History    Not on file   Tobacco Use    Smoking status: Never    Smokeless tobacco: Never   Substance and Sexual Activity    Alcohol use: No     Alcohol/week: 0.0 standard drinks    Drug use: No    Sexual activity: Not on file   Other Topics Concern    Not on file   Social History Narrative    Abuse: Feels safe at home, no history of physical abuse, no history of sexual abuse       Social Determinants of Health     Financial Resource Strain: Not on file   Food Insecurity: Not on file   Transportation Needs: Not on file Physical Activity: Not on file   Stress: Not on file   Social Connections: Not on file   Intimate Partner Violence: Not on file   Housing Stability: Not on file     Current Outpatient Medications   Medication Sig Dispense Refill    levothyroxine (SYNTHROID) 25 MCG tablet Take 1 tablet by mouth daily 30 tablet 2    triamcinolone (NASACORT ALLERGY 24HR) 55 MCG/ACT nasal inhaler 2 sprays by Each Nostril route daily 1 each 1    lidocaine-prilocaine (EMLA) 2.5-2.5 % cream Apply to port about 45 minutes prior to access 2.5 g 1    spironolactone (ALDACTONE) 25 MG tablet 25 mg      dilTIAZem (TIAZAC) 240 MG extended release capsule 1 po qd       No current facility-administered medications for this visit.      Facility-Administered Medications Ordered in Other Visits   Medication Dose Route Frequency Provider Last Rate Last Admin    sodium chloride flush 0.9 % injection 5-40 mL  5-40 mL IntraVENous PRN Paulo Calero MD   10 mL at 12/27/22 1504    0.9 % sodium chloride infusion  5-250 mL/hr IntraVENous PRSANJUANA Calero MD        ondansetron [facility]) injection 8 mg  8 mg IntraVENous Once PRN Paulo Calero MD        sodium chloride flush 0.9 % injection 5-40 mL  5-40 mL IntraVENous PRN Paulo Calero MD   10 mL at 12/27/22 1640    diphenhydrAMINE (BENADRYL) injection 50 mg  50 mg IntraVENous Once PRSANJUANA Calero MD        famotidine (PEPCID) 20 mg in sodium chloride (PF) 0.9 % 10 mL injection  20 mg IntraVENous Once PRSANJUANA Calero MD        hydrocortisone sodium succinate PF (SOLU-CORTEF) injection 100 mg  100 mg IntraVENous Once PRN Paulo Calero MD        acetaminophen (TYLENOL) tablet 650 mg  650 mg Oral Once PRN Paulo Calero MD           OBJECTIVE:  /85 (Site: Left Upper Arm, Position: Sitting, Cuff Size: Medium Adult)   Pulse 79   Temp 98.1 °F (36.7 °C) (Oral)   Resp 15   Ht 5' 0.05\" (1.525 m)   Wt 160 lb 11.2 oz (72.9 kg)   SpO2 96%   BMI 31.33 kg/m²     Physical Exam: Constitutional:  Oriented to person, place, and time. Well-developed and well-nourished. HEENT:  Normocephalic and atraumatic. Oropharynx is clear and moist.    Conjunctivae and EOM are normal. Pupils are equal, round, and reactive to light. No scleral icterus. Neck supple. No JVD present. No tracheal deviation present. No thyromegaly present. Lymph node  No palpable submandibular, cervical, supraclavicular, axillary and inguinal lymph nodes. Breasts  Left breast soft, nontender, popular nodular area and 9:00 about 2 cm at baseline, no longer palpable after chemotherapy. Right breast soft, nontender, no mass. Skin   rash. Warm and dry. No bruising noted. No erythema. No pallor. Respiratory  Effort normal and breath sounds normal.  No respiratory distress. No wheezes. No rales. No tenderness. CVS  Normal rate, regular rhythm and normal heart sounds. Exam reveals no gallop, no friction and no rub. No murmur heard. Abdomen  Soft. Bowel sounds are normal. Exhibits no distension. There is no tenderness. There is no rebound and no guarding. Neuro  Normal reflexes. No cranial nerve deficit. Exhibits normal muscle tone, 5 of 5 strength of all extremities. MSK  Normal range of motion in general.  No edema and no tenderness.         Psych  Normal mood, affect, behavior, judgment and thought content        Labs:  Recent Results (from the past 24 hour(s))   CBC with Auto Differential    Collection Time: 12/27/22  3:01 PM   Result Value Ref Range    WBC 4.7 4.3 - 11.1 K/uL    RBC 3.77 (L) 4.05 - 5.2 M/uL    Hemoglobin 12.0 11.7 - 15.4 g/dL    Hematocrit 36.8 35.8 - 46.3 %    MCV 97.6 82.0 - 102.0 FL    MCH 31.8 26.1 - 32.9 PG    MCHC 32.6 31.4 - 35.0 g/dL    RDW 12.8 11.9 - 14.6 %    Platelets 617 408 - 766 K/uL    MPV 8.7 (L) 9.4 - 12.3 FL    nRBC 0.00 0.0 - 0.2 K/uL    Differential Type AUTOMATED      Seg Neutrophils 72 43 - 78 %    Lymphocytes 18 13 - 44 % Monocytes 9 4.0 - 12.0 %    Eosinophils % 1 0.5 - 7.8 %    Basophils 0 0.0 - 2.0 %    Immature Granulocytes 0 0.0 - 5.0 %    Segs Absolute 3.4 1.7 - 8.2 K/UL    Absolute Lymph # 0.9 0.5 - 4.6 K/UL    Absolute Mono # 0.4 0.1 - 1.3 K/UL    Absolute Eos # 0.0 0.0 - 0.8 K/UL    Basophils Absolute 0.0 0.0 - 0.2 K/UL    Absolute Immature Granulocyte 0.0 0.0 - 0.5 K/UL   Comprehensive Metabolic Panel    Collection Time: 12/27/22  3:01 PM   Result Value Ref Range    Sodium 136 133 - 143 mmol/L    Potassium 4.3 3.5 - 5.1 mmol/L    Chloride 105 101 - 110 mmol/L    CO2 26 21 - 32 mmol/L    Anion Gap 5 2 - 11 mmol/L    Glucose 116 (H) 65 - 100 mg/dL    BUN 27 (H) 6 - 23 MG/DL    Creatinine 0.90 0.6 - 1.0 MG/DL    Est, Glom Filt Rate >60 >60 ml/min/1.73m2    Calcium 9.5 8.3 - 10.4 MG/DL    Total Bilirubin 0.3 0.2 - 1.1 MG/DL    ALT 39 12 - 65 U/L    AST 22 15 - 37 U/L    Alk Phosphatase 114 50 - 136 U/L    Total Protein 8.0 6.3 - 8.2 g/dL    Albumin 3.8 3.5 - 5.0 g/dL    Globulin 4.2 2.8 - 4.5 g/dL    Albumin/Globulin Ratio 0.9 0.4 - 1.6     TSH    Collection Time: 12/27/22  3:01 PM   Result Value Ref Range    TSH, 3RD GENERATION 2.980 0.358 - 3 uIU/mL         Imaging:  No results found for this or any previous visit. ASSESSMENT/PLAN:   Diagnosis Orders   1. Triple negative malignant neoplasm of breast ([facility] [address]  CT CHEST WO CONTRAST    CBC with Auto Differential    Comprehensive Metabolic Panel    TSH      2. Cough, unspecified type  CT CHEST WO CONTRAST      3. High risk medication use  CBC with Auto Differential    Comprehensive Metabolic Panel    TSH      4. Hypothyroidism due to medication  CBC with Auto Differential    Comprehensive Metabolic Panel    TSH    levothyroxine (SYNTHROID) 25 MCG tablet      5. Shoulder pain, unspecified chronicity, unspecified laterality                          [unfilled]    56 y.o. F consulted for triple negative left breast cancer and presented to [facility] with  and daughter on 1/19/2022. She has good baseline health and functions well, and annual screening mammogram showed left breast lesion, biopsy showed triple negative grade 2 IDC, cT2 N0 M0, and we discussed the high risks profile for triple negative breast cancer and the most recent  FDA approval of neoadjuvant chemoimmunotherapy, she appeared a good candidate and agreed to arrange Olya Pleasure for 4 cycles followed by Adriamycin Cytoxan Keytruda for 4 cycles, followed by surgery and adjuvant Keytruda for total systemic therapy  of 1 year, discussed toxicity and management, discussed logistics, arrange Zofran Compazine Ativan, baseline echocardiogram showed normal EF, hypertension better but still uncontrolled after adding spironolactone and educated risk of dehydration and hypotension  on chemotherapy when she is taking double diuretics, started cycle 1 2/7/22 and left breast tumor responded rapidly, lost 20 pounds after cycle 1 and BP much better controlled even after she runs out of diltiazem, again warned of monitoring dehydration/hypotension,  discussed increased protein/calorie intake, may add stimulant if continues to lose weight, occasional mild tingling in fingertips, episodes of constipation responded to laxative, proceed to cycle 1 day 8 carbotaxol Keytruda, educated to monitor for neuropathy,  follow next week but call as needed.       However after cycle 1 day 8 Taxol on 2/14 and developed acute onset of itching, rash, anasarca, short of breath, fever, hypotension, diarrhea, admitted with antibiotics, antihistamine,  IV fluid, most symptom much improved the second day although still lingering, discussed that the picture of allergic reaction but thorough discussion not able to identify new food or medicine, not typical for Taxol or Keytruda reaction given the timing  and the acuity, and she reported a similar episode a few years ago that she went to ER for treatment, but never followed up for allergen test and never had EpiPen, discussed the concern of allergy and will arrange allergist work-up, however proceeded  with day 15 Taxol with close observation to rule out atypical Taxol allergy, patient and family understand the risk and agreed to proceed, and reported reasonable tolerance except for mild itching through the infusion, however overnight RN was concerned  of tachycardia and tachypneic which patient reported similar to each infusion but received IV Benadryl and Solu-Medrol, LFT elevated for a day, give albumin with lasix and anasarca improved, DC with Benadryl/prednisone/EpiPen as needed, followed  on 2/28/2022, swelling resolved, mild rash of hands and arms, discussed to change Taxol to Taxotere for better management of reaction, return on 3/7/2022 reported much better tolerance for the allergic reaction, however more nausea/vomiting/weight loss  and neutropenic fever with temperature 101 and ANC 0.2, coughing and rule out Covid, typical seasonal allergic sinusitis symptoms and educated to start using Nasacort spray, admit to hospital for neutropenic fever, discharge 3/10/2022, had skin rash that  was attributed to cefepime and resolved, again discussed her sensitivity to multiple allergens and different manifestations need to be managed accordingly, Zaditor for allergic conjunctivitis, nasal steroid for sinusitis, Kenalog for skin rash, oral antihistamine,  prednisone 20 mg only as needed, added G-CSF since cycle 3, better controlled for allergy/hypertension/fluid retention, severe diarrhea responded to PRBC, severe thrombocytopenia recovered after delaying cycle 4 by a week, completed cycle 4 with G-CSF  and Emend.    She returned on 5/9/2022 to start AC/Keytruda, however platelet was low and chemo had to be deferred for 2 weeks actually and platelets finally recovered to 92 on 5/23/2022, discussed to proceed with caution and reduce AC dose by 20%, which she tolerated reasonably well and completed 4 cycles of AC/Keytruda without major complication, repeat MRI showed cCR and Dr. Lupillo Lozada performed left lumpectomy and axillary SLND on 9/21/2022, pathology showed ypTis N0 triple negative left breast carcinoma, discussed that the complete response of the invasive triple negative cancer is a favorable prognostic marker, continued adjuvant treatment, complaining of left shoulder pain since the surgery and consult [facility] for physical therapy, still hurting after physical therapy and orthopedic evaluation was recommended, help to refer and schedule, TSH responded to Synthroid and continue 25 mcg daily, proceed to cycle 15 Keytruda, again discussed exercise and weight control is associated with favorable breast cancer outcome, chest CT to evaluate for coughing, return in 3 weeks but call as needed. All questions are answered to their satisfaction. They will call for further questions and concerns. ECOG PERFORMANCE STATUS - 1    Pain - 3/10. Fatigue - No flowsheet data found. Distress - No flowsheet data found. Elements of this note have been dictated via voice recognition software. Text and phrases may be limited by the accuracy and autoconversion of the software. The chart has been reviewed, but errors may still be present. Dee uMrry M.D.   [address]  Office : [phone]  Fax : [phone]

## 2022-12-27 NOTE — PATIENT INSTRUCTIONS
Patient Instructions from Today's Visit    Reason for Visit:  Follow up visit for breast cancer      Plan:    Linda Iglesias today. Continue Synthroid. We have referred you to the orthopedic surgeon for right shoulder pain. Below is the information. Will try to get an appointment scheduled for you. [address], [address] [place] [address]   [phone]    Will order CT scan of the chest for recent cough.     Follow Up:  - 3 weeks    Recent Lab Results:  Hospital Outpatient Visit on 12/27/2022   Component Date Value Ref Range Status    WBC 12/27/2022 4.7  4.3 - 11.1 K/uL Final    RBC 12/27/2022 3.77 (A)  4.05 - 5.2 M/uL Final    Hemoglobin 12/27/2022 12.0  11.7 - 15.4 g/dL Final    Hematocrit 12/27/2022 36.8  35.8 - 46.3 % Final    MCV 12/27/2022 97.6  82.0 - 102.0 FL Final    MCH 12/27/2022 31.8  26.1 - 32.9 PG Final    MCHC 12/27/2022 32.6  31.4 - 35.0 g/dL Final    RDW 12/27/2022 12.8  11.9 - 14.6 % Final    Platelets 09/68/8754 155  150 - 450 K/uL Final    MPV 12/27/2022 8.7 (A)  9.4 - 12.3 FL Final    nRBC 12/27/2022 0.00  0.0 - 0.2 K/uL Final    **Note: Absolute NRBC parameter is now reported with Hemogram**    Differential Type 12/27/2022 AUTOMATED    Final    Seg Neutrophils 12/27/2022 72  43 - 78 % Final    Lymphocytes 12/27/2022 18  13 - 44 % Final    Monocytes 12/27/2022 9  4.0 - 12.0 % Final    Eosinophils % 12/27/2022 1  0.5 - 7.8 % Final    Basophils 12/27/2022 0  0.0 - 2.0 % Final    Immature Granulocytes 12/27/2022 0  0.0 - 5.0 % Final    Segs Absolute 12/27/2022 3.4  1.7 - 8.2 K/UL Final    Absolute Lymph # 12/27/2022 0.9  0.5 - 4.6 K/UL Final    Absolute Mono # 12/27/2022 0.4  0.1 - 1.3 K/UL Final    Absolute Eos # 12/27/2022 0.0  0.0 - 0.8 K/UL Final    Basophils Absolute 12/27/2022 0.0  0.0 - 0.2 K/UL Final    Absolute Immature Granulocyte 12/27/2022 0.0  0.0 - 0.5 K/UL Final    Sodium 12/27/2022 136  133 - 143 mmol/L Final    Potassium 12/27/2022 4.3  3.5 - 5.1 mmol/L Final    Chloride 12/27/2022 105  101 - 110 mmol/L Final    CO2 12/27/2022 26  21 - 32 mmol/L Final    Anion Gap 12/27/2022 5  2 - 11 mmol/L Final    Glucose 12/27/2022 116 (A)  65 - 100 mg/dL Final    BUN 12/27/2022 27 (A)  6 - 23 MG/DL Final    Creatinine 12/27/2022 0.90  0.6 - 1.0 MG/DL Final    Est, Glom Filt Rate 12/27/2022 >60  >60 ml/min/1.73m2 Final    Comment:      Pediatric calculator link: Kitty.at. org/professionals/kdoqi/gfr_calculatorped       These results are not intended for use in patients <25years of age. eGFR results are calculated without a race factor using  the 2021 CKD-EPI equation. Careful clinical correlation is recommended, particularly when comparing to results calculated using previous equations. The CKD-EPI equation is less accurate in patients with extremes of muscle mass, extra-renal metabolism of creatinine, excessive creatine ingestion, or following therapy that affects renal tubular secretion.       Calcium 12/27/2022 9.5  8.3 - 10.4 MG/DL Final    Total Bilirubin 12/27/2022 0.3  0.2 - 1.1 MG/DL Final    ALT 12/27/2022 39  12 - 65 U/L Final    AST 12/27/2022 22  15 - 37 U/L Final    Alk Phosphatase 12/27/2022 114  50 - 136 U/L Final    Total Protein 12/27/2022 8.0  6.3 - 8.2 g/dL Final    Albumin 12/27/2022 3.8  3.5 - 5.0 g/dL Final    Globulin 12/27/2022 4.2  2.8 - 4.5 g/dL Final    Albumin/Globulin Ratio 12/27/2022 0.9  0.4 - 1.6   Final    TSH, 3RD GENERATION 12/27/2022 2.980  0.358 - 3 uIU/mL Final        Treatment Summary has been discussed and given to patient: n/a        -------------------------------------------------------------------------------------------------------------------  Please call our office at [phone] if you have any  of the following symptoms:   Fever of 100.5 or greater  Chills  Shortness of breath  Swelling or pain in one leg    After office hours an answering service is available and will contact a provider for emergencies or if you are experiencing any of the above symptoms. Patient did express an interest in My Chart. My Chart log in information explained on the after visit summary printout at the [facility] desk.     Randi Zafar RN

## 2022-12-28 ENCOUNTER — HOSPITAL ENCOUNTER (OUTPATIENT)
Dept: INFUSION THERAPY | Age: 57
Discharge: HOME | End: 2022-12-28

## 2022-12-28 DIAGNOSIS — C50.919 TRIPLE NEGATIVE MALIGNANT NEOPLASM OF BREAST (HCC): ICD-10-CM

## 2023-01-03 ENCOUNTER — HOSPITAL ENCOUNTER (OUTPATIENT)
Dept: CT IMAGING | Age: 58
Discharge: HOME | End: 2023-01-06
Payer: COMMERCIAL

## 2023-01-03 DIAGNOSIS — R05.9 COUGH, UNSPECIFIED TYPE: ICD-10-CM

## 2023-01-03 DIAGNOSIS — C50.919 TRIPLE NEGATIVE MALIGNANT NEOPLASM OF BREAST (HCC): ICD-10-CM

## 2023-01-03 PROCEDURE — 71250 CT THORAX DX C-: CPT

## 2023-01-04 ENCOUNTER — HOSPITAL ENCOUNTER (OUTPATIENT)
Dept: RADIATION ONCOLOGY | Age: 58
Setting detail: RECURRING SERIES
Discharge: HOME | End: 2023-01-07
Payer: COMMERCIAL

## 2023-01-04 VITALS
HEART RATE: 83 BPM | WEIGHT: 159.5 LBS | SYSTOLIC BLOOD PRESSURE: 141 MMHG | OXYGEN SATURATION: 98 % | DIASTOLIC BLOOD PRESSURE: 85 MMHG | RESPIRATION RATE: 16 BRPM | TEMPERATURE: 98 F | BODY MASS INDEX: 31.1 KG/M2

## 2023-01-04 PROCEDURE — 99211 OFF/OP EST MAY X REQ PHY/QHP: CPT

## 2023-01-04 RX ORDER — DILTIAZEM HYDROCHLORIDE 240 MG/1
1 CAPSULE, COATED, EXTENDED RELEASE ORAL DAILY
COMMUNITY
Start: 2022-11-11

## 2023-01-04 ASSESSMENT — PAIN SCALES - GENERAL: PAINLEVEL_OUTOF10: 4

## 2023-01-04 ASSESSMENT — PAIN DESCRIPTION - LOCATION: LOCATION: CHEST

## 2023-01-04 NOTE — PROGRESS NOTES
RADIATION ONCOLOGY FOLLOW UP VISIT  01/04/23    Visit today was conducted in Mandarin via video .    Chief Complaint: Follow up after RT    History of Present Illness:  Diagnosis  57 y.o. female with L breast cT2N0 invasive ductal carcinoma, intermediate grade, triple negative, ypTisN0.  Pt of Dr. Weiss's.   Treatment  Neoadjuvant chemotherapy with carbo/ taxol/ Keytruda followed by ming/ cytoxan/ Keytruda  Left lumpectomy and SLNB 9/21/22  Adjuvant Keytruda ongoing  Adjuvant RT to the left breast, 40 Gy/15 fx + 10 Gy/5 fx boost, 11/8/22-12/6/22  Current symptoms/ROS:   Overall she is doing well and has recovered well after RT.  She reports persistent skin discoloration and shape of left breast is different.  Intermittent mild left breast pain.    Breast lumps/masses: no  Skin: as above  Restricted ROM: no, some stiffness left shoulder in the mornings   Lymphedema: no  Cough/dyspnea: coughing few times a day, clear sputum, no dyspnea   Headaches/neuro sx: occasional neuropathy in toes   New imaging:  B/l MMG 12/7/22: BI-RADS 2 benign.   CT chest 1/3/23: Negative.     Meds: Reviewed. Pertinent - none.    Physical Examination:  Vitals:    01/04/23 0915   BP: (!) 141/85   Pulse: 83   Resp: 16   Temp: 98 °F (36.7 °C)   TempSrc: Oral   SpO2: 98%   Weight: 159 lb 8 oz (72.3 kg)     Pain 4/10  ECOG 0 - Fully active; no performance restrictions.    Well nourished, in NAD. Alert and conversant. Lungs CTAB. RRR.  Left breast: Lumpectomy scar well healed.  Persistent RT related skin discoloration, primarily around the nipple.  No concerning palpable masses or nipple discharge.   No left supraclavicular or axillary lymphadenopathy.   ROM full in LUE. No lymphedema.     Assessment:  L breast cT2N0 invasive ductal carcinoma, intermediate grade, triple negative, ypTisN0    Now 1 month out from completion of RT.  Persistent toxicity: mild/moderate skin/breast changes   Disease status: No evidence of disease, still under  active treatment on Keytruda. Plan:  -Continue Keytruda and follow up with Dr. Anabelle Orellana.   -Annual MMG was already ordered by Dr. Braydon Juarez.   -Follow up here in 1 year or sooner if needed.   -Can try Robitussin for cough, CT negative. Continue allergy meds. -The patient has a documented plan of care to address pain. Pain is controlled on current regimen. Yahir Lawrence MD  01/04/23    I spent a total of 25 minutes today performing the following care related activities for Rosmery Do:   Face to face exam/evaluation, /Educate Patient/Caregivers, Pre-Charting/Documenting after the visit, and Interpreting/Ordering Meds, Tests, Procedures

## 2023-01-09 ENCOUNTER — OFFICE VISIT (OUTPATIENT)
Dept: ORTHOPEDIC SURGERY | Age: 58
End: 2023-01-09
Payer: COMMERCIAL

## 2023-01-09 DIAGNOSIS — M25.512 LEFT SHOULDER PAIN, UNSPECIFIED CHRONICITY: Primary | ICD-10-CM

## 2023-01-09 DIAGNOSIS — M75.42 IMPINGEMENT SYNDROME OF LEFT SHOULDER: ICD-10-CM

## 2023-01-09 PROCEDURE — 99204 OFFICE O/P NEW MOD 45 MIN: CPT | Performed by: ORTHOPAEDIC SURGERY

## 2023-01-09 RX ORDER — DICLOFENAC SODIUM 75 MG/1
75 TABLET, DELAYED RELEASE ORAL 2 TIMES DAILY
Qty: 60 TABLET | Refills: 0 | Status: SHIPPED | OUTPATIENT
Start: 2023-01-09

## 2023-01-09 NOTE — PROGRESS NOTES
Name: Rosmery Do  YOB: 1965  Gender: female  MRN: [id number]      CC: Shoulder Pain (L Shoulder)       HPI: Rosmery Do is a 56 y.o. female who presents with Shoulder Pain (L Shoulder)  No history of injury to this shoulder. Pain began in September or August, pain came on suddenly and has not gotten better over time. Usually it is a dull pain. Does not report any weakness. Horizontal adduction and overhead flexion bother her the most. Lying down for extended periods of time without moving her arm will bring on pain. Has not been taking any over the counters. ROS/Meds/PSH/PMH/FH/SH: I personally reviewed the patients standard intake form. Below are the pertinents    Tobacco:  reports that she has never smoked. She has never used smokeless tobacco.  Diabetes: none  Other: Triple negative breast cancer, HTN, HLD    Physical Examination:  General: no acute distress  Lungs: breathing easily  CV: regular rhythm by pulse  Left Shoulder: No obvious deformity of the biceps. No tenderness to palpation. Active forward flexion to 180 degrees with mild pain in the extreme. External rotation with elbows at side equal bilaterally. External rotation with elbows at side resisted range of motion 5/5 strength. Pain with empty can testing but 5/5 strength empty can position. Positive Kay Melissa, Neer's. Pain with Olancha's, negative speeds. Imaging:   Shoulder XR: Grashey, Axillary and Scapula Yviews     Clinical Indication:  1. Left shoulder pain, unspecified chronicity    2. Impingement syndrome of left shoulder           Report: Grashey, Axillary and Scapula Y XRs of the Left shoulder demonstrates no acute fracture dislocation or advanced degenerative change    Impression: No acute findings as above        All imaging interpreted by myself Roberto James MD independent of radiology review        Assessment:     ICD-10-CM    1.  Left shoulder pain, unspecified chronicity  M25.512 XR SHOULDER LEFT (MIN 2 VIEWS)     Ambulatory referral to Physical Therapy      2. Impingement syndrome of left shoulder  M75.42 Ambulatory referral to Physical Therapy          Plan:   The majority of the patient's left shoulder pain is due to subacromial impingement or bursitis. I discussed with the patient conservative treatment options to include corticosteroid injection, formal physical therapy and prescription strength NSAIDs. I think the fact that the patient is having chemotherapy for triple negative breast cancer I would not recommend a corticosteroid injection at this time due to her potential immunocompromise state. I think she would get excellent benefit from a combination of physical therapy and prescription NSAIDs which we prescribed today. I prescribed 75 mg diclofenac and explained to the patient not to take any other NSAIDs and to take this medication with food. Steve Romero NP dictating as a scribe for MD Elisa Paredes.  Anita Beasley MD, [address] [place] [address] and Sports Medicine

## 2023-01-23 ENCOUNTER — HOSPITAL ENCOUNTER (OUTPATIENT)
Dept: INFUSION THERAPY | Age: 58
Discharge: HOME | End: 2023-01-23
Payer: COMMERCIAL

## 2023-01-23 ENCOUNTER — OFFICE VISIT (OUTPATIENT)
Dept: ONCOLOGY | Age: 58
End: 2023-01-23
Payer: COMMERCIAL

## 2023-01-23 VITALS
BODY MASS INDEX: 31.71 KG/M2 | OXYGEN SATURATION: 97 % | DIASTOLIC BLOOD PRESSURE: 76 MMHG | HEART RATE: 82 BPM | SYSTOLIC BLOOD PRESSURE: 128 MMHG | TEMPERATURE: 97.8 F | HEIGHT: 60 IN | RESPIRATION RATE: 16 BRPM | WEIGHT: 161.5 LBS

## 2023-01-23 DIAGNOSIS — R05.9 COUGH, UNSPECIFIED TYPE: ICD-10-CM

## 2023-01-23 DIAGNOSIS — C50.919 TRIPLE NEGATIVE MALIGNANT NEOPLASM OF BREAST (HCC): ICD-10-CM

## 2023-01-23 DIAGNOSIS — E03.2 HYPOTHYROIDISM DUE TO MEDICATION: ICD-10-CM

## 2023-01-23 DIAGNOSIS — Z79.899 HIGH RISK MEDICATION USE: ICD-10-CM

## 2023-01-23 DIAGNOSIS — C50.919 TRIPLE NEGATIVE MALIGNANT NEOPLASM OF BREAST (HCC): Primary | ICD-10-CM

## 2023-01-23 DIAGNOSIS — J30.2 SEASONAL ALLERGIC RHINITIS, UNSPECIFIED TRIGGER: ICD-10-CM

## 2023-01-23 LAB
ALBUMIN SERPL-MCNC: 3.8 G/DL (ref 3.5–5)
ALBUMIN/GLOB SERPL: 1 (ref 0.4–1.6)
ALP SERPL-CCNC: 122 U/L (ref 50–136)
ALT SERPL-CCNC: 39 U/L (ref 12–65)
ANION GAP SERPL CALC-SCNC: 6 MMOL/L (ref 2–11)
AST SERPL-CCNC: 22 U/L (ref 15–37)
BASOPHILS # BLD: 0 K/UL (ref 0–0.2)
BASOPHILS NFR BLD: 1 % (ref 0–2)
BILIRUB SERPL-MCNC: 0.4 MG/DL (ref 0.2–1.1)
BUN SERPL-MCNC: 22 MG/DL (ref 6–23)
CALCIUM SERPL-MCNC: 9.4 MG/DL (ref 8.3–10.4)
CHLORIDE SERPL-SCNC: 106 MMOL/L (ref 101–110)
CO2 SERPL-SCNC: 23 MMOL/L (ref 21–32)
CREAT SERPL-MCNC: 1 MG/DL (ref 0.6–1)
DIFFERENTIAL METHOD BLD: ABNORMAL
EOSINOPHIL # BLD: 0.1 K/UL (ref 0–0.8)
EOSINOPHIL NFR BLD: 2 % (ref 0.5–7.8)
ERYTHROCYTE [DISTWIDTH] IN BLOOD BY AUTOMATED COUNT: 13.1 % (ref 11.9–14.6)
GLOBULIN SER CALC-MCNC: 3.9 G/DL (ref 2.8–4.5)
GLUCOSE SERPL-MCNC: 162 MG/DL (ref 65–100)
HCT VFR BLD AUTO: 36.9 % (ref 35.8–46.3)
HGB BLD-MCNC: 11.8 G/DL (ref 11.7–15.4)
IMM GRANULOCYTES # BLD AUTO: 0 K/UL (ref 0–0.5)
IMM GRANULOCYTES NFR BLD AUTO: 0 % (ref 0–5)
LYMPHOCYTES # BLD: 0.8 K/UL (ref 0.5–4.6)
LYMPHOCYTES NFR BLD: 23 % (ref 13–44)
MCH RBC QN AUTO: 31.1 PG (ref 26.1–32.9)
MCHC RBC AUTO-ENTMCNC: 32 G/DL (ref 31.4–35)
MCV RBC AUTO: 97.1 FL (ref 82–102)
MONOCYTES # BLD: 0.3 K/UL (ref 0.1–1.3)
MONOCYTES NFR BLD: 8 % (ref 4–12)
NEUTS SEG # BLD: 2.2 K/UL (ref 1.7–8.2)
NEUTS SEG NFR BLD: 66 % (ref 43–78)
NRBC # BLD: 0 K/UL (ref 0–0.2)
PLATELET # BLD AUTO: 172 K/UL (ref 150–450)
PMV BLD AUTO: 8.7 FL (ref 9.4–12.3)
POTASSIUM SERPL-SCNC: 4.3 MMOL/L (ref 3.5–5.1)
PROT SERPL-MCNC: 7.7 G/DL (ref 6.3–8.2)
RBC # BLD AUTO: 3.8 M/UL (ref 4.05–5.2)
SODIUM SERPL-SCNC: 135 MMOL/L (ref 133–143)
TSH, 3RD GENERATION: 3.53 UIU/ML (ref 0.36–3.74)
WBC # BLD AUTO: 3.3 K/UL (ref 4.3–11.1)

## 2023-01-23 PROCEDURE — 2580000003 HC RX 258: Performed by: INTERNAL MEDICINE

## 2023-01-23 PROCEDURE — 6360000002 HC RX W HCPCS: Performed by: INTERNAL MEDICINE

## 2023-01-23 PROCEDURE — 84443 ASSAY THYROID STIM HORMONE: CPT

## 2023-01-23 PROCEDURE — 36591 DRAW BLOOD OFF VENOUS DEVICE: CPT

## 2023-01-23 PROCEDURE — 80053 COMPREHEN METABOLIC PANEL: CPT

## 2023-01-23 PROCEDURE — 3074F SYST BP LT 130 MM HG: CPT | Performed by: INTERNAL MEDICINE

## 2023-01-23 PROCEDURE — 3078F DIAST BP <80 MM HG: CPT | Performed by: INTERNAL MEDICINE

## 2023-01-23 PROCEDURE — 96413 CHEMO IV INFUSION 1 HR: CPT

## 2023-01-23 PROCEDURE — 85025 COMPLETE CBC W/AUTO DIFF WBC: CPT

## 2023-01-23 PROCEDURE — 99215 OFFICE O/P EST HI 40 MIN: CPT | Performed by: INTERNAL MEDICINE

## 2023-01-23 RX ORDER — FLUTICASONE PROPIONATE AND SALMETEROL 250; 50 UG/1; UG/1
1 POWDER RESPIRATORY (INHALATION) 2 TIMES DAILY
Qty: 60 EACH | Refills: 3 | Status: SHIPPED | OUTPATIENT
Start: 2023-01-23

## 2023-01-23 RX ORDER — SODIUM CHLORIDE 9 MG/ML
5-250 INJECTION, SOLUTION INTRAVENOUS PRN
Status: DISCONTINUED | OUTPATIENT
Start: 2023-01-23 | End: 2023-01-24 | Stop reason: HOSPADM

## 2023-01-23 RX ORDER — LEVOTHYROXINE SODIUM 0.03 MG/1
25 TABLET ORAL DAILY
Qty: 30 TABLET | Refills: 2 | Status: SHIPPED | OUTPATIENT
Start: 2023-01-23

## 2023-01-23 RX ORDER — SODIUM CHLORIDE 0.9 % (FLUSH) 0.9 %
5-40 SYRINGE (ML) INJECTION PRN
Status: DISCONTINUED | OUTPATIENT
Start: 2023-01-23 | End: 2023-01-24 | Stop reason: HOSPADM

## 2023-01-23 RX ADMIN — SODIUM CHLORIDE 25 ML/HR: 9 INJECTION, SOLUTION INTRAVENOUS at 11:49

## 2023-01-23 RX ADMIN — SODIUM CHLORIDE, PRESERVATIVE FREE 10 ML: 5 INJECTION INTRAVENOUS at 10:22

## 2023-01-23 RX ADMIN — SODIUM CHLORIDE 200 MG: 9 INJECTION, SOLUTION INTRAVENOUS at 12:05

## 2023-01-23 RX ADMIN — SODIUM CHLORIDE, PRESERVATIVE FREE 10 ML: 5 INJECTION INTRAVENOUS at 11:48

## 2023-01-23 NOTE — PROGRESS NOTES
Patient arrived ambulatory to infusion. [facility] completed. Patient tolerated tx well today. Port deaccessed and discharged ambulatory. Patient aware of next infusion on 2/13.

## 2023-01-23 NOTE — PATIENT INSTRUCTIONS
Patient Instructions from Today's Visit    Reason for Visit:  Follow up triple negative breast cancer    Diagnosis Information:  https://www.[employer]/. net/about-us/asco-answers-patient-education-materials/eauu-vbpmgye-nxck-sheets    Plan: You still have one refill for your thyroid medicine available  Last treatment in three weeks  Surveillance after your last chemo. Follow Up:   In three weeks    Recent Lab Results:  Hospital Outpatient Visit on 01/23/2023   Component Date Value Ref Range Status    WBC 01/23/2023 3.3 (A)  4.3 - 11.1 K/uL Final    RBC 01/23/2023 3.80 (A)  4.05 - 5.2 M/uL Final    Hemoglobin 01/23/2023 11.8  11.7 - 15.4 g/dL Final    Hematocrit 01/23/2023 36.9  35.8 - 46.3 % Final    MCV 01/23/2023 97.1  82.0 - 102.0 FL Final    MCH 01/23/2023 31.1  26.1 - 32.9 PG Final    MCHC 01/23/2023 32.0  31.4 - 35.0 g/dL Final    RDW 01/23/2023 13.1  11.9 - 14.6 % Final    Platelets 51/57/4416 172  150 - 450 K/uL Final    MPV 01/23/2023 8.7 (A)  9.4 - 12.3 FL Final    nRBC 01/23/2023 0.00  0.0 - 0.2 K/uL Final    **Note: Absolute NRBC parameter is now reported with Hemogram**    Differential Type 01/23/2023 AUTOMATED    Final    Seg Neutrophils 01/23/2023 66  43 - 78 % Final    Lymphocytes 01/23/2023 23  13 - 44 % Final    Monocytes 01/23/2023 8  4.0 - 12.0 % Final    Eosinophils % 01/23/2023 2  0.5 - 7.8 % Final    Basophils 01/23/2023 1  0.0 - 2.0 % Final    Immature Granulocytes 01/23/2023 0  0.0 - 5.0 % Final    Segs Absolute 01/23/2023 2.2  1.7 - 8.2 K/UL Final    Absolute Lymph # 01/23/2023 0.8  0.5 - 4.6 K/UL Final    Absolute Mono # 01/23/2023 0.3  0.1 - 1.3 K/UL Final    Absolute Eos # 01/23/2023 0.1  0.0 - 0.8 K/UL Final    Basophils Absolute 01/23/2023 0.0  0.0 - 0.2 K/UL Final    Absolute Immature Granulocyte 01/23/2023 0.0  0.0 - 0.5 K/UL Final         Treatment Summary has been discussed and given to patient: n/a        -------------------------------------------------------------------------------------------------------------------  Please call our office at [phone] if you have any  of the following symptoms:   Fever of 100.5 or greater  Chills  Shortness of breath  Swelling or pain in one leg    After office hours an answering service is available and will contact a provider for emergencies or if you are experiencing any of the above symptoms. Patient does express an interest in My Chart. My Chart log in information explained on the after visit summary printout at the [facility] desk.     Andres Marcus RN

## 2023-01-23 NOTE — PROGRESS NOTES
[facility] Hematology & Oncology: Office Visit Progress Note    Chief Complaint:    Triple negative left breast cancer    History of Present Illness:  Reason for Referral: Carcinoma of upper-inner  quadrant of left breast in female, estrogen receptor negative; Triple negative malignant neoplasm of breast       Referring Provider: Jennifer Carpenter MD       Primary Care Provider: Nya Soares MD       Family History of Cancer/Hematologic Disorders: None reported       Presenting Symptoms: Abnormal routine screening mammogram        Ms. Angelica Block is a 56 y.o.  female with a history of heart murmur, HTN, mixed HLD, and irregular menses, who was recently diagnosed with breast  cancer. She initially presented on 11/22/21 for a routine bilateral digital screening mammogram which identified focal asymmetry in the medial inferior left breast at 6 cm from the nipple. Further evaluation with diagnostic left breast mammogram and left  breast ultrasound were completed on 12/6/21 confirming the presence of adjacent left breast masses with one at the 9:00 position, 6 cm from the nipple measuring approximately 1.5 x 0.8 x 0.8 cm and a 7 mm hypoechoic mass just adjacent to this. Recommended ultrasound-guided biopsy of the medial left breast mass was completed on 12/23/21 with pathology from the core biopsy of the left breast, 9:00 position, 6 cm from nipple, revealing ER 0.9%/LA 0.4%/HER-2 (0) negative, intermediate grade (moderately  differentiated), foci of at least microinvasive infiltrating ductal carcinoma in association with focally high grade ductal carcinoma in situ with no definite lymphovascular invasion identified. MRI of the bilateral breasts was performed on 12/28/21 demonstrating left medial breast cancer measuring about 4.5 cm in greatest dimension; no additional suspicious finding elsewhere in either breast; and no obvious lymphadenopathy.        Patient was referred to surgery and evaluated in consultation by Surgeon, Dr. Carrington Hoang, on 1/10/22. She was assessed with signs and symptoms consistent with cT2N0 triple negative left breast IDC with DCIS. Treatment options were discussed, and  Dr. Tad Gonzales recommended genetic testing and preoperative evaluation by medical oncology to consider neoadjuvant chemotherapy. Dr. Tad Gonzales noted, \"We discussed the strong possibility that she could require left mastectomy/sentinel node should we  proceed with surgery upfront. At this time the enhancement on MRI looks too large, diffuse, and elongated to perform breast preservation surgery and expected good cosmetic result. If she has neoadjuvant treatment I will see her back in about 4 months  to regroup. \"       Patient is now referred to [facility] for oncology evaluation and consideration for neoadjuvant chemotherapy. Patient completed genetic counseling on 1/13/22. She agreed to pursue testing with BRCAPlus panel of 8 genes associated with hereditary cancer  (including BRCA1 and BRCA2 ). BILATERAL DIGITAL SCREENING MAMMOGRAM  11/22/2021   FINDINGS: There are scattered fibroglandular densities. A few typically benign-appearing calcifications in the left breast are similar in  appearance to the previous study. There is a focal asymmetry in the medial inferior left breast located 6 cm from the nipple. Recommend spot compression magnification views on the direct ML view and ultrasound, if indicated for further evaluation. There  is otherwise no suspicious mass, calcification, or architectural distortion in either breast.      IMPRESSION: Focal asymmetry in the medial inferior left breast at 6 cm from the nipple. for which further evaluation is recommended with ML  and compression magnification views, with possible ultrasound if indicated. BI-RADS Assessment Category 0: Incomplete: Needs additional imaging evaluation.        DIAGNOSTIC MAMMOGRAM LEFT BREAST, LEFT BREAST ULTRASOUND 12/6/21   FINDING: The area of concern does not persist on additional imaging. There is no architectural distortion. The area of concern persists with  additional imaging. This resides within the medial left breast at approximately 9:00 position. There are few associated calcifications which appear somewhat coarse. This measures approximately 1.3 cm in size. An additional smaller asymmetry also at the  9:00 position resides 7 cm from the nipple. A left breast ultrasound is recommended for further evaluation. LEFT BREAST ULTRASOUND: At the 9:00 position, 6 cm from the nipple is a lobulated hypoechoic but mildly heterogeneous appearing mass measuring approximately 1.5 x 0.8 x 0.8 cm. The margins are occasionally ill-defined with some components demonstrating  posterior acoustic shadowing. The long axis is parallel to the plane of the chest wall. There is a 7 mm hypoechoic mass just adjacent to this This is hypoechoic with a lobulated contour. The margins are occasionally ill-defined. IMPRESSION: Adjacent left breast masses. Recommend tissue sampling of the larger lesion. BI-RADS Assessment Category 4: Suspicious Finding- Biopsy should be considered. [facility] SURGICAL PATHOLOGY REPORT 12/23/21   DIAGNOSIS    A: \" LEFT BREAST, 9:00 POSITION, 6 CM FROM NIPPLE, CORE BIOPSY\": FOCI OF AT LEAST MICROINVASIVE INFILTRATING DUCTAL CARCINOMA, INTERMEDIATE GRADE (MODERATELY DIFFERENTIATED) IN ASSOCIATION WITH FOCALLY HIGH GRADE DUCTAL CARCINOMA IN SITU.  DEFINITE LYMPHOVASCULAR  INVASION IS NOT IDENTIFIED   [facility]- ER/CA/HDR4RIM BY IHC    INTERPRETATION    [facility] IHC Quantitative Breast Panel    TEST NAME:                                      RESULTS:                  INTERNAL CONTROLS:    ESTROGEN RECEPTOR:                 Negative (0.9%)          Present, Positive    PROGESTERONE RECEPTOR:       Negative (0.4%)          Present, Positive    HER-2/CHAR:                                        Negative (0)                Percentage of Cells with Uniform Intense Complete Membrane    Stainin%   STF-IMMUNOHISTOCHEMISTRY   Immunohistochemical Stain Panel:    INTERPRETATION: Immunohistochemical findings consistent with breast primary. ANTIBODY/TEST       MARKER FOR                                               RESULT    Cytokeratin 7               Lung, breast, upper GE, serous ovary           Positive    Cytokeratin 20             Colon, mucinous ovary, urothelium                Negative    GATA3                        Urothelial, breast carcinoma                           Positive    GCDFP                       Breast, salivary gland, skin, adnexa               Positive    Mammaglobin                         Breast                                                              Positive       MRI OF THE BREASTS WITH AND WITHOUT CONTRAST 21   FINDINGS: The breasts demonstrate moderate glandularity and mild background enhancement. Left 9:00 biopsy clip within a lobulated heterogeneously  enhancing mass with angulated and irregular margins overall measuring about 1.8 x 0.7 x 1.2 cm. Posterior to this (about 1 cm away) the smaller mass measuring 0.9 cm is unchanged from recent mammogram and ultrasound. Additionally there is clumped and  reticular nonmass enhancement extending superiorly by about 2.7 cm and anteriorly by about 1.8 cm, in keeping with the DCIS and calcifications. Overall the expected area of disease measures about 4.5 cm AP by 1.2 cm transverse and is located at 9:00-9:30,  mid to posterior depth. There is no evidence of involvement of skin or chest wall structures. There is no other evidence of suspicious enhancing mass, and no other dominant or unique nonmass enhancement, to suggest additional malignancy in either breast.  There is no evidence of axillary or internal mammary lymphadenopathy.  Elsewhere, limited visualization of the partially included thorax and upper abdomen shows no acute abnormality, with note of a small benign cyst in the left liver. IMPRESSION   1. Left medial breast cancer measures about 4.5 cm in greatest dimension. 2. No additional suspicious finding elsewhere in either breast. No obvious lymphadenopathy. Recommend continued management as directed clinically. BI-RADS Assessment Category 6: Known Biopsy Proven Malignancy       Notes from Referring Provider: \"Age 49 with cT2N0 triple  negative left breast IDC and DCIS Evaluate    Speaks mandarin   She has been referred to genetics also\"       Interim history update in A/P. Review of Systems:      Constitutional  Denies fever or chills. Denies fatigue. Denies anorexia. HEENT   allergic conjunctivitis and sinusitis. Denies trauma, bluring vision, hearing loss, ear pain, nosebleeds, sore throat, neck pain and ear discharge. Skin  Denies lesions or rashes. Lungs  Denies shortness of breath, cough, sputum production or hemoptysis. Cardiovascular  Denies chest pain, palpitations, orthopnea, claudication and leg swelling. Gastrointestinal  Nausea. Denies vomiting. Denies bloody or black stools. Denies abdominal pain.   Denies dysuria, frequency or hesitancy of urination     Neuro  Denies headaches, visual changes or ataxia. Denies dizziness, tingling, tremors, sensory change, speech change, focal weakness and headaches. Hematology  Denies nasal/gum bleeding, denies easy bruise     Endo  Denies heat/cold intolerance, denies diabetes. MSK  Denies back pain, swollen legs, myalgias and falls. Psychiatric/Behavioral   insomnia. Denies depression and substance abuse. The patient is not nervous/anxious.               Allergies   Allergen Reactions    Paclitaxel Angioedema and Swelling    Lisinopril Other (See Comments)     Other reaction(s): Cough     Past Medical History:   Diagnosis Date    Breast cancer ([facility] [address]     History of therapeutic radiation     Hx antineoplastic chemo     Hypertension     Murmur, heart     echo 2016 EF 60-65% ; 1/31/22 states never has had symptoms    Poor historian     difficulty with verifying medication list     Past Surgical History:   Procedure Laterality Date    BREAST BIOPSY Left 09/21/2022    BREAST LESION BIOPSY EXCISION NEEDLE LOCALIZATION/ CHINESE/MANDARIN  performed by Blaise Awad [place] [facility] [place]    BREAST BIOPSY Left 09/21/2022    LEFT BREAST BIOPSY SENTINEL NODE DISSECTION. performed by Blaise Awad MD [place] [facility] [place]    BREAST LUMPECTOMY Left 09/21/2022    BREAST SURGERY      bx of left breast     IR PORT PLACEMENT EQUAL OR GREATER THAN 5 YEARS  2/2/2022    IR PORT PLACEMENT EQUAL OR GREATER THAN 5 YEARS  02/02/2022    IR PORT PLACEMENT EQUAL OR GREATER THAN 5 YEARS 2/2/2022 [facility] RADIOLOGY SPECIALS    US BREAST BIOPSY W LOC DEVICE 1ST LESION LEFT Left 12/23/2021    US BREAST NEEDLE BIOPSY LEFT 12/23/2021 E RADIOLOGY MAMMO     Family History   Problem Relation Age of Onset    Ovarian Cancer Neg Hx     No Known Problems Mother     Breast Cancer Neg Hx     No Known Problems Sister     Other Other         states no family history    Colon Cancer Neg Hx     Hypertension Father     No Known Problems Sister     No Known Problems Brother     Uterine Cancer Neg Hx      Social History     Socioeconomic History    Marital status:      Spouse name: Not on file    Number of children: Not on file    Years of education: Not on file    Highest education level: Not on file   Occupational History    Not on file   Tobacco Use    Smoking status: Never    Smokeless tobacco: Never   Substance and Sexual Activity    Alcohol use: No     Alcohol/week: 0.0 standard drinks    Drug use: No    Sexual activity: Not on file   Other Topics Concern    Not on file   Social History Narrative    Abuse: Feels safe at home, no history of physical abuse, no history of sexual abuse       Social Determinants of Health     Financial Resource Strain: Not on file   Food Insecurity: Not on file   Transportation  Needs: Not on file   Physical Activity: Not on file   Stress: Not on file   Social Connections: Not on file   Intimate Partner Violence: Not on file   Housing Stability: Not on file     Current Outpatient Medications   Medication Sig Dispense Refill    levothyroxine (SYNTHROID) 25 MCG tablet Take 1 tablet by mouth daily 30 tablet 2    fluticasone-salmeterol (ADVAIR DISKUS) 250-50 MCG/ACT AEPB diskus inhaler Inhale 1 puff into the lungs 2 times daily 60 each 3    diclofenac (VOLTAREN) 75 MG EC tablet Take 1 tablet by mouth 2 times daily 60 tablet 0    dilTIAZem (CARDIZEM CD) 240 MG extended release capsule Take 1 capsule by mouth daily      triamcinolone (NASACORT ALLERGY 24HR) 55 MCG/ACT nasal inhaler 2 sprays by Each Nostril route daily 1 each 1    lidocaine-prilocaine (EMLA) 2.5-2.5 % cream Apply to port about 45 minutes prior to access 2.5 g 1    spironolactone (ALDACTONE) 25 MG tablet 25 mg       No current facility-administered medications for this visit. Facility-Administered Medications Ordered in Other Visits   Medication Dose Route Frequency Provider Last Rate Last Admin    sodium chloride flush 0.9 % injection 5-40 mL  5-40 mL IntraVENous PRN Laz Bryant MD   10 mL at 01/23/23 1022    0.9 % sodium chloride infusion  5-250 mL/hr IntraVENous PRN Laz Bryant MD   Stopped at 01/23/23 1240    sodium chloride flush 0.9 % injection 5-40 mL  5-40 mL IntraVENous PRN Laz Bryant MD   10 mL at 01/23/23 1148       OBJECTIVE:  /76 (Site: Left Upper Arm, Position: Standing, Cuff Size: Medium Adult)   Pulse 82   Temp 97.8 °F (36.6 °C) (Oral)   Resp 16   Ht 5' 0.05\" (1.525 m)   Wt 161 lb 8 oz (73.3 kg)   SpO2 97%   BMI 31.49 kg/m²     Physical Exam:      Constitutional:  Oriented to person, place, and time. Well-developed and well-nourished. HEENT:  Normocephalic and atraumatic.  Oropharynx is clear and moist.    Conjunctivae and EOM are normal. Pupils are equal, round, and reactive to light. No scleral icterus. Neck supple. No JVD present. No tracheal deviation present. No thyromegaly present. Lymph node  No palpable submandibular, cervical, supraclavicular, axillary and inguinal lymph nodes. Breasts  Left breast soft, nontender, popular nodular area and 9:00 about 2 cm at baseline, no longer palpable after chemotherapy. Right breast soft, nontender, no mass. Skin   rash. Warm and dry. No bruising noted. No erythema. No pallor. Respiratory  Effort normal and breath sounds normal.  No respiratory distress. No wheezes. No rales. No tenderness. CVS  Normal rate, regular rhythm and normal heart sounds. Exam reveals no gallop, no friction and no rub. No murmur heard. Abdomen  Soft. Bowel sounds are normal. Exhibits no distension. There is no tenderness. There is no rebound and no guarding. Neuro  Normal reflexes. No cranial nerve deficit. Exhibits normal muscle tone, 5 of 5 strength of all extremities. MSK  Normal range of motion in general.  No edema and no tenderness.         Psych  Normal mood, affect, behavior, judgment and thought content        Labs:  Recent Results (from the past 24 hour(s))   CBC with Auto Differential    Collection Time: 01/23/23 10:22 AM   Result Value Ref Range    WBC 3.3 (L) 4.3 - 11.1 K/uL    RBC 3.80 (L) 4.05 - 5.2 M/uL    Hemoglobin 11.8 11.7 - 15.4 g/dL    Hematocrit 36.9 35.8 - 46.3 %    MCV 97.1 82.0 - 102.0 FL    MCH 31.1 26.1 - 32.9 PG    MCHC 32.0 31.4 - 35.0 g/dL    RDW 13.1 11.9 - 14.6 %    Platelets 344 804 - 735 K/uL    MPV 8.7 (L) 9.4 - 12.3 FL    nRBC 0.00 0.0 - 0.2 K/uL    Differential Type AUTOMATED      Seg Neutrophils 66 43 - 78 %    Lymphocytes 23 13 - 44 %    Monocytes 8 4.0 - 12.0 %    Eosinophils % 2 0.5 - 7.8 %    Basophils 1 0.0 - 2.0 %    Immature Granulocytes 0 0.0 - 5.0 %    Segs Absolute 2.2 1.7 - 8.2 K/UL    Absolute Lymph # 0.8 0.5 - 4.6 K/UL    Absolute Mono # 0.3 0.1 - 1.3 K/UL Absolute Eos # 0.1 0.0 - 0.8 K/UL    Basophils Absolute 0.0 0.0 - 0.2 K/UL    Absolute Immature Granulocyte 0.0 0.0 - 0.5 K/UL   Comprehensive Metabolic Panel    Collection Time: 01/23/23 10:22 AM   Result Value Ref Range    Sodium 135 133 - 143 mmol/L    Potassium 4.3 3.5 - 5.1 mmol/L    Chloride 106 101 - 110 mmol/L    CO2 23 21 - 32 mmol/L    Anion Gap 6 2 - 11 mmol/L    Glucose 162 (H) 65 - 100 mg/dL    BUN 22 6 - 23 MG/DL    Creatinine 1.00 0.6 - 1.0 MG/DL    Est, Glom Filt Rate >60 >60 ml/min/1.73m2    Calcium 9.4 8.3 - 10.4 MG/DL    Total Bilirubin 0.4 0.2 - 1.1 MG/DL    ALT 39 12 - 65 U/L    AST 22 15 - 37 U/L    Alk Phosphatase 122 50 - 136 U/L    Total Protein 7.7 6.3 - 8.2 g/dL    Albumin 3.8 3.5 - 5.0 g/dL    Globulin 3.9 2.8 - 4.5 g/dL    Albumin/Globulin Ratio 1.0 0.4 - 1.6     TSH    Collection Time: 01/23/23 10:22 AM   Result Value Ref Range    TSH, 3RD GENERATION 3.530 0.358 - 3.740 uIU/mL         Imaging:  No results found for this or any previous visit. ASSESSMENT/PLAN:   Diagnosis Orders   1. Triple negative malignant neoplasm of breast ([facility] [address]        2. Hypothyroidism due to medication  levothyroxine (SYNTHROID) 25 MCG tablet      3. Seasonal allergic rhinitis, unspecified trigger  fluticasone-salmeterol (ADVAIR DISKUS) 250-50 MCG/ACT AEPB diskus inhaler      4. Cough, unspecified type        5. High risk medication use            [unfilled]    56 y.o. F consulted for triple negative left breast cancer and presented to [facility] with  and daughter on 1/19/2022.    She has good baseline health and functions well, and annual screening mammogram showed left breast lesion, biopsy showed triple negative grade 2 IDC, cT2 N0 M0, and we discussed the high risks profile for triple negative breast cancer and the most recent  FDA approval of neoadjuvant chemoimmunotherapy, she appeared a good candidate and agreed to arrange Mlya Mickey for 4 cycles followed by Adriamycin Cytoxan Keytruda for 4 cycles, followed by surgery and adjuvant Keytruda for total systemic therapy  of 1 year, discussed toxicity and management, discussed logistics, arrange Zofran Compazine Ativan, baseline echocardiogram showed normal EF, hypertension better but still uncontrolled after adding spironolactone and educated risk of dehydration and hypotension  on chemotherapy when she is taking double diuretics, started cycle 1 2/7/22 and left breast tumor responded rapidly, lost 20 pounds after cycle 1 and BP much better controlled even after she runs out of diltiazem, again warned of monitoring dehydration/hypotension,  discussed increased protein/calorie intake, may add stimulant if continues to lose weight, occasional mild tingling in fingertips, episodes of constipation responded to laxative, proceed to cycle 1 day 8 carbotaxol Keytruda, educated to monitor for neuropathy,  follow next week but call as needed.       However after cycle 1 day 8 Taxol on 2/14 and developed acute onset of itching, rash, anasarca, short of breath, fever, hypotension, diarrhea, admitted with antibiotics, antihistamine,  IV fluid, most symptom much improved the second day although still lingering, discussed that the picture of allergic reaction but thorough discussion not able to identify new food or medicine, not typical for Taxol or Keytruda reaction given the timing  and the acuity, and she reported a similar episode a few years ago that she went to ER for treatment, but never followed up for allergen test and never had EpiPen, discussed the concern of allergy and will arrange allergist work-up, however proceeded  with day 15 Taxol with close observation to rule out atypical Taxol allergy, patient and family understand the risk and agreed to proceed, and reported reasonable tolerance except for mild itching through the infusion, however overnight RN was concerned  of tachycardia and tachypneic which patient reported similar to each infusion but received IV Benadryl and Solu-Medrol, LFT elevated for a day, give albumin with lasix and anasarca improved, DC with Benadryl/prednisone/EpiPen as needed, followed  on 2/28/2022, swelling resolved, mild rash of hands and arms, discussed to change Taxol to Taxotere for better management of reaction, return on 3/7/2022 reported much better tolerance for the allergic reaction, however more nausea/vomiting/weight loss  and neutropenic fever with temperature 101 and ANC 0.2, coughing and rule out Covid, typical seasonal allergic sinusitis symptoms and educated to start using Nasacort spray, admit to hospital for neutropenic fever, discharge 3/10/2022, had skin rash that  was attributed to cefepime and resolved, again discussed her sensitivity to multiple allergens and different manifestations need to be managed accordingly, Zaditor for allergic conjunctivitis, nasal steroid for sinusitis, Kenalog for skin rash, oral antihistamine,  prednisone 20 mg only as needed, added G-CSF since cycle 3, better controlled for allergy/hypertension/fluid retention, severe diarrhea responded to PRBC, severe thrombocytopenia recovered after delaying cycle 4 by a week, completed cycle 4 with G-CSF  and Emend.    She returned on 5/9/2022 to start AC/Keytruda, however platelet was low and chemo had to be deferred for 2 weeks actually and platelets finally recovered to 92 on 5/23/2022, discussed to proceed with caution and reduce AC dose by 20%, which she tolerated reasonably well and completed 4 cycles of AC/Keytruda without major complication, repeat MRI showed cCR and Dr. Tad Gonzales performed left lumpectomy and axillary SLND on 9/21/2022, pathology showed ypTis N0 triple negative left breast carcinoma, discussed that the complete response of the invasive triple negative cancer is a favorable prognostic marker, continued adjuvant treatment, complaining of left shoulder pain since the surgery and consult [facility] for physical therapy, still hurting after physical therapy and orthopedic evaluation was recommended, considered tendinitis and responded to diclofenac, proceed to cycle 16 adjuvant Keytruda, continue Synthroid 25 mcg daily and trend TSH, discussed CT showed no acute change in the lung and prescribed Advair/Nasacort for coughing control, return in 3 weeks for last cycle of Keytruda but call as needed. All questions are answered to their satisfaction. They will call for further questions and concerns. ECOG PERFORMANCE STATUS - 1    Pain - 0 - No pain/10. Fatigue - No flowsheet data found. Distress - No flowsheet data found. Elements of this note have been dictated via voice recognition software. Text and phrases may be limited by the accuracy and autoconversion of the software. The chart has been reviewed, but errors may still be present. Richard Watkins M.D.   [address]  Office : [phone]  Fax : [phone]

## 2023-02-01 ENCOUNTER — HOSPITAL ENCOUNTER (OUTPATIENT)
Dept: PHYSICAL THERAPY | Age: 58
Setting detail: RECURRING SERIES
Discharge: HOME | End: 2023-02-04
Payer: COMMERCIAL

## 2023-02-01 PROCEDURE — 97110 THERAPEUTIC EXERCISES: CPT

## 2023-02-01 PROCEDURE — 97161 PT EVAL LOW COMPLEX 20 MIN: CPT

## 2023-02-01 ASSESSMENT — PAIN SCALES - GENERAL: PAINLEVEL_OUTOF10: 2

## 2023-02-01 NOTE — PLAN OF CARE
Derrick Foster  : 1965  Primary: Donaciano Seip Sc ([facility])  Secondary: [place] COPAY PATIENT ASSISTANCE [place] [address]  [address]  [address] [place] [address] Way [address]  Phone: [phone]  Fax: [phone] Plan Frequency: 2 x week for 6 weeks    Plan of Care/Certification Expiration Date: 23      PT Visit Info:  Plan Frequency: 2 x week for 6 weeks  Plan of Care/Certification Expiration Date: 23  Total # of Visits to Date: 1      Visit Count:  1                OUTPATIENT PHYSICAL THERAPY:             OP NOTE TYPE: Initial Assessment 2023               Episode (Left shoulder pain) Appt Desk         Treatment Diagnosis:  Pain in Left Shoulder (M25.512)  Stiffness of Left Shoulder, Not elsewhere classified (M25.612)  Generalized Muscle Weakness (M62.81)  Medical/Referring Diagnosis:  Left shoulder pain, unspecified chronicity [M25.512]  Impingement syndrome of left shoulder [M75.42]  Referring Physician:  Wilbert Barrientos MD MD Orders:  PT Eval and Treat   Return MD Appt:  23  Date of Onset:  Onset Date: 22      Allergies:  Paclitaxel and Lisinopril  Restrictions/Precautions:    Restrictions/Precautions: None        Medications Last Reviewed:  2023     SUBJECTIVE   History of Injury/Illness (Reason for Referral): history taken via  services [facility] # [id number]  Patient reports having complaints of left shoulder pain for several months, no injury reported. Pain is described as dull aching  aggravated with sleeping;  awakes with soreness lasting ~ 30 minutes. Difficulty reaching and carrying, and dressing ; especially doffing a  sweater. She reports avoiding  heavy household chores; able to vacuum and mop without difficulty. Denies numbness /tingling. Patient referred for evaluation and treatment of Left Shoulder Impingement Syndrome  Patient Stated Goal(s):   \"To make the soreness go away\"  Initial:     2/10 Post Session:     2/10  Past Medical History/Comorbidities:   Ms. Clare Ordaz  has a past medical history of Breast cancer ([address] History of therapeutic radiation, Hx antineoplastic chemo, Hypertension, Murmur, heart, and Poor historian. Ms. Clare Ordaz  has a past surgical history that includes IR PORT PLACEMENT > 5 YEARS (2/2/2022); Breast surgery; US BREAST BIOPSY W LOC DEVICE 1ST LESION LEFT (Left, 12/23/2021); IR PORT PLACEMENT > 5 YEARS (02/02/2022); Breast biopsy (Left, 09/21/2022); Breast biopsy (Left, 09/21/2022); and Breast lumpectomy (Left, 09/21/2022). 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         Learning:   Does the patient/guardian have any barriers to learning?: Language  Will there be a co-learner?: No  What is the preferred language of the patient/guardian?: Chinese  Is an  required?: Yes  Is an  present?: Yes  How does the patient/guardian prefer to learn new concepts?: Demonstration; Pictures/Videos; Listening; Reading     Fall Risk Scale:    Starr Total Score: 0  Starr Fall Risk: Low (0-24)     Dominant Side:  right handed      OBJECTIVE   Observation:  Left shoulder depressed flat thoracic kyphosis                          Positive scapular dyskinesia  Posture: Rounded shoulders, decreased thoracic kyphosis  Palpation:  Tightness right > left upper traps                     Tender biceps tendon                     Stiff thoracic spine with CPA and left UPA T4-9    ROM:   Date:  2/1/23       Right Left   Shoulder flex 155 140   Shoulder abd 145 140   Shoulder ER 75 60   Shoulder IR T8 T10           Strength:   Date:  2/1/23       Right Left   Shoulder flex 5 5-   Shoulder abd 5 5-   Shoulder ER 4+ 4   Shoulder IR 5 4-   Scapular retraction 4 4-          Special Tests: NA  ASSESSMENT   Initial Assessment:  on exam patient presents with decreased ROM, strength and functional use of arm. Patient will require skilled therapeutic intervention to restore impairments assessed and allow for return to PLOF  Problem List: (Impacting functional limitations): Body Structures, Functions, Activity Limitations Requiring Skilled Therapeutic Intervention: Decreased functional mobility ; Decreased ROM; Increased pain     Therapy Prognosis:   Therapy Prognosis: Good     Initial Assessment Complexity:   Decision Making: Medium Complexity    PLAN   Effective Dates: 2/1/23 TO Plan of Care/Certification Expiration Date: 04/02/23     Frequency/Duration: Plan Frequency: 2 x week for 6 weeks     Interventions Planned (Treatment may consist of any combination of the following):    Current Treatment Recommendations: Strengthening; ROM; Manual Therapy - Soft Tissue Mobilization; Home exercise program     Goals: (Goals have been discussed and agreed upon with patient.)  Short-Term Functional Goals: Time Frame: 3 weeks  Increase shoulder forward elevation to 150 to allow for dressing with more ease  Independent HEP of shoulder ROM exercises  Decrease pain to 2/10 to allow for greater ease with dressing  Discharge Goals: Time Frame: 6 weeks  Increase ROM to WNL in all planes to allow for reaching overhead  Increase strength to at least 4+/5 to allow for carrying of groceries  Decrease pain to less than 1/10 to allow for sleeping with minimal interruptions           Outcome Measure: Unable to assess due to language barrier       Medical Necessity:   > Patient is expected to demonstrate progress in strength and range of motion to allow for return to PLOF.   Reason For Services/Other Comments:  > Patient will require skilled therapeutic intervention to address impairments assessed and allow for return to PLOF  Total Duration: 20 minutes  Time In: 1345  Time Out: Shayy Nails's therapy, I certify that the treatment plan above will be carried out by a therapist or under their direction.   Thank you for this referral,  Mercy Franklin, PT     Referring Physician Signature: Christopher Vega MD                    Post Session Pain  Charge Capture  PT Visit Info MD Guidelines  Sandra

## 2023-02-02 NOTE — PROGRESS NOTES
Lacey Burrell  : 1965  Primary: Georgia Antony ([facility])  Secondary: [place] COPAY PATIENT ASSISTANCE [facility]  [address]  [address]  [facility] [address]  Phone: [phone]  Fax: [phone] Plan Frequency: 2 x week for 6 weeks    Plan of Care/Certification Expiration Date: 23      PT Visit Info:  Plan Frequency: 2 x week for 6 weeks  Plan of Care/Certification Expiration Date: 23  Total # of Visits to Date: 1      Visit Count:  6    OUTPATIENT PHYSICAL THERAPY:OP NOTE TYPE: Treatment Note 2023       Episode  }Appt Desk             Treatment Diagnosis:  Pain in Left Shoulder (M25.512)  Stiffness of Left Shoulder, Not elsewhere classified (M25.612)  Generalized Muscle Weakness (M62.81)  Medical/Referring Diagnosis:  Left shoulder pain, unspecified chronicity [M25.512]  Impingement syndrome of left shoulder [M75.42]  Referring Physician:  Karen Roman MD MD Orders:  PT Eval and Treat   Date of Onset:  Onset Date: 22     Allergies:   Paclitaxel and Lisinopril  Restrictions/Precautions:  Restrictions/Precautions: None  No data recorded     Interventions Planned (Treatment may consist of any combination of the following):    Current Treatment Recommendations: Strengthening; ROM; Manual Therapy - Soft Tissue Mobilization; Home exercise program     Subjective Comments:  Left shoulder pain with reaching and dressing. [employer]  #[id number] utilized  Initial:}    2/10Post Session:       2/10  Medications Last Reviewed:  2023  Updated Objective Findings:  See evaluation note from today  Treatment   THERAPEUTIC EXERCISE: (23 minutes):    Exercises per grid below to improve mobility and strength. Required moderate visual and verbal cues to promote proper body alignment. Progressed range and complexity of movement as indicated.    Date:  23 Date:   Date:     Activity/Exercise Parameters Parameters Parameters   Patient education/HEP 8'     Shoulder flex Seated 10x Scapular retraction 2 x 10      Upper trap stretch 3 x 20 secs     Posture education 3'                   [employer] Portal Access Code: [email or web address]  URL: [email or web address]  Date: 02/02/2023  Prepared by: Barney Cordoba    Exercises  Seated Shoulder Flexion Full Range - 1-2 x daily - 7 x weekly - 1 sets - 10 reps - 2 hold  Seated Scapular Retraction - 1-2 x daily - 7 x weekly - 2 sets - 10 reps  Seated Gentle Upper Trapezius Stretch - 1 x daily - 7 x weekly - 3 reps - 20 hold    Treatment/Session Summary:    Treatment Assessment:  Tolerated treatment well. Seemed to have a clear understanding of exercises given  Communication/Consultation:   POC, goals, posture education, pathology, HEP   Equipment provided today:  HEP  Recommendations/Intent for next treatment session: Next visit will focus on manual therapy, therapeutic exercise for rotator cuff and scapular strengthening, posture education, modalities as needed.     Total Treatment Billable Duration:  43 minutes (20 minutes eval, 23 minutes TE)  Time In: 6040  Time Out: 1430    Lopez Careyne-Enrike, PT       Charge Capture  }Post Session Pain  PT Visit Info  [employer] Portal  MD Guidelines  Scanned Media  Benefits  MyChart    Future Appointments   Date Time Provider [place]   2/7/2023  9:30 AM Cherylann C Desdune-Enrike, PT [facility]   2/9/2023  9:30 AM Cherylann C Desdune-Enrike, PT [facility]   2/13/2023 10:30 AM LAB [facility]   2/13/2023 11:00 AM Bia Francisco MD [facility]   2/13/2023 11:30 AM BMT2 [facility]   2/14/2023  9:30 AM Cherylann C Desdune-Enrike, PT [facility]   2/16/2023  9:30 AM Cherylann C Desdune-Enrike, PT [facility]   2/20/2023  1:15 PM NIGEL Guerra - CNP [facility]   2/21/2023  9:30 AM Cherylann C Desdune-Enrike, PT [facility]   2/23/2023  9:30 AM Cherylann C Desdune-Enrike, PT [facility]   1/11/2024  9:30 AM Delbert Gould  [address]

## 2023-02-07 ENCOUNTER — HOSPITAL ENCOUNTER (OUTPATIENT)
Dept: PHYSICAL THERAPY | Age: 58
Setting detail: RECURRING SERIES
Discharge: HOME | End: 2023-02-10
Payer: COMMERCIAL

## 2023-02-07 PROCEDURE — 97110 THERAPEUTIC EXERCISES: CPT

## 2023-02-07 ASSESSMENT — PAIN SCALES - GENERAL: PAINLEVEL_OUTOF10: 3

## 2023-02-07 NOTE — PROGRESS NOTES
Bertrand Dunaway  : 1965  Primary: Dari Antony ([facility])  Secondary: [place] COPAY PATIENT ASSISTANCE [facility]  [address]  [address]  [facility] [address]  Phone: [phone]  Fax: [phone] Plan Frequency: 2 x week for 6 weeks    Plan of Care/Certification Expiration Date: 23      PT Visit Info:  Plan Frequency: 2 x week for 6 weeks  Plan of Care/Certification Expiration Date: 23  Total # of Visits to Date: 2      Visit Count:  2    OUTPATIENT PHYSICAL THERAPY:OP NOTE TYPE: Treatment Note 2023       Episode  }Appt Desk             Treatment Diagnosis:  Pain in Left Shoulder (M25.512)  Stiffness of Left Shoulder, Not elsewhere classified (M25.612)  Generalized Muscle Weakness (M62.81)  Medical/Referring Diagnosis:  Left shoulder pain, unspecified chronicity [M25.512]  Impingement syndrome of left shoulder [M75.42]  Referring Physician:  Abigail Ordonez MD MD Orders:  PT Eval and Treat   Date of Onset:  Onset Date: 22     Allergies:   Paclitaxel and Lisinopril  Restrictions/Precautions:  Restrictions/Precautions: None  No data recorded     Interventions Planned (Treatment may consist of any combination of the following):    Current Treatment Recommendations: Strengthening; ROM; Manual Therapy - Soft Tissue Mobilization; Home exercise program     Subjective Comments:  Patient reports decreased difficulty tie dressing. stratus Intre[employer] used for session [id number]  Initial:}    3/10Post Session:       2/10  Medications Last Reviewed:  2023  Updated Objective Findings:  None Today  Treatment   MANUAL THERAPY: (5 minutes):   Joint mobilization was utilized and necessary because of the patient's restricted joint motion. J mobs for lateral and inferior glides grade 2-3  THERAPEUTIC EXERCISE: (40 minutes):    Exercises per grid below to improve mobility and strength. Required moderate visual and verbal cues to promote proper body alignment.   Progressed range and complexity of movement as indicated. Date:  2/1/23 Date:  2/7/23 Date:     Activity/Exercise Parameters Parameters Parameters   Patient education/HEP 8' 3'    Shoulder flex Seated 10x 10x supine with wand  10x AFE supine  10x OTB supine  10x AFE seated  Bilat wall slide 10x    Scapular retraction 2 x 10      Upper trap stretch 3 x 20 secs     Posture education 3' 2'    Pendulums  20x    PROM  Flex, ER, abd x 6'    Shoulder ER  Bilat supine 2 x 10 OTB    Shoulder IR  10x hand behind back    Rows  2 x 10 OTB    Low rows  2 x 10 OTB    Wall push up  2 x 10x    UBE  4' level 1 1/1      [employer] Portal Access Code: [email or web address] Code: [email or web address]  URL: [email or web address]  Date: 02/07/2023  Prepared by: Lisbeth Gonzalez    Exercises  Seated Shoulder Flexion Full Range - 1-2 x daily - 7 x weekly - 1 sets - 10 reps - 2 hold  Seated Scapular Retraction - 1-2 x daily - 7 x weekly - 2 sets - 10 reps  Seated Gentle Upper Trapezius Stretch - 1 x daily - 7 x weekly - 3 reps - 20 hold  Standing Row with Anchored Resistance - 1 x daily - 7 x weekly - 2 sets - 10 reps  Shoulder extension with resistance - Neutral - 1 x daily - 7 x weekly - 10 reps  Circular Shoulder Pendulum with Table Support - 1 x daily - 7 x weekly - 3 sets - 10 reps    Treatment/Session Summary:    Treatment Assessment:  Tolerated treatment well demonstrating improved ROM. Some mild complaints of fatigue post treatment; muscualr in nature  Communication/Consultation:   HEP   Equipment provided today:  HEP  Recommendations/Intent for next treatment session: Next visit will focus on manual therapy, therapeutic exercise for rotator cuff and scapular strengthening, posture education, modalities as needed.     Total Treatment Billable Duration:  45 minutes (40 minutes TE, 5 minutes manual)  Time In: 0930  Time Out: 1015    Lopez Montes, PT       Charge Capture  }Post Session Pain  PT Visit Info  [employer] Portal  MD Guidelines Scanned Media  Benefits  MyChart    Future Appointments   Date Time Provider [place] Cid   2/9/2023  9:30 AM Chad Gonzalez, PT [facility]   2/13/2023 10:30 AM LAB CK [facility]   2/13/2023 11:00 AM Quan Muhammad MD [facility]   2/13/2023 11:30 AM BMT2 [facility]   2/14/2023  9:30 AM Lopez Montes, PT [facility]   2/16/2023  9:30 AM Lopez Montes, PT [facility]   2/20/2023  1:15 PM NIGEL Reilly - CNP [facility]   2/21/2023  9:30 AM Lopez Montes, PT [facility]   2/23/2023  9:30 AM Chermarty Montes, PT [facility]   1/11/2024  9:30 AM Martin Nash  [address]

## 2023-02-09 ENCOUNTER — HOSPITAL ENCOUNTER (OUTPATIENT)
Dept: PHYSICAL THERAPY | Age: 58
Setting detail: RECURRING SERIES
Discharge: HOME | End: 2023-02-12
Payer: COMMERCIAL

## 2023-02-09 DIAGNOSIS — C50.919 TRIPLE NEGATIVE MALIGNANT NEOPLASM OF BREAST (HCC): Primary | ICD-10-CM

## 2023-02-09 DIAGNOSIS — E03.2 HYPOTHYROIDISM DUE TO MEDICATION: ICD-10-CM

## 2023-02-09 PROCEDURE — 97110 THERAPEUTIC EXERCISES: CPT

## 2023-02-09 PROCEDURE — 97140 MANUAL THERAPY 1/> REGIONS: CPT

## 2023-02-09 ASSESSMENT — PAIN SCALES - GENERAL: PAINLEVEL_OUTOF10: 2

## 2023-02-09 NOTE — PROGRESS NOTES
Stuart Urena  : 1965  Primary: Hart Fulling Sc ([facility])  Secondary: [place] COPAY PATIENT ASSISTANCE [place] [address]  [address]  [facility] [address]  Phone: [phone]  Fax: [phone] Plan Frequency: 2 x week for 6 weeks    Plan of Care/Certification Expiration Date: 23      PT Visit Info:  Plan Frequency: 2 x week for 6 weeks  Plan of Care/Certification Expiration Date: 23  Total # of Visits to Date: 3      Visit Count:  3    OUTPATIENT PHYSICAL THERAPY:OP NOTE TYPE: Treatment Note 2023       Episode  }Appt Desk             Treatment Diagnosis:  Pain in Left Shoulder (M25.512)  Stiffness of Left Shoulder, Not elsewhere classified (M25.612)  Generalized Muscle Weakness (M62.81)  Medical/Referring Diagnosis:  Left shoulder pain, unspecified chronicity [M25.512]  Impingement syndrome of left shoulder [M75.42]  Referring Physician:  Mnaish Jensen MD MD Orders:  PT Eval and Treat   Date of Onset:  Onset Date: 22     Allergies:   Paclitaxel and Lisinopril  Restrictions/Precautions:  Restrictions/Precautions: None  No data recorded     Interventions Planned (Treatment may consist of any combination of the following):    Current Treatment Recommendations: Strengthening; ROM; Manual Therapy - Soft Tissue Mobilization; Home exercise program     Subjective Comments:  Patient reports awaking with more aching yesterday, but doing better this morning. Daniel Aguilar [id number] utilized  Initial:}    2/10Post Session:       2/10  Medications Last Reviewed:  2023  Updated Objective Findings:  None Today  Treatment   MANUAL THERAPY: (10 minutes):   Joint mobilization was utilized and necessary because of the patient's restricted joint motion. GHJ mobs for lateral and inferior glides grade 2-3        Thoracic mobs UPA and CPA T4-9  THERAPEUTIC EXERCISE: (35 minutes):    Exercises per grid below to improve mobility and strength.   Required moderate visual and verbal cues to promote proper body alignment. Progressed range and complexity of movement as indicated. Date:  2/1/23 Date:  2/7/23 Date:  2/9/23   Activity/Exercise Parameters Parameters Parameters   Patient education/HEP 8' 3' 3'   Shoulder flex Seated 10x 10x supine with wand  10x AFE supine  10x OTB supine  10x AFE seated  Bilat wall slide 10x 10x supine AFE  10x 2# bar  AFE   Seated flex to 90 1#  Wall slide with lift off 2 x 10  Supine fig 8's 2 x 10   Scapular retraction 2 x 10   Prone I 10x    Upper trap stretch 3 x 20 secs     Posture education 3' 2'    Pendulums  20x    PROM  Flex, ER, abd x 6' Flex, ER, abd x 6'   Shoulder ER  Bilat supine 2 x 10 OTB Bilat supine and seated 10x OTB   Shoulder IR  10x hand behind back    Rows  2 x 10 OTB    Low rows  2 x 10 OTB    Wall push up  2 x 10x 2 x 10    UBE  4' level 1 1/1 6' level 1 1/1     [employer] Portal Access Code: [email or web address]  URL: [email or web address]  Date: 02/09/2023  Prepared by: Sandra Torres    Exercises  Seated Shoulder Flexion Full Range - 1-2 x daily - 7 x weekly - 1 sets - 10 reps - 2 hold  Seated Scapular Retraction - 1-2 x daily - 7 x weekly - 2 sets - 10 reps  Seated Gentle Upper Trapezius Stretch - 1 x daily - 7 x weekly - 3 reps - 20 hold  Standing Row with Anchored Resistance - 1 x daily - 7 x weekly - 2 sets - 10 reps  Shoulder extension with resistance - Neutral - 1 x daily - 7 x weekly - 10 reps  Circular Shoulder Pendulum with Table Support - 3 x daily - 7 x weekly - 3 sets - 10 reps  Tricep Push Up on Wall - 1 x daily - 7 x weekly - 2 sets - 10 reps  Shoulder Flexion Wall Slide with Towel - 1 x daily - 7 x weekly - 1 sets - 10 reps    Treatment/Session Summary:    Treatment Assessment:  Patient did well with exercise progression.   Some difficulty and discomfort reported with prone I, but demonstrated good form with wall slide into lift off  Communication/Consultation:   HEP   Equipment provided today: HEP  Recommendations/Intent for next treatment session: Next visit will focus on manual therapy, therapeutic exercise for rotator cuff and scapular strengthening, posture education, modalities as needed.     Total Treatment Billable Duration:  45 minutes (35 minutes TE, 10 minutes manual)  Time In: 0930  Time Out: 7820    Lopez Montes, PT       Charge Capture  }Post Session Pain  PT Visit Info  MedBridge Portal  MD Guidelines  Scanned Media  Benefits  MyChart    Future Appointments   Date Time Provider Yue Cid   2/13/2023 10:30 AM LAB CK [facility]   2/13/2023 11:00 AM Pierre Mc MD [facility]   2/13/2023 11:30 AM BMT2 [facility] [address] [place] [address]   2/14/2023  9:30 AM Lopez Montes, PT [facility]   2/16/2023  9:30 AM Lopez Montes, PT [facility]   2/20/2023  1:15 PM Cesia Thomas APRN - CNP [facility]   2/21/2023  9:30 AM Lopez Montes, PT [facility]   2/23/2023  9:30 AM Lopez Montes, PT [facility]   1/11/2024  9:30 AM Michaela Mendoza  [address]

## 2023-02-13 ENCOUNTER — HOSPITAL ENCOUNTER (OUTPATIENT)
Dept: INFUSION THERAPY | Age: 58
Discharge: HOME | End: 2023-02-13
Payer: COMMERCIAL

## 2023-02-13 ENCOUNTER — OFFICE VISIT (OUTPATIENT)
Dept: ONCOLOGY | Age: 58
End: 2023-02-13
Payer: COMMERCIAL

## 2023-02-13 VITALS
SYSTOLIC BLOOD PRESSURE: 137 MMHG | HEART RATE: 87 BPM | BODY MASS INDEX: 31.67 KG/M2 | WEIGHT: 161.3 LBS | DIASTOLIC BLOOD PRESSURE: 82 MMHG | OXYGEN SATURATION: 98 % | RESPIRATION RATE: 14 BRPM | HEIGHT: 60 IN | TEMPERATURE: 98 F

## 2023-02-13 DIAGNOSIS — C50.919 TRIPLE NEGATIVE MALIGNANT NEOPLASM OF BREAST (HCC): ICD-10-CM

## 2023-02-13 DIAGNOSIS — R05.9 COUGH, UNSPECIFIED TYPE: ICD-10-CM

## 2023-02-13 DIAGNOSIS — E03.2 HYPOTHYROIDISM DUE TO MEDICATION: ICD-10-CM

## 2023-02-13 DIAGNOSIS — C50.919 TRIPLE NEGATIVE MALIGNANT NEOPLASM OF BREAST (HCC): Primary | ICD-10-CM

## 2023-02-13 DIAGNOSIS — Z79.899 HIGH RISK MEDICATION USE: ICD-10-CM

## 2023-02-13 LAB
ALBUMIN SERPL-MCNC: 3.8 G/DL (ref 3.5–5)
ALBUMIN/GLOB SERPL: 0.9 (ref 0.4–1.6)
ALP SERPL-CCNC: 116 U/L (ref 50–136)
ALT SERPL-CCNC: 34 U/L (ref 12–65)
ANION GAP SERPL CALC-SCNC: 7 MMOL/L (ref 2–11)
AST SERPL-CCNC: 21 U/L (ref 15–37)
BASOPHILS # BLD: 0 K/UL (ref 0–0.2)
BASOPHILS NFR BLD: 1 % (ref 0–2)
BILIRUB SERPL-MCNC: 0.3 MG/DL (ref 0.2–1.1)
BUN SERPL-MCNC: 23 MG/DL (ref 6–23)
CALCIUM SERPL-MCNC: 9.6 MG/DL (ref 8.3–10.4)
CHLORIDE SERPL-SCNC: 108 MMOL/L (ref 101–110)
CO2 SERPL-SCNC: 24 MMOL/L (ref 21–32)
CREAT SERPL-MCNC: 0.9 MG/DL (ref 0.6–1)
DIFFERENTIAL METHOD BLD: ABNORMAL
EOSINOPHIL # BLD: 0.1 K/UL (ref 0–0.8)
EOSINOPHIL NFR BLD: 3 % (ref 0.5–7.8)
ERYTHROCYTE [DISTWIDTH] IN BLOOD BY AUTOMATED COUNT: 12.9 % (ref 11.9–14.6)
GLOBULIN SER CALC-MCNC: 4.1 G/DL (ref 2.8–4.5)
GLUCOSE SERPL-MCNC: 165 MG/DL (ref 65–100)
HCT VFR BLD AUTO: 36.8 % (ref 35.8–46.3)
HGB BLD-MCNC: 11.9 G/DL (ref 11.7–15.4)
IMM GRANULOCYTES # BLD AUTO: 0 K/UL (ref 0–0.5)
IMM GRANULOCYTES NFR BLD AUTO: 0 % (ref 0–5)
LYMPHOCYTES # BLD: 0.8 K/UL (ref 0.5–4.6)
LYMPHOCYTES NFR BLD: 20 % (ref 13–44)
MCH RBC QN AUTO: 31.4 PG (ref 26.1–32.9)
MCHC RBC AUTO-ENTMCNC: 32.3 G/DL (ref 31.4–35)
MCV RBC AUTO: 97.1 FL (ref 82–102)
MONOCYTES # BLD: 0.3 K/UL (ref 0.1–1.3)
MONOCYTES NFR BLD: 8 % (ref 4–12)
NEUTS SEG # BLD: 2.9 K/UL (ref 1.7–8.2)
NEUTS SEG NFR BLD: 68 % (ref 43–78)
NRBC # BLD: 0 K/UL (ref 0–0.2)
PLATELET # BLD AUTO: 170 K/UL (ref 150–450)
PMV BLD AUTO: 8.3 FL (ref 9.4–12.3)
POTASSIUM SERPL-SCNC: 4.2 MMOL/L (ref 3.5–5.1)
PROT SERPL-MCNC: 7.9 G/DL (ref 6.3–8.2)
RBC # BLD AUTO: 3.79 M/UL (ref 4.05–5.2)
SODIUM SERPL-SCNC: 139 MMOL/L (ref 133–143)
TSH, 3RD GENERATION: 3.16 UIU/ML (ref 0.36–3.74)
WBC # BLD AUTO: 4.3 K/UL (ref 4.3–11.1)

## 2023-02-13 PROCEDURE — 96413 CHEMO IV INFUSION 1 HR: CPT

## 2023-02-13 PROCEDURE — 2580000003 HC RX 258: Performed by: INTERNAL MEDICINE

## 2023-02-13 PROCEDURE — 36591 DRAW BLOOD OFF VENOUS DEVICE: CPT

## 2023-02-13 PROCEDURE — 84443 ASSAY THYROID STIM HORMONE: CPT

## 2023-02-13 PROCEDURE — 80053 COMPREHEN METABOLIC PANEL: CPT

## 2023-02-13 PROCEDURE — 6360000002 HC RX W HCPCS: Performed by: INTERNAL MEDICINE

## 2023-02-13 PROCEDURE — 3079F DIAST BP 80-89 MM HG: CPT | Performed by: INTERNAL MEDICINE

## 2023-02-13 PROCEDURE — 85025 COMPLETE CBC W/AUTO DIFF WBC: CPT

## 2023-02-13 PROCEDURE — 3075F SYST BP GE 130 - 139MM HG: CPT | Performed by: INTERNAL MEDICINE

## 2023-02-13 PROCEDURE — 99215 OFFICE O/P EST HI 40 MIN: CPT | Performed by: INTERNAL MEDICINE

## 2023-02-13 RX ORDER — ALBUTEROL SULFATE 90 UG/1
4 AEROSOL, METERED RESPIRATORY (INHALATION) PRN
OUTPATIENT
Start: 2023-02-13

## 2023-02-13 RX ORDER — SODIUM CHLORIDE 9 MG/ML
5-250 INJECTION, SOLUTION INTRAVENOUS PRN
Status: CANCELLED | OUTPATIENT
Start: 2023-02-13

## 2023-02-13 RX ORDER — SODIUM CHLORIDE 0.9 % (FLUSH) 0.9 %
5-40 SYRINGE (ML) INJECTION PRN
Status: DISCONTINUED | OUTPATIENT
Start: 2023-02-13 | End: 2023-02-14 | Stop reason: HOSPADM

## 2023-02-13 RX ORDER — HEPARIN SODIUM (PORCINE) LOCK FLUSH IV SOLN 100 UNIT/ML 100 UNIT/ML
500 SOLUTION INTRAVENOUS PRN
OUTPATIENT
Start: 2023-02-13

## 2023-02-13 RX ORDER — LEVOTHYROXINE SODIUM 0.03 MG/1
25 TABLET ORAL DAILY
Qty: 90 TABLET | Refills: 1 | Status: SHIPPED | OUTPATIENT
Start: 2023-02-13

## 2023-02-13 RX ORDER — SODIUM CHLORIDE 9 MG/ML
5-40 INJECTION INTRAVENOUS PRN
OUTPATIENT
Start: 2023-02-13

## 2023-02-13 RX ORDER — SODIUM CHLORIDE 9 MG/ML
INJECTION, SOLUTION INTRAVENOUS CONTINUOUS
OUTPATIENT
Start: 2023-02-13

## 2023-02-13 RX ORDER — SODIUM CHLORIDE 0.9 % (FLUSH) 0.9 %
5-40 SYRINGE (ML) INJECTION PRN
Status: CANCELLED | OUTPATIENT
Start: 2023-02-13

## 2023-02-13 RX ORDER — MEPERIDINE HYDROCHLORIDE 50 MG/ML
12.5 INJECTION INTRAMUSCULAR; INTRAVENOUS; SUBCUTANEOUS PRN
OUTPATIENT
Start: 2023-02-13

## 2023-02-13 RX ORDER — ONDANSETRON 2 MG/ML
8 INJECTION INTRAMUSCULAR; INTRAVENOUS
OUTPATIENT
Start: 2023-02-13

## 2023-02-13 RX ORDER — DIPHENHYDRAMINE HYDROCHLORIDE 50 MG/ML
50 INJECTION INTRAMUSCULAR; INTRAVENOUS
OUTPATIENT
Start: 2023-02-13

## 2023-02-13 RX ORDER — SODIUM CHLORIDE 9 MG/ML
5-250 INJECTION, SOLUTION INTRAVENOUS PRN
OUTPATIENT
Start: 2023-02-13

## 2023-02-13 RX ORDER — SODIUM CHLORIDE 0.9 % (FLUSH) 0.9 %
10 SYRINGE (ML) INJECTION PRN
Status: DISCONTINUED | OUTPATIENT
Start: 2023-02-13 | End: 2023-02-14 | Stop reason: HOSPADM

## 2023-02-13 RX ORDER — EPINEPHRINE 1 MG/ML
0.3 INJECTION, SOLUTION, CONCENTRATE INTRAVENOUS PRN
OUTPATIENT
Start: 2023-02-13

## 2023-02-13 RX ORDER — SODIUM CHLORIDE 9 MG/ML
5-250 INJECTION, SOLUTION INTRAVENOUS PRN
Status: DISCONTINUED | OUTPATIENT
Start: 2023-02-13 | End: 2023-02-14 | Stop reason: HOSPADM

## 2023-02-13 RX ORDER — FAMOTIDINE 10 MG/ML
20 INJECTION, SOLUTION INTRAVENOUS
OUTPATIENT
Start: 2023-02-13

## 2023-02-13 RX ORDER — ACETAMINOPHEN 325 MG/1
650 TABLET ORAL
OUTPATIENT
Start: 2023-02-13

## 2023-02-13 RX ADMIN — SODIUM CHLORIDE 25 ML/HR: 9 INJECTION, SOLUTION INTRAVENOUS at 12:36

## 2023-02-13 RX ADMIN — SODIUM CHLORIDE, PRESERVATIVE FREE 10 ML: 5 INJECTION INTRAVENOUS at 10:35

## 2023-02-13 RX ADMIN — SODIUM CHLORIDE, PRESERVATIVE FREE 10 ML: 5 INJECTION INTRAVENOUS at 13:10

## 2023-02-13 RX ADMIN — SODIUM CHLORIDE 200 MG: 9 INJECTION, SOLUTION INTRAVENOUS at 12:36

## 2023-02-13 ASSESSMENT — PATIENT HEALTH QUESTIONNAIRE - PHQ9
2. FEELING DOWN, DEPRESSED OR HOPELESS: 0
SUM OF ALL RESPONSES TO PHQ QUESTIONS 1-9: 0

## 2023-02-13 NOTE — PROGRESS NOTES
[facility] Hematology & Oncology: Office Visit Progress Note    Chief Complaint:    Triple negative left breast cancer    History of Present Illness:  Reason for Referral: Carcinoma of upper-inner  quadrant of left breast in female, estrogen receptor negative; Triple negative malignant neoplasm of breast       Referring Provider: Blaise Awad MD       Primary Care Provider: Karl Muse MD       Family History of Cancer/Hematologic Disorders: None reported       Presenting Symptoms: Abnormal routine screening mammogram        Ms. Nails is a 57 y.o.  female with a history of heart murmur, HTN, mixed HLD, and irregular menses, who was recently diagnosed with breast  cancer. She initially presented on 11/22/21 for a routine bilateral digital screening mammogram which identified focal asymmetry in the medial inferior left breast at 6 cm from the nipple. Further evaluation with diagnostic left breast mammogram and left  breast ultrasound were completed on 12/6/21 confirming the presence of adjacent left breast masses with one at the 9:00 position, 6 cm from the nipple measuring approximately 1.5 x 0.8 x 0.8 cm and a 7 mm hypoechoic mass just adjacent to this.        Recommended ultrasound-guided biopsy of the medial left breast mass was completed on 12/23/21 with pathology from the core biopsy of the left breast, 9:00 position, 6 cm from nipple, revealing ER 0.9%/NJ 0.4%/HER-2 (0) negative, intermediate grade (moderately  differentiated), foci of at least microinvasive infiltrating ductal carcinoma in association with focally high grade ductal carcinoma in situ with no definite lymphovascular invasion identified.        MRI of the bilateral breasts was performed on 12/28/21 demonstrating left medial breast cancer measuring about 4.5 cm in greatest dimension; no additional suspicious finding elsewhere in either breast; and no obvious lymphadenopathy.       Patient was referred to surgery and evaluated in  consultation by Surgeon, Dr. Meño Giang, on 1/10/22. She was assessed with signs and symptoms consistent with cT2N0 triple negative left breast IDC with DCIS. Treatment options were discussed, and  Dr. Judy Bella recommended genetic testing and preoperative evaluation by medical oncology to consider neoadjuvant chemotherapy. Dr. Judy Bella noted, \"We discussed the strong possibility that she could require left mastectomy/sentinel node should we  proceed with surgery upfront. At this time the enhancement on MRI looks too large, diffuse, and elongated to perform breast preservation surgery and expected good cosmetic result. If she has neoadjuvant treatment I will see her back in about 4 months  to regroup. \"       Patient is now referred to [facility] for oncology evaluation and consideration for neoadjuvant chemotherapy. Patient completed genetic counseling on 1/13/22. She agreed to pursue testing with BRCAPlus panel of 8 genes associated with hereditary cancer  (including BRCA1 and BRCA2 ). BILATERAL DIGITAL SCREENING MAMMOGRAM  11/22/2021   FINDINGS: There are scattered fibroglandular densities. A few typically benign-appearing calcifications in the left breast are similar in  appearance to the previous study. There is a focal asymmetry in the medial inferior left breast located 6 cm from the nipple. Recommend spot compression magnification views on the direct ML view and ultrasound, if indicated for further evaluation. There  is otherwise no suspicious mass, calcification, or architectural distortion in either breast.      IMPRESSION: Focal asymmetry in the medial inferior left breast at 6 cm from the nipple. for which further evaluation is recommended with ML  and compression magnification views, with possible ultrasound if indicated. BI-RADS Assessment Category 0: Incomplete: Needs additional imaging evaluation.        DIAGNOSTIC MAMMOGRAM LEFT BREAST, LEFT BREAST ULTRASOUND 12/6/21   FINDING: The area of concern does not persist on additional imaging. There is no architectural distortion. The area of concern persists with  additional imaging. This resides within the medial left breast at approximately 9:00 position. There are few associated calcifications which appear somewhat coarse. This measures approximately 1.3 cm in size. An additional smaller asymmetry also at the  9:00 position resides 7 cm from the nipple. A left breast ultrasound is recommended for further evaluation. LEFT BREAST ULTRASOUND: At the 9:00 position, 6 cm from the nipple is a lobulated hypoechoic but mildly heterogeneous appearing mass measuring approximately 1.5 x 0.8 x 0.8 cm. The margins are occasionally ill-defined with some components demonstrating  posterior acoustic shadowing. The long axis is parallel to the plane of the chest wall. There is a 7 mm hypoechoic mass just adjacent to this This is hypoechoic with a lobulated contour. The margins are occasionally ill-defined. IMPRESSION: Adjacent left breast masses. Recommend tissue sampling of the larger lesion. BI-RADS Assessment Category 4: Suspicious Finding- Biopsy should be considered. [facility] SURGICAL PATHOLOG[facility] REPORT 12/23/21   DIAGNOSIS    A: \" LEFT BREAST, 9:00 POSITION, 6 CM FROM NIPPLE, CORE BIOPSY\": FOCI OF AT LEAST MICROINVASIVE INFILTRATING DUCTAL CARCINOMA, INTERMEDIATE GRADE (MODERATELY DIFFERENTIATED) IN ASSOCIATION WITH FOCALLY HIGH GRADE DUCTAL CARCINOMA IN SITU.  DEFINITE LYMPHOVASCULAR  INVASION IS NOT IDENTIFIED   [facility]- ER/W[place]/[id number] BY IHC    INTERPRETATION    Self-A-r-T IHC Quantitative Breast Panel    TEST NAME:                                      RESULTS:                  INTERNAL CONTROLS:    ESTROGEN RECEPTOR:                 Negative (0.9%)          Present, Positive    PROGESTERONE RECEPTOR:       Negative (0.4%)          Present, Positive    HER-2/CHAR:                                        Negative (0)                Percentage of Cells with Uniform Intense Complete Membrane    Stainin%   STF-IMMUNOHISTOCHEMISTRY   Immunohistochemical Stain Panel:    INTERPRETATION: Immunohistochemical findings consistent with breast primary. ANTIBODY/TEST       MARKER FOR                                               RESULT    Cytokeratin 7               Lung, breast, upper GE, serous ovary           Positive    Cytokeratin 20             Colon, mucinous ovary, urothelium                Negative    GATA3                        Urothelial, breast carcinoma                           Positive    GCDFP                       Breast, salivary gland, skin, adnexa               Positive    Mammaglobin                         Breast                                                              Positive       MRI OF THE BREASTS WITH AND WITHOUT CONTRAST 21   FINDINGS: The breasts demonstrate moderate glandularity and mild background enhancement. Left 9:00 biopsy clip within a lobulated heterogeneously  enhancing mass with angulated and irregular margins overall measuring about 1.8 x 0.7 x 1.2 cm. Posterior to this (about 1 cm away) the smaller mass measuring 0.9 cm is unchanged from recent mammogram and ultrasound. Additionally there is clumped and  reticular nonmass enhancement extending superiorly by about 2.7 cm and anteriorly by about 1.8 cm, in keeping with the DCIS and calcifications. Overall the expected area of disease measures about 4.5 cm AP by 1.2 cm transverse and is located at 9:00-9:30,  mid to posterior depth. There is no evidence of involvement of skin or chest wall structures. There is no other evidence of suspicious enhancing mass, and no other dominant or unique nonmass enhancement, to suggest additional malignancy in either breast.  There is no evidence of axillary or internal mammary lymphadenopathy.  Elsewhere, limited visualization of the partially included thorax and upper abdomen shows no acute abnormality, with note of a small benign cyst in the left liver. IMPRESSION   1. Left medial breast cancer measures about 4.5 cm in greatest dimension. 2. No additional suspicious finding elsewhere in either breast. No obvious lymphadenopathy. Recommend continued management as directed clinically. BI-RADS Assessment Category 6: Known Biopsy Proven Malignancy       Notes from Referring Provider: \"Age 49 with cT2N0 triple  negative left breast IDC and DCIS Evaluate    Speaks mandarin   She has been referred to genetics also\"       Interim history update in A/P. Review of Systems:      Constitutional  Denies fever or chills. Denies fatigue. Denies anorexia. HEENT   allergic conjunctivitis and sinusitis. Denies trauma, bluring vision, hearing loss, ear pain, nosebleeds, sore throat, neck pain and ear discharge. Skin  Denies lesions or rashes. Lungs  Denies shortness of breath, cough, sputum production or hemoptysis. Cardiovascular  Denies chest pain, palpitations, orthopnea, claudication and leg swelling. Gastrointestinal  Nausea. Denies vomiting. Denies bloody or black stools. Denies abdominal pain.   Denies dysuria, frequency or hesitancy of urination     Neuro  Denies headaches, visual changes or ataxia. Denies dizziness, tingling, tremors, sensory change, speech change, focal weakness and headaches. Hematology  Denies nasal/gum bleeding, denies easy bruise     Endo  Denies heat/cold intolerance, denies diabetes. MSK  Denies back pain, swollen legs, myalgias and falls. Psychiatric/Behavioral   insomnia. Denies depression and substance abuse. The patient is not nervous/anxious.               Allergies   Allergen Reactions    Paclitaxel Angioedema and Swelling    Lisinopril Other (See Comments)     Other reaction(s): Cough     Past Medical History:   Diagnosis Date    Breast cancer ([facility] [address]     History of therapeutic radiation     Hx antineoplastic chemo     Hypertension     Murmur, heart     echo 2016 EF 60-65% ; 1/31/22 states never has had symptoms    Poor historian     difficulty with verifying medication list     Past Surgical History:   Procedure Laterality Date    BREAST BIOPSY Left 09/21/2022    BREAST LESION BIOPSY EXCISION NEEDLE LOCALIZATION/ CHINESE/MANDARIN  performed by Governor Members, MD at [address] Left 09/21/2022    LEFT BREAST BIOPSY SENTINEL NODE DISSECTION.  performed by [place] at [id number] [facility] LUMPECTOMY Left 09/21/2022    BREAST SURGERY      bx of left breast     IR PORT PLACEMENT EQUAL OR GREATER THAN 5 YEARS  2/2/2022    IR PORT PLACEMENT EQUAL OR GREATER THAN 5 YEARS  02/02/2022    IR PORT PLACEMENT EQUAL OR GREATER THAN 5 YEARS 2/2/2022 [facility] RADIOLOGY SPECIALS    US BREAST BIOPSY W LOC DEVICE 1ST LESION LEFT Left 12/23/2021    US BREAST NEEDLE BIOPSY LEFT 12/23/2021 [facility] RADIOLOGY MAMMO     Family History   Problem Relation Age of Onset    Ovarian Cancer Neg Hx     No Known Problems Mother     Breast Cancer Neg Hx     No Known Problems Sister     Other Other         states no family history    Colon Cancer Neg Hx     Hypertension Father     No Known Problems Sister     No Known Problems Brother     Uterine Cancer Neg Hx      Social History     Socioeconomic History    Marital status:      Spouse name: Not on file    Number of children: Not on file    Years of education: Not on file    Highest education level: Not on file   Occupational History    Not on file   Tobacco Use    Smoking status: Never    Smokeless tobacco: Never   Substance and Sexual Activity    Alcohol use: No     Alcohol/week: 0.0 standard drinks    Drug use: No    Sexual activity: Not on file   Other Topics Concern    Not on file   Social History Narrative    Abuse: Feels safe at home, no history of physical abuse, no history of sexual abuse       Social Determinants of Health     Financial Resource Strain: Not on file   Food Insecurity: Not on file   Transportation Needs: Not on file   Physical Activity: Not on file   Stress: Not on file   Social Connections: Not on file   Intimate Partner Violence: Not on file   Housing Stability: Not on file     Current Outpatient Medications   Medication Sig Dispense Refill    levothyroxine (SYNTHROID) 25 MCG tablet Take 1 tablet by mouth daily 90 tablet 1    fluticasone-salmeterol (ADVAIR DISKUS) 250-50 MCG/ACT AEPB diskus inhaler Inhale 1 puff into the lungs 2 times daily (Patient taking differently: Inhale 1 puff into the lungs 2 times daily PRN) 60 each 3    dilTIAZem (CARDIZEM CD) 240 MG extended release capsule Take 1 capsule by mouth daily      lidocaine-prilocaine (EMLA) 2.5-2.5 % cream Apply to port about 45 minutes prior to access 2.5 g 1    spironolactone (ALDACTONE) 25 MG tablet 25 mg      diclofenac (VOLTAREN) 75 MG EC tablet Take 1 tablet by mouth 2 times daily (Patient not taking: Reported on 2/13/2023) 60 tablet 0    triamcinolone (NASACORT ALLERGY 24HR) 55 MCG/ACT nasal inhaler 2 sprays by Each Nostril route daily (Patient not taking: Reported on 2/13/2023) 1 each 1     No current facility-administered medications for this visit. OBJECTIVE:  /82 (Site: Right Upper Arm, Position: Standing)   Pulse 87   Temp 98 °F (36.7 °C)   Resp 14   Ht 5' 0.05\" (1.525 m)   Wt 161 lb 4.8 oz (73.2 kg)   SpO2 98%   BMI 31.45 kg/m²     Physical Exam:      Constitutional:  Oriented to person, place, and time. Well-developed and well-nourished. HEENT:  Normocephalic and atraumatic. Oropharynx is clear and moist.    Conjunctivae and EOM are normal. Pupils are equal, round, and reactive to light. No scleral icterus. Neck supple. No JVD present. No tracheal deviation present. No thyromegaly present. Lymph node  No palpable submandibular, cervical, supraclavicular, axillary and inguinal lymph nodes.      Breasts  Left breast soft, nontender, popular nodular area and 9:00 about 2 cm at baseline, no longer palpable after chemotherapy. Right breast soft, nontender, no mass. Skin   rash. Warm and dry. No bruising noted. No erythema. No pallor. Respiratory  Effort normal and breath sounds normal.  No respiratory distress. No wheezes. No rales. No tenderness. CVS  Normal rate, regular rhythm and normal heart sounds. Exam reveals no gallop, no friction and no rub. No murmur heard. Abdomen  Soft. Bowel sounds are normal. Exhibits no distension. There is no tenderness. There is no rebound and no guarding. Neuro  Normal reflexes. No cranial nerve deficit. Exhibits normal muscle tone, 5 of 5 strength of all extremities. MSK  Normal range of motion in general.  No edema and no tenderness.         Psych  Normal mood, affect, behavior, judgment and thought content        Labs:  Recent Results (from the past 24 hour(s))   CBC with Auto Differential    Collection Time: 02/13/23 10:35 AM   Result Value Ref Range    WBC 4.3 4.3 - 11.1 K/uL    RBC 3.79 (L) 4.05 - 5.2 M/uL    Hemoglobin 11.9 11.7 - 15.4 g/dL    Hematocrit 36.8 35.8 - 46.3 %    MCV 97.1 82.0 - 102.0 FL    MCH 31.4 26.1 - 32.9 PG    MCHC 32.3 31.4 - 35.0 g/dL    RDW 12.9 11.9 - 14.6 %    Platelets 186 667 - 458 K/uL    MPV 8.3 (L) 9.4 - 12.3 FL    nRBC 0.00 0.0 - 0.2 K/uL    Seg Neutrophils 68 43 - 78 %    Lymphocytes 20 13 - 44 %    Monocytes 8 4.0 - 12.0 %    Eosinophils % 3 0.5 - 7.8 %    Basophils 1 0.0 - 2.0 %    Immature Granulocytes 0 0.0 - 5.0 %    Segs Absolute 2.9 1.7 - 8.2 K/UL    Absolute Lymph # 0.8 0.5 - 4.6 K/UL    Absolute Mono # 0.3 0.1 - 1.3 K/UL    Absolute Eos # 0.1 0.0 - 0.8 K/UL    Basophils Absolute 0.0 0.0 - 0.2 K/UL    Absolute Immature Granulocyte 0.0 0.0 - 0.5 K/UL    Differential Type AUTOMATED     Comprehensive Metabolic Panel    Collection Time: 02/13/23 10:35 AM   Result Value Ref Range    Sodium 139 133 - 143 mmol/L    Potassium 4.2 3.5 - 5.1 mmol/L    Chloride 108 101 - 110 mmol/L    CO2 24 21 - 32 mmol/L Anion Gap 7 2 - 11 mmol/L    Glucose 165 (H) 65 - 100 mg/dL    BUN 23 6 - 23 MG/DL    Creatinine 0.90 0.6 - 1.0 MG/DL    Est, Glom Filt Rate >60 >60 ml/min/1.73m2    Calcium 9.6 8.3 - 10.4 MG/DL    Total Bilirubin 0.3 0.2 - 1.1 MG/DL    ALT 34 12 - 65 U/L    AST 21 15 - 37 U/L    Alk Phosphatase 116 50 - 136 U/L    Total Protein 7.9 6.3 - 8.2 g/dL    Albumin 3.8 3.5 - 5.0 g/dL    Globulin 4.1 2.8 - 4.5 g/dL    Albumin/Globulin Ratio 0.9 0.4 - 1.6           Imaging:  No results found for this or any previous visit. ASSESSMENT/PLAN:   Diagnosis Orders   1. Triple negative malignant neoplasm of breast (HCC)  CBC with Auto Differential    Comprehensive Metabolic Panel    TSH    IR REMOVE TUNNELED CVAD W SQ PORT/PUMP INSERT      2. Hypothyroidism due to medication  levothyroxine (SYNTHROID) 25 MCG tablet    CBC with Auto Differential    Comprehensive Metabolic Panel    TSH      3. Cough, unspecified type        4. High risk medication use              [unfilled]    56 y.o. F consulted for triple negative left breast cancer and presented to [facility] with  and daughter on 1/19/2022.    She has good baseline health and functions well, and annual screening mammogram showed left breast lesion, biopsy showed triple negative grade 2 IDC, cT2 N0 M0, and we discussed the high risks profile for triple negative breast cancer and the most recent  FDA approval of neoadjuvant chemoimmunotherapy, she appeared a good candidate and agreed to arrange Freada Case for 4 cycles followed by Adriamycin Cytoxan Keytruda for 4 cycles, followed by surgery and adjuvant Keytruda for total systemic therapy  of 1 year, discussed toxicity and management, discussed logistics, arrange Zofran Compazine Ativan, baseline echocardiogram showed normal EF, hypertension better but still uncontrolled after adding spironolactone and educated risk of dehydration and hypotension  on chemotherapy when she is taking double diuretics, started cycle 1 2/7/22 and left breast tumor responded rapidly, lost 20 pounds after cycle 1 and BP much better controlled even after she runs out of diltiazem, again warned of monitoring dehydration/hypotension,  discussed increased protein/calorie intake, may add stimulant if continues to lose weight, occasional mild tingling in fingertips, episodes of constipation responded to laxative, proceed to cycle 1 day 8 carbotaxol Keytruda, educated to monitor for neuropathy,  follow next week but call as needed.       However after cycle 1 day 8 Taxol on 2/14/22 and developed acute onset of itching, rash, anasarca, short of breath, fever, hypotension, diarrhea, admitted with antibiotics, antihistamine,  IV fluid, most symptom much improved the second day although still lingering, discussed that the picture of allergic reaction but thorough discussion not able to identify new food or medicine, not typical for Taxol or Keytruda reaction given the timing  and the acuity, and she reported a similar episode a few years ago that she went to ER for treatment, but never followed up for allergen test and never had EpiPen, discussed the concern of allergy and will arrange allergist work-up, however proceeded  with day 15 Taxol with close observation to rule out atypical Taxol allergy, patient and family understand the risk and agreed to proceed, and reported reasonable tolerance except for mild itching through the infusion, however overnight RN was concerned  of tachycardia and tachypneic which patient reported similar to each infusion but received IV Benadryl and Solu-Medrol, LFT elevated for a day, give albumin with lasix and anasarca improved, DC with Benadryl/prednisone/EpiPen as needed, followed  on 2/28/2022, swelling resolved, mild rash of hands and arms, discussed to change Taxol to Taxotere for better management of reaction, return on 3/7/2022 reported much better tolerance for the allergic reaction, however more nausea/vomiting/weight loss  and neutropenic fever with temperature 101 and ANC 0.2, coughing and rule out Covid, typical seasonal allergic sinusitis symptoms and educated to start using Nasacort spray, admit to hospital for neutropenic fever, discharge 3/10/2022, had skin rash that  was attributed to cefepime and resolved, again discussed her sensitivity to multiple allergens and different manifestations need to be managed accordingly, Zaditor for allergic conjunctivitis, nasal steroid for sinusitis, Kenalog for skin rash, oral antihistamine,  prednisone 20 mg only as needed, added G-CSF since cycle 3, better controlled for allergy/hypertension/fluid retention, severe diarrhea responded to PRBC, severe thrombocytopenia recovered after delaying cycle 4 by a week, completed cycle 4 with G-CSF  and Emend.    She returned on 5/9/2022 to start AC/Keytruda, however platelet was low and chemo had to be deferred for 2 weeks actually and platelets finally recovered to 92 on 5/23/2022, discussed to proceed with caution and reduce AC dose by 20%, which she tolerated reasonably well and completed 4 cycles of AC/Keytruda without major complication, repeat MRI showed cCR and Dr. Yuli Whitaker performed left lumpectomy and axillary SLND on 9/21/2022, pathology showed ypTis N0 triple negative left breast carcinoma, discussed that the complete response of the invasive triple negative cancer is a favorable prognostic marker, continued adjuvant treatment, complaining of left shoulder pain since the surgery and consult [facility] for physical therapy, still hurting after physical therapy and orthopedic evaluation was recommended, considered tendinitis and responded to diclofenac, return on 2/13/2023, labs reviewed and proceed to cycle 17 adjuvant Keytruda, discussed all therapy is completed, continue with surveillance and risk reduction lifestyle, refill Synthroid 25 mcg daily and return in 6 months, next mammogram due in 12/2023, Advair/Nasacort for coughing control, call as needed. All questions are answered to their satisfaction. They will call for further questions and concerns. ECOG PERFORMANCE STATUS - 1    Pain - 2/10. Fatigue - No flowsheet data found. Distress - No flowsheet data found. Elements of this note have been dictated via voice recognition software. Text and phrases may be limited by the accuracy and autoconversion of the software. The chart has been reviewed, but errors may still be present. Sarah Porter M.D.   [address]  Office : [phone]  Fax : [phone]

## 2023-02-13 NOTE — PROGRESS NOTES
Arrived to the [facility]. Keytruda completed. Patient tolerated well. Any issues or concerns during appointment: none. Patient instructed to call provider with temperature of 100.4 or greater or nausea/vomiting/ diarrhea or pain not controlled by medications  Discharged ambulatory with family.

## 2023-02-13 NOTE — PATIENT INSTRUCTIONS
Patient Instructions from Today's Visit    Reason for Visit:  Follow up breast    Diagnosis Information:  [email or web address]       Plan:  Last treatment today  Synthroid refills sent for the next 6 months  Port removal, call [phone] in one week if they don't call first to schedule     Follow Up:  6 months    Recent Lab Results:  Hospital Outpatient Visit on 02/13/2023   Component Date Value Ref Range Status    WBC 02/13/2023 4.3  4.3 - 11.1 K/uL Final    RBC 02/13/2023 3.79 (A)  4.05 - 5.2 M/uL Final    Hemoglobin 02/13/2023 11.9  11.7 - 15.4 g/dL Final    Hematocrit 02/13/2023 36.8  35.8 - 46.3 % Final    MCV 02/13/2023 97.1  82.0 - 102.0 FL Final    MCH 02/13/2023 31.4  26.1 - 32.9 PG Final    MCHC 02/13/2023 32.3  31.4 - 35.0 g/dL Final    RDW 02/13/2023 12.9  11.9 - 14.6 % Final    Platelets 03/87/1749 170  150 - 450 K/uL Final    MPV 02/13/2023 8.3 (A)  9.4 - 12.3 FL Final    nRBC 02/13/2023 0.00  0.0 - 0.2 K/uL Final    **Note: Absolute NRBC parameter is now reported with Hemogram**    Seg Neutrophils 02/13/2023 68  43 - 78 % Final    Lymphocytes 02/13/2023 20  13 - 44 % Final    Monocytes 02/13/2023 8  4.0 - 12.0 % Final    Eosinophils % 02/13/2023 3  0.5 - 7.8 % Final    Basophils 02/13/2023 1  0.0 - 2.0 % Final    Immature Granulocytes 02/13/2023 0  0.0 - 5.0 % Final    Segs Absolute 02/13/2023 2.9  1.7 - 8.2 K/UL Final    Absolute Lymph # 02/13/2023 0.8  0.5 - 4.6 K/UL Final    Absolute Mono # 02/13/2023 0.3  0.1 - 1.3 K/UL Final    Absolute Eos # 02/13/2023 0.1  0.0 - 0.8 K/UL Final    Basophils Absolute 02/13/2023 0.0  0.0 - 0.2 K/UL Final    Absolute Immature Granulocyte 02/13/2023 0.0  0.0 - 0.5 K/UL Final    Differential Type 02/13/2023 AUTOMATED    Final         Treatment Summary has been discussed and given to patient: n/a        -------------------------------------------------------------------------------------------------------------------  Please call our office at [phone] if you have any  of the following symptoms:   Fever of 100.5 or greater  Chills  Shortness of breath  Swelling or pain in one leg    After office hours an answering service is available and will contact a provider for emergencies or if you are experiencing any of the above symptoms. Patient does express an interest in My Chart. My Chart log in information explained on the after visit summary printout at the . [facility] [address] desk.     Yovana Newell RN

## 2023-02-14 ENCOUNTER — HOSPITAL ENCOUNTER (OUTPATIENT)
Dept: PHYSICAL THERAPY | Age: 58
Setting detail: RECURRING SERIES
Discharge: HOME | End: 2023-02-17
Payer: COMMERCIAL

## 2023-02-14 PROCEDURE — 97110 THERAPEUTIC EXERCISES: CPT

## 2023-02-14 ASSESSMENT — PAIN SCALES - GENERAL: PAINLEVEL_OUTOF10: 2

## 2023-02-14 NOTE — PROGRESS NOTES
Ivett Arana  : 1965  Primary: Kacey Antony ([facility])  Secondary: [place] COPAY PATIENT ASSISTANCE O [facility]  [address]  [address]  [place] [address]  Phone: [phone]  Fax: [phone] Plan Frequency: 2 x week for 6 weeks    Plan of Care/Certification Expiration Date: 23      PT Visit Info:  Plan Frequency: 2 x week for 6 weeks  Plan of Care/Certification Expiration Date: 23  Total # of Visits to Date: 4      Visit Count:  6    OUTPATIENT PHYSICAL THERAPY:OP NOTE TYPE: Treatment Note 2023       Episode  }Appt Desk             Treatment Diagnosis:  Pain in Left Shoulder (M25.512)  Stiffness of Left Shoulder, Not elsewhere classified (M25.612)  Generalized Muscle Weakness (M62.81)  Medical/Referring Diagnosis:  Left shoulder pain, unspecified chronicity [M25.512]  Impingement syndrome of left shoulder [M75.42]  Referring Physician:  Errol Leyva MD MD Orders:  PT Eval and Treat   Date of Onset:  Onset Date: 22     Allergies:   Paclitaxel and Lisinopril  Restrictions/Precautions:  Restrictions/Precautions: None  No data recorded     Interventions Planned (Treatment may consist of any combination of the following):    Current Treatment Recommendations: Strengthening; ROM; Manual Therapy - Soft Tissue Mobilization; Home exercise program     Subjective Comments:  patient reports soreness comes and go, not related to activity. Stratus [id number]  Initial:}    2/10Post Session:       1/10  Medications Last Reviewed:  2023  Updated Objective Findings:  None Today  Treatment   MANUAL THERAPY: (0 minutes):   Joint mobilization was utilized and necessary because of the patient's restricted joint motion. GHJ mobs for lateral and inferior glides grade 2-3        Thoracic mobs UPA and CPA T4-9  THERAPEUTIC EXERCISE: (45 minutes):    Exercises per grid below to improve mobility and strength. Required moderate visual and verbal cues to promote proper body alignment. Progressed range and complexity of movement as indicated. Date:  2/1/23 Date:  2/7/23 Date:  2/9/23 Date:  2/14/23   Activity/Exercise Parameters Parameters Parameters    Patient education/HEP 8' 3' 3'    Shoulder flex Seated 10x 10x supine with wand  10x AFE supine  10x OTB supine  10x AFE seated  Bilat wall slide 10x 10x supine AFE  10x 2# bar  AFE   Seated flex to 90 1#  Wall slide with lift off 2 x 10  Supine fig 8's 2 x 10 AFE standing 1# 10x  Isometric hold with opp arm raise to 90 1#; B  Serratus activation with OTB at wall w lift off 10x   Scapular retraction 2 x 10   Prone I 10x     Upper trap stretch 3 x 20 secs      Posture education 3' 2'     Pendulums  20x     PROM  Flex, ER, abd x 6' Flex, ER, abd x 6'    Shoulder ER  Bilat supine 2 x 10 OTB Bilat supine and seated 10x OTB Bilat supine and seated 10x OTB  10x RTB   Shoulder IR  10x hand behind back  2 x 10 RTB    Rows  2 x 10 OTB  2 x 10 GTB   Low rows  2 x 10 OTB  2 x 10 RTB   Wall push up  2 x 10x 2 x 10  2 x 10   UBE  4' level 1 1/1 6' level 1 1/1    Prone I, W,     2 x 10 1#   prone T    2 x 10   Shoulder    Isometric hold with opp arm raise to 90 1#; B     [employer] Portal Access Code: [email or web address]  URL: [email or web address]  Date: 02/09/2023  Prepared by: Shima Coupe    Exercises  Seated Shoulder Flexion Full Range - 1-2 x daily - 7 x weekly - 1 sets - 10 reps - 2 hold  Seated Scapular Retraction - 1-2 x daily - 7 x weekly - 2 sets - 10 reps  Seated Gentle Upper Trapezius Stretch - 1 x daily - 7 x weekly - 3 reps - 20 hold  Standing Row with Anchored Resistance - 1 x daily - 7 x weekly - 2 sets - 10 reps  Shoulder extension with resistance - Neutral - 1 x daily - 7 x weekly - 10 reps  Circular Shoulder Pendulum with Table Support - 3 x daily - 7 x weekly - 3 sets - 10 reps  Tricep Push Up on Wall - 1 x daily - 7 x weekly - 2 sets - 10 reps  Shoulder Flexion Wall Slide with Towel - 1 x daily - 7 x weekly - 1 sets - 10 reps    Treatment/Session Summary:    Treatment Assessment:  Tolerated treatment very well good ROM noted in all planes  Communication/Consultation:   HEP   Equipment provided today:  HEP  Recommendations/Intent for next treatment session: Next visit will focus on manual therapy, therapeutic exercise for rotator cuff and scapular strengthening, posture education, modalities as needed.     Total Treatment Billable Duration:  45 minutes (45 minutes TE,)  Time In: 0930  Time Out: 8502    Lopez Montes, PT       Charge Capture  }Post Session Pain  PT Visit Info  MedBridge Portal  MD Guidelines  Scanned Media  Benefits  MyChart    Future Appointments   Date Time Provider Yue Cid   2/16/2023  9:30 AM Delio Lundberg, PT [facility]   2/20/2023  1:15 PM NIGEL Flaherty - CNP [facility]   2/21/2023  9:30 AM Lopez Montes, PT [facility]   2/21/2023  2:00 PM S[facility] IR UNIT 1 SFDRSP [facility]   2/23/2023  9:30 AM Lopez Montes, PT [facility]   8/14/2023 10:20 AM [facility]   8/14/2023 10:45 AM Pepe Gomez MD [facility]   1/11/2024  9:30 AM Eboni Hannah  [address]

## 2023-02-16 ENCOUNTER — HOSPITAL ENCOUNTER (OUTPATIENT)
Dept: PHYSICAL THERAPY | Age: 58
Setting detail: RECURRING SERIES
Discharge: HOME | End: 2023-02-19
Payer: COMMERCIAL

## 2023-02-16 PROCEDURE — 97110 THERAPEUTIC EXERCISES: CPT

## 2023-02-16 PROCEDURE — 97140 MANUAL THERAPY 1/> REGIONS: CPT

## 2023-02-16 ASSESSMENT — PAIN SCALES - GENERAL: PAINLEVEL_OUTOF10: 2

## 2023-02-16 NOTE — PROGRESS NOTES
Ninosak Rausch  : 1965  Primary: Reyes Antony ([facility])  Secondary: [place] COPAY PATIENT ASSISTANCE O [facility]  [address]  [address]  [facility] [address]  Phone: [phone]  Fax: [phone] Plan Frequency: 2 x week for 6 weeks    Plan of Care/Certification Expiration Date: 23      PT Visit Info:  Plan Frequency: 2 x week for 6 weeks  Plan of Care/Certification Expiration Date: 23  Total # of Visits to Date: 4      Visit Count:  4    OUTPATIENT PHYSICAL THERAPY:OP NOTE TYPE: Treatment Note 2023       Episode  }Appt Desk             Treatment Diagnosis:  Pain in Left Shoulder (M25.512)  Stiffness of Left Shoulder, Not elsewhere classified (M25.612)  Generalized Muscle Weakness (M62.81)  Medical/Referring Diagnosis:  Left shoulder pain, unspecified chronicity [M25.512]  Impingement syndrome of left shoulder [M75.42]  Referring Physician:  Gladis Lofton MD MD Orders:  PT Eval and Treat   Date of Onset:  Onset Date: 22     Allergies:   Paclitaxel and Lisinopril  Restrictions/Precautions:  Restrictions/Precautions: None  No data recorded     Interventions Planned (Treatment may consist of any combination of the following):    Current Treatment Recommendations: Strengthening; ROM; Manual Therapy - Soft Tissue Mobilization; Home exercise program     Subjective Comments:  Reports 50% improvement overall,  Jeromeus mjreter [id number] utilized  Initial:}    2/10Post Session:       3/10  Medications Last Reviewed:  2023  Updated Objective Findings:  None Today  Treatment   MANUAL THERAPY: (10 minutes): Soft tissue mobilization was utilized and necessary because of the patient's restricted motion of soft tissue. es grade 2-3        Thoracic mobs UPA and CPA T4-9  THERAPEUTIC EXERCISE: (35 minutes):    Exercises per grid below to improve mobility and strength. Required moderate visual and verbal cues to promote proper body alignment.   Progressed range and complexity of movement as indicated. Date:  2/1/23 Date:  2/7/23 Date:  2/9/23 Date:  2/14/23 Date:  2/16/23   Activity/Exercise Parameters Parameters Parameters     Patient education/HEP 8' 3' 3'     Shoulder flex Seated 10x 10x supine with wand  10x AFE supine  10x OTB supine  10x AFE seated  Bilat wall slide 10x 10x supine AFE  10x 2# bar  AFE   Seated flex to 90 1#  Wall slide with lift off 2 x 10  Supine fig 8's 2 x 10 AFE standing 1# 10x  Isometric hold with opp arm raise to 90 1#; B  Serratus activation with OTB at wall w lift off 10x AFE supine 1# 10x  Isometric hold with opp arm raise to 90 1#; B  Serratus activation with OTB at wall w lift off 10x   Scapular retraction 2 x 10   Prone I 10x      Upper trap stretch 3 x 20 secs       Posture education 3' 2'      Pendulums  20x      PROM  Flex, ER, abd x 6' Flex, ER, abd x 6'     Shoulder ER  Bilat supine 2 x 10 OTB Bilat supine and seated 10x OTB Bilat supine and seated 10x OTB  10x RTB Bilat supine  Sidleying 1# 10x   Shoulder IR  10x hand behind back  2 x 10 RTB     Rows  2 x 10 OTB  2 x 10 GTB 20x GTB   Low rows  2 x 10 OTB  2 x 10 RTB    Wall push up  2 x 10x 2 x 10  2 x 10    UBE  4' level 1 1/1 6' level 1 1/1  8' level 1 1/1   Prone I, W,     2 x 10 1#    prone T    2 x 10    Shoulder abd    Isometric hold with opp arm raise to 90 1#; B Isometric hold with opp arm raise to 90 1#; B   Bicep curls     Isometric hold on opp side 10x; B     [employer] Portal Access Code: [email or web address]  URL: [email or web address]  Date: 02/09/2023  Prepared by: Farhat Callaway    Exercises  Seated Shoulder Flexion Full Range - 1-2 x daily - 7 x weekly - 1 sets - 10 reps - 2 hold  Seated Scapular Retraction - 1-2 x daily - 7 x weekly - 2 sets - 10 reps  Seated Gentle Upper Trapezius Stretch - 1 x daily - 7 x weekly - 3 reps - 20 hold  Standing Row with Anchored Resistance - 1 x daily - 7 x weekly - 2 sets - 10 reps  Shoulder extension with resistance - Neutral - 1 x daily - 7 x weekly - 10 reps  Circular Shoulder Pendulum with Table Support - 3 x daily - 7 x weekly - 3 sets - 10 reps  Tricep Push Up on Wall - 1 x daily - 7 x weekly - 2 sets - 10 reps  Shoulder Flexion Wall Slide with Towel - 1 x daily - 7 x weekly - 1 sets - 10 reps    Treatment/Session Summary:    Treatment Assessment:  Tolerated progressive strengthening exercises well. ROM remains WNL and reports continued improvement in her shoulder overall iwth ADL's  Communication/Consultation:   None  Equipment provided today:  None  Recommendations/Intent for next treatment session: Next visit will focus on manual therapy, therapeutic exercise for rotator cuff and scapular strengthening, posture education, modalities as needed.     Total Treatment Billable Duration:  45 minutes (35 minutes TE, 10 minutes manual)  Time In: 0930  Time Out: 1283    Lopez Montes, PT       Charge Capture  }Post Session Pain  PT Visit Info  [employer] Portal  MD Guidelines  Scanned Media  Benefits  MyChart    Future Appointments   Date Time Provider hospitals   2/20/2023  1:15 PM Beatrice Mazariegos APRN - CNP [facility]   2/21/2023  9:30 AM Lopez Montes, PT [facility]   2/21/2023  2:00 PM [facility] IR UNIT 1 SFDRSP [facility]   2/23/2023  9:30 AM Lopez Montes, PT [facility]   8/14/2023 10:20 AM [facility]   8/14/2023 10:45 AM Fidelina Levine MD [facility] AM[facility]   1/11/2024  9:30 AM Ibis Salas  [address]

## 2023-02-20 ENCOUNTER — OFFICE VISIT (OUTPATIENT)
Dept: ORTHOPEDIC SURGERY | Age: 58
End: 2023-02-20
Payer: COMMERCIAL

## 2023-02-20 DIAGNOSIS — M75.42 IMPINGEMENT SYNDROME OF LEFT SHOULDER: Primary | ICD-10-CM

## 2023-02-20 DIAGNOSIS — M25.512 LEFT SHOULDER PAIN, UNSPECIFIED CHRONICITY: ICD-10-CM

## 2023-02-20 PROCEDURE — 99212 OFFICE O/P EST SF 10 MIN: CPT | Performed by: SPECIALIST/TECHNOLOGIST

## 2023-02-20 NOTE — PROGRESS NOTES
Name: Regulo Chun  YOB: 1965  Gender: female  MRN: [id number]      CC: Follow-up (Left Shoulder)       HPI: Regulo Chun is a 56 y.o. female who returns for follow up on left shoulder pain. She has noticed some improvement in her range of motion and strength. She reports compliance with attending formal physical therapy. Has completed her prescription of diclofenac but does not feel as though it was very helpful so she discontinued it. Physical Examination:  General: no acute distress  Lungs: breathing easily  CV: regular rhythm by pulse  Left Shoulder: No obvious deformity of the biceps. No tenderness to palpation. Active forward flexion to 170 degrees with mild pain in the extreme. External rotation with elbows at side equal bilaterally. External rotation with elbows at side resisted range of motion 5/5 strength. Pain with empty can testing but 5/5 strength empty can position. Positive Brendalyn Challenger, Neer's. Mild pain with [place]'s and speeds. Assessment:   1. Impingement syndrome of left shoulder    2. Left shoulder pain, unspecified chronicity         Plan:   Patient who reports that she is getting improvement in her strength, pain and range of motion with formal physical therapy. She has completed her breast cancer treatment therefore we discussed possible corticosteroid injection for improvement in her pain. She does not wish to proceed with any corticosteroid injection at this time. We will continue formal physical therapy for 6 more weeks and if she is not getting continued improvement then we will consider corticosteroid injection at that time.     Don Leija, APRN - CNP    Orthopaedics and Sports Medicine

## 2023-02-21 ENCOUNTER — HOSPITAL ENCOUNTER (OUTPATIENT)
Dept: INTERVENTIONAL RADIOLOGY/VASCULAR | Age: 58
Discharge: HOME | End: 2023-02-24
Payer: COMMERCIAL

## 2023-02-21 ENCOUNTER — HOSPITAL ENCOUNTER (OUTPATIENT)
Dept: PHYSICAL THERAPY | Age: 58
Setting detail: RECURRING SERIES
Discharge: HOME | End: 2023-02-24
Payer: COMMERCIAL

## 2023-02-21 VITALS
OXYGEN SATURATION: 98 % | TEMPERATURE: 97.2 F | DIASTOLIC BLOOD PRESSURE: 63 MMHG | HEART RATE: 80 BPM | SYSTOLIC BLOOD PRESSURE: 130 MMHG | RESPIRATION RATE: 16 BRPM

## 2023-02-21 DIAGNOSIS — C50.919 TRIPLE NEGATIVE MALIGNANT NEOPLASM OF BREAST (HCC): ICD-10-CM

## 2023-02-21 PROCEDURE — 2500000003 HC RX 250 WO HCPCS: Performed by: PHYSICIAN ASSISTANT

## 2023-02-21 PROCEDURE — 36590 REMOVAL TUNNELED CV CATH: CPT

## 2023-02-21 PROCEDURE — 97110 THERAPEUTIC EXERCISES: CPT

## 2023-02-21 RX ORDER — LIDOCAINE HYDROCHLORIDE AND EPINEPHRINE BITARTRATE 20; .01 MG/ML; MG/ML
INJECTION, SOLUTION SUBCUTANEOUS
Status: COMPLETED | OUTPATIENT
Start: 2023-02-21 | End: 2023-02-21

## 2023-02-21 RX ADMIN — LIDOCAINE HYDROCHLORIDE,EPINEPHRINE BITARTRATE 20 ML: 20; .01 INJECTION, SOLUTION INFILTRATION; PERINEURAL at 16:00

## 2023-02-21 ASSESSMENT — PAIN SCALES - GENERAL: PAINLEVEL_OUTOF10: 1

## 2023-02-21 NOTE — PROGRESS NOTES
Dilip Fleeting  : 1965  Primary: Hansel Antony ([employer])  Secondary: [place] COPAY PATIENT ASSISTANCE O [facility]  [address]  [address]  [facility] [address]  Phone: [phone]  Fax: [phone] Plan Frequency: 2 x week for 6 weeks    Plan of Care/Certification Expiration Date: 23      PT Visit Info:  Plan Frequency: 2 x week for 6 weeks  Plan of Care/Certification Expiration Date: 23  Total # of Visits to Date: 6      Visit Count:  6    OUTPATIENT PHYSICAL THERAPY:OP NOTE TYPE: Treatment Note 2023       Episode  }Appt Desk             Treatment Diagnosis:  Pain in Left Shoulder (M25.512)  Stiffness of Left Shoulder, Not elsewhere classified (M25.612)  Generalized Muscle Weakness (M62.81)  Medical/Referring Diagnosis:  Left shoulder pain, unspecified chronicity [M25.512]  Impingement syndrome of left shoulder [M75.42]  Referring Physician:  Susanna Naranjo MD MD Orders:  PT Eval and Treat   Date of Onset:  Onset Date: 22     Allergies:   Paclitaxel and Lisinopril  Restrictions/Precautions:  Restrictions/Precautions: None  No data recorded     Interventions Planned (Treatment may consist of any combination of the following):    Current Treatment Recommendations: Strengthening; ROM; Manual Therapy - Soft Tissue Mobilization; Home exercise program     Subjective Comments:  Patient reports shoulder continues to feel better, occasional needling pain in in deltoid region. Domingotus barkerreter  [id number]  Initial:}    1/10Post Session:        /10  Medications Last Reviewed:  2023  Updated Objective Findings:  None Today  Treatment   MANUAL THERAPY: (0 minutes): Soft tissue mobilization was utilized and necessary because of the patient's restricted motion of soft tissue. es grade 2-3        Thoracic mobs UPA and CPA T4-9  THERAPEUTIC EXERCISE: (53 minutes):    Exercises per grid below to improve mobility and strength.   Required moderate visual and verbal cues to promote proper body alignment. Progressed range and complexity of movement as indicated. Date:  2/1/23 Date:  2/7/23 Date:  2/9/23 Date:  2/14/23 Date:  2/16/23 Date:  2/21/23   Activity/Exercise Parameters Parameters Parameters      Patient education/HEP 8' 3' 3'   5'   Shoulder flex Seated 10x 10x supine with wand  10x AFE supine  10x OTB supine  10x AFE seated  Bilat wall slide 10x 10x supine AFE  10x 2# bar  AFE   Seated flex to 90 1#  Wall slide with lift off 2 x 10  Supine fig 8's 2 x 10 AFE standing 1# 10x  Isometric hold with opp arm raise to 90 1#; B  Serratus activation with OTB at wall w lift off 10x AFE supine 1# 10x  Isometric hold with opp arm raise to 90 1#; B  Serratus activation with OTB at wall w lift off 10x AFE supine 2# 10x  Isometric hold with opp arm raise to 90 2#; B  Shoulder press 2#, 10x; B   Scapular retraction 2 x 10   Prone I 10x       Upper trap stretch 3 x 20 secs        Posture education 3' 2'       Pendulums  20x       PROM  Flex, ER, abd x 6' Flex, ER, abd x 6'      Shoulder ER  Bilat supine 2 x 10 OTB Bilat supine and seated 10x OTB Bilat supine and seated 10x OTB  10x RTB Bilat supine  Sidleying 1# 10x Bilat 2 x 10 LTB  Sidelying 2 x 10 2#   Shoulder IR  10x hand behind back  2 x 10 RTB      Rows  2 x 10 OTB  2 x 10 GTB 20x GTB    Low rows  2 x 10 OTB  2 x 10 RTB     Wall push up  2 x 10x 2 x 10  2 x 10  Table push up 10x   UBE  4' level 1 1/1 6' level 1 1/1  8' level 1 1/1 8' level 1 2/2   Prone I, W,     2 x 10 1#  2 x 10 I's w 2#  2 x 10 W's 0#   Prone T    2 x 10     Shoulder abd    Isometric hold with opp arm raise to 90 1#; B Isometric hold with opp arm raise to 90 1#; B Isometric hold with opp arm raise to 90 2#; B   Bicep curls     Isometric hold on opp side 10x; B 15x 2#     [employer] Portal Access Code: [email or web address]  URL: [email or web address]  Date: 02/21/2023  Prepared by: Aline Guerrero    Exercises  Seated Shoulder Flexion Full Range - 1-2 x daily - 7 x weekly - 1 sets - 10 reps - 2 hold  Seated Scapular Retraction - 1-2 x daily - 7 x weekly - 2 sets - 10 reps  Seated Gentle Upper Trapezius Stretch - 1 x daily - 7 x weekly - 3 reps - 20 hold  Standing Row with Anchored Resistance - 1 x daily - 7 x weekly - 2 sets - 10 reps  Shoulder extension with resistance - Neutral - 1 x daily - 7 x weekly - 10 reps  Circular Shoulder Pendulum with Table Support - 3 x daily - 7 x weekly - 3 sets - 10 reps  Tricep Push Up on Wall - 1 x daily - 7 x weekly - 2 sets - 10 reps  Shoulder Flexion Wall Slide with Towel - 1 x daily - 7 x weekly - 1 sets - 10 reps  Prone Scapular Slide with Shoulder Extension - 1 x daily - 7 x weekly - 2 sets - 10 reps  Prone W Scapular Retraction - 1 x daily - 7 x weekly - 2 sets - 10 reps  Push-Up on Counter - 1 x daily - 7 x weekly - 1 sets - 10 reps        Treatment/Session Summary:    Treatment Assessment:  Did well with increase in resistance with exercises today. Communication/Consultation:   HEP  Equipment provided today:  HEP  Recommendations/Intent for next treatment session: Next visit will focus on manual therapy, therapeutic exercise for rotator cuff and scapular strengthening, posture education, modalities as needed.     Total Treatment Billable Duration:  53 minutes (53 minutes TE,)  Time In: 0930  Time Out: 2069    Lopez Montes, PT       Charge Capture  }Post Session Pain  PT Visit Info  MedBridge Portal  MD Guidelines  Scanned Media  Benefits  MyChart    Future Appointments   Date Time Provider Yue Cid   3/6/2023  3:15 PM Arsenio Blank PT [facility]   4/24/2023 10:00 AM Geneva Donovan MD [facility] AMB   8/14/2023 10:20 AM [facility]RE[facility]URANCE [facility]   8/14/2023 10:45 AM Debi Herron MD [facility] AM[facility]   1/11/2024  9:30 AM Tai Plummer  [address]

## 2023-02-21 NOTE — OR NURSING
Recovery period without difficulty. Pt alert and oriented and denies pain. Dressing/site is clean, dry, and intact. Reviewed discharge instructions with patient and  with Mandarin  via Stratus, both verbalized understanding. Pt ambulatory; escorted to lobby. Vital signs and Zaira score completed.

## 2023-02-21 NOTE — OR NURSING
TRANSFER - OUT REPORT:           Verbal report given to JOSE Chapman(name) on Mei Nails  being transferred to IR Recovery 3(unit) for routine post-op              Report consisted of patient’s Situation, Background, Assessment and      Recommendations(SBAR).            Information from the following report(s) SBAR and Procedure Summary was reviewed with the receiving nurse.           Opportunity for questions and clarification was provided.         Pt tolerated procedure well.

## 2023-02-21 NOTE — DISCHARGE INSTRUCTIONS
[facility]     Department of Interventional Radiology     [facility] Radiology     [phone] Office     [phone] Fax         DRAIN/PORT/CATHETER REMOVAL DISCHARGE INSTRUCTIONS           General Information:        Your doctor has ordered for us to remove your drain, port, or catheter. This could be that you do not need it anymore, it is not doing its job, your physician has decided on another plan for your treatment and/or it may need replacing. Home Care Instructions: You can resume your regular diet and medication regimen. Do not drink alcohol, drive, or make any important legal decisions in the next 24 hours. Do not lift anything heavier than a gallon of milk, or do anything strenuous for the next 24 hours. You will notice a dressing over the site of the removal. This dressing should stay in place until the site is healed. The dressing should be changed at least every 48 hours. You should change the dressing sooner if it becomes soiled or wet. The nurse who discharges you to home should review with you any wound care instructions. Resume your normal level of activity slowly. You may shower after 24 hours, but do not take a bath or swim or immerse yourself in water until the site has healed, and keep the dressing dry with plastic wrap while showering. The site may ooze for a couple of days. This drainage should lessen with each passing day. Call If:        You should call your Physician and/or the Radiology Nurse if you have any bleeding other than a small spot on your bandage. Call if you have any signs of infection, fever, or increased pain at the site of the tube. Call if the oozing increases, if it changes color, or begins to have an odor. Follow-Up Instructions: Please see your ordering doctor as he/she has requested. To Reach Us:                   Patient Signature:     Date:     Discharging Nurse:       Kalyn Marino [address]     Department of Interventional Radiology     [facility] Radiology     [phone] Office     [phone] Fax         DRAIN/PORT/CATHETER REMOVAL DISCHARGE INSTRUCTIONS           General Information:        Your doctor has ordered for us to remove your drain, port, or catheter. This could be that you do not need it anymore, it is not doing its job, your physician has decided on another plan for your treatment and/or it may need replacing. Home Care Instructions: You can resume your regular diet and medication regimen. Do not drink alcohol, drive, or make any important legal decisions in the next 24 hours. Do not lift anything heavier than a gallon of milk, or do anything strenuous for the next 24 hours. You will notice a dressing over the site of the removal. This dressing should stay in place until the site is healed. The dressing should be changed at least every 48 hours. You should change the dressing sooner if it becomes soiled or wet. The nurse who discharges you to home should review with you any wound care instructions. Resume your normal level of activity slowly. You may shower after 24 hours, but do not take a bath or swim or immerse yourself in water until the site has healed, and keep the dressing dry with plastic wrap while showering. The site may ooze for a couple of days. This drainage should lessen with each passing day. Call If:        You should call your Physician and/or the Radiology Nurse if you have any bleeding other than a small spot on your bandage. Call if you have any signs of infection, fever, or increased pain at the site of the tube. Call if the oozing increases, if it changes color, or begins to have an odor. Follow-Up Instructions: Please see your ordering doctor as he/she has requested. To Reach Us:    If you have any questions about your procedure, please call the Interventional Radiology department at [phone] or [phone]. After business hours (5pm) and weekends, call the answering service at [phone] and ask for the Radiologist on call to be paged. [address] ??    ?????    ??????????    [phone] ???    [phone] ??        ??/??/????????          ?????        ??????????????????????? ??????????????????????????????????????/?????????            ???????        ?????????????????? ????? 24 ??????????????????????? ????? 24 ???????????????????????????????? ???????????????? ?????????????????? ?????? 48 ??????? ???????????????????? ????????????????????????? ????????????? 24????????????????????????????????????????? ??????????? ?????????????????          ?????        ?????????????????????????????/??????? ???????????????????????????? ????????????????????          ?????????/?????????????          ?????                ?????    ???    ?????     [address] ??    ?????    ??????????    [phone] ???    [phone] ??        ??/??/????????          ?????        ??????????????????????? ??????????????????????????????????????/?????????            ???????        ?????????????????? ????? 24 ??????????????????????? ????? 24 ???????????????????????????????? ???????????????? ?????????????????? ?????? 48 ??????? ???????????????????? ????????????????????????? ????????????? 24????????????????????????????????????????? ??????????? ?????????????????          ?????        ?????????????????????????????/??????? ???????????????????????????? ????????????????????          ?????????/?????????????          ?????????????????????????????? [phone] ? [phone]?  ???????? 5 ??????????? [phone] ?????????????????????

## 2023-02-23 ENCOUNTER — APPOINTMENT (OUTPATIENT)
Dept: PHYSICAL THERAPY | Age: 58
End: 2023-02-23
Payer: COMMERCIAL

## 2023-03-06 ENCOUNTER — HOSPITAL ENCOUNTER (OUTPATIENT)
Dept: PHYSICAL THERAPY | Age: 58
Setting detail: RECURRING SERIES
Discharge: HOME | End: 2023-03-09
Payer: COMMERCIAL

## 2023-03-06 PROCEDURE — 97110 THERAPEUTIC EXERCISES: CPT

## 2023-03-06 ASSESSMENT — PAIN SCALES - GENERAL: PAINLEVEL_OUTOF10: 2

## 2023-03-06 NOTE — PROGRESS NOTES
Jose Le  : 1965  Primary: Shelley Going Sc ([facility])  Secondary: [place] COPAY PATIENT ASSISTANCE O [facility]  [address]  [address]  [facility] [address]  Phone: [phone]  Fax: [phone] Plan Frequency: 2 x week for 6 weeks    Plan of Care/Certification Expiration Date: 23      PT Visit Info:  Plan Frequency: 2 x week for 6 weeks  Plan of Care/Certification Expiration Date: 23  Total # of Visits to Date: 7      Visit Count:  7                OUTPATIENT PHYSICAL THERAPY:             OP NOTE TYPE: Progress Report 3/6/2023               Episode (Left shoulder pain) Appt Desk         Treatment Diagnosis:  Pain in Left Shoulder (M25.512)  Stiffness of Left Shoulder, Not elsewhere classified (M25.612)  Generalized Muscle Weakness (M62.81)  Medical/Referring Diagnosis:  Left shoulder pain, unspecified chronicity [M25.512]  Impingement syndrome of left shoulder [M75.42]  Referring Physician:  Davida Antoine MD MD Orders:  PT Eval and Treat   Return MD Appt:  23  Date of Onset:  Onset Date: 22      Allergies:  Paclitaxel and Lisinopril  Restrictions/Precautions:    Restrictions/Precautions: None        Medications Last Reviewed:  3/6/2023     SUBJECTIVE   As of 3/6/2023, Jose Le has attended 7 out of 0 scheduled visits, with 0 cancellation(s) and 0 no shows. Current Complaints:         Patient reports her left shoulder is better pain intermittent in nature,  reports 30-40% improvement overall. Better with dressing, carrying, sleeping and performing light household duties.   [employer]  [id number] utilized    Initial:     2/10 Post Session:     1/10     OBJECTIVE   Observation:  Left shoulder depressed flat thoracic kyphosis                          Positive scapular dyskinesia  Posture: Rounded shoulders, decreased thoracic kyphosis  Palpation:  Stiff thoracic spine with CPA and left UPA T4-9    ROM:   Date:  23    Date:  3/6/23       Right Left Right Left   Shoulder flex 155 140 170 165    Shoulder abd 145 140 170 160    Shoulder ER 75 60 85 80   Shoulder IR T8 T10 T8 T8             Strength:   Date:  2/1/23    Date:  3/6/23     Right Left Right Left   Shoulder flex 5 5- 5 5-   Shoulder abd 5 5- 5 5-   Shoulder ER 4+ 4 4+ 4   Shoulder IR 5 4- 5 4+   Scapular retraction 4 4- 4 4            Special Tests: NA  ASSESSMENT   Initial Assessment:  on exam patient shows improvement in ROM, strength and reported functional use of arm. Patient will require continuation of skilled therapeutic intervention to restore impairments assessed and allow for return to PLOF      PLAN   Effective Dates: 2/1/23 TO Plan of Care/Certification Expiration Date: 04/02/23     Frequency/Duration: Plan Frequency: 2 x week for 6 weeks     Interventions Planned (Treatment may consist of any combination of the following):    Current Treatment Recommendations: Strengthening; ROM; Manual Therapy - Soft Tissue Mobilization; Home exercise program     Goals: (Goals have been discussed and agreed upon with patient.)  Short-Term Functional Goals: Time Frame: 3 weeks  Increase shoulder forward elevation to 150 to allow for dressing with more ease (MET)  Independent HEP of shoulder ROM exercises (MET)  Decrease pain to 2/10 to allow for greater ease with dressing (MET)  Discharge Goals: Time Frame: 6 weeks  Increase ROM to WNL in all planes to allow for reaching overhead (ONGOING)  Increase strength to at least 4+/5 to allow for carrying of groceries (ONGOING)  Decrease pain to less than 1/10 to allow for sleeping with minimal interruptions (ONGOING)           Outcome Measure: Unable to assess due to language barrier     Medical Necessity:   > Skilled intervention continues to be required due to goals not met.   Reason For Services/Other Comments:  > Patient will require continuation of skilled therapeutic intervention to address impairments assessed and allow for return to PLOF  Total Duration: 5 minutes  Time In: 1515  Time Out: 1600    Regarding [facility]'s therapy, I certify that the treatment plan above will be carried out by a therapist or under their direction.   Thank you for this referral,  Cyrus John, PT     Referring Physician Signature: Jose Roldan MD                    Post Session Pain  Charge Capture  PT Visit Info MD Guidelines  Sandra

## 2023-03-06 NOTE — PROGRESS NOTES
Arnoldo Cevallos  : 1965  Primary: Rosalino Antony ([facility])  Secondary: [place] COPAY PATIENT ASSISTANCE [facility]  [address]  [address]  [facility] [address]  Phone: [phone]  Fax: [phone] Plan Frequency: 2 x week for 6 weeks    Plan of Care/Certification Expiration Date: 23      PT Visit Info:  Plan Frequency: 2 x week for 6 weeks  Plan of Care/Certification Expiration Date: 23  Total # of Visits to Date: 7      Visit Count:  7    OUTPATIENT PHYSICAL THERAPY:OP NOTE TYPE: Treatment Note 3/6/2023       Episode  }Appt Desk             Treatment Diagnosis:  Pain in Left Shoulder (M25.512)  Stiffness of Left Shoulder, Not elsewhere classified (M25.612)  Generalized Muscle Weakness (M62.81)  Medical/Referring Diagnosis:  Left shoulder pain, unspecified chronicity [M25.512]  Impingement syndrome of left shoulder [M75.42]  Referring Physician:  Merle Clark MD MD Orders:  PT Eval and Treat   Date of Onset:  Onset Date: 22     Allergies:   Paclitaxel and Lisinopril  Restrictions/Precautions:  Restrictions/Precautions: None  No data recorded     Interventions Planned (Treatment may consist of any combination of the following):    Current Treatment Recommendations: Strengthening; ROM; Manual Therapy - Soft Tissue Mobilization; Home exercise program     Subjective Comments:  Reports better with dressing, carrying and performing household chores  [employer]  [id number]  Initial:}    2/10Post Session:       1/10  Medications Last Reviewed:  3/6/2023  Updated Objective Findings:  See evaluation note from today  Treatment   MANUAL THERAPY: (0 minutes): Soft tissue mobilization was utilized and necessary because of the patient's restricted motion of soft tissue. es grade 2-3        Thoracic mobs UPA and CPA T4-9  THERAPEUTIC EXERCISE: (40 minutes):    Exercises per grid below to improve mobility and strength.   Required moderate visual and verbal cues to promote proper body alignment. Progressed resistance, range, repetitions, and complexity of movement as indicated. Date:  2/1/23 Date:  2/7/23 Date:  2/9/23 Date:  2/14/23 Date:  2/16/23 Date:  2/21/23 Date:  3/6/23   Activity/Exercise Parameters Parameters Parameters       Patient education/HEP 8' 3' 3'   5' 7'   Shoulder flex Seated 10x 10x supine with wand  10x AFE supine  10x OTB supine  10x AFE seated  Bilat wall slide 10x 10x supine AFE  10x 2# bar  AFE   Seated flex to 90 1#  Wall slide with lift off 2 x 10  Supine fig 8's 2 x 10 AFE standing 1# 10x  Isometric hold with opp arm raise to 90 1#; B  Serratus activation with OTB at wall w lift off 10x AFE supine 1# 10x  Isometric hold with opp arm raise to 90 1#; B  Serratus activation with OTB at wall w lift off 10x AFE supine 2# 10x  Isometric hold with opp arm raise to 90 2#; B  Shoulder press 2#, 10x; B AFE seated to 90, 2 x 10 with 2#  Serratus activation with OTB at wall w lift off 10x   Scapular retraction 2 x 10   Prone I 10x        Upper trap stretch 3 x 20 secs         Posture education 3' 2'        Pendulums  20x     10x   PROM  Flex, ER, abd x 6' Flex, ER, abd x 6'       Shoulder ER  Bilat supine 2 x 10 OTB Bilat supine and seated 10x OTB Bilat supine and seated 10x OTB  10x RTB Bilat supine  Sidleying 1# 10x Bilat 2 x 10 LTB  Sidelying 2 x 10 2# Sidelying 2 x 10 2#   Shoulder IR  10x hand behind back  2 x 10 RTB       Rows  2 x 10 OTB  2 x 10 GTB 20x GTB     Low rows  2 x 10 OTB  2 x 10 RTB      Wall push up  2 x 10x 2 x 10  2 x 10  Table push up 10x Table push up 10x   UBE  4' level 1 1/1 6' level 1 1/1  8' level 1 1/1 8' level 1 2/2 8' level 2 1/1   Prone I, W,     2 x 10 1#  2 x 10 I's w 2#  2 x 10 W's 0# 2 x 10 I's w 2#  2 x 10 W's w 2#   Prone T    2 x 10      Shoulder abd    Isometric hold with opp arm raise to 90 1#; B Isometric hold with opp arm raise to 90 1#; B Isometric hold with opp arm raise to 90 2#; B    Bicep curls     Isometric hold on opp side 10x; B 15x 2#      [employer] Portal Access Code: [email or web address] Code: [email or web address]  URL: [email or web address]  Date: 03/06/2023  Prepared by: Lopez Montes    Exercises  Seated Shoulder Flexion Full Range - 1-2 x daily - 7 x weekly - 1 sets - 10 reps - 2 hold  Seated Scapular Retraction - 1-2 x daily - 7 x weekly - 2 sets - 10 reps  Seated Gentle Upper Trapezius Stretch - 1 x daily - 7 x weekly - 3 reps - 20 hold  Standing Row with Anchored Resistance - 1 x daily - 7 x weekly - 2 sets - 10 reps  Shoulder extension with resistance - Neutral - 1 x daily - 7 x weekly - 10 reps  Circular Shoulder Pendulum with Table Support - 3 x daily - 7 x weekly - 3 sets - 10 reps  Tricep Push Up on Wall - 1 x daily - 7 x weekly - 2 sets - 10 reps  Shoulder Flexion Wall Slide with Towel - 1 x daily - 7 x weekly - 1 sets - 10 reps  Push-Up on Counter - 1 x daily - 7 x weekly - 1 sets - 10 reps  Prone Scapular Slide with Shoulder Extension - 1 x daily - 7 x weekly - 2 sets - 10 reps  Prone W Scapular Retraction - 1 x daily - 7 x weekly - 2 sets - 10 reps  Sidelying Shoulder External Rotation - 1 x daily - 7 x weekly - 2 sets - 10 reps    Treatment/Session Summary:    Treatment Assessment:  Patient demosntrates improvement in ROM and strength and also with ADL's.  HEP updated and modified  Communication/Consultation:   HEP  Equipment provided today:  HEP  Recommendations/Intent for next treatment session: Next visit will focus on manual therapy, therapeutic exercise for rotator cuff and scapular strengthening, posture education, modalities as needed.    Total Treatment Billable Duration:  45 minutes (40 minutes TE, 5 minutes measurements)  Time In: 1515  Time Out: 1600    Lopez Montes, PT       Charge Capture  }Post Session Pain  PT Visit Info  [employer] Portal  MD Guidelines  Scanned Media  Benefits  MyChart    Future Appointments   Date Time Provider Department Center   3/14/2023  4:00 PM Lopez YEE  Simon, PT [facility]   4/24/2023 10:00 AM Miguelina Quevedo MD [facility] AMB   8/14/2023 10:20 AM [facility] OUTREACH INS[facility]ANCE [facility]   8/14/2023 10:45 AM Fred Crawley MD [facility] AMB   1/11/2024  9:30 AM Favio Chun  [address]

## 2023-03-14 ENCOUNTER — HOSPITAL ENCOUNTER (OUTPATIENT)
Dept: PHYSICAL THERAPY | Age: 58
Setting detail: RECURRING SERIES
Discharge: HOME | End: 2023-03-17
Payer: COMMERCIAL

## 2023-03-14 PROCEDURE — 97110 THERAPEUTIC EXERCISES: CPT

## 2023-03-14 ASSESSMENT — PAIN SCALES - GENERAL: PAINLEVEL_OUTOF10: 2

## 2023-03-14 NOTE — PROGRESS NOTES
Evin Nieves  : 1965  Primary: Chris Antony ([facility])  Secondary: [place] COPAY PATIENT ASSISTANCE [facility]  [address]  [address]  [facility] [address]  Phone: [phone]  Fax: [phone] Plan Frequency: 2 x week for 6 weeks    Plan of Care/Certification Expiration Date: 23      PT Visit Info:  Plan Frequency: 2 x week for 6 weeks  Plan of Care/Certification Expiration Date: 23  Total # of Visits to Date: 8      Visit Count:  8    OUTPATIENT PHYSICAL THERAPY:OP NOTE TYPE: Treatment Note 3/14/2023       Episode  }Appt Desk             Treatment Diagnosis:  Pain in Left Shoulder (M25.512)  Stiffness of Left Shoulder, Not elsewhere classified (M25.612)  Generalized Muscle Weakness (M62.81)  Medical/Referring Diagnosis:  Left shoulder pain, unspecified chronicity [M25.512]  Impingement syndrome of left shoulder [M75.42]  Referring Physician:  Emma Larkin MD MD Orders:  PT Eval and Treat   Date of Onset:  Onset Date: 22     Allergies:   Paclitaxel and Lisinopril  Restrictions/Precautions:  Restrictions/Precautions: None  No data recorded     Interventions Planned (Treatment may consist of any combination of the following):    Current Treatment Recommendations: Strengthening; ROM; Manual Therapy - Soft Tissue Mobilization; Home exercise program     Subjective Comments:  Patient reports intermittent soreness. Stratus intreperter [id number] utilized  Initial:}    2/10Post Session:       2/10  Medications Last Reviewed:  3/14/2023  Updated Objective Findings:  None Today  Treatment   MANUAL THERAPY: (0 minutes): Soft tissue mobilization was utilized and necessary because of the patient's restricted motion of soft tissue. es grade 2-3        Thoracic mobs UPA and CPA T4-9  THERAPEUTIC EXERCISE: (4% minutes):    Exercises per grid below to improve mobility and strength. Required moderate visual and verbal cues to promote proper body alignment.   Progressed resistance, range, repetitions, and complexity of movement as indicated. Date:  2/14/23 Date:  2/16/23 Date:  2/21/23 Date:  3/6/23 Date:  3/14/23   Activity/Exercise        Patient education/HEP   5' 7'    Shoulder flex AFE standing 1# 10x  Isometric hold with opp arm raise to 90 1#; B  Serratus activation with OTB at wall w lift off 10x AFE supine 1# 10x  Isometric hold with opp arm raise to 90 1#; B  Serratus activation with OTB at wall w lift off 10x AFE supine 2# 10x  Isometric hold with opp arm raise to 90 2#; B  Shoulder press 2#, 10x; B AFE seated to 90, 2 x 10 with 2#  Serratus activation with OTB at wall w lift off 10x AFE to 90 into abd, then abd into flex   Overhead press with YTB standing at wall 2 x 10   Scapular retraction     Single  arm  T alternating at wall YTB   Posterior capsule stretch     2  x 20 secs   Posture education        Pendulums    10x    PROM        Shoulder ER Bilat supine and seated 10x OTB  10x RTB Bilat supine  Sidleying 1# 10x Bilat 2 x 10 LTB  Sidelying 2 x 10 2# Sidelying 2 x 10 2# Sidelying 2 x 10 2#  ER stretch with    Shoulder IR 2 x 10 RTB        Rows 2 x 10 GTB 20x GTB      Low rows 2 x 10 RTB       Wall push up 2 x 10  Table push up 10x Table push up 10x    UBE  8' level 1 1/1 8' level 1 2/2 8' level 2 1/1 8' level 2 1/1   Prone I, W,  2 x 10 1#  2 x 10 I's w 2#  2 x 10 W's 0# 2 x 10 I's w 2#  2 x 10 W's w 2# 2 x 10 with 2#; I's  2 x 10 with W's   Prone T 2 x 10       Shoulder abd Isometric hold with opp arm raise to 90 1#; B Isometric hold with opp arm raise to 90 1#; B Isometric hold with opp arm raise to 90 2#; B  Sidelying CAM with 2# 10x   Bicep curls  Isometric hold on opp side 10x; B 15x 2#     Quadruped     Single arm Y and T 10x, B     [employer] Portal Access Code: [email or web address] Code: [email or web address]  URL: [email or web address]  Date: 03/06/2023  Prepared by: John Arroyo    Exercises  Seated Shoulder Flexion Full Range - 1-2 x daily - 7 x weekly - 1 sets - 10 reps - 2 hold  Seated Scapular Retraction - 1-2 x daily - 7 x weekly - 2 sets - 10 reps  Seated Gentle Upper Trapezius Stretch - 1 x daily - 7 x weekly - 3 reps - 20 hold  Standing Row with Anchored Resistance - 1 x daily - 7 x weekly - 2 sets - 10 reps  Shoulder extension with resistance - Neutral - 1 x daily - 7 x weekly - 10 reps  Circular Shoulder Pendulum with Table Support - 3 x daily - 7 x weekly - 3 sets - 10 reps  Tricep Push Up on Wall - 1 x daily - 7 x weekly - 2 sets - 10 reps  Shoulder Flexion Wall Slide with Towel - 1 x daily - 7 x weekly - 1 sets - 10 reps  Push-Up on Counter - 1 x daily - 7 x weekly - 1 sets - 10 reps  Prone Scapular Slide with Shoulder Extension - 1 x daily - 7 x weekly - 2 sets - 10 reps  Prone W Scapular Retraction - 1 x daily - 7 x weekly - 2 sets - 10 reps  Sidelying Shoulder External Rotation - 1 x daily - 7 x weekly - 2 sets - 10 reps    Treatment/Session Summary:    Treatment Assessment:  Tolerated treatment fairly well. Did have some complaints of soreness with resistive work;  muscular in nature  Communication/Consultation:  None today  Equipment provided today:  None  Recommendations/Intent for next treatment session: Next visit will focus on manual therapy, therapeutic exercise for rotator cuff and scapular strengthening, posture education, modalities as needed.     Total Treatment Billable Duration:  45 minutes (45 minutes TE,)  Time In: 1600  Time Out: 2241    Lopez Montes, PT       Charge Capture  }Post Session Pain  PT Visit Info  MedBridge Portal  MD Guidelines  Scanned Media  Benefits  MyChart    Future Appointments   Date Time Provider Yue Cid   4/24/2023 10:00 AM Claudette Fleischer, MD [facility] AMB   8/14/2023 10:20 AM [facility] OUTREACH INSURANCE [facility]   8/14/2023 10:45 AM Deanne Conley MD [facility] AMB   1/11/2024  9:30 AM Virginia Allen  [address]

## 2023-03-20 ENCOUNTER — CLINICAL DOCUMENTATION (OUTPATIENT)
Dept: PHYSICAL THERAPY | Age: 58
End: 2023-03-20

## 2023-04-24 ENCOUNTER — OFFICE VISIT (OUTPATIENT)
Dept: ORTHOPEDIC SURGERY | Age: 58
End: 2023-04-24
Payer: COMMERCIAL

## 2023-04-24 DIAGNOSIS — M75.42 IMPINGEMENT SYNDROME OF LEFT SHOULDER: Primary | ICD-10-CM

## 2023-04-24 PROCEDURE — 99213 OFFICE O/P EST LOW 20 MIN: CPT | Performed by: ORTHOPAEDIC SURGERY

## 2023-04-24 NOTE — PROGRESS NOTES
Name: Jefferson Andrade  YOB: 1965  Gender: female  MRN: [id number]      CC: Follow-up (Left Shoulder)       HPI: Jefferson Andrade is a 56 y.o. female who returns for a left shoulder follow up. She is progressing well and has seen much improvement from physical therapy. Physical Examination:  General: no acute distress  Lungs: breathing easily  CV: regular rhythm by pulse  Left Shoulder: Full range of motion negative impingement signs 5 out of 5 resisted rotator cuff strength and empty can internal and external rotation position        Assessment:     ICD-10-CM    1. Impingement syndrome of left shoulder  M75.42           Plan:   She is doing very well with minimal discomfort at this point. Continue home exercise program follow-up as needed            Roberto Oneill MD, [address] [place] [address] Sports Medicine

## 2023-08-09 DIAGNOSIS — E03.2 HYPOTHYROIDISM DUE TO MEDICATION: ICD-10-CM

## 2023-08-14 ENCOUNTER — HOSPITAL ENCOUNTER (OUTPATIENT)
Dept: LAB | Age: 58
Discharge: HOME | End: 2023-08-17
Payer: COMMERCIAL

## 2023-08-14 ENCOUNTER — OFFICE VISIT (OUTPATIENT)
Dept: ONCOLOGY | Age: 58
End: 2023-08-14
Payer: COMMERCIAL

## 2023-08-14 VITALS
BODY MASS INDEX: 31.61 KG/M2 | RESPIRATION RATE: 18 BRPM | SYSTOLIC BLOOD PRESSURE: 148 MMHG | OXYGEN SATURATION: 96 % | HEART RATE: 74 BPM | DIASTOLIC BLOOD PRESSURE: 87 MMHG | TEMPERATURE: 98.2 F | HEIGHT: 60 IN | WEIGHT: 161 LBS

## 2023-08-14 DIAGNOSIS — C50.919 TRIPLE NEGATIVE MALIGNANT NEOPLASM OF BREAST (HCC): ICD-10-CM

## 2023-08-14 DIAGNOSIS — E03.2 HYPOTHYROIDISM DUE TO MEDICATION: ICD-10-CM

## 2023-08-14 DIAGNOSIS — C50.919 TRIPLE NEGATIVE MALIGNANT NEOPLASM OF BREAST (HCC): Primary | ICD-10-CM

## 2023-08-14 DIAGNOSIS — Z12.39 BREAST CANCER SCREENING, HIGH RISK PATIENT: ICD-10-CM

## 2023-08-14 LAB
ALBUMIN SERPL-MCNC: 4 G/DL (ref 3.5–5)
ALBUMIN/GLOB SERPL: 0.9 (ref 0.4–1.6)
ALP SERPL-CCNC: 124 U/L (ref 50–136)
ALT SERPL-CCNC: 41 U/L (ref 12–65)
ANION GAP SERPL CALC-SCNC: 3 MMOL/L (ref 2–11)
AST SERPL-CCNC: 25 U/L (ref 15–37)
BASOPHILS # BLD: 0 K/UL (ref 0–0.2)
BASOPHILS NFR BLD: 1 % (ref 0–2)
BILIRUB SERPL-MCNC: 0.5 MG/DL (ref 0.2–1.1)
BUN SERPL-MCNC: 20 MG/DL (ref 6–23)
CALCIUM SERPL-MCNC: 9.4 MG/DL (ref 8.3–10.4)
CHLORIDE SERPL-SCNC: 107 MMOL/L (ref 101–110)
CO2 SERPL-SCNC: 25 MMOL/L (ref 21–32)
CREAT SERPL-MCNC: 0.9 MG/DL (ref 0.6–1)
DIFFERENTIAL METHOD BLD: ABNORMAL
EOSINOPHIL # BLD: 0.3 K/UL (ref 0–0.8)
EOSINOPHIL NFR BLD: 5 % (ref 0.5–7.8)
ERYTHROCYTE [DISTWIDTH] IN BLOOD BY AUTOMATED COUNT: 13 % (ref 11.9–14.6)
GLOBULIN SER CALC-MCNC: 4.6 G/DL (ref 2.8–4.5)
GLUCOSE SERPL-MCNC: 113 MG/DL (ref 65–100)
HCT VFR BLD AUTO: 43.2 % (ref 35.8–46.3)
HGB BLD-MCNC: 13.6 G/DL (ref 11.7–15.4)
IMM GRANULOCYTES # BLD AUTO: 0 K/UL (ref 0–0.5)
IMM GRANULOCYTES NFR BLD AUTO: 0 % (ref 0–5)
LYMPHOCYTES # BLD: 1.1 K/UL (ref 0.5–4.6)
LYMPHOCYTES NFR BLD: 24 % (ref 13–44)
MCH RBC QN AUTO: 30.3 PG (ref 26.1–32.9)
MCHC RBC AUTO-ENTMCNC: 31.5 G/DL (ref 31.4–35)
MCV RBC AUTO: 96.2 FL (ref 82–102)
MONOCYTES # BLD: 0.4 K/UL (ref 0.1–1.3)
MONOCYTES NFR BLD: 8 % (ref 4–12)
NEUTS SEG # BLD: 2.8 K/UL (ref 1.7–8.2)
NEUTS SEG NFR BLD: 62 % (ref 43–78)
NRBC # BLD: 0 K/UL (ref 0–0.2)
PLATELET # BLD AUTO: 213 K/UL (ref 150–450)
PMV BLD AUTO: 8.5 FL (ref 9.4–12.3)
POTASSIUM SERPL-SCNC: 4.8 MMOL/L (ref 3.5–5.1)
PROT SERPL-MCNC: 8.6 G/DL (ref 6.3–8.2)
RBC # BLD AUTO: 4.49 M/UL (ref 4.05–5.2)
SODIUM SERPL-SCNC: 135 MMOL/L (ref 133–143)
TSH, 3RD GENERATION: 3.15 UIU/ML (ref 0.36–3)
WBC # BLD AUTO: 4.6 K/UL (ref 4.3–11.1)

## 2023-08-14 PROCEDURE — 36415 COLL VENOUS BLD VENIPUNCTURE: CPT

## 2023-08-14 PROCEDURE — 80053 COMPREHEN METABOLIC PANEL: CPT

## 2023-08-14 PROCEDURE — 99214 OFFICE O/P EST MOD 30 MIN: CPT | Performed by: INTERNAL MEDICINE

## 2023-08-14 PROCEDURE — 84443 ASSAY THYROID STIM HORMONE: CPT

## 2023-08-14 PROCEDURE — 85025 COMPLETE CBC W/AUTO DIFF WBC: CPT

## 2023-08-14 PROCEDURE — 3077F SYST BP >= 140 MM HG: CPT | Performed by: INTERNAL MEDICINE

## 2023-08-14 PROCEDURE — 3079F DIAST BP 80-89 MM HG: CPT | Performed by: INTERNAL MEDICINE

## 2023-08-14 RX ORDER — LEVOTHYROXINE SODIUM 0.03 MG/1
25 TABLET ORAL DAILY
Qty: 90 TABLET | Refills: 1 | Status: SHIPPED | OUTPATIENT
Start: 2023-08-14

## 2023-08-14 ASSESSMENT — PATIENT HEALTH QUESTIONNAIRE - PHQ9
SUM OF ALL RESPONSES TO PHQ QUESTIONS 1-9: 0
SUM OF ALL RESPONSES TO PHQ9 QUESTIONS 1 & 2: 0
2. FEELING DOWN, DEPRESSED OR HOPELESS: 0
SUM OF ALL RESPONSES TO PHQ QUESTIONS 1-9: 0
1. LITTLE INTEREST OR PLEASURE IN DOING THINGS: 0

## 2023-08-14 NOTE — PATIENT INSTRUCTIONS
Patient Instructions from Today's Visit    Reason for Visit:  Follow up visit      Plan:    Can take Claritin or Zyrtec over the counter. Will send in refill for Synthroid. Mammogram due in December. Radiology will call you to schedule this.     Follow Up:  - 6 months    Recent Lab Results:  Hospital Outpatient Visit on 08/14/2023   Component Date Value Ref Range Status    WBC 08/14/2023 4.6  4.3 - 11.1 K/uL Final    RBC 08/14/2023 4.49  4.05 - 5.2 M/uL Final    Hemoglobin 08/14/2023 13.6  11.7 - 15.4 g/dL Final    Hematocrit 08/14/2023 43.2  35.8 - 46.3 % Final    MCV 08/14/2023 96.2  82.0 - 102.0 FL Final    MCH 08/14/2023 30.3  26.1 - 32.9 PG Final    MCHC 08/14/2023 31.5  31.4 - 35.0 g/dL Final    RDW 08/14/2023 13.0  11.9 - 14.6 % Final    Platelets 86/50/6798 213  150 - 450 K/uL Final    MPV 08/14/2023 8.5 (L)  9.4 - 12.3 FL Final    nRBC 08/14/2023 0.00  0.0 - 0.2 K/uL Final    **Note: Absolute NRBC parameter is now reported with Hemogram**    Differential Type 08/14/2023 AUTOMATED    Final    Neutrophils % 08/14/2023 62  43 - 78 % Final    Lymphocytes % 08/14/2023 24  13 - 44 % Final    Monocytes % 08/14/2023 8  4.0 - 12.0 % Final    Eosinophils % 08/14/2023 5  0.5 - 7.8 % Final    Basophils % 08/14/2023 1  0.0 - 2.0 % Final    Immature Granulocytes 08/14/2023 0  0.0 - 5.0 % Final    Neutrophils Absolute 08/14/2023 2.8  1.7 - 8.2 K/UL Final    Lymphocytes Absolute 08/14/2023 1.1  0.5 - 4.6 K/UL Final    Monocytes Absolute 08/14/2023 0.4  0.1 - 1.3 K/UL Final    Eosinophils Absolute 08/14/2023 0.3  0.0 - 0.8 K/UL Final    Basophils Absolute 08/14/2023 0.0  0.0 - 0.2 K/UL Final    Absolute Immature Granulocyte 08/14/2023 0.0  0.0 - 0.5 K/UL Final        Treatment Summary has been discussed and given to patient: n/a        -------------------------------------------------------------------------------------------------------------------  Please call our office at [phone] if you have any  of the

## 2023-08-14 NOTE — PROGRESS NOTES
15 - 37 U/L    Alk Phosphatase 124 50 - 136 U/L    Total Protein 8.6 (H) 6.3 - 8.2 g/dL    Albumin 4.0 3.5 - 5.0 g/dL    Globulin 4.6 (H) 2.8 - 4.5 g/dL    Albumin/Globulin Ratio 0.9 0.4 - 1.6     TSH    Collection Time: 08/14/23 10:22 AM   Result Value Ref Range    TSH, 3RD GENERATION 3.150 (H) 0.358 - 3 uIU/mL         Imaging:  No results found for this or any previous visit. ASSESSMENT/PLAN:   Diagnosis Orders   1. Triple negative malignant neoplasm of breast ([address])  GUNNER EUGENIO DIGITAL SCREEN BILATERAL      2. Hypothyroidism due to medication  levothyroxine (SYNTHROID) 25 MCG tablet      3. Breast cancer screening, high risk patient  GUNNER EUGENIO DIGITAL SCREEN BILATERAL              [unfilled]    56 y.o. F consulted for triple negative left breast cancer and presented to [facility] with  and daughter on 1/19/2022.    She has good baseline health and functions well, and annual screening mammogram showed left breast lesion, biopsy showed triple negative grade 2 IDC, cT2 N0 M0, and we discussed the high risks profile for triple negative breast cancer and the most recent  FDA approval of neoadjuvant chemoimmunotherapy, she appeared a good candidate and agreed to arrange Rustam Masterson for 4 cycles followed by Adriamycin Cytoxan Keytruda for 4 cycles, followed by surgery and adjuvant Keytruda for total systemic therapy  of 1 year, discussed toxicity and management, discussed logistics, arrange Zofran Compazine Ativan, baseline echocardiogram showed normal EF, hypertension better but still uncontrolled after adding spironolactone and educated risk of dehydration and hypotension  on chemotherapy when she is taking double diuretics, started cycle 1 2/7/22 and left breast tumor responded rapidly, lost 20 pounds after cycle 1 and BP much better controlled even after she runs out of diltiazem, again warned of monitoring dehydration/hypotension,  discussed increased protein/calorie intake, may add stimulant if continues to

## 2023-10-30 RX ORDER — LEVOTHYROXINE SODIUM 0.03 MG/1
TABLET ORAL
Qty: 90 TABLET | Refills: 1 | OUTPATIENT
Start: 2023-10-30

## 2023-12-09 ENCOUNTER — HOSPITAL ENCOUNTER (OUTPATIENT)
Dept: MAMMOGRAPHY | Age: 58
End: 2023-12-09
Attending: INTERNAL MEDICINE
Payer: COMMERCIAL

## 2023-12-09 VITALS — BODY MASS INDEX: 30.21 KG/M2 | WEIGHT: 160 LBS | HEIGHT: 61 IN

## 2023-12-09 DIAGNOSIS — Z12.39 BREAST CANCER SCREENING, HIGH RISK PATIENT: ICD-10-CM

## 2023-12-09 DIAGNOSIS — C50.919 TRIPLE NEGATIVE MALIGNANT NEOPLASM OF BREAST (HCC): ICD-10-CM

## 2023-12-09 PROCEDURE — 77063 BREAST TOMOSYNTHESIS BI: CPT

## 2023-12-21 ENCOUNTER — HOSPITAL ENCOUNTER (OUTPATIENT)
Dept: LAB | Age: 58
Discharge: HOME | End: 2023-12-24
Payer: COMMERCIAL

## 2023-12-21 DIAGNOSIS — C50.919 TRIPLE NEGATIVE MALIGNANT NEOPLASM OF BREAST (HCC): ICD-10-CM

## 2023-12-21 LAB
ALBUMIN SERPL-MCNC: 3.9 G/DL (ref 3.5–5)
ALBUMIN/GLOB SERPL: 0.8 (ref 0.4–1.6)
ALP SERPL-CCNC: 145 U/L (ref 50–136)
ALT SERPL-CCNC: 39 U/L (ref 12–65)
ANION GAP SERPL CALC-SCNC: 5 MMOL/L (ref 2–11)
AST SERPL-CCNC: 23 U/L (ref 15–37)
BASOPHILS # BLD: 0 K/UL (ref 0–0.2)
BASOPHILS NFR BLD: 0 % (ref 0–2)
BILIRUB SERPL-MCNC: 0.3 MG/DL (ref 0.2–1.1)
BUN SERPL-MCNC: 23 MG/DL (ref 6–23)
CALCIUM SERPL-MCNC: 9.7 MG/DL (ref 8.3–10.4)
CHLORIDE SERPL-SCNC: 104 MMOL/L (ref 103–113)
CO2 SERPL-SCNC: 28 MMOL/L (ref 21–32)
CREAT SERPL-MCNC: 0.9 MG/DL (ref 0.6–1)
DIFFERENTIAL METHOD BLD: ABNORMAL
EOSINOPHIL # BLD: 0.1 K/UL (ref 0–0.8)
EOSINOPHIL NFR BLD: 1 % (ref 0.5–7.8)
ERYTHROCYTE [DISTWIDTH] IN BLOOD BY AUTOMATED COUNT: 12.9 % (ref 11.9–14.6)
GLOBULIN SER CALC-MCNC: 4.7 G/DL (ref 2.8–4.5)
GLUCOSE SERPL-MCNC: 138 MG/DL (ref 65–100)
HCT VFR BLD AUTO: 43.2 % (ref 35.8–46.3)
HGB BLD-MCNC: 13.8 G/DL (ref 11.7–15.4)
IMM GRANULOCYTES # BLD AUTO: 0 K/UL (ref 0–0.5)
IMM GRANULOCYTES NFR BLD AUTO: 0 % (ref 0–5)
LYMPHOCYTES # BLD: 1.2 K/UL (ref 0.5–4.6)
LYMPHOCYTES NFR BLD: 15 % (ref 13–44)
MCH RBC QN AUTO: 29.9 PG (ref 26.1–32.9)
MCHC RBC AUTO-ENTMCNC: 31.9 G/DL (ref 31.4–35)
MCV RBC AUTO: 93.5 FL (ref 82–102)
MONOCYTES # BLD: 0.3 K/UL (ref 0.1–1.3)
MONOCYTES NFR BLD: 4 % (ref 4–12)
NEUTS SEG # BLD: 6.1 K/UL (ref 1.7–8.2)
NEUTS SEG NFR BLD: 80 % (ref 43–78)
NRBC # BLD: 0 K/UL (ref 0–0.2)
PLATELET # BLD AUTO: 216 K/UL (ref 150–450)
PMV BLD AUTO: 8.9 FL (ref 9.4–12.3)
POTASSIUM SERPL-SCNC: 4.4 MMOL/L (ref 3.5–5.1)
PROT SERPL-MCNC: 8.6 G/DL (ref 6.3–8.2)
RBC # BLD AUTO: 4.62 M/UL (ref 4.05–5.2)
SODIUM SERPL-SCNC: 137 MMOL/L (ref 136–146)
TSH, 3RD GENERATION: 1.27 UIU/ML (ref 0.36–3.74)
WBC # BLD AUTO: 7.8 K/UL (ref 4.3–11.1)

## 2023-12-21 PROCEDURE — 80053 COMPREHEN METABOLIC PANEL: CPT

## 2023-12-21 PROCEDURE — 84443 ASSAY THYROID STIM HORMONE: CPT

## 2023-12-21 PROCEDURE — 85025 COMPLETE CBC W/AUTO DIFF WBC: CPT

## 2024-01-08 ENCOUNTER — TELEPHONE (OUTPATIENT)
Dept: RADIATION ONCOLOGY | Age: 59
End: 2024-01-08

## 2024-01-10 ENCOUNTER — CLINICAL DOCUMENTATION (OUTPATIENT)
Dept: ONCOLOGY | Age: 59
End: 2024-01-10

## 2024-01-10 NOTE — PROGRESS NOTES
[facility]. Patient had appt scheduled 1/10 but cancelled it. Their office called 4 times and could not get in touch with her to reschedule.

## 2024-01-11 ENCOUNTER — TELEPHONE (OUTPATIENT)
Dept: RADIATION ONCOLOGY | Age: 59
End: 2024-01-11

## 2024-01-11 NOTE — TELEPHONE ENCOUNTER
Pt presented to [facility] dept for previously scheduled one year f/u.  Apt was cancelled on 1/5.  Apparently,  spoke with daughter to cancel f/u.  Pt is to f/u Radiation prn and continue f/u with Dr. Baker in Medical Oncology.  Using Mandarin interpretor this was explained to pt and spouse.  They were instructed to continue f/u with Dr. Baker and call us with any further needs.    Carley Clark RN

## 2024-02-14 DIAGNOSIS — E03.2 HYPOTHYROIDISM DUE TO MEDICATION: ICD-10-CM

## 2024-02-14 DIAGNOSIS — C50.919 TRIPLE NEGATIVE MALIGNANT NEOPLASM OF BREAST (HCC): Primary | ICD-10-CM

## 2024-02-19 ENCOUNTER — HOSPITAL ENCOUNTER (OUTPATIENT)
Dept: LAB | Age: 59
Discharge: HOME | End: 2024-02-22
Payer: COMMERCIAL

## 2024-02-19 ENCOUNTER — OFFICE VISIT (OUTPATIENT)
Dept: ONCOLOGY | Age: 59
End: 2024-02-19
Payer: COMMERCIAL

## 2024-02-19 VITALS
OXYGEN SATURATION: 94 % | TEMPERATURE: 97.4 F | BODY MASS INDEX: 31.41 KG/M2 | WEIGHT: 160 LBS | HEART RATE: 73 BPM | HEIGHT: 60 IN | RESPIRATION RATE: 16 BRPM | SYSTOLIC BLOOD PRESSURE: 136 MMHG | DIASTOLIC BLOOD PRESSURE: 92 MMHG

## 2024-02-19 DIAGNOSIS — E03.2 HYPOTHYROIDISM DUE TO MEDICATION: ICD-10-CM

## 2024-02-19 DIAGNOSIS — C50.919 TRIPLE NEGATIVE MALIGNANT NEOPLASM OF BREAST (HCC): ICD-10-CM

## 2024-02-19 DIAGNOSIS — J30.2 SEASONAL ALLERGIC RHINITIS, UNSPECIFIED TRIGGER: ICD-10-CM

## 2024-02-19 DIAGNOSIS — Z12.39 BREAST CANCER SCREENING, HIGH RISK PATIENT: ICD-10-CM

## 2024-02-19 DIAGNOSIS — R77.1 HYPERGLOBULINEMIA: ICD-10-CM

## 2024-02-19 DIAGNOSIS — Z85.3 HISTORY OF BREAST CANCER: Primary | ICD-10-CM

## 2024-02-19 LAB
ALBUMIN SERPL-MCNC: 4.1 G/DL (ref 3.5–5)
ALBUMIN/GLOB SERPL: 0.8 (ref 0.4–1.6)
ALP SERPL-CCNC: 138 U/L (ref 50–136)
ALT SERPL-CCNC: 44 U/L (ref 12–65)
ANION GAP SERPL CALC-SCNC: 6 MMOL/L (ref 2–11)
AST SERPL-CCNC: 34 U/L (ref 15–37)
BASOPHILS # BLD: 0.1 K/UL (ref 0–0.2)
BASOPHILS NFR BLD: 1 % (ref 0–2)
BILIRUB SERPL-MCNC: 0.7 MG/DL (ref 0.2–1.1)
BUN SERPL-MCNC: 19 MG/DL (ref 6–23)
CALCIUM SERPL-MCNC: 9.8 MG/DL (ref 8.3–10.4)
CHLORIDE SERPL-SCNC: 103 MMOL/L (ref 103–113)
CO2 SERPL-SCNC: 27 MMOL/L (ref 21–32)
CREAT SERPL-MCNC: 0.9 MG/DL (ref 0.6–1)
DIFFERENTIAL METHOD BLD: ABNORMAL
EOSINOPHIL # BLD: 0.1 K/UL (ref 0–0.8)
EOSINOPHIL NFR BLD: 2 % (ref 0.5–7.8)
ERYTHROCYTE [DISTWIDTH] IN BLOOD BY AUTOMATED COUNT: 12.9 % (ref 11.9–14.6)
GLOBULIN SER CALC-MCNC: 5 G/DL (ref 2.8–4.5)
GLUCOSE SERPL-MCNC: 102 MG/DL (ref 65–100)
HCT VFR BLD AUTO: 45.4 % (ref 35.8–46.3)
HGB BLD-MCNC: 14.7 G/DL (ref 11.7–15.4)
IMM GRANULOCYTES # BLD AUTO: 0 K/UL (ref 0–0.5)
IMM GRANULOCYTES NFR BLD AUTO: 0 % (ref 0–5)
LYMPHOCYTES # BLD: 1.3 K/UL (ref 0.5–4.6)
LYMPHOCYTES NFR BLD: 25 % (ref 13–44)
MCH RBC QN AUTO: 30.1 PG (ref 26.1–32.9)
MCHC RBC AUTO-ENTMCNC: 32.4 G/DL (ref 31.4–35)
MCV RBC AUTO: 93 FL (ref 82–102)
MONOCYTES # BLD: 0.3 K/UL (ref 0.1–1.3)
MONOCYTES NFR BLD: 5 % (ref 4–12)
NEUTS SEG # BLD: 3.4 K/UL (ref 1.7–8.2)
NEUTS SEG NFR BLD: 67 % (ref 43–78)
NRBC # BLD: 0 K/UL (ref 0–0.2)
PLATELET # BLD AUTO: 230 K/UL (ref 150–450)
PMV BLD AUTO: 8.6 FL (ref 9.4–12.3)
POTASSIUM SERPL-SCNC: 4 MMOL/L (ref 3.5–5.1)
PROT SERPL-MCNC: 9.1 G/DL (ref 6.3–8.2)
RBC # BLD AUTO: 4.88 M/UL (ref 4.05–5.2)
SODIUM SERPL-SCNC: 136 MMOL/L (ref 136–146)
TSH, 3RD GENERATION: 1.86 UIU/ML (ref 0.36–3)
WBC # BLD AUTO: 5.1 K/UL (ref 4.3–11.1)

## 2024-02-19 PROCEDURE — 85025 COMPLETE CBC W/AUTO DIFF WBC: CPT

## 2024-02-19 PROCEDURE — 3080F DIAST BP >= 90 MM HG: CPT | Performed by: INTERNAL MEDICINE

## 2024-02-19 PROCEDURE — 36415 COLL VENOUS BLD VENIPUNCTURE: CPT

## 2024-02-19 PROCEDURE — 99214 OFFICE O/P EST MOD 30 MIN: CPT | Performed by: INTERNAL MEDICINE

## 2024-02-19 PROCEDURE — 84443 ASSAY THYROID STIM HORMONE: CPT

## 2024-02-19 PROCEDURE — 3075F SYST BP GE 130 - 139MM HG: CPT | Performed by: INTERNAL MEDICINE

## 2024-02-19 PROCEDURE — 80053 COMPREHEN METABOLIC PANEL: CPT

## 2024-02-19 RX ORDER — FLUTICASONE PROPIONATE AND SALMETEROL 250; 50 UG/1; UG/1
1 POWDER RESPIRATORY (INHALATION) 2 TIMES DAILY
Qty: 60 EACH | Refills: 3 | Status: SHIPPED | OUTPATIENT
Start: 2024-02-19

## 2024-02-19 RX ORDER — LEVOTHYROXINE SODIUM 0.03 MG/1
25 TABLET ORAL DAILY
Qty: 90 TABLET | Refills: 1 | Status: SHIPPED | OUTPATIENT
Start: 2024-02-19

## 2024-02-19 NOTE — PROGRESS NOTES
and normal heart sounds.  Exam reveals no gallop, no friction and no rub.  No murmur heard.     Abdomen  Soft. Bowel sounds are normal. Exhibits no distension. There is no tenderness. There is no rebound and no guarding.     Neuro  Normal reflexes.  No cranial nerve deficit.  Exhibits normal muscle tone, 5 of 5 strength of all extremities.     MSK  Normal range of motion in general.  No edema and no tenderness.        Psych  Normal mood, affect, behavior, judgment and thought content        Labs:  Recent Results (from the past 24 hour(s))   TSH    Collection Time: 02/19/24 11:34 AM   Result Value Ref Range    TSH, 3RD GENERATION 1.860 0.358 - 3 uIU/mL   Comprehensive Metabolic Panel    Collection Time: 02/19/24 11:34 AM   Result Value Ref Range    Sodium 136 136 - 146 mmol/L    Potassium 4.0 3.5 - 5.1 mmol/L    Chloride 103 103 - 113 mmol/L    CO2 27 21 - 32 mmol/L    Anion Gap 6 2 - 11 mmol/L    Glucose 102 (H) 65 - 100 mg/dL    BUN 19 6 - 23 MG/DL    Creatinine 0.90 0.6 - 1.0 MG/DL    Est, Glom Filt Rate >60 >60 ml/min/1.73m2    Calcium 9.8 8.3 - 10.4 MG/DL    Total Bilirubin 0.7 0.2 - 1.1 MG/DL    ALT 44 12 - 65 U/L    AST 34 15 - 37 U/L    Alk Phosphatase 138 (H) 50 - 136 U/L    Total Protein 9.1 (H) 6.3 - 8.2 g/dL    Albumin 4.1 3.5 - 5.0 g/dL    Globulin 5.0 (H) 2.8 - 4.5 g/dL    Albumin/Globulin Ratio 0.8 0.4 - 1.6     CBC with Auto Differential    Collection Time: 02/19/24 11:34 AM   Result Value Ref Range    WBC 5.1 4.3 - 11.1 K/uL    RBC 4.88 4.05 - 5.2 M/uL    Hemoglobin 14.7 11.7 - 15.4 g/dL    Hematocrit 45.4 35.8 - 46.3 %    MCV 93.0 82.0 - 102.0 FL    MCH 30.1 26.1 - 32.9 PG    MCHC 32.4 31.4 - 35.0 g/dL    RDW 12.9 11.9 - 14.6 %    Platelets 230 150 - 450 K/uL    MPV 8.6 (L) 9.4 - 12.3 FL    nRBC 0.00 0.0 - 0.2 K/uL    Neutrophils % 67 43 - 78 %    Lymphocytes % 25 13 - 44 %    Monocytes % 5 4.0 - 12.0 %    Eosinophils % 2 0.5 - 7.8 %    Basophils % 1 0.0 - 2.0 %    Immature Granulocytes 0 0.0 - 5.0 
Stable

## 2024-03-15 NOTE — PROGRESS NOTES
END OF SHIFT NOTE:    Intake/Output  02/20 1901 - 02/21 0700  In: -   Out: 100 [Urine:100]   Voiding: YES  Catheter: NO  Drain:              Stool:  0 occurrences. Stool Assessment  Stool Color: Brown (02/20/22 1509)  Stool Appearance: Mucous; Loose (02/20/22 1509)  Stool Amount: Small (02/20/22 1509)  Stool Source/Status: Rectum (02/20/22 1509)    Emesis:  0 occurrences. VITAL SIGNS  Patient Vitals for the past 12 hrs:   Temp Pulse Resp BP SpO2   02/21/22 0300 97.8 °F (36.6 °C) 89 18 104/60 97 %   02/20/22 2243 97.7 °F (36.5 °C) 89 18 106/69 99 %   02/20/22 1927 97.8 °F (36.6 °C) 93 18 109/60 99 %       Pain Assessment  Pain 1  Pain Scale 1: Numeric (0 - 10) (02/21/22 0215)  Pain Intensity 1: 0 (02/21/22 0215)  Patient Stated Pain Goal: 0 (02/21/22 0215)  Pain Reassessment 1: Yes (02/19/22 1320)    Ambulating  Yes    Additional Information: pt had an uneventful night, currently resting with no complaints at this time    Shift report given to oncoming nurse at the bedside.     Terra Pritchett RN Head,  normocephalic,  atraumatic,  Face,  Face within normal limits

## 2024-08-19 ENCOUNTER — OFFICE VISIT (OUTPATIENT)
Dept: ONCOLOGY | Age: 59
End: 2024-08-19
Payer: COMMERCIAL

## 2024-08-19 ENCOUNTER — HOSPITAL ENCOUNTER (OUTPATIENT)
Dept: LAB | Age: 59
Discharge: HOME | End: 2024-08-22
Payer: COMMERCIAL

## 2024-08-19 VITALS
DIASTOLIC BLOOD PRESSURE: 73 MMHG | HEIGHT: 60 IN | WEIGHT: 161.5 LBS | OXYGEN SATURATION: 99 % | TEMPERATURE: 97.4 F | HEART RATE: 61 BPM | RESPIRATION RATE: 18 BRPM | SYSTOLIC BLOOD PRESSURE: 134 MMHG | BODY MASS INDEX: 31.71 KG/M2

## 2024-08-19 DIAGNOSIS — Z12.39 BREAST CANCER SCREENING, HIGH RISK PATIENT: ICD-10-CM

## 2024-08-19 DIAGNOSIS — Z85.3 HISTORY OF BREAST CANCER: ICD-10-CM

## 2024-08-19 DIAGNOSIS — Z85.3 HISTORY OF BREAST CANCER: Primary | ICD-10-CM

## 2024-08-19 DIAGNOSIS — E03.2 HYPOTHYROIDISM DUE TO MEDICATION: ICD-10-CM

## 2024-08-19 LAB
ALBUMIN SERPL-MCNC: 4.3 G/DL (ref 3.5–5)
ALBUMIN/GLOB SERPL: 1.2 (ref 1–1.9)
ALP SERPL-CCNC: 120 U/L (ref 35–104)
ALT SERPL-CCNC: 34 U/L (ref 12–65)
ANION GAP SERPL CALC-SCNC: 10 MMOL/L (ref 9–18)
AST SERPL-CCNC: 32 U/L (ref 15–37)
BASOPHILS # BLD: 0 K/UL (ref 0–0.2)
BASOPHILS NFR BLD: 1 % (ref 0–2)
BILIRUB SERPL-MCNC: 0.8 MG/DL (ref 0–1.2)
BUN SERPL-MCNC: 15 MG/DL (ref 6–23)
CALCIUM SERPL-MCNC: 9.8 MG/DL (ref 8.8–10.2)
CHLORIDE SERPL-SCNC: 102 MMOL/L (ref 98–107)
CO2 SERPL-SCNC: 26 MMOL/L (ref 20–28)
CREAT SERPL-MCNC: 0.78 MG/DL (ref 0.6–1.1)
DIFFERENTIAL METHOD BLD: ABNORMAL
EOSINOPHIL # BLD: 0.1 K/UL (ref 0–0.8)
EOSINOPHIL NFR BLD: 1 % (ref 0.5–7.8)
ERYTHROCYTE [DISTWIDTH] IN BLOOD BY AUTOMATED COUNT: 12.9 % (ref 11.9–14.6)
GLOBULIN SER CALC-MCNC: 3.7 G/DL (ref 2.3–3.5)
GLUCOSE SERPL-MCNC: 103 MG/DL (ref 70–99)
HCT VFR BLD AUTO: 45.1 % (ref 35.8–46.3)
HGB BLD-MCNC: 14.6 G/DL (ref 11.7–15.4)
IMM GRANULOCYTES # BLD AUTO: 0 K/UL (ref 0–0.5)
IMM GRANULOCYTES NFR BLD AUTO: 0 % (ref 0–5)
KAPPA LC FREE SER-MCNC: 27 MG/L (ref 2.4–20.7)
KAPPA LC FREE/LAMBDA FREE SER: 1.9 (ref 0.2–0.8)
LAMBDA LC FREE SERPL-MCNC: 14.5 MG/L (ref 4.2–27.7)
LYMPHOCYTES # BLD: 1.6 K/UL (ref 0.5–4.6)
LYMPHOCYTES NFR BLD: 31 % (ref 13–44)
MCH RBC QN AUTO: 30.4 PG (ref 26.1–32.9)
MCHC RBC AUTO-ENTMCNC: 32.4 G/DL (ref 31.4–35)
MCV RBC AUTO: 93.8 FL (ref 82–102)
MONOCYTES # BLD: 0.3 K/UL (ref 0.1–1.3)
MONOCYTES NFR BLD: 6 % (ref 4–12)
NEUTS SEG # BLD: 3.2 K/UL (ref 1.7–8.2)
NEUTS SEG NFR BLD: 61 % (ref 43–78)
NRBC # BLD: 0 K/UL (ref 0–0.2)
PLATELET # BLD AUTO: 199 K/UL (ref 150–450)
PMV BLD AUTO: 8.9 FL (ref 9.4–12.3)
POTASSIUM SERPL-SCNC: 4.5 MMOL/L (ref 3.5–5.1)
PROT SERPL-MCNC: 8.1 G/DL (ref 6.3–8.2)
RBC # BLD AUTO: 4.81 M/UL (ref 4.05–5.2)
SODIUM SERPL-SCNC: 138 MMOL/L (ref 136–145)
TSH, 3RD GENERATION: 1.85 UIU/ML (ref 0.27–4.2)
WBC # BLD AUTO: 5.2 K/UL (ref 4.3–11.1)

## 2024-08-19 PROCEDURE — 82784 ASSAY IGA/IGD/IGG/IGM EACH: CPT

## 2024-08-19 PROCEDURE — 36415 COLL VENOUS BLD VENIPUNCTURE: CPT

## 2024-08-19 PROCEDURE — 84443 ASSAY THYROID STIM HORMONE: CPT

## 2024-08-19 PROCEDURE — 3075F SYST BP GE 130 - 139MM HG: CPT | Performed by: INTERNAL MEDICINE

## 2024-08-19 PROCEDURE — 99214 OFFICE O/P EST MOD 30 MIN: CPT | Performed by: INTERNAL MEDICINE

## 2024-08-19 PROCEDURE — 80053 COMPREHEN METABOLIC PANEL: CPT

## 2024-08-19 PROCEDURE — 85025 COMPLETE CBC W/AUTO DIFF WBC: CPT

## 2024-08-19 PROCEDURE — 3078F DIAST BP <80 MM HG: CPT | Performed by: INTERNAL MEDICINE

## 2024-08-19 PROCEDURE — 84165 PROTEIN E-PHORESIS SERUM: CPT

## 2024-08-19 PROCEDURE — 86334 IMMUNOFIX E-PHORESIS SERUM: CPT

## 2024-08-19 PROCEDURE — 83521 IG LIGHT CHAINS FREE EACH: CPT

## 2024-08-19 RX ORDER — LEVOTHYROXINE SODIUM 0.03 MG/1
25 TABLET ORAL DAILY
Qty: 90 TABLET | Refills: 1 | Status: SHIPPED | OUTPATIENT
Start: 2024-08-19

## 2024-08-19 ASSESSMENT — PATIENT HEALTH QUESTIONNAIRE - PHQ9
2. FEELING DOWN, DEPRESSED OR HOPELESS: NOT AT ALL
SUM OF ALL RESPONSES TO PHQ QUESTIONS 1-9: 0
SUM OF ALL RESPONSES TO PHQ QUESTIONS 1-9: 0
1. LITTLE INTEREST OR PLEASURE IN DOING THINGS: NOT AT ALL
SUM OF ALL RESPONSES TO PHQ9 QUESTIONS 1 & 2: 0
SUM OF ALL RESPONSES TO PHQ QUESTIONS 1-9: 0
SUM OF ALL RESPONSES TO PHQ QUESTIONS 1-9: 0

## 2024-08-19 NOTE — PROGRESS NOTES
2/28/2022, swelling resolved, mild rash of hands and arms, discussed to change Taxol to Taxotere for better management of reaction, return on 3/7/2022 reported much better tolerance for the allergic reaction, however more nausea/vomiting/weight loss  and neutropenic fever with temperature 101 and ANC 0.2, coughing and rule out Covid, typical seasonal allergic sinusitis symptoms and educated to start using Nasacort spray, admit to hospital for neutropenic fever, discharge 3/10/2022, had skin rash that  was attributed to cefepime and resolved, again discussed her sensitivity to multiple allergens and different manifestations need to be managed accordingly, Zaditor for allergic conjunctivitis, nasal steroid for sinusitis, Kenalog for skin rash, oral antihistamine,  prednisone 20 mg only as needed, added G-CSF since cycle 3, better controlled for allergy/hypertension/fluid retention, severe diarrhea responded to PRBC, severe thrombocytopenia recovered after delaying cycle 4 by a week, completed cycle 4 with G-CSF  and Emend.   She returned on 5/9/2022 to start AC/Keytruda, however platelet was low and chemo had to be deferred for 2 weeks actually and platelets finally recovered to 92 on 5/23/2022, discussed to proceed with caution and reduce AC dose by 20%, which she tolerated reasonably well and completed 4 cycles of AC/Keytruda without major complication, repeat MRI showed cCR and Dr. Awad performed left lumpectomy and axillary SLND on 9/21/2022, pathology showed ypTis N0 triple negative left breast carcinoma, discussed that the complete response of the invasive triple negative cancer is a favorable prognostic marker, continued adjuvant treatment, complaining of left shoulder pain since the surgery and consult [facility] for physical therapy, still hurting after physical therapy and orthopedic evaluation was recommended, considered tendinitis and responded to diclofenac, return on 2/13/2023, labs reviewed and proceed to

## 2024-08-19 NOTE — PATIENT INSTRUCTIONS
----- Message from Ronit Hickey MD sent at 12/22/2020  2:51 PM CST -----  Hello,    Chest x-ray normal.    Please let patient know chest x-ray is normal  
LVM for patient regarding test results.  Please call the office back at [phone].    Letter sent  
LVM for patient regarding your test results.  You can call the office back at [phone] at your convenience.  
equations.  The CKD-EPI equation is less accurate in patients with extremes of muscle mass, extra-renal metabolism of creatinine, excessive creatine ingestion, or following therapy that affects renal tubular secretion.      Calcium 08/19/2024 9.8  8.8 - 10.2 MG/DL Final    Total Bilirubin 08/19/2024 0.8  0.0 - 1.2 MG/DL Final    ALT 08/19/2024 34  12 - 65 U/L Final    AST 08/19/2024 32  15 - 37 U/L Final    Alk Phosphatase 08/19/2024 120 (H)  35 - 104 U/L Final    Total Protein 08/19/2024 8.1  6.3 - 8.2 g/dL Final    Albumin 08/19/2024 4.3  3.5 - 5.0 g/dL Final    Globulin 08/19/2024 3.7 (H)  2.3 - 3.5 g/dL Final    Albumin/Globulin Ratio 08/19/2024 1.2  1.0 - 1.9   Final    WBC 08/19/2024 5.2  4.3 - 11.1 K/uL Final    RBC 08/19/2024 4.81  4.05 - 5.2 M/uL Final    Hemoglobin 08/19/2024 14.6  11.7 - 15.4 g/dL Final    Hematocrit 08/19/2024 45.1  35.8 - 46.3 % Final    MCV 08/19/2024 93.8  82.0 - 102.0 FL Final    MCH 08/19/2024 30.4  26.1 - 32.9 PG Final    MCHC 08/19/2024 32.4  31.4 - 35.0 g/dL Final    RDW 08/19/2024 12.9  11.9 - 14.6 % Final    Platelets 08/19/2024 199  150 - 450 K/uL Final    MPV 08/19/2024 8.9 (L)  9.4 - 12.3 FL Final    nRBC 08/19/2024 0.00  0.0 - 0.2 K/uL Final    **Note: Absolute NRBC parameter is now reported with Hemogram**    Differential Type 08/19/2024 AUTOMATED    Final    Neutrophils % 08/19/2024 61  43 - 78 % Final    Lymphocytes % 08/19/2024 31  13 - 44 % Final    Monocytes % 08/19/2024 6  4.0 - 12.0 % Final    Eosinophils % 08/19/2024 1  0.5 - 7.8 % Final    Basophils % 08/19/2024 1  0.0 - 2.0 % Final    Immature Granulocytes % 08/19/2024 0  0.0 - 5.0 % Final    Neutrophils Absolute 08/19/2024 3.2  1.7 - 8.2 K/UL Final    Lymphocytes Absolute 08/19/2024 1.6  0.5 - 4.6 K/UL Final    Monocytes Absolute 08/19/2024 0.3  0.1 - 1.3 K/UL Final    Eosinophils Absolute 08/19/2024 0.1  0.0 - 0.8 K/UL Final    Basophils Absolute 08/19/2024 0.0  0.0 - 0.2 K/UL Final    Immature Granulocytes

## 2024-08-23 LAB
ALBUMIN SERPL ELPH-MCNC: 4.2 G/DL (ref 2.9–4.4)
ALBUMIN/GLOB SERPL: 1.2 (ref 0.7–1.7)
ALPHA1 GLOB SERPL ELPH-MCNC: 0.2 G/DL (ref 0–0.4)
ALPHA2 GLOB SERPL ELPH-MCNC: 0.8 G/DL (ref 0.4–1)
B-GLOBULIN SERPL ELPH-MCNC: 1.2 G/DL (ref 0.7–1.3)
GAMMA GLOB SERPL ELPH-MCNC: 1.5 G/DL (ref 0.4–1.8)
GLOBULIN SER-MCNC: 3.7 G/DL (ref 2.2–3.9)
IGA SERPL-MCNC: 180 MG/DL (ref 87–352)
IGG SERPL-MCNC: 1672 MG/DL (ref 586–1602)
IGM SERPL-MCNC: 135 MG/DL (ref 26–217)
INTERPRETATION SERPL IEP-IMP: ABNORMAL
M PROTEIN SERPL ELPH-MCNC: ABNORMAL G/DL
PROT SERPL-MCNC: 7.9 G/DL (ref 6–8.5)

## 2024-12-09 ENCOUNTER — HOSPITAL ENCOUNTER (OUTPATIENT)
Dept: MAMMOGRAPHY | Age: 59
Discharge: HOME | End: 2024-12-12
Attending: INTERNAL MEDICINE
Payer: COMMERCIAL

## 2024-12-09 DIAGNOSIS — Z85.3 HISTORY OF BREAST CANCER: ICD-10-CM

## 2024-12-09 DIAGNOSIS — Z12.39 BREAST CANCER SCREENING, HIGH RISK PATIENT: ICD-10-CM

## 2024-12-09 PROCEDURE — 77063 BREAST TOMOSYNTHESIS BI: CPT

## 2025-02-24 ENCOUNTER — OFFICE VISIT (OUTPATIENT)
Dept: ONCOLOGY | Age: 60
End: 2025-02-24
Payer: COMMERCIAL

## 2025-02-24 ENCOUNTER — HOSPITAL ENCOUNTER (OUTPATIENT)
Dept: LAB | Age: 60
Discharge: HOME | End: 2025-02-24
Payer: COMMERCIAL

## 2025-02-24 VITALS
HEIGHT: 60 IN | RESPIRATION RATE: 18 BRPM | TEMPERATURE: 98.1 F | DIASTOLIC BLOOD PRESSURE: 85 MMHG | OXYGEN SATURATION: 99 % | SYSTOLIC BLOOD PRESSURE: 142 MMHG | HEART RATE: 64 BPM | WEIGHT: 162 LBS | BODY MASS INDEX: 31.8 KG/M2

## 2025-02-24 DIAGNOSIS — E03.2 HYPOTHYROIDISM DUE TO MEDICATION: ICD-10-CM

## 2025-02-24 DIAGNOSIS — Z12.31 BREAST CANCER SCREENING BY MAMMOGRAM: ICD-10-CM

## 2025-02-24 DIAGNOSIS — Z85.3 HISTORY OF BREAST CANCER: ICD-10-CM

## 2025-02-24 DIAGNOSIS — Z17.421 TRIPLE NEGATIVE MALIGNANT NEOPLASM OF BREAST (HCC): Primary | ICD-10-CM

## 2025-02-24 DIAGNOSIS — C50.919 TRIPLE NEGATIVE MALIGNANT NEOPLASM OF BREAST (HCC): Primary | ICD-10-CM

## 2025-02-24 LAB
ALBUMIN SERPL-MCNC: 4 G/DL (ref 3.5–5)
ALBUMIN/GLOB SERPL: 1 (ref 1–1.9)
ALP SERPL-CCNC: 91 U/L (ref 35–104)
ALT SERPL-CCNC: 21 U/L (ref 8–45)
ANION GAP SERPL CALC-SCNC: 10 MMOL/L (ref 7–16)
AST SERPL-CCNC: 27 U/L (ref 15–37)
BASOPHILS # BLD: 0.03 K/UL (ref 0–0.2)
BASOPHILS NFR BLD: 0.6 % (ref 0–2)
BILIRUB SERPL-MCNC: 0.7 MG/DL (ref 0–1.2)
BUN SERPL-MCNC: 16 MG/DL (ref 6–23)
CALCIUM SERPL-MCNC: 9.8 MG/DL (ref 8.8–10.2)
CHLORIDE SERPL-SCNC: 101 MMOL/L (ref 98–107)
CO2 SERPL-SCNC: 25 MMOL/L (ref 20–29)
CREAT SERPL-MCNC: 0.72 MG/DL (ref 0.6–1.1)
DIFFERENTIAL METHOD BLD: ABNORMAL
EOSINOPHIL # BLD: 0.05 K/UL (ref 0–0.8)
EOSINOPHIL NFR BLD: 1 % (ref 0.5–7.8)
ERYTHROCYTE [DISTWIDTH] IN BLOOD BY AUTOMATED COUNT: 12.7 % (ref 11.9–14.6)
GLOBULIN SER CALC-MCNC: 4 G/DL (ref 2.3–3.5)
GLUCOSE SERPL-MCNC: 105 MG/DL (ref 70–99)
HCT VFR BLD AUTO: 45.2 % (ref 35.8–46.3)
HGB BLD-MCNC: 14.6 G/DL (ref 11.7–15.4)
IMM GRANULOCYTES # BLD AUTO: 0.02 K/UL (ref 0–0.5)
IMM GRANULOCYTES NFR BLD AUTO: 0.4 % (ref 0–5)
LYMPHOCYTES # BLD: 1.46 K/UL (ref 0.5–4.6)
LYMPHOCYTES NFR BLD: 29.3 % (ref 13–44)
MCH RBC QN AUTO: 30.1 PG (ref 26.1–32.9)
MCHC RBC AUTO-ENTMCNC: 32.3 G/DL (ref 31.4–35)
MCV RBC AUTO: 93.2 FL (ref 82–102)
MONOCYTES # BLD: 0.28 K/UL (ref 0.1–1.3)
MONOCYTES NFR BLD: 5.6 % (ref 4–12)
NEUTS SEG # BLD: 3.14 K/UL (ref 1.7–8.2)
NEUTS SEG NFR BLD: 63.1 % (ref 43–78)
NRBC # BLD: 0 K/UL (ref 0–0.2)
PLATELET # BLD AUTO: 202 K/UL (ref 150–450)
PMV BLD AUTO: 9 FL (ref 9.4–12.3)
POTASSIUM SERPL-SCNC: 3.9 MMOL/L (ref 3.5–5.1)
PROT SERPL-MCNC: 8 G/DL (ref 6.3–8.2)
RBC # BLD AUTO: 4.85 M/UL (ref 4.05–5.2)
SODIUM SERPL-SCNC: 136 MMOL/L (ref 136–145)
TSH, 3RD GENERATION: 1.95 UIU/ML (ref 0.27–4.2)
WBC # BLD AUTO: 5 K/UL (ref 4.3–11.1)

## 2025-02-24 PROCEDURE — 80053 COMPREHEN METABOLIC PANEL: CPT

## 2025-02-24 PROCEDURE — 3077F SYST BP >= 140 MM HG: CPT | Performed by: INTERNAL MEDICINE

## 2025-02-24 PROCEDURE — 99214 OFFICE O/P EST MOD 30 MIN: CPT | Performed by: INTERNAL MEDICINE

## 2025-02-24 PROCEDURE — 36415 COLL VENOUS BLD VENIPUNCTURE: CPT

## 2025-02-24 PROCEDURE — 85025 COMPLETE CBC W/AUTO DIFF WBC: CPT

## 2025-02-24 PROCEDURE — 3079F DIAST BP 80-89 MM HG: CPT | Performed by: INTERNAL MEDICINE

## 2025-02-24 PROCEDURE — 84443 ASSAY THYROID STIM HORMONE: CPT

## 2025-02-24 RX ORDER — LEVOTHYROXINE SODIUM 25 UG/1
25 TABLET ORAL DAILY
Qty: 90 TABLET | Refills: 1 | Status: SHIPPED | OUTPATIENT
Start: 2025-02-24

## 2025-02-24 RX ORDER — NEBIVOLOL 10 MG/1
10 TABLET ORAL DAILY
COMMUNITY
Start: 2024-05-08

## 2025-02-24 ASSESSMENT — PATIENT HEALTH QUESTIONNAIRE - PHQ9
SUM OF ALL RESPONSES TO PHQ9 QUESTIONS 1 & 2: 0
SUM OF ALL RESPONSES TO PHQ QUESTIONS 1-9: 0
1. LITTLE INTEREST OR PLEASURE IN DOING THINGS: NOT AT ALL
SUM OF ALL RESPONSES TO PHQ QUESTIONS 1-9: 0
2. FEELING DOWN, DEPRESSED OR HOPELESS: NOT AT ALL

## 2025-02-24 NOTE — PROGRESS NOTES
[facility]: Office Visit Progress Note    Chief Complaint:    Triple negative left breast cancer    History of Present Illness:  Reason for Referral: Carcinoma of upper-inner  quadrant of left breast in female, estrogen receptor negative; Triple negative malignant neoplasm of breast       Referring Provider: Blaise Awad MD       Primary Care Provider: Karl Muse MD       Family History of Cancer/Hematologic Disorders: None reported       Presenting Symptoms: Abnormal routine screening mammogram        Ms. Nails is a 59 y.o.  female with a history of heart murmur, HTN, mixed HLD, and irregular menses, who was recently diagnosed with breast  cancer. She initially presented on 11/22/21 for a routine bilateral digital screening mammogram which identified focal asymmetry in the medial inferior left breast at 6 cm from the nipple. Further evaluation with diagnostic left breast mammogram and left  breast ultrasound were completed on 12/6/21 confirming the presence of adjacent left breast masses with one at the 9:00 position, 6 cm from the nipple measuring approximately 1.5 x 0.8 x 0.8 cm and a 7 mm hypoechoic mass just adjacent to this.        Recommended ultrasound-guided biopsy of the medial left breast mass was completed on 12/23/21 with pathology from the core biopsy of the left breast, 9:00 position, 6 cm from nipple, revealing ER 0.9%/N[place] 0.4%/HER-2 (0) negative, intermediate grade (moderately  differentiated), foci of at least microinvasive infiltrating ductal carcinoma in association with focally high grade ductal carcinoma in situ with no definite lymphovascular invasion identified.        MRI of the bilateral breasts was performed on 12/28/21 demonstrating left medial breast cancer measuring about 4.5 cm in greatest dimension; no additional suspicious finding elsewhere in either breast; and no obvious lymphadenopathy.       Patient was referred to surgery and evaluated in

## 2025-02-24 NOTE — PATIENT INSTRUCTIONS
Patient Information from Today's Visit    The members of your [facility] are listed below:    Physician Provider: Nancy Baker Medical Oncologist  Advanced Practice Clinician: Keyanna Henning NP  Registered Nurse: Rima YAÑEZ   Navigator: Chayito HAMLIN RN or Keyanna SEGURA RN  Medical Assistant: Janene ZHANG MA  : Jessica ANTONIO   Supportive Care Services: Honey HURD LMSW    Diagnosis: Breast      Follow Up Instructions: 6 months.    Labs reviewed.  Symptoms reviewed.  Next mammogram due in December.  You can call to schedule this at [phone].  Refill of levothyroxine sent.    Treatment Summary has been discussed and given to patient:N/A      Current Labs:   Hospital Outpatient Visit on 02/24/2025   Component Date Value Ref Range Status    WBC 02/24/2025 5.0  4.3 - 11.1 K/uL Final    RBC 02/24/2025 4.85  4.05 - 5.2 M/uL Final    Hemoglobin 02/24/2025 14.6  11.7 - 15.4 g/dL Final    Hematocrit 02/24/2025 45.2  35.8 - 46.3 % Final    MCV 02/24/2025 93.2  82.0 - 102.0 FL Final    MCH 02/24/2025 30.1  26.1 - 32.9 PG Final    MCHC 02/24/2025 32.3  31.4 - 35.0 g/dL Final    RDW 02/24/2025 12.7  11.9 - 14.6 % Final    Platelets 02/24/2025 202  150 - 450 K/uL Final    MPV 02/24/2025 9.0 (L)  9.4 - 12.3 FL Final    nRBC 02/24/2025 0.00  0.0 - 0.2 K/uL Final    **Note: Absolute NRBC parameter is now reported with Hemogram**    Neutrophils % 02/24/2025 63.1  43.0 - 78.0 % Final    Lymphocytes % 02/24/2025 29.3  13.0 - 44.0 % Final    Monocytes % 02/24/2025 5.6  4.0 - 12.0 % Final    Eosinophils % 02/24/2025 1.0  0.5 - 7.8 % Final    Basophils % 02/24/2025 0.6  0.0 - 2.0 % Final    Immature Granulocytes % 02/24/2025 0.4  0.0 - 5.0 % Final    Neutrophils Absolute 02/24/2025 3.14  1.70 - 8.20 K/UL Final    Lymphocytes Absolute 02/24/2025 1.46  0.50 - 4.60 K/UL Final    Monocytes Absolute 02/24/2025 0.28  0.10 - 1.30 K/UL Final    Eosinophils Absolute 02/24/2025 0.05  0.00 - 0.80 K/UL Final    Basophils Absolute

## 2025-06-23 ENCOUNTER — OFFICE VISIT (OUTPATIENT)
Dept: OBGYN CLINIC | Age: 60
End: 2025-06-23
Payer: COMMERCIAL

## 2025-06-23 VITALS
SYSTOLIC BLOOD PRESSURE: 144 MMHG | HEIGHT: 60 IN | BODY MASS INDEX: 31.41 KG/M2 | DIASTOLIC BLOOD PRESSURE: 80 MMHG | WEIGHT: 160 LBS

## 2025-06-23 DIAGNOSIS — Z12.4 PAP SMEAR FOR CERVICAL CANCER SCREENING: ICD-10-CM

## 2025-06-23 DIAGNOSIS — Z01.419 WOMEN'S ANNUAL ROUTINE GYNECOLOGICAL EXAMINATION: Primary | ICD-10-CM

## 2025-06-23 PROCEDURE — 3077F SYST BP >= 140 MM HG: CPT | Performed by: NURSE PRACTITIONER

## 2025-06-23 PROCEDURE — 3079F DIAST BP 80-89 MM HG: CPT | Performed by: NURSE PRACTITIONER

## 2025-06-23 PROCEDURE — 99396 PREV VISIT EST AGE 40-64: CPT | Performed by: NURSE PRACTITIONER

## 2025-06-23 NOTE — PROGRESS NOTES
I have reviewed the patient's visit today including history, exam and assessment by SAIRA Olivo.  I agree with treatment/plan as above.    Luis M Law MD  10:59 AM  06/23/25   
Patient comes in today for AE.     : Vidya [id number]    LAST PAP:  04/25/2022 negative, HPV negative     LAST MAMMO:  12/09/2024    LMP:  No LMP recorded. Patient is postmenopausal.    BIRTH CONTROL:  post menopausal status    TOBACCO USE:  No    FAMILY HISTORY OF:   Breast Cancer:  No   Ovarian Cancer:  No   Uterine Cancer:  No   Colon Cancer:  No    Vitals:    06/23/25 1024   BP: (!) 144/80   BP Site: Left Upper Arm   Patient Position: Sitting   Weight: 72.6 kg (160 lb)   Height: 1.524 m (5')        Kirstin Chavez MA  06/23/25  10:39 AM    
imaging study 04/13/2016    Benign essential HTN 04/13/2016    Frequent headaches 02/25/2016       Problem List Items Addressed This Visit          Other    Women's annual routine gynecological examination - Primary     Other Visit Diagnoses         Pap smear for cervical cancer screening        Relevant Orders    PAP IG, Aptima HPV and rfx 16/18,45 ([id number])            Orders Placed This Encounter   Procedures    PAP IG, Aptima HPV and rfx 16/18,45 ([id number])       Outpatient Encounter Medications as of 6/23/2025   Medication Sig Dispense Refill    levothyroxine (SYNTHROID) 25 MCG tablet Take 1 tablet by mouth daily 90 tablet 1    nebivolol (BYSTOLIC) 10 MG tablet Take 1 tablet by mouth daily      fluticasone-salmeterol (ADVAIR DISKUS) 250-50 MCG/ACT AEPB diskus inhaler Inhale 1 puff into the lungs 2 times daily 60 each 3    dilTIAZem (CARDIZEM CD) 240 MG extended release capsule Take 1 capsule by mouth daily (Patient not taking: Reported on 6/23/2025)      lidocaine-prilocaine (EMLA) 2.5-2.5 % cream Apply to port about 45 minutes prior to access 2.5 g 1    spironolactone (ALDACTONE) 25 MG tablet 1 tablet      [DISCONTINUED] montelukast (SINGULAIR) 10 MG tablet Take 10 mg by mouth daily (Patient not taking: No sig reported)       No facility-administered encounter medications on file as of 6/23/2025.

## 2025-06-25 ENCOUNTER — TELEPHONE (OUTPATIENT)
Dept: OBGYN CLINIC | Age: 60
End: 2025-06-25

## 2025-06-25 LAB
COLLECTION METHOD: NORMAL
CYTOLOGIST CVX/VAG CYTO: NORMAL
CYTOLOGY CVX/VAG DOC THIN PREP: NORMAL
HPV APTIMA: NEGATIVE
HPV GENOTYPE REFLEX: NORMAL
Lab: NORMAL
PAP SOURCE: NORMAL
PATH REPORT.FINAL DX SPEC: NORMAL
STAT OF ADQ CVX/VAG CYTO-IMP: NORMAL

## 2025-06-27 NOTE — CONSULTS
Session ID: [id number]  Session Duration: 2 minutes  Language: Mandarin   ID: #[id number]   Name: Gertrudis

## 2025-06-27 NOTE — TELEPHONE ENCOUNTER
Gertrudis   #[id number]    Patient came into office on 6/27/2025 at 9:02am.   Updated patient in office that her pap smear came back normal.     Patient verbalized understanding.

## 2025-07-23 PROBLEM — Z01.419 WOMEN'S ANNUAL ROUTINE GYNECOLOGICAL EXAMINATION: Status: RESOLVED | Noted: 2017-07-24 | Resolved: 2025-07-23

## 2025-09-02 DIAGNOSIS — C50.919 TRIPLE NEGATIVE MALIGNANT NEOPLASM OF BREAST (HCC): Primary | ICD-10-CM

## 2025-09-02 DIAGNOSIS — Z17.421 TRIPLE NEGATIVE MALIGNANT NEOPLASM OF BREAST (HCC): Primary | ICD-10-CM

## (undated) DEVICE — APPLIER CLP L9.38IN M LIG TI DISP STR RNG HNDL LIGACLP

## (undated) DEVICE — PREMIUM WET SKIN PREP TRAY: Brand: MEDLINE INDUSTRIES, INC.

## (undated) DEVICE — SUTURE VCRL SZ 3-0 L18IN ABSRB UD L26MM SH 1/2 CIR J864D

## (undated) DEVICE — CANISTER, RIGID, 2000CC: Brand: MEDLINE INDUSTRIES, INC.

## (undated) DEVICE — CONTAINER,SPECIMEN,O.R.STRL,4.5OZ: Brand: MEDLINE

## (undated) DEVICE — MINOR SPLIT GENERAL: Brand: MEDLINE INDUSTRIES, INC.

## (undated) DEVICE — GAUZE,SPONGE,4"X4",16PLY,STRL,LF,10/TRAY: Brand: MEDLINE

## (undated) DEVICE — WIRE CUTTER, STERILE (WCS144): Brand: CENTURION MEDICAL PRODUCTS CORP

## (undated) DEVICE — GLOVE SURG SZ 6.5 L12IN FNGR THK11.8MIL KHAKI LTX BEAD CUF

## (undated) DEVICE — PAD,NON-ADHERENT,3X8,STERILE,LF,1/PK: Brand: MEDLINE

## (undated) DEVICE — SOLUTION IRRIG 1000ML 0.9% SOD CHL USP POUR PLAS BTL

## (undated) DEVICE — GLOVE SURG SZ 65 CRM LTX FREE POLYISOPRENE POLYMER BEAD ANTI

## (undated) DEVICE — GLOVE SURG SZ 7 L12IN FNGR THK79MIL GRN LTX FREE

## (undated) DEVICE — GLOVE SURG SZ 65 THK91MIL LTX FREE SYN POLYISOPRENE

## (undated) DEVICE — MASTISOL ADHESIVE LIQ 2/3ML

## (undated) DEVICE — NEEDLE HYPO 21GA L1.5IN INTRAMUSCULAR S STL LATCH BVL UP

## (undated) DEVICE — STRIP,CLOSURE,WOUND,MEDI-STRIP,1/2X4: Brand: MEDLINE

## (undated) DEVICE — COVER PRB L11.9CM TAPR L3.8X61CM TRNSPAR SFT PLIABLE

## (undated) DEVICE — 3M™ TEGADERM™ TRANSPARENT FILM DRESSING FRAME STYLE, 1626W, 4 IN X 4-3/4 IN (10 CM X 12 CM), 50/CT 4CT/CASE: Brand: 3M™ TEGADERM™